# Patient Record
Sex: MALE | Race: WHITE | Employment: OTHER | URBAN - METROPOLITAN AREA
[De-identification: names, ages, dates, MRNs, and addresses within clinical notes are randomized per-mention and may not be internally consistent; named-entity substitution may affect disease eponyms.]

---

## 2017-01-30 PROBLEM — M54.50 CHRONIC MIDLINE LOW BACK PAIN WITHOUT SCIATICA: Status: ACTIVE | Noted: 2017-01-30

## 2017-01-30 PROBLEM — G89.29 CHRONIC MIDLINE LOW BACK PAIN WITHOUT SCIATICA: Status: ACTIVE | Noted: 2017-01-30

## 2017-01-30 PROBLEM — G62.9 SENSORIMOTOR NEUROPATHY: Status: ACTIVE | Noted: 2017-01-30

## 2017-01-30 PROBLEM — I67.89 CEREBRAL MICROVASCULOPATHY: Status: ACTIVE | Noted: 2017-01-30

## 2017-01-30 PROBLEM — Z87.898 HISTORY OF SNORING: Status: ACTIVE | Noted: 2017-01-30

## 2017-01-30 PROBLEM — R41.9 COGNITIVE COMPLAINTS: Status: ACTIVE | Noted: 2017-01-30

## 2017-02-02 PROBLEM — G62.89 MIXED AXONAL-DEMYELINATING POLYNEUROPATHY: Status: ACTIVE | Noted: 2017-02-02

## 2017-02-02 PROCEDURE — 82607 VITAMIN B-12: CPT | Performed by: OTHER

## 2017-02-02 PROCEDURE — 84165 PROTEIN E-PHORESIS SERUM: CPT | Performed by: OTHER

## 2017-02-02 PROCEDURE — 82746 ASSAY OF FOLIC ACID SERUM: CPT | Performed by: OTHER

## 2017-02-02 PROCEDURE — 83883 ASSAY NEPHELOMETRY NOT SPEC: CPT | Performed by: OTHER

## 2017-02-02 PROCEDURE — 86334 IMMUNOFIX E-PHORESIS SERUM: CPT | Performed by: OTHER

## 2017-02-02 PROCEDURE — 83921 ORGANIC ACID SINGLE QUANT: CPT | Performed by: OTHER

## 2017-02-02 PROCEDURE — 86618 LYME DISEASE ANTIBODY: CPT | Performed by: OTHER

## 2017-02-02 PROCEDURE — 86038 ANTINUCLEAR ANTIBODIES: CPT | Performed by: OTHER

## 2017-03-01 PROBLEM — I73.9 PVD (PERIPHERAL VASCULAR DISEASE) (HCC): Status: ACTIVE | Noted: 2017-03-01

## 2017-03-01 PROBLEM — E11.42 DIABETIC SENSORIMOTOR POLYNEUROPATHY (HCC): Status: ACTIVE | Noted: 2017-03-01

## 2017-03-01 PROBLEM — M48.061 LUMBAR FORAMINAL STENOSIS: Status: ACTIVE | Noted: 2017-03-01

## 2017-03-01 PROBLEM — I25.10 CORONARY ARTERY DISEASE INVOLVING NATIVE CORONARY ARTERY OF NATIVE HEART WITHOUT ANGINA PECTORIS: Status: ACTIVE | Noted: 2017-03-01

## 2017-03-01 PROBLEM — I34.0 NON-RHEUMATIC MITRAL REGURGITATION: Status: ACTIVE | Noted: 2017-03-01

## 2017-03-10 ENCOUNTER — HOSPITAL ENCOUNTER (OUTPATIENT)
Dept: GENERAL RADIOLOGY | Facility: HOSPITAL | Age: 62
Discharge: HOME | End: 2017-03-10
Attending: INTERNAL MEDICINE
Payer: COMMERCIAL

## 2017-03-10 ENCOUNTER — OFFICE VISIT (OUTPATIENT)
Dept: WOUND CARE | Facility: HOSPITAL | Age: 62
End: 2017-03-10
Attending: INTERNAL MEDICINE
Payer: COMMERCIAL

## 2017-03-10 DIAGNOSIS — E11.621 TYPE 2 DIABETES MELLITUS WITH FOOT ULCER (HCC): Primary | ICD-10-CM

## 2017-03-10 DIAGNOSIS — E11.621 DIABETES MELLITUS WITH ULCER OF FOOT (HCC): ICD-10-CM

## 2017-03-10 DIAGNOSIS — L97.509 FOOT ULCER DUE TO SECONDARY DM (HCC): ICD-10-CM

## 2017-03-10 DIAGNOSIS — E13.621 FOOT ULCER DUE TO SECONDARY DM (HCC): ICD-10-CM

## 2017-03-10 DIAGNOSIS — L97.509 DIABETES MELLITUS WITH ULCER OF FOOT (HCC): ICD-10-CM

## 2017-03-10 DIAGNOSIS — L97.509 TYPE 2 DIABETES MELLITUS WITH FOOT ULCER (HCC): Primary | ICD-10-CM

## 2017-03-10 PROCEDURE — 73630 X-RAY EXAM OF FOOT: CPT

## 2017-03-10 PROCEDURE — 11042 DBRDMT SUBQ TIS 1ST 20SQCM/<: CPT

## 2017-03-10 PROCEDURE — 87070 CULTURE OTHR SPECIMN AEROBIC: CPT

## 2017-03-10 PROCEDURE — 99215 OFFICE O/P EST HI 40 MIN: CPT

## 2017-03-10 PROCEDURE — 29445 APPL RIGID TOT CNTC LEG CAST: CPT

## 2017-03-10 PROCEDURE — 87147 CULTURE TYPE IMMUNOLOGIC: CPT

## 2017-03-10 PROCEDURE — 87205 SMEAR GRAM STAIN: CPT

## 2017-03-10 PROCEDURE — 87077 CULTURE AEROBIC IDENTIFY: CPT

## 2017-03-10 PROCEDURE — 87186 SC STD MICRODIL/AGAR DIL: CPT

## 2017-03-10 NOTE — PROGRESS NOTES
Chief Complaint  This information was obtained from the patient  Patient is a 60year old male and is here for an initial consult for his Right foot. Patient states that he has had this wound for about 2 years.  He only has pain when he steps onto his foot/ oral  pramipexole 1 mg tablet oral tablet oral  amlodipine 10 mg tablet oral tablet oral  ascorbic acid 7.5 mg-vit E 7.5 unit-biotin 1,250 mcg chewable tablet oral tablet,chewable oral  allopurinol 100 mg tablet oral tablet oral  insulin aspart 100 unit/mL The periwound skin did not exhibit: Edema, Erythema. The temperature of the periwound skin is Warm. Periwound skin does not exhibit signs or symptoms of infection.  Local Pulse is Normal.        Assessment    Active Problems    ICD-10  (Encounter Diagnosis) WEEK    Off-Loading  TCC  Right foot    Follow-Up Appointments  Return Appointment in 1 week. Misc/Additional Orders  Supplement with a daily multivitamin. Increase dietary protein  Exercise as tolerated.   S/S of Infection  Other: - intense blood sugar

## 2017-03-13 ENCOUNTER — OFFICE VISIT (OUTPATIENT)
Dept: WOUND CARE | Facility: HOSPITAL | Age: 62
End: 2017-03-13
Attending: INTERNAL MEDICINE
Payer: COMMERCIAL

## 2017-03-13 DIAGNOSIS — S91.301A UNSPECIFIED OPEN WOUND, RIGHT FOOT, INITIAL ENCOUNTER: ICD-10-CM

## 2017-03-13 DIAGNOSIS — E13.621 FOOT ULCER DUE TO SECONDARY DM (HCC): Primary | ICD-10-CM

## 2017-03-13 DIAGNOSIS — L97.509 FOOT ULCER DUE TO SECONDARY DM (HCC): Primary | ICD-10-CM

## 2017-03-13 PROCEDURE — 29445 APPL RIGID TOT CNTC LEG CAST: CPT

## 2017-03-17 ENCOUNTER — OFFICE VISIT (OUTPATIENT)
Dept: WOUND CARE | Facility: HOSPITAL | Age: 62
End: 2017-03-17
Attending: INTERNAL MEDICINE
Payer: COMMERCIAL

## 2017-03-17 DIAGNOSIS — L97.509 TYPE 2 DIABETES MELLITUS WITH FOOT ULCER (HCC): ICD-10-CM

## 2017-03-17 DIAGNOSIS — S91.301A UNSPECIFIED OPEN WOUND, RIGHT FOOT, INITIAL ENCOUNTER: Primary | ICD-10-CM

## 2017-03-17 DIAGNOSIS — E11.621 TYPE 2 DIABETES MELLITUS WITH FOOT ULCER (HCC): ICD-10-CM

## 2017-03-17 PROCEDURE — 29445 APPL RIGID TOT CNTC LEG CAST: CPT

## 2017-03-17 PROCEDURE — 11042 DBRDMT SUBQ TIS 1ST 20SQCM/<: CPT

## 2017-03-17 NOTE — PROGRESS NOTES
Chief Complaint  This information was obtained from the patient  Patient is a 60year old male and is here for a wound on his right foot. Has been tolerating cast well.      Allergies  Statins-Hmg-Coa Reductase Inhibitors (Reaction: Rash)    HPI  This inf (Hair, Skin)  Wound #1 Right, Plantar Foot is a chronic Marina Grade 1 Diabetic Ulcer and has received a status of Not Healed.  Subsequent wound encounter measurements are 0.5cm length x 0.4cm width x 0.4cm depth, with an area of 0.2 sq cm and a volume of 0 Lido. A moderate amount of bleeding was controlled with pressure. The procedure was tolerated well with a pain level of 0 throughout and a pain level of 0 following the procedure.  Post Debridement Measurements: 0.5cm length x 0.4cm width x 0.5cm depth; wit

## 2017-03-24 ENCOUNTER — OFFICE VISIT (OUTPATIENT)
Dept: WOUND CARE | Facility: HOSPITAL | Age: 62
End: 2017-03-24
Attending: INTERNAL MEDICINE
Payer: COMMERCIAL

## 2017-03-24 DIAGNOSIS — E13.621 FOOT ULCER DUE TO SECONDARY DM (HCC): ICD-10-CM

## 2017-03-24 DIAGNOSIS — S91.301A UNSPECIFIED OPEN WOUND, RIGHT FOOT, INITIAL ENCOUNTER: Primary | ICD-10-CM

## 2017-03-24 DIAGNOSIS — L97.509 TYPE 2 DIABETES MELLITUS WITH FOOT ULCER (HCC): ICD-10-CM

## 2017-03-24 DIAGNOSIS — L97.509 FOOT ULCER DUE TO SECONDARY DM (HCC): ICD-10-CM

## 2017-03-24 DIAGNOSIS — E11.621 TYPE 2 DIABETES MELLITUS WITH FOOT ULCER (HCC): ICD-10-CM

## 2017-03-24 PROCEDURE — 11042 DBRDMT SUBQ TIS 1ST 20SQCM/<: CPT

## 2017-03-24 PROCEDURE — 29445 APPL RIGID TOT CNTC LEG CAST: CPT

## 2017-03-24 PROCEDURE — 97597 DBRDMT OPN WND 1ST 20 CM/<: CPT

## 2017-03-31 ENCOUNTER — OFFICE VISIT (OUTPATIENT)
Dept: WOUND CARE | Facility: HOSPITAL | Age: 62
End: 2017-03-31
Attending: INTERNAL MEDICINE
Payer: COMMERCIAL

## 2017-03-31 DIAGNOSIS — L97.509 TYPE 2 DIABETES MELLITUS WITH FOOT ULCER (HCC): ICD-10-CM

## 2017-03-31 DIAGNOSIS — S91.301A UNSPECIFIED OPEN WOUND, RIGHT FOOT, INITIAL ENCOUNTER: Primary | ICD-10-CM

## 2017-03-31 DIAGNOSIS — E13.621 FOOT ULCER DUE TO SECONDARY DM (HCC): ICD-10-CM

## 2017-03-31 DIAGNOSIS — E11.621 TYPE 2 DIABETES MELLITUS WITH FOOT ULCER (HCC): ICD-10-CM

## 2017-03-31 DIAGNOSIS — L97.509 FOOT ULCER DUE TO SECONDARY DM (HCC): ICD-10-CM

## 2017-03-31 PROCEDURE — 29445 APPL RIGID TOT CNTC LEG CAST: CPT

## 2017-03-31 PROCEDURE — 11042 DBRDMT SUBQ TIS 1ST 20SQCM/<: CPT

## 2017-03-31 NOTE — PROGRESS NOTES
Chief Complaint  This information was obtained from the patient  Patient is a 60year old male and is here for a wound on his right foot. Has been tolerating cast well.      Allergies  Statins-Hmg-Coa Reductase Inhibitors (Reaction: Rash)    HPI  This infor one so he was told to stop taking the protein drink        Objective    Constitutional  Height/Length: 71 in (180.34 cm), Weight: 230 lbs (104.55 kgs), BMI: 32.1, Temperature: 96.7 °F (35.94 °C), Pulse: 67 bpm, Respiratory Rate: 16 breaths/min, Blood Press site        Procedures    Wound #1  Wound #1 (Diabetic Ulcer) is located on the right, plantar foot.  A skin/subcutaneous tissue level excisional/surgical debridement with a total area debrided of 0.09 sq cm was performed by Camacho Yoon MD. Manpreet Jacobo STUDY    General Notes:  3/10/17: bedside TBI- R 0.5    Follow-Up Appointments:  A follow-up appointment should be scheduled.

## 2017-04-07 ENCOUNTER — OFFICE VISIT (OUTPATIENT)
Dept: WOUND CARE | Facility: HOSPITAL | Age: 62
End: 2017-04-07
Attending: INTERNAL MEDICINE
Payer: COMMERCIAL

## 2017-04-07 ENCOUNTER — HOSPITAL ENCOUNTER (OUTPATIENT)
Dept: LAB | Facility: HOSPITAL | Age: 62
Discharge: HOME | End: 2017-04-07
Attending: INTERNAL MEDICINE
Payer: COMMERCIAL

## 2017-04-07 ENCOUNTER — HOSPITAL ENCOUNTER (OUTPATIENT)
Dept: CT IMAGING | Facility: HOSPITAL | Age: 62
Discharge: HOME | End: 2017-04-07
Attending: INTERNAL MEDICINE
Payer: COMMERCIAL

## 2017-04-07 DIAGNOSIS — E11.621 TYPE 2 DIABETES MELLITUS WITH FOOT ULCER (HCC): ICD-10-CM

## 2017-04-07 DIAGNOSIS — E87.5 HYPERKALEMIA: ICD-10-CM

## 2017-04-07 DIAGNOSIS — S91.301D UNSPECIFIED OPEN WOUND, RIGHT FOOT, SUBSEQUENT ENCOUNTER: Primary | ICD-10-CM

## 2017-04-07 DIAGNOSIS — N18.2 CKD (CHRONIC KIDNEY DISEASE), STAGE 2 (MILD): ICD-10-CM

## 2017-04-07 DIAGNOSIS — S91.301D UNSPECIFIED OPEN WOUND, RIGHT FOOT, SUBSEQUENT ENCOUNTER: ICD-10-CM

## 2017-04-07 DIAGNOSIS — L97.509 TYPE 2 DIABETES MELLITUS WITH FOOT ULCER (HCC): ICD-10-CM

## 2017-04-07 PROCEDURE — 99213 OFFICE O/P EST LOW 20 MIN: CPT

## 2017-04-07 PROCEDURE — 36415 COLL VENOUS BLD VENIPUNCTURE: CPT

## 2017-04-07 PROCEDURE — 73700 CT LOWER EXTREMITY W/O DYE: CPT

## 2017-04-07 PROCEDURE — 76377 3D RENDER W/INTRP POSTPROCES: CPT

## 2017-04-07 PROCEDURE — 80048 BASIC METABOLIC PNL TOTAL CA: CPT

## 2017-04-07 NOTE — PROGRESS NOTES
Chief Complaint  This information was obtained from the patient  Patient is a 60year old male and is here for a wound on his right foot. He states that he denies any pain to his wound. Patient came in with his cast bent in certain areas.  Once cast was mariela restriction? : Yes  Patient on prescription diet?: Diabetic Diet  Patient taking supplements? : No  Unintentional weight gain or loss > 5% / month?: No        Objective    Constitutional  Height/Length: 71 in (180.34 cm), Weight: 230 lbs (104.55 kgs), BMI: ulcer  A49.02:  Methicillin resistant Staphylococcus aureus infection, unspecified site        Plan    Wound Orders:  Wound #1 Right, Plantar Foot     Topicals:  Initial Anesthetic Order: Apply lidocaine to wound bed on all future wound center visits during

## 2017-04-14 ENCOUNTER — OFFICE VISIT (OUTPATIENT)
Dept: WOUND CARE | Facility: HOSPITAL | Age: 62
End: 2017-04-14
Attending: INTERNAL MEDICINE
Payer: COMMERCIAL

## 2017-04-14 DIAGNOSIS — E11.621 TYPE 2 DIABETES MELLITUS WITH FOOT ULCER (HCC): ICD-10-CM

## 2017-04-14 DIAGNOSIS — S91.301D UNSPECIFIED OPEN WOUND, RIGHT FOOT, SUBSEQUENT ENCOUNTER: Primary | ICD-10-CM

## 2017-04-14 DIAGNOSIS — L97.509 TYPE 2 DIABETES MELLITUS WITH FOOT ULCER (HCC): ICD-10-CM

## 2017-04-14 PROCEDURE — 99213 OFFICE O/P EST LOW 20 MIN: CPT

## 2017-04-14 NOTE — PROGRESS NOTES
Chief Complaint  This information was obtained from the patient  Pt here for a f/u to wound on right heel. Pt stated he thinks the wound has healed.      Allergies  Statins-Hmg-Coa Reductase Inhibitors (Reaction: Rash)    HPI  This information was obtaine supplements? : No  Unintentional weight gain or loss > 5% / month?: No        Objective    Constitutional  Height/Length: 71 in (180.34 cm), Weight: 230 lbs (104.55 kgs), BMI: 32.1, Temperature: 97.1 °F (36.17 °C), Pulse: 69 bpm, Respiratory Rate: 16 breat and/or cleanse wound with mild soap and water. Other: - band aid for protection    Off-Loading  Pressure relief shoe / inserts / foams  Other: - knee roller    Follow-Up Appointments  Return Appointment in 2 weeks.  - see dr. Jefferson Valente to address aurora paulino

## 2017-04-27 ENCOUNTER — OFFICE VISIT (OUTPATIENT)
Dept: WOUND CARE | Facility: HOSPITAL | Age: 62
End: 2017-04-27
Attending: NURSE PRACTITIONER
Payer: COMMERCIAL

## 2017-04-27 DIAGNOSIS — S91.301D UNSPECIFIED OPEN WOUND, RIGHT FOOT, SUBSEQUENT ENCOUNTER: Primary | ICD-10-CM

## 2017-04-27 DIAGNOSIS — L97.509 TYPE 2 DIABETES MELLITUS WITH FOOT ULCER (HCC): ICD-10-CM

## 2017-04-27 DIAGNOSIS — L97.511 NON-PRESSURE CHRONIC ULCER OF OTHER PART OF RIGHT FOOT LIMITED TO BREAKDOWN OF SKIN (HCC): ICD-10-CM

## 2017-04-27 DIAGNOSIS — E11.621 TYPE 2 DIABETES MELLITUS WITH FOOT ULCER (HCC): ICD-10-CM

## 2017-04-27 PROCEDURE — 99214 OFFICE O/P EST MOD 30 MIN: CPT

## 2017-04-27 NOTE — PROGRESS NOTES
Progress Note Details  Patient Name: Aaron Heck Date: 4/27/2017   Patient Number: [id number] Physician / Lisa Gomes: Diane Khan   Patient YOB: 1955 Facility: [facility]    Chief Complaint  This information was obtained f 4/27/16 Patient doing well. Ulcer is stable but remains open. No signs or symptoms of infection. Has history of reoccurrence. Charcot changes noted on CT and will be seen by Dr Hamilton Darnell next Wednesday at 115pm in [facility] office.  Has decreased me ascorbic acid 7.5 mg-vit E 7.5 unit-biotin 1,250 mcg chewable tablet oral tablet,chewable oral  allopurinol 100 mg tablet oral tablet oral  insulin aspart 100 unit/mL subcutaneous pen subcutaneous insulin pen subcutaneous  fenofibrate 120 mg tablet oral ta Wound #1 Right, Plantar Foot is a chronic Marina Grade 1 Diabetic Ulcer and has received a status of Not Healed. Subsequent wound encounter measurements are 0.1cm length x 0.1cm width with no measurable depth, with an area of 0.01 sq cm .  No tunneling has May shower and/or cleanse wound with mild soap and water.   Collagen - fibracol  Other: - band aid for protection  Change Dressing Daily and PRN    Off-Loading  Pressure relief shoe / inserts / foams  Other: - felted foam donut to be worn around open area u

## 2017-05-22 PROCEDURE — 87147 CULTURE TYPE IMMUNOLOGIC: CPT | Performed by: INTERNAL MEDICINE

## 2017-05-22 PROCEDURE — 87081 CULTURE SCREEN ONLY: CPT | Performed by: INTERNAL MEDICINE

## 2017-06-01 PROBLEM — Z86.73 H/O: CVA (CEREBROVASCULAR ACCIDENT): Status: ACTIVE | Noted: 2017-06-01

## 2017-06-08 ENCOUNTER — HOSPITAL (OUTPATIENT)
Dept: OTHER | Age: 62
End: 2017-06-08
Attending: INTERNAL MEDICINE

## 2017-06-08 LAB
GLUCOSE BLDC GLUCOMTR-MCNC: 129 MG/DL (ref 65–99)
GLUCOSE BLDC GLUCOMTR-MCNC: 142 MG/DL (ref 65–99)
GLUCOSE BLDC GLUCOMTR-MCNC: 99 MG/DL (ref 65–99)

## 2017-06-09 LAB
ANALYZER ANC (IANC): ABNORMAL
ANION GAP SERPL CALC-SCNC: 11 MMOL/L (ref 10–20)
BASOPHILS # BLD: 0 THOUSAND/MCL (ref 0–0.3)
BASOPHILS NFR BLD: 0 %
BUN SERPL-MCNC: 20 MG/DL (ref 6–20)
BUN/CREAT SERPL: 17 (ref 7–25)
CALCIUM SERPL-MCNC: 8.8 MG/DL (ref 8.4–10.2)
CHLORIDE: 102 MMOL/L (ref 98–107)
CO2 SERPL-SCNC: 28 MMOL/L (ref 21–32)
CREAT SERPL-MCNC: 1.17 MG/DL (ref 0.67–1.17)
DIFFERENTIAL METHOD BLD: ABNORMAL
EOSINOPHIL # BLD: 0.1 THOUSAND/MCL (ref 0.1–0.5)
EOSINOPHIL NFR BLD: 1 %
ERYTHROCYTE [DISTWIDTH] IN BLOOD: 13.1 % (ref 11–15)
GLUCOSE BLDC GLUCOMTR-MCNC: 138 MG/DL (ref 65–99)
GLUCOSE BLDC GLUCOMTR-MCNC: 158 MG/DL (ref 65–99)
GLUCOSE SERPL-MCNC: 159 MG/DL (ref 65–99)
HEMATOCRIT: 30.5 % (ref 39–51)
HGB BLD-MCNC: 10.1 GM/DL (ref 13–17)
LYMPHOCYTES # BLD: 0.9 THOUSAND/MCL (ref 1–4)
LYMPHOCYTES NFR BLD: 10 %
MCH RBC QN AUTO: 30.1 PG (ref 26–34)
MCHC RBC AUTO-ENTMCNC: 33.1 GM/DL (ref 32–36.5)
MCV RBC AUTO: 91 FL (ref 78–100)
MONOCYTES # BLD: 0.7 THOUSAND/MCL (ref 0.3–0.9)
MONOCYTES NFR BLD: 8 %
NEUTROPHILS # BLD: 7.2 THOUSAND/MCL (ref 1.8–7.7)
NEUTROPHILS NFR BLD: 81 %
NEUTS SEG NFR BLD: ABNORMAL %
PERCENT NRBC: ABNORMAL
PLATELET # BLD: 230 THOUSAND/MCL (ref 140–450)
POTASSIUM SERPL-SCNC: 4.5 MMOL/L (ref 3.4–5.1)
RBC # BLD: 3.35 MILLION/MCL (ref 4.5–5.9)
SODIUM SERPL-SCNC: 136 MMOL/L (ref 135–145)
WBC # BLD: 9 THOUSAND/MCL (ref 4.2–11)

## 2017-06-13 PROBLEM — Z47.89 AFTERCARE FOLLOWING SURGERY OF THE MUSCULOSKELETAL SYSTEM: Status: ACTIVE | Noted: 2017-06-13

## 2017-07-12 ENCOUNTER — ANESTHESIA EVENT (OUTPATIENT)
Dept: SURGERY | Facility: HOSPITAL | Age: 62
DRG: 856 | End: 2017-07-12
Payer: COMMERCIAL

## 2017-07-12 ENCOUNTER — HOSPITAL ENCOUNTER (INPATIENT)
Facility: HOSPITAL | Age: 62
LOS: 7 days | Discharge: NURSING, REHAB OR LONG-TERM CARE | DRG: 856 | End: 2017-07-19
Attending: HOSPITALIST | Admitting: HOSPITALIST
Payer: COMMERCIAL

## 2017-07-12 PROBLEM — L08.9 INFECTED TRAUMATIC LEG ULCER (HCC): Status: ACTIVE | Noted: 2017-07-12

## 2017-07-12 PROBLEM — L97.909 INFECTED TRAUMATIC LEG ULCER (HCC): Status: ACTIVE | Noted: 2017-07-12

## 2017-07-12 LAB
BASOPHILS # BLD AUTO: 0.02 X10(3) UL (ref 0–0.1)
BASOPHILS NFR BLD AUTO: 0.2 %
BUN BLD-MCNC: 38 MG/DL (ref 8–20)
CALCIUM BLD-MCNC: 9 MG/DL (ref 8.3–10.3)
CHLORIDE: 103 MMOL/L (ref 101–111)
CO2: 25 MMOL/L (ref 22–32)
CREAT BLD-MCNC: 1.65 MG/DL (ref 0.7–1.3)
EOSINOPHIL # BLD AUTO: 0 X10(3) UL (ref 0–0.3)
EOSINOPHIL NFR BLD AUTO: 0 %
ERYTHROCYTE [DISTWIDTH] IN BLOOD BY AUTOMATED COUNT: 14.3 % (ref 11.5–16)
EST. AVERAGE GLUCOSE BLD GHB EST-MCNC: 140 MG/DL (ref 68–126)
GLUCOSE BLD-MCNC: 209 MG/DL (ref 65–99)
GLUCOSE BLD-MCNC: 239 MG/DL (ref 70–99)
GLUCOSE BLD-MCNC: 252 MG/DL (ref 65–99)
HAV IGM SER QL: 2.2 MG/DL (ref 1.7–3)
HBA1C MFR BLD HPLC: 6.5 % (ref ?–5.7)
HCT VFR BLD AUTO: 25.2 % (ref 37–53)
HGB BLD-MCNC: 8.1 G/DL (ref 13–17)
IMMATURE GRANULOCYTE COUNT: 0.08 X10(3) UL (ref 0–1)
IMMATURE GRANULOCYTE RATIO %: 0.6 %
LYMPHOCYTES # BLD AUTO: 0.28 X10(3) UL (ref 0.9–4)
LYMPHOCYTES NFR BLD AUTO: 2.2 %
MCH RBC QN AUTO: 29.8 PG (ref 27–33.2)
MCHC RBC AUTO-ENTMCNC: 32.1 G/DL (ref 31–37)
MCV RBC AUTO: 92.6 FL (ref 80–99)
MONOCYTES # BLD AUTO: 0.57 X10(3) UL (ref 0.1–0.6)
MONOCYTES NFR BLD AUTO: 4.5 %
NEUTROPHIL ABS PRELIM: 11.6 X10 (3) UL (ref 1.3–6.7)
NEUTROPHILS # BLD AUTO: 11.6 X10(3) UL (ref 1.3–6.7)
NEUTROPHILS NFR BLD AUTO: 92.5 %
PLATELET # BLD AUTO: 244 10(3)UL (ref 150–450)
POTASSIUM SERPL-SCNC: 4.5 MMOL/L (ref 3.6–5.1)
RBC # BLD AUTO: 2.72 X10(6)UL (ref 4.3–5.7)
RED CELL DISTRIBUTION WIDTH-SD: 48 FL (ref 35.1–46.3)
SODIUM SERPL-SCNC: 135 MMOL/L (ref 136–144)
WBC # BLD AUTO: 12.6 X10(3) UL (ref 4–13)

## 2017-07-12 PROCEDURE — 87070 CULTURE OTHR SPECIMN AEROBIC: CPT | Performed by: INTERNAL MEDICINE

## 2017-07-12 PROCEDURE — 80048 BASIC METABOLIC PNL TOTAL CA: CPT | Performed by: HOSPITALIST

## 2017-07-12 PROCEDURE — 87186 SC STD MICRODIL/AGAR DIL: CPT | Performed by: INTERNAL MEDICINE

## 2017-07-12 PROCEDURE — 83036 HEMOGLOBIN GLYCOSYLATED A1C: CPT | Performed by: HOSPITALIST

## 2017-07-12 PROCEDURE — 87205 SMEAR GRAM STAIN: CPT | Performed by: INTERNAL MEDICINE

## 2017-07-12 PROCEDURE — 82962 GLUCOSE BLOOD TEST: CPT

## 2017-07-12 PROCEDURE — 87147 CULTURE TYPE IMMUNOLOGIC: CPT | Performed by: INTERNAL MEDICINE

## 2017-07-12 PROCEDURE — 87147 CULTURE TYPE IMMUNOLOGIC: CPT | Performed by: HOSPITALIST

## 2017-07-12 PROCEDURE — 87150 DNA/RNA AMPLIFIED PROBE: CPT | Performed by: HOSPITALIST

## 2017-07-12 PROCEDURE — 85025 COMPLETE CBC W/AUTO DIFF WBC: CPT | Performed by: HOSPITALIST

## 2017-07-12 PROCEDURE — 87040 BLOOD CULTURE FOR BACTERIA: CPT | Performed by: HOSPITALIST

## 2017-07-12 PROCEDURE — 83735 ASSAY OF MAGNESIUM: CPT | Performed by: HOSPITALIST

## 2017-07-12 PROCEDURE — 87186 SC STD MICRODIL/AGAR DIL: CPT | Performed by: HOSPITALIST

## 2017-07-12 PROCEDURE — 87077 CULTURE AEROBIC IDENTIFY: CPT | Performed by: INTERNAL MEDICINE

## 2017-07-12 RX ORDER — BISACODYL 10 MG
10 SUPPOSITORY, RECTAL RECTAL
Status: DISCONTINUED | OUTPATIENT
Start: 2017-07-12 | End: 2017-07-19

## 2017-07-12 RX ORDER — GABAPENTIN 600 MG/1
600 TABLET ORAL
COMMUNITY
End: 2017-08-03

## 2017-07-12 RX ORDER — ASPIRIN 325 MG
325 TABLET ORAL DAILY
Status: DISCONTINUED | OUTPATIENT
Start: 2017-07-13 | End: 2017-07-19

## 2017-07-12 RX ORDER — CARVEDILOL 6.25 MG/1
6.25 TABLET ORAL 2 TIMES DAILY WITH MEALS
Status: DISCONTINUED | OUTPATIENT
Start: 2017-07-12 | End: 2017-07-19

## 2017-07-12 RX ORDER — AMLODIPINE BESYLATE 5 MG/1
5 TABLET ORAL DAILY
Status: DISCONTINUED | OUTPATIENT
Start: 2017-07-13 | End: 2017-07-19

## 2017-07-12 RX ORDER — DULOXETIN HYDROCHLORIDE 20 MG/1
20 CAPSULE, DELAYED RELEASE ORAL DAILY
Status: DISCONTINUED | OUTPATIENT
Start: 2017-07-13 | End: 2017-07-19

## 2017-07-12 RX ORDER — HYDRALAZINE HYDROCHLORIDE 50 MG/1
100 TABLET, FILM COATED ORAL EVERY 8 HOURS SCHEDULED
Status: DISCONTINUED | OUTPATIENT
Start: 2017-07-12 | End: 2017-07-19

## 2017-07-12 RX ORDER — CARBAMAZEPINE 200 MG/1
400 TABLET ORAL 3 TIMES DAILY
Status: DISCONTINUED | OUTPATIENT
Start: 2017-07-12 | End: 2017-07-19

## 2017-07-12 RX ORDER — GABAPENTIN 600 MG/1
1200 TABLET ORAL 2 TIMES DAILY
Status: DISCONTINUED | OUTPATIENT
Start: 2017-07-12 | End: 2017-07-19

## 2017-07-12 RX ORDER — DEXTROSE MONOHYDRATE 25 G/50ML
50 INJECTION, SOLUTION INTRAVENOUS
Status: DISCONTINUED | OUTPATIENT
Start: 2017-07-12 | End: 2017-07-19

## 2017-07-12 RX ORDER — FENOFIBRATE 134 MG/1
134 CAPSULE ORAL
Status: DISCONTINUED | OUTPATIENT
Start: 2017-07-13 | End: 2017-07-19

## 2017-07-12 RX ORDER — GABAPENTIN 600 MG/1
1200 TABLET ORAL 2 TIMES DAILY
COMMUNITY
End: 2017-08-03

## 2017-07-12 RX ORDER — PRAMIPEXOLE DIHYDROCHLORIDE 1 MG/1
1 TABLET ORAL NIGHTLY
Status: DISCONTINUED | OUTPATIENT
Start: 2017-07-12 | End: 2017-07-19

## 2017-07-12 RX ORDER — ONDANSETRON 2 MG/ML
4 INJECTION INTRAMUSCULAR; INTRAVENOUS EVERY 6 HOURS PRN
Status: DISCONTINUED | OUTPATIENT
Start: 2017-07-12 | End: 2017-07-19

## 2017-07-12 RX ORDER — GABAPENTIN 600 MG/1
600 TABLET ORAL
Status: DISCONTINUED | OUTPATIENT
Start: 2017-07-13 | End: 2017-07-19

## 2017-07-12 RX ORDER — ACETAMINOPHEN 325 MG/1
650 TABLET ORAL EVERY 6 HOURS PRN
Status: DISCONTINUED | OUTPATIENT
Start: 2017-07-12 | End: 2017-07-19

## 2017-07-12 RX ORDER — POLYETHYLENE GLYCOL 3350 17 G/17G
17 POWDER, FOR SOLUTION ORAL DAILY PRN
Status: DISCONTINUED | OUTPATIENT
Start: 2017-07-12 | End: 2017-07-19

## 2017-07-12 RX ORDER — MULTIVIT WITH MINERALS/LUTEIN
1000 TABLET ORAL DAILY
Status: DISCONTINUED | OUTPATIENT
Start: 2017-07-12 | End: 2017-07-19

## 2017-07-12 RX ORDER — ALLOPURINOL 100 MG/1
100 TABLET ORAL DAILY
Status: DISCONTINUED | OUTPATIENT
Start: 2017-07-13 | End: 2017-07-19

## 2017-07-12 RX ORDER — FOLIC ACID 1 MG/1
1 TABLET ORAL DAILY
Status: DISCONTINUED | OUTPATIENT
Start: 2017-07-13 | End: 2017-07-19

## 2017-07-12 RX ORDER — HYDROMORPHONE HYDROCHLORIDE 1 MG/ML
0.5 INJECTION, SOLUTION INTRAMUSCULAR; INTRAVENOUS; SUBCUTANEOUS EVERY 2 HOUR PRN
Status: DISCONTINUED | OUTPATIENT
Start: 2017-07-12 | End: 2017-07-13

## 2017-07-12 RX ORDER — FOLIC ACID/VIT B COMPLEX AND C 400 MCG
1 TABLET ORAL DAILY
Status: DISCONTINUED | OUTPATIENT
Start: 2017-07-13 | End: 2017-07-19

## 2017-07-12 RX ORDER — ICOSAPENT ETHYL 1000 MG/1
2 CAPSULE ORAL 2 TIMES DAILY
Status: DISCONTINUED | OUTPATIENT
Start: 2017-07-12 | End: 2017-07-19

## 2017-07-12 RX ORDER — FOLIC ACID 1 MG/1
1 TABLET ORAL DAILY
COMMUNITY
End: 2017-08-03

## 2017-07-12 RX ORDER — HYDROMORPHONE HYDROCHLORIDE 1 MG/ML
1 INJECTION, SOLUTION INTRAMUSCULAR; INTRAVENOUS; SUBCUTANEOUS EVERY 2 HOUR PRN
Status: DISCONTINUED | OUTPATIENT
Start: 2017-07-12 | End: 2017-07-13

## 2017-07-12 NOTE — CONSULTS
INFECTIOUS DISEASE CONSULTATION    [facility] Patient Status:  Inpatient    1955 MRN [id number]   [facility] 5NW-A Attending Irlanda Ding, *   Hosp Day # 0 PCP Mariela Walsh [START ON 7/13/2017] DULoxetine HCl (CYMBALTA) DR particles cap 20 mg, 20 mg, Oral, Daily  •  [START ON 7/13/2017] fenofibrate micronized (LOFIBRA) cap 134 mg, 134 mg, Oral, Daily with breakfast  •  [START ON 7/08/8149] folic acid (FOLVITE) tab 1 mg, 1 mg, nondistended. Positive bowel sounds.   Musculoskeletal: right foot wound open, discharge, erythema, swelling        Laboratory Data:      Recent Labs   Lab  07/12/17   1318   RBC  2.72*   HGB  8.1*   HCT  25.2*   MCV  92.6   MCH  29.8   MCHC  32.1   RDW  1 vancomycin  I/D in am per podiatry    2. Renal insufficency, adjust abx dosinig    3.  CARROLL Curtis MD  [facility]  [phone]

## 2017-07-12 NOTE — ANESTHESIA PREPROCEDURE EVALUATION
PRE-OP EVALUATION    Patient Name: Alcon Whitaker    Pre-op Diagnosis: PT ON [address]    Procedure(s):  IRRIGATION AND DEBRIDEMENT OF RIGHT FOOT    Surgeon(s) and Role:     Mary Simon DPM - Primary    Pre-op vitals reviewed.   Temp: 100.2 °F ( (325 mg total) by mouth daily. Disp: 90 tablet Rfl: 3   VASCEPA 1 G Oral Cap 2 tablets 2 (two) times daily. Disp:  Rfl: 4   Ergocalciferol (VITAMIN D OR) Take 5,000 capsules by mouth daily.  Disp:  Rfl:    Ascorbic Acid (VITAMIN C) 1000 MG Oral Tab Take 1,0

## 2017-07-12 NOTE — PROGRESS NOTES
[facility] Pharmacy Note: Antimicrobial Weight Dose Adjustment for: Zosyn (piperacillin/tazobactam)    Christina Garcia is a 60year old male who has been prescribed Zosyn (piperacillin/tazobactam) 3.375 g IVPB every 8 hours.   The patient has a weight/BMI of 112.4 k

## 2017-07-12 NOTE — PROGRESS NOTES
Brief Internal Medicine Note    Full Note to Follow      Pt is a 61 yo with CAD, HTN/HL, DMII with nephropathy and neuropathy, CKD, cerebrovascular disease s/p hx CVA with cognitive changes per wife, who is directly admitted for severe infection of R foot

## 2017-07-12 NOTE — H&P
[facility] Hospitalist H&P       CC: R foot infection    PCP: Luiza Gates MD    History of Present Illness:     Pt is a 61 yo with CAD, HTN/HL, DMII with nephropathy and neuropathy, CKD, cerebrovascular disease s/p hx CVA with cognitive changes per wife, 1000 MG (OSM) Oral Tablet 24 Hr Take 2 tablets (2,000 mg total) by mouth daily.  Disp: 180 tablet Rfl: 1   HYDRALAZINE  MG Oral Tab TAKE 1 TABLET(100 MG) BY MOUTH THREE TIMES DAILY Disp: 270 tablet Rfl: 0   PRAMIPEXOLE DIHYDROCHLORIDE 1 MG Oral Tab T arms--somewhat tremulous, AxO x2. Answers questions, no accessory m use   Head:  Normocephalic, without obvious abnormality, atraumatic. Eyes:  Sclera anicteric, EOMs intact. Lids wnl.  PE   Ears, nose, throat:  external ears and nose within normal limit anticoagulation give procedure tomorrow     Outpatient records or previous hospital records reviewed. Further recommendations pending patient's clinical course.   [facility] hospitalist to continue to follow patient while in house    Patient and/or patient's f

## 2017-07-13 ENCOUNTER — SURGERY (OUTPATIENT)
Age: 62
End: 2017-07-13

## 2017-07-13 ENCOUNTER — APPOINTMENT (OUTPATIENT)
Dept: MRI IMAGING | Facility: HOSPITAL | Age: 62
DRG: 856 | End: 2017-07-13
Attending: Other
Payer: COMMERCIAL

## 2017-07-13 ENCOUNTER — ANESTHESIA (OUTPATIENT)
Dept: SURGERY | Facility: HOSPITAL | Age: 62
DRG: 856 | End: 2017-07-13
Payer: COMMERCIAL

## 2017-07-13 LAB
AMMONIA: <10 UMOL/L (ref 11–32)
ANTIBODY SCREEN: NEGATIVE
BASOPHILS # BLD AUTO: 0.03 X10(3) UL (ref 0–0.1)
BASOPHILS NFR BLD AUTO: 0.3 %
BUN BLD-MCNC: 50 MG/DL (ref 8–20)
CALCIUM BLD-MCNC: 9.4 MG/DL (ref 8.3–10.3)
CHLORIDE: 102 MMOL/L (ref 101–111)
CO2: 25 MMOL/L (ref 22–32)
CREAT BLD-MCNC: 2.06 MG/DL (ref 0.7–1.3)
EOSINOPHIL # BLD AUTO: 0 X10(3) UL (ref 0–0.3)
EOSINOPHIL NFR BLD AUTO: 0 %
ERYTHROCYTE [DISTWIDTH] IN BLOOD BY AUTOMATED COUNT: 14.4 % (ref 11.5–16)
GLUCOSE BLD-MCNC: 171 MG/DL (ref 70–99)
GLUCOSE BLD-MCNC: 192 MG/DL (ref 65–99)
GLUCOSE BLD-MCNC: 194 MG/DL (ref 65–99)
GLUCOSE BLD-MCNC: 195 MG/DL (ref 65–99)
GLUCOSE BLD-MCNC: 244 MG/DL (ref 65–99)
HAV IGM SER QL: 2.4 MG/DL (ref 1.7–3)
HCT VFR BLD AUTO: 25 % (ref 37–53)
HGB BLD-MCNC: 8 G/DL (ref 13–17)
IMMATURE GRANULOCYTE COUNT: 0.05 X10(3) UL (ref 0–1)
IMMATURE GRANULOCYTE RATIO %: 0.4 %
LYMPHOCYTES # BLD AUTO: 0.56 X10(3) UL (ref 0.9–4)
LYMPHOCYTES NFR BLD AUTO: 5 %
MCH RBC QN AUTO: 30 PG (ref 27–33.2)
MCHC RBC AUTO-ENTMCNC: 32 G/DL (ref 31–37)
MCV RBC AUTO: 93.6 FL (ref 80–99)
MONOCYTES # BLD AUTO: 0.64 X10(3) UL (ref 0.1–0.6)
MONOCYTES NFR BLD AUTO: 5.7 %
NEUTROPHIL ABS PRELIM: 9.93 X10 (3) UL (ref 1.3–6.7)
NEUTROPHILS # BLD AUTO: 9.93 X10(3) UL (ref 1.3–6.7)
NEUTROPHILS NFR BLD AUTO: 88.6 %
PLATELET # BLD AUTO: 225 10(3)UL (ref 150–450)
POTASSIUM SERPL-SCNC: 4.2 MMOL/L (ref 3.6–5.1)
RBC # BLD AUTO: 2.67 X10(6)UL (ref 4.3–5.7)
RED CELL DISTRIBUTION WIDTH-SD: 48.6 FL (ref 35.1–46.3)
RH BLOOD TYPE: POSITIVE
SODIUM SERPL-SCNC: 135 MMOL/L (ref 136–144)
WBC # BLD AUTO: 11.2 X10(3) UL (ref 4–13)

## 2017-07-13 PROCEDURE — 83735 ASSAY OF MAGNESIUM: CPT | Performed by: HOSPITALIST

## 2017-07-13 PROCEDURE — 86850 RBC ANTIBODY SCREEN: CPT | Performed by: HOSPITALIST

## 2017-07-13 PROCEDURE — 70551 MRI BRAIN STEM W/O DYE: CPT | Performed by: OTHER

## 2017-07-13 PROCEDURE — 87205 SMEAR GRAM STAIN: CPT | Performed by: PODIATRIST

## 2017-07-13 PROCEDURE — 95816 EEG AWAKE AND DROWSY: CPT

## 2017-07-13 PROCEDURE — 82962 GLUCOSE BLOOD TEST: CPT

## 2017-07-13 PROCEDURE — 86901 BLOOD TYPING SEROLOGIC RH(D): CPT | Performed by: HOSPITALIST

## 2017-07-13 PROCEDURE — 87075 CULTR BACTERIA EXCEPT BLOOD: CPT | Performed by: PODIATRIST

## 2017-07-13 PROCEDURE — 85025 COMPLETE CBC W/AUTO DIFF WBC: CPT | Performed by: HOSPITALIST

## 2017-07-13 PROCEDURE — 80048 BASIC METABOLIC PNL TOTAL CA: CPT | Performed by: HOSPITALIST

## 2017-07-13 PROCEDURE — 87147 CULTURE TYPE IMMUNOLOGIC: CPT | Performed by: PODIATRIST

## 2017-07-13 PROCEDURE — 82140 ASSAY OF AMMONIA: CPT | Performed by: OTHER

## 2017-07-13 PROCEDURE — 87176 TISSUE HOMOGENIZATION CULTR: CPT | Performed by: PODIATRIST

## 2017-07-13 PROCEDURE — 87040 BLOOD CULTURE FOR BACTERIA: CPT | Performed by: INTERNAL MEDICINE

## 2017-07-13 PROCEDURE — 87070 CULTURE OTHR SPECIMN AEROBIC: CPT | Performed by: PODIATRIST

## 2017-07-13 PROCEDURE — 87186 SC STD MICRODIL/AGAR DIL: CPT | Performed by: PODIATRIST

## 2017-07-13 PROCEDURE — 86900 BLOOD TYPING SEROLOGIC ABO: CPT | Performed by: HOSPITALIST

## 2017-07-13 PROCEDURE — 0QBN0ZZ EXCISION OF RIGHT METATARSAL, OPEN APPROACH: ICD-10-PCS | Performed by: PODIATRIST

## 2017-07-13 RX ORDER — MIDAZOLAM HYDROCHLORIDE 1 MG/ML
1 INJECTION INTRAMUSCULAR; INTRAVENOUS EVERY 5 MIN PRN
Status: DISCONTINUED | OUTPATIENT
Start: 2017-07-13 | End: 2017-07-13 | Stop reason: HOSPADM

## 2017-07-13 RX ORDER — HYDROCODONE BITARTRATE AND ACETAMINOPHEN 5; 325 MG/1; MG/1
2 TABLET ORAL AS NEEDED
Status: DISCONTINUED | OUTPATIENT
Start: 2017-07-13 | End: 2017-07-13 | Stop reason: HOSPADM

## 2017-07-13 RX ORDER — SODIUM CHLORIDE, SODIUM LACTATE, POTASSIUM CHLORIDE, CALCIUM CHLORIDE 600; 310; 30; 20 MG/100ML; MG/100ML; MG/100ML; MG/100ML
INJECTION, SOLUTION INTRAVENOUS CONTINUOUS
Status: DISCONTINUED | OUTPATIENT
Start: 2017-07-13 | End: 2017-07-16

## 2017-07-13 RX ORDER — INSULIN ASPART 100 [IU]/ML
INJECTION, SOLUTION INTRAVENOUS; SUBCUTANEOUS
Status: COMPLETED
Start: 2017-07-13 | End: 2017-07-13

## 2017-07-13 RX ORDER — BACITRACIN 50000 [USP'U]/1
INJECTION, POWDER, LYOPHILIZED, FOR SOLUTION INTRAMUSCULAR AS NEEDED
Status: DISCONTINUED | OUTPATIENT
Start: 2017-07-13 | End: 2017-07-13 | Stop reason: HOSPADM

## 2017-07-13 RX ORDER — ONDANSETRON 2 MG/ML
4 INJECTION INTRAMUSCULAR; INTRAVENOUS AS NEEDED
Status: DISCONTINUED | OUTPATIENT
Start: 2017-07-13 | End: 2017-07-13 | Stop reason: HOSPADM

## 2017-07-13 RX ORDER — MEPERIDINE HYDROCHLORIDE 25 MG/ML
12.5 INJECTION INTRAMUSCULAR; INTRAVENOUS; SUBCUTANEOUS AS NEEDED
Status: DISCONTINUED | OUTPATIENT
Start: 2017-07-13 | End: 2017-07-13 | Stop reason: HOSPADM

## 2017-07-13 RX ORDER — DEXTROSE MONOHYDRATE 25 G/50ML
50 INJECTION, SOLUTION INTRAVENOUS
Status: DISCONTINUED | OUTPATIENT
Start: 2017-07-13 | End: 2017-07-13 | Stop reason: HOSPADM

## 2017-07-13 RX ORDER — LABETALOL HYDROCHLORIDE 5 MG/ML
5 INJECTION, SOLUTION INTRAVENOUS EVERY 5 MIN PRN
Status: DISCONTINUED | OUTPATIENT
Start: 2017-07-13 | End: 2017-07-13 | Stop reason: HOSPADM

## 2017-07-13 RX ORDER — INSULIN ASPART 100 [IU]/ML
INJECTION, SOLUTION INTRAVENOUS; SUBCUTANEOUS ONCE
Status: COMPLETED | OUTPATIENT
Start: 2017-07-13 | End: 2017-07-13

## 2017-07-13 RX ORDER — HYDROMORPHONE HYDROCHLORIDE 1 MG/ML
0.4 INJECTION, SOLUTION INTRAMUSCULAR; INTRAVENOUS; SUBCUTANEOUS EVERY 2 HOUR PRN
Status: DISCONTINUED | OUTPATIENT
Start: 2017-07-13 | End: 2017-07-19

## 2017-07-13 RX ORDER — HYDROMORPHONE HYDROCHLORIDE 1 MG/ML
0.8 INJECTION, SOLUTION INTRAMUSCULAR; INTRAVENOUS; SUBCUTANEOUS EVERY 2 HOUR PRN
Status: DISCONTINUED | OUTPATIENT
Start: 2017-07-13 | End: 2017-07-19

## 2017-07-13 RX ORDER — HYDROMORPHONE HYDROCHLORIDE 1 MG/ML
0.4 INJECTION, SOLUTION INTRAMUSCULAR; INTRAVENOUS; SUBCUTANEOUS EVERY 5 MIN PRN
Status: DISCONTINUED | OUTPATIENT
Start: 2017-07-13 | End: 2017-07-13 | Stop reason: HOSPADM

## 2017-07-13 RX ORDER — HYDROMORPHONE HYDROCHLORIDE 1 MG/ML
0.2 INJECTION, SOLUTION INTRAMUSCULAR; INTRAVENOUS; SUBCUTANEOUS EVERY 2 HOUR PRN
Status: DISCONTINUED | OUTPATIENT
Start: 2017-07-13 | End: 2017-07-19

## 2017-07-13 RX ORDER — NALOXONE HYDROCHLORIDE 0.4 MG/ML
80 INJECTION, SOLUTION INTRAMUSCULAR; INTRAVENOUS; SUBCUTANEOUS AS NEEDED
Status: DISCONTINUED | OUTPATIENT
Start: 2017-07-13 | End: 2017-07-13 | Stop reason: HOSPADM

## 2017-07-13 RX ORDER — HYDROCODONE BITARTRATE AND ACETAMINOPHEN 5; 325 MG/1; MG/1
1 TABLET ORAL AS NEEDED
Status: DISCONTINUED | OUTPATIENT
Start: 2017-07-13 | End: 2017-07-13 | Stop reason: HOSPADM

## 2017-07-13 RX ORDER — HEPARIN SODIUM 5000 [USP'U]/ML
5000 INJECTION, SOLUTION INTRAVENOUS; SUBCUTANEOUS EVERY 12 HOURS SCHEDULED
Status: DISCONTINUED | OUTPATIENT
Start: 2017-07-14 | End: 2017-07-19

## 2017-07-13 RX ORDER — SODIUM CHLORIDE 9 MG/ML
INJECTION, SOLUTION INTRAVENOUS CONTINUOUS
Status: DISCONTINUED | OUTPATIENT
Start: 2017-07-13 | End: 2017-07-14

## 2017-07-13 RX ORDER — DIPHENHYDRAMINE HYDROCHLORIDE 50 MG/ML
12.5 INJECTION INTRAMUSCULAR; INTRAVENOUS AS NEEDED
Status: DISCONTINUED | OUTPATIENT
Start: 2017-07-13 | End: 2017-07-13 | Stop reason: HOSPADM

## 2017-07-13 RX ORDER — METOCLOPRAMIDE HYDROCHLORIDE 5 MG/ML
10 INJECTION INTRAMUSCULAR; INTRAVENOUS AS NEEDED
Status: DISCONTINUED | OUTPATIENT
Start: 2017-07-13 | End: 2017-07-13 | Stop reason: HOSPADM

## 2017-07-13 NOTE — PLAN OF CARE
Diabetes/Glucose Control    • Glucose maintained within prescribed range Progressing        Patient/Family Goals    • Patient/Family Long Term Goal Progressing    • Patient/Family Short Term Goal Progressing            Pt aox4, confusion at times.  Maintain

## 2017-07-13 NOTE — CM/SW NOTE
07/13/17 1600   CM/SW Referral Data   Referral Source Physician;Social Work (self-referral)   Reason for Referral Discharge planning   Informant Patient;Spouse   Pertinent Medical Hx   Primary Care Physician Name [facility]   Patient Info   Patient's Mental

## 2017-07-13 NOTE — CONSULTS
[facility]  Neurology  Consultation Report    Araceli Oleary Patient Status:  Inpatient    1955 MRN [id number]   [facility] 5NW-A Attending Luis Olea, *   Hosp Day # 1 PCP Patricia Bhatt MD   Date of Admission: History:  No date: APPENDECTOMY  No date: OTHER Left      Comment: AC joint x 2  06/2017: OTHER      Comment: 1st metatarsal osteotomy, 2nd, and 3rd                metatarsal head resections, right foot. Family History  History reviewed.  No pertinent f 1,500 mg in sodium chloride 0.9 % 500 mL IVPB 1,500 mg Intravenous Q24H   Piperacillin Sod-Tazobactam So (ZOSYN) 3.375 g in dextrose 5 % 100 mL IVPB 3.375 g Intravenous Q8H   glucose (DEX4) oral liquid 15 g 15 g Oral Q15 Min PRN   Or      Glucose-Vitamin C Oral Tab Take 1,000 mg by mouth daily. Insulin Aspart 100 UNIT/ML Subcutaneous Solution Pen-injector Inject 8 Units into the skin 2 (two) times daily.    Dakins, 1/4 strength, 0.125 % External Solution Apply bid to affected area   VIRT-TARUN 2.5-25-1 MG Or Pulse 75   Temp 98.5 °F (36.9 °C) (Oral)   Resp 20   Ht 5' 11\" (1.803 m)   Wt 247 lb 12.8 oz (112.4 kg)   SpO2 94%   BMI 34.56 kg/m²    General: The patient appears attentive, bright and well composed. Head: Normocephalic, atraumatic.   Eyes: anicteric  E presence of infection, the patient could be confused from this as well. I don't suspect the patient has meningoencephalitis. There does not appear to be indication at this time for lumbar puncture. It seems reasonable to repeat imaging of the brain.

## 2017-07-13 NOTE — PROCEDURES
ELECTROENCEPHALOGRAM REPORT      Patient Name: Pepe Loza  Chart ID: [id number]  Ordering Physician: Juany Christensen M.D. Date of Test: July 13, 2017  Patient Diagnosis: Confusion    A routine EEG was obtained. No sedation was given.     The record i

## 2017-07-13 NOTE — PLAN OF CARE
Diabetes/Glucose Control    • Glucose maintained within prescribed range Progressing        PAIN - ADULT    • Verbalizes/displays adequate comfort level or patient's stated pain goal Progressing        Patient/Family Goals    • Patient/Family [facility]

## 2017-07-13 NOTE — PROGRESS NOTES
[facility] Hospitalist Progress Note     PCP: Judie Lombard, MD    CC:  Follow up    SUBJECTIVE:  Pt laying in bed, on 2L NC. Spoke with RN--desatted with 1 mg IV dilaudid. Says he does not want his leg cut off.         OBJECTIVE:  Temp:  [98.5 °F (36.9 °C)-1 • folic acid  1 mg Oral Daily   • vitamin E  1,000 Units Oral Daily   • aspirin  325 mg Oral Daily   • JUAN-BEE/C  1 tablet Oral Daily   • gabapentin  1,200 mg Oral BID   • gabapentin  600 mg Oral After lunch   • HydrALAZINE HCl  100 mg Oral Q8H [address]   • protocol prn     **PPx-holding ppx anticoagulation given procedure today        Thank You,  Marla Parker MD    [facility] Hospitalist  Pager: [phone]

## 2017-07-13 NOTE — PAYOR COMM NOTE
--------------  ADMISSION REVIEW     Payor: [address]  Subscriber #:  [id number]  Authorization Number: N/A    Admit date: 7/12/2017 11:56 AM       Admitting Physician: Gaurav Lopez DPM  Attending Physician:  Luis Olea, *  Pr Location: Left arm)   Pulse 82   Temp 100.2 °F (37.9 °C) (Oral)   Resp 20   Ht 180.3 cm (5' 11\")   Wt 247 lb 12.8 oz (112.4 kg)   SpO2 100%   BMI 34.56 kg/m²    General:[LM.1]     Sitting up in bed, appears slightly restless, moving arms--somewhat tremulo appreciate[LM.3]    **anemia- ?acute. HDS. Suspect consumptive/reactive.  Repeat cbc in AM[LM.4]       MEDICATIONS ADMINISTERED IN LAST 1 DAY:  acetaminophen (TYLENOL) tab 650 mg     Date Action Dose Route User    7/12/2017 1645 Given 650 mg Oral Christopher Action Dose Route User    7/13/2017 0808 New Bag 3.375 g Intravenous Guillermina Kwan RN    7/13/2017 0150 New Bag 3.375 g Intravenous Vidal Bush RN      Piperacillin Sod-Tazobactam So (ZOSYN) 4.5 g in dextrose 5 % 100 mL IVPB     Date Action Dose

## 2017-07-13 NOTE — CONSULTS
[address] [place]    PATIENT'S NAME: GÉNESIS MYERS   ATTENDING PHYSICIAN: Larry Stout. Bailey Martinez MD   CONSULTING PHYSICIAN: Bethel Fowler D.P.M.    PATIENT ACCOUNT#:   [de-identified]    LOCATION:  [address] [place] [address]  MEDICAL RECORD #:   [id number]       DATE OF BIRTH: vancomycin. Wound cultures have been obtained, as well as blood cultures. The patient is complaining of pain to his right foot, mostly neuropathic in nature. PAST MEDICAL HISTORY:  Reviewed, and in the chart.       PAST SURGICAL HISTORY:  Reviewed, and await final wound cultures prior to adjusting antibiotics. All questions were entertained and answered. I will see the patient tomorrow.       Dictated By Ann-Marie Rojas D.P.M.  d: 07/12/2017 19:28:17  t: 07/12/2017 20:54:33  [facility] [id number]  GV/C02

## 2017-07-13 NOTE — PROGRESS NOTES
PT NOTED WITHOUT VOID SINCE 8AM. PT REPORTED NOT HAVING THE URGE TO VOID. ASSISTED PT TO USE URINAL, PT UNABLE TO URINATE AND REPORTED BLADDER FEELING FULL BUT DONT FEEL THE NEED TO VOID, BLADDER [id number].  PERFORMED STRAIGHT CATH FOR PT AS PER PROTOCOL A

## 2017-07-13 NOTE — PROGRESS NOTES
[facility]                INFECTIOUS DISEASE PROGRESS NOTE    Abimael Canada Patient Status:  Inpatient    1955 MRN [id number]   [facility] 5NW-A Attending Aneudy Redman, [facility] Day # 1 PCP Jessica Garzon MD     A 07/13/17 0536   Specimen: Blood from Blood,peripheral     Blood Culture Result Pending (A)    Blood Culture Smear Gram positive cocci in clusters     Aerobic Bacterial Culture Once [[id number]] (Abnormal) Collected: 07/12/17 1621   Order Status: Completed [facility]  [phone]

## 2017-07-13 NOTE — ANESTHESIA POSTPROCEDURE EVALUATION
[facility] Patient Status:  Inpatient   Age/Gender 60year old male MRN [id number]   [facility] SURGERY Attending Karin Kim [facility] Day # 1 PCP Lonny Spaulding MD       Anesthesia Post-op Note    Procedure

## 2017-07-13 NOTE — PROGRESS NOTES
Pt admitted to the floor from physician's office. Pt c/o pain rated 10/10 to right foot, prn dilaudid administered as ordered. Tylenol given for elevated temp. Dr. Ashley Villalpando notified of consult, IV antibiotics infusing as ordered.  Wound cultured and wet to

## 2017-07-13 NOTE — BRIEF OP NOTE
Pre-Operative Diagnosis: Post Operative Infection, Right Foot     Post-Operative Diagnosis: Post Operative Infection, Right Foot     Procedure Performed:   INCISION AND DRAINAGE OF COMPLEX POSTOPERATIVE INFECTION RIGHT FOOT,   EXCISIONAL DEBRIDEMENT OF

## 2017-07-14 ENCOUNTER — APPOINTMENT (OUTPATIENT)
Dept: GENERAL RADIOLOGY | Facility: HOSPITAL | Age: 62
DRG: 856 | End: 2017-07-14
Attending: HOSPITALIST
Payer: COMMERCIAL

## 2017-07-14 ENCOUNTER — APPOINTMENT (OUTPATIENT)
Dept: ULTRASOUND IMAGING | Facility: HOSPITAL | Age: 62
DRG: 856 | End: 2017-07-14
Attending: INTERNAL MEDICINE
Payer: COMMERCIAL

## 2017-07-14 LAB
ALLENS TEST: POSITIVE
ARTERIAL BLD GAS O2 SATURATION: 91 % (ref 92–100)
ARTERIAL BLOOD GAS BASE EXCESS: -0.7
ARTERIAL BLOOD GAS HCO3: 24.8 MEQ/L (ref 22–26)
ARTERIAL BLOOD GAS PCO2: 45 MM HG (ref 35–45)
ARTERIAL BLOOD GAS PH: 7.36 (ref 7.35–7.45)
ARTERIAL BLOOD GAS PO2: 67 MM HG (ref 80–105)
BASOPHILS # BLD AUTO: 0.02 X10(3) UL (ref 0–0.1)
BASOPHILS NFR BLD AUTO: 0.2 %
BUN BLD-MCNC: 52 MG/DL (ref 8–20)
CALCIUM BLD-MCNC: 8.3 MG/DL (ref 8.3–10.3)
CALCULATED O2 SATURATION: 92 % (ref 92–100)
CARBOXYHEMOGLOBIN: 1 % SAT (ref 0–3)
CHLORIDE: 99 MMOL/L (ref 101–111)
CO2: 25 MMOL/L (ref 22–32)
CREAT BLD-MCNC: 2 MG/DL (ref 0.7–1.3)
D-DIMER: 1.54 UG/ML FEU (ref 0–0.49)
EOSINOPHIL # BLD AUTO: 0 X10(3) UL (ref 0–0.3)
EOSINOPHIL NFR BLD AUTO: 0 %
ERYTHROCYTE [DISTWIDTH] IN BLOOD BY AUTOMATED COUNT: 14.2 % (ref 11.5–16)
GLUCOSE BLD-MCNC: 199 MG/DL (ref 65–99)
GLUCOSE BLD-MCNC: 213 MG/DL (ref 65–99)
GLUCOSE BLD-MCNC: 223 MG/DL (ref 70–99)
GLUCOSE BLD-MCNC: 244 MG/DL (ref 65–99)
HAV IGM SER QL: 2.4 MG/DL (ref 1.7–3)
HCT VFR BLD AUTO: 24 % (ref 37–53)
HGB BLD-MCNC: 7.7 G/DL (ref 13–17)
HGB BLD-MCNC: 7.8 G/DL (ref 13–17)
IMMATURE GRANULOCYTE COUNT: 0.04 X10(3) UL (ref 0–1)
IMMATURE GRANULOCYTE RATIO %: 0.4 %
L/M: 10 L/MIN
LYMPHOCYTES # BLD AUTO: 0.57 X10(3) UL (ref 0.9–4)
LYMPHOCYTES NFR BLD AUTO: 5.5 %
MCH RBC QN AUTO: 29.7 PG (ref 27–33.2)
MCHC RBC AUTO-ENTMCNC: 32.5 G/DL (ref 31–37)
MCV RBC AUTO: 91.3 FL (ref 80–99)
METHEMOGLOBIN: 0.5 % SAT (ref 0.4–1.5)
MONOCYTES # BLD AUTO: 0.84 X10(3) UL (ref 0.1–0.6)
MONOCYTES NFR BLD AUTO: 8.2 %
NEUTROPHIL ABS PRELIM: 8.81 X10 (3) UL (ref 1.3–6.7)
NEUTROPHILS # BLD AUTO: 8.81 X10(3) UL (ref 1.3–6.7)
NEUTROPHILS NFR BLD AUTO: 85.7 %
PATIENT TEMPERATURE: 98.6 F
PLATELET # BLD AUTO: 250 10(3)UL (ref 150–450)
POTASSIUM SERPL-SCNC: 4.1 MMOL/L (ref 3.6–5.1)
RBC # BLD AUTO: 2.63 X10(6)UL (ref 4.3–5.7)
RED CELL DISTRIBUTION WIDTH-SD: 47.3 FL (ref 35.1–46.3)
SODIUM SERPL-SCNC: 134 MMOL/L (ref 136–144)
TOTAL HEMOGLOBIN: 7.8 G/DL (ref 13.2–17.3)
WBC # BLD AUTO: 10.3 X10(3) UL (ref 4–13)

## 2017-07-14 PROCEDURE — 85025 COMPLETE CBC W/AUTO DIFF WBC: CPT | Performed by: HOSPITALIST

## 2017-07-14 PROCEDURE — 93970 EXTREMITY STUDY: CPT | Performed by: INTERNAL MEDICINE

## 2017-07-14 PROCEDURE — 97162 PT EVAL MOD COMPLEX 30 MIN: CPT

## 2017-07-14 PROCEDURE — 83050 HGB METHEMOGLOBIN QUAN: CPT | Performed by: HOSPITALIST

## 2017-07-14 PROCEDURE — 85018 HEMOGLOBIN: CPT | Performed by: HOSPITALIST

## 2017-07-14 PROCEDURE — 71010 XR CHEST AP PORTABLE  (CPT=71010): CPT | Performed by: HOSPITALIST

## 2017-07-14 PROCEDURE — 85378 FIBRIN DEGRADE SEMIQUANT: CPT | Performed by: HOSPITALIST

## 2017-07-14 PROCEDURE — 82375 ASSAY CARBOXYHB QUANT: CPT | Performed by: HOSPITALIST

## 2017-07-14 PROCEDURE — 87040 BLOOD CULTURE FOR BACTERIA: CPT | Performed by: INTERNAL MEDICINE

## 2017-07-14 PROCEDURE — 97166 OT EVAL MOD COMPLEX 45 MIN: CPT

## 2017-07-14 PROCEDURE — 82962 GLUCOSE BLOOD TEST: CPT

## 2017-07-14 PROCEDURE — 97530 THERAPEUTIC ACTIVITIES: CPT

## 2017-07-14 PROCEDURE — 83735 ASSAY OF MAGNESIUM: CPT | Performed by: HOSPITALIST

## 2017-07-14 PROCEDURE — 80048 BASIC METABOLIC PNL TOTAL CA: CPT | Performed by: HOSPITALIST

## 2017-07-14 PROCEDURE — 82803 BLOOD GASES ANY COMBINATION: CPT | Performed by: HOSPITALIST

## 2017-07-14 PROCEDURE — 36600 WITHDRAWAL OF ARTERIAL BLOOD: CPT | Performed by: HOSPITALIST

## 2017-07-14 RX ORDER — FUROSEMIDE 10 MG/ML
40 INJECTION INTRAMUSCULAR; INTRAVENOUS ONCE
Status: COMPLETED | OUTPATIENT
Start: 2017-07-14 | End: 2017-07-14

## 2017-07-14 NOTE — PROGRESS NOTES
Patient seen at bedside. Resting in no obvious distress. AVSS  Dressing intact to foot. Mild sanquinous drainage on bandage. Wounds appear clean, no odor. Slight serosanguinous weepage from I&D sites.    Post I&D right foot  Plan: abx per ID

## 2017-07-14 NOTE — CM/SW NOTE
Spoke with Óscar Roa from OT regarding recommendation for MELO at DC. Pt's wife does work during the day and pt requiring significant assistance in order to maintain NWB. She stated pt was not receptive to the need for MELO, but appears more confused today.

## 2017-07-14 NOTE — PROGRESS NOTES
MD notified that patient requiring up to 12L NC from 3L during the day. Orders received for chest xray, d dimer, hemoglobin, ABGs. Will continue to monitor. 0200- MD notified of lab results. Received orders to consult pulm.  Pulm consulted- notified of

## 2017-07-14 NOTE — PROGRESS NOTES
[facility] Hospitalist Progress Note     PCP: Donna Gunn MD    CC:  Follow up    SUBJECTIVE:  Pt's O2 requirements increased to 12L NC. Was given IV lasix and O2 now to 5L. IVF d/c'ed. Wife at bedside. Pt irritated.   Says he doesn't want to be here an 244*  194*  195*  244*  213*       Meds:     • Heparin Sodium (Porcine)  5,000 Units Subcutaneous 2 times per day   • allopurinol  100 mg Oral Daily   • AmLODIPine Besylate  5 mg Oral Daily   • carBAMazepine  400 mg Oral TID   • carvedilol  6.25 mg Oral BI consult, appreciate  -currently on IV zosyn, vancomycin. [facility] with MRSA x2. Aerobic cx with heavy staph aureus  -s/p I&D 7/13     Upper extremity tremors-  New  -suspect secondary to infectious process  -neuro consult, appreciate--, ammonia unremarkable.

## 2017-07-14 NOTE — PLAN OF CARE
Diabetes/Glucose Control    • Glucose maintained within prescribed range Progressing        PAIN - ADULT    • Verbalizes/displays adequate comfort level or patient's stated pain goal Progressing        Patient/Family Goals    • Patient/Family [facility]

## 2017-07-14 NOTE — OCCUPATIONAL THERAPY NOTE
Attempted to see pt for skilled OT services this date. RN requesting HOLD as pt is currently on 15L O2 at rest. Will re-attempt as appropriate.  Bailey Resendez, 07/14/17, 9:05 AM

## 2017-07-14 NOTE — OCCUPATIONAL THERAPY NOTE
OCCUPATIONAL THERAPY EVALUATION - INPATIENT     Room Number: 526/526-A  Evaluation Date: 7/14/2017  Type of Evaluation: Initial  Presenting Problem: AMS, R foot cellulitis s/p I&D on R foot on 7/13    Physician Order: IP Consult to Occupational Therapy  Re (Comment) (surgical shoe when up for transfers, R LE NWB)  Fall Risk: High fall risk    WEIGHT BEARING RESTRICTION  Weight Bearing Restriction: R lower extremity        R Lower Extremity: Non-Weight Bearing       PAIN ASSESSMENT  Ratin  Location: N/A Score:   Score: 15  Approx Degree of Impairment: 56.46%  Standardized Score (AM-PAC Scale): 34.69  CMS Modifier (G-Code): CK    FUNCTIONAL TRANSFER ASSESSMENT  Supine to Sit : Supervision  Sit to Stand: Maximum assistance    Skilled Therapy Provided: Pt was days )  OT Device Recommendations: TBD    PLAN  OT Treatment Plan: Balance activities; Energy conservation/work simplification techniques;ADL training;Functional transfer training; Endurance training;Cognitive reorientation;UE strengthening/ROM; Patient/Famil

## 2017-07-14 NOTE — CONSULTS
Pulmonary H&P/Consult       NAME: Costella Runner - ROOM: [id number] - MRN: [id number] - Age: 60year old - :  1955    Date of Admission: 2017 11:56 AM  Admission Diagnosis: celulitis right foot  PT ON WAY TO HOSPITAL  Infected traumatic leg ulcer 7/12/2017 at Unknown time   carBAMazepine 200 MG Oral Tab 2 tablets (400 MG) by mouth TID Disp: 180 tablet Rfl: 3 7/12/2017 at Unknown time   MetFORMIN HCl ER, OSM, 1000 MG (OSM) Oral Tablet 24 Hr Take 2 tablets (2,000 mg total) by mouth daily.  Disp: 180 t to affected area Disp: 1 Bottle Rfl: 1    VIRT-TARUN 2.5-25-1 MG Oral Tab Take 1 tablet by mouth daily.    Disp:  Rfl:  Taking   clotrimazole-betamethasone 1-0.05 % External Cream Apply twice daily to affected area Disp: 60 g Rfl: 3 Taking   BD PEN NEEDLE NA Tab Take 1 mg by mouth daily. Disp:  Rfl:    gabapentin 600 MG Oral Tab Take 1,200 mg by mouth 2 (two) times daily. Disp:  Rfl:    gabapentin 600 MG Oral Tab Take 600 mg by mouth After lunch.  Disp:  Rfl:    AmLODIPine Besylate 5 MG Oral Tab Take 5 mg by mo 400 mg Oral TID   • carvedilol  6.25 mg Oral BID with meals   • DULoxetine HCl  20 mg Oral Daily   • fenofibrate micronized  134 mg Oral Daily with breakfast   • folic acid  1 mg Oral Daily   • vitamin E  1,000 Units Oral Daily   • aspirin  325 mg Oral Jeff Nelson Readings from Last 3 Encounters:  07/12/17 : 247 lb 12.8 oz (112.4 kg)  07/13/17 : 247 lb 12.8 oz (112.4 kg)  06/02/17 : 251 lb 12.8 oz (114.2 kg)        Intake/Output Summary (Last 24 hours) at 07/14/17 1052  Last data filed at 07/14/17 0800   Gross per 2 8.3   MG 2.2 2.4 2.4       No results for input(s): ALT, AST, ALB, AMYLASE, LIPASE, LDH in the last 72 hours.     Invalid input(s): ALPHOS, TBIL, DBIL, TPROT    Invalid input(s): ARTERIALPH, ARTERIALPO2, ARTERIALPCO2, ARTERIALHCO3    No results for input(s)

## 2017-07-14 NOTE — PHYSICAL THERAPY NOTE
Attempted to see pt for skilled PT services this AM. RN requesting HOLD as pt is currently on 15L O2 at rest. Will re-attempt as able and appropriate.

## 2017-07-14 NOTE — PROGRESS NOTES
[facility]                INFECTIOUS DISEASE PROGRESS NOTE    Saulo Berry Patient Status:  Inpatient    1955 MRN [id number]   [facility] 5NW-A Attending Evette De Dios, [address] Day # 2 PCP Rodo Elizabeth MD     A Few Gram Negative Rods   Susceptibility      Staphylococcus aureus, MRSA     Not Specified (Preliminary)    Clindamycin <=.25 \"><=.25  Sensitive    Erythromycin >=8  Resistant    Gentamicin <=.5 \"><=.5  Sensitive    Levofloxacin . [address] History of coronary artery stent placement     Type 2 diabetes mellitus with other ophthalmic complication, with long-term current use of insulin (HCC)     Diabetic ulcer of left foot associated with diabetes mellitus due to underlying condition ([address]

## 2017-07-14 NOTE — PHYSICAL THERAPY NOTE
PHYSICAL THERAPY EVALUATION - INPATIENT     Room Number: 526/526-A  Evaluation Date: 7/14/2017  Type of Evaluation: Initial  Physician Order: PT Eval and Treat    Presenting Problem: infected traumatic leg ulcer  Reason for Therapy: Mobility Dysfunctio because of his neuropathic pain in BLE. SUBJECTIVE  \"I want to go home. I am not going to rehab\"    Patient self-stated goal is to go home. OBJECTIVE  Precautions:  Other (Comment) (Surgical shoe when up for transfers)  Fall Risk: High fall risk breath    AM-PAC '6-Clicks' INPATIENT SHORT FORM - BASIC MOBILITY  How much difficulty does the patient currently have. ..  -   Turning over in bed (including adjusting bedclothes, sheets and blankets)?: A Little   -   Sitting down on and standing up from a conservation  Functional activity tolerated  Posture  Transfer training    Patient End of Session: Up in chair;Needs met;Call light within reach;RN aware of session/findings; All patient questions and concerns addressed; Alarm set; Family present; Discussed re able to demonstrate transfers Sit to/from Stand at assistance level: supervision     Goal #3 Patient is able to stand pivot transfer from bed to chair with RW.  at assistance level: minimum assistance     Goal #4 -   Goal #5 -   Goal #6 -   Goal Comments:

## 2017-07-14 NOTE — PROGRESS NOTES
PT RECEIVED FROM PACU S/P INCISION AND DRAINAGE OF R FOOT WOUND. PT AWAKE AND ALERT, DENIES ANY SYMPTOMS,VS STABLE. REVIEWED POST OP ORDERS FROM DR. Parul Ramirez.

## 2017-07-14 NOTE — OPERATIVE REPORT
[address]    PATIENT'S NAME: GÉNESIS MYERS   ATTENDING PHYSICIAN: Luis A Brewer MD   OPERATING PHYSICIAN: Kyler Carver D.P.M.    PATIENT ACCOUNT#:   [de-identified]    LOCATION:  [address] [place] [address]  MEDICAL RECORD #:   [id number]       DATE OF BIRTH: thoroughly debrided. All necrotic nonviable soft tissue including bone that was prominent was resected. A bone biopsy was obtained from the right second metatarsal.  All of this was sent for aerobic, anaerobic cultures.   Pulse lavage irrigation at this p

## 2017-07-14 NOTE — PROGRESS NOTES
Patient was not in the room, when I came back at 6: 10, he still not in the room, went to radiology for the test.     Talked to his wife. He is more confused today than yesterday.  He had wound surgery and received Dilaudid for pain control, could be the re

## 2017-07-15 ENCOUNTER — APPOINTMENT (OUTPATIENT)
Dept: NUCLEAR MEDICINE | Facility: HOSPITAL | Age: 62
DRG: 856 | End: 2017-07-15
Attending: INTERNAL MEDICINE
Payer: COMMERCIAL

## 2017-07-15 ENCOUNTER — APPOINTMENT (OUTPATIENT)
Dept: ULTRASOUND IMAGING | Facility: HOSPITAL | Age: 62
DRG: 856 | End: 2017-07-15
Attending: PODIATRIST
Payer: COMMERCIAL

## 2017-07-15 ENCOUNTER — APPOINTMENT (OUTPATIENT)
Dept: CV DIAGNOSTICS | Facility: HOSPITAL | Age: 62
DRG: 856 | End: 2017-07-15
Attending: INTERNAL MEDICINE
Payer: COMMERCIAL

## 2017-07-15 ENCOUNTER — APPOINTMENT (OUTPATIENT)
Dept: GENERAL RADIOLOGY | Facility: HOSPITAL | Age: 62
DRG: 856 | End: 2017-07-15
Attending: INTERNAL MEDICINE
Payer: COMMERCIAL

## 2017-07-15 LAB
ALLENS TEST: POSITIVE
ARTERIAL BLD GAS O2 SATURATION: 97 % (ref 92–100)
ARTERIAL BLOOD GAS BASE EXCESS: 2
ARTERIAL BLOOD GAS HCO3: 27.8 MEQ/L (ref 22–26)
ARTERIAL BLOOD GAS PCO2: 50 MM HG (ref 35–45)
ARTERIAL BLOOD GAS PH: 7.36 (ref 7.35–7.45)
ARTERIAL BLOOD GAS PO2: 114 MM HG (ref 80–105)
BASOPHILS # BLD AUTO: 0.04 X10(3) UL (ref 0–0.1)
BASOPHILS NFR BLD AUTO: 0.4 %
BUN BLD-MCNC: 48 MG/DL (ref 8–20)
CALCIUM BLD-MCNC: 8.9 MG/DL (ref 8.3–10.3)
CALCULATED O2 SATURATION: 98 % (ref 92–100)
CARBOXYHEMOGLOBIN: 1 % SAT (ref 0–3)
CHLORIDE: 98 MMOL/L (ref 101–111)
CO2: 26 MMOL/L (ref 22–32)
CREAT BLD-MCNC: 1.42 MG/DL (ref 0.7–1.3)
EOSINOPHIL # BLD AUTO: 0.04 X10(3) UL (ref 0–0.3)
EOSINOPHIL NFR BLD AUTO: 0.4 %
ERYTHROCYTE [DISTWIDTH] IN BLOOD BY AUTOMATED COUNT: 14.1 % (ref 11.5–16)
GLUCOSE BLD-MCNC: 161 MG/DL (ref 65–99)
GLUCOSE BLD-MCNC: 180 MG/DL (ref 65–99)
GLUCOSE BLD-MCNC: 213 MG/DL (ref 70–99)
GLUCOSE BLD-MCNC: 217 MG/DL (ref 65–99)
GLUCOSE BLD-MCNC: 220 MG/DL (ref 65–99)
GLUCOSE BLD-MCNC: 237 MG/DL (ref 65–99)
GLUCOSE BLD-MCNC: 259 MG/DL (ref 65–99)
HCT VFR BLD AUTO: 24.7 % (ref 37–53)
HGB BLD-MCNC: 8.1 G/DL (ref 13–17)
IMMATURE GRANULOCYTE COUNT: 0.08 X10(3) UL (ref 0–1)
IMMATURE GRANULOCYTE RATIO %: 0.8 %
L/M: 15 L/MIN
LYMPHOCYTES # BLD AUTO: 0.68 X10(3) UL (ref 0.9–4)
LYMPHOCYTES NFR BLD AUTO: 6.4 %
MCH RBC QN AUTO: 29.3 PG (ref 27–33.2)
MCHC RBC AUTO-ENTMCNC: 32.8 G/DL (ref 31–37)
MCV RBC AUTO: 89.5 FL (ref 80–99)
METHEMOGLOBIN: 0.5 % SAT (ref 0.4–1.5)
MONOCYTES # BLD AUTO: 0.91 X10(3) UL (ref 0.1–0.6)
MONOCYTES NFR BLD AUTO: 8.5 %
NEUTROPHIL ABS PRELIM: 8.91 X10 (3) UL (ref 1.3–6.7)
NEUTROPHILS # BLD AUTO: 8.91 X10(3) UL (ref 1.3–6.7)
NEUTROPHILS NFR BLD AUTO: 83.5 %
PATIENT TEMPERATURE: 98.6 F
PLATELET # BLD AUTO: 282 10(3)UL (ref 150–450)
POTASSIUM SERPL-SCNC: 3.8 MMOL/L (ref 3.6–5.1)
RBC # BLD AUTO: 2.76 X10(6)UL (ref 4.3–5.7)
RED CELL DISTRIBUTION WIDTH-SD: 46.1 FL (ref 35.1–46.3)
SODIUM SERPL-SCNC: 134 MMOL/L (ref 136–144)
TOTAL HEMOGLOBIN: 9 G/DL (ref 13.2–17.3)
VANCOMYCIN TROUGH: 6.8 UG/ML (ref 10–20)
WBC # BLD AUTO: 10.7 X10(3) UL (ref 4–13)

## 2017-07-15 PROCEDURE — 94660 CPAP INITIATION&MGMT: CPT

## 2017-07-15 PROCEDURE — 36600 WITHDRAWAL OF ARTERIAL BLOOD: CPT | Performed by: INTERNAL MEDICINE

## 2017-07-15 PROCEDURE — 82375 ASSAY CARBOXYHB QUANT: CPT | Performed by: INTERNAL MEDICINE

## 2017-07-15 PROCEDURE — 93306 TTE W/DOPPLER COMPLETE: CPT | Performed by: INTERNAL MEDICINE

## 2017-07-15 PROCEDURE — 94664 DEMO&/EVAL PT USE INHALER: CPT

## 2017-07-15 PROCEDURE — 78582 LUNG VENTILAT&PERFUS IMAGING: CPT | Performed by: INTERNAL MEDICINE

## 2017-07-15 PROCEDURE — 94640 AIRWAY INHALATION TREATMENT: CPT

## 2017-07-15 PROCEDURE — 87040 BLOOD CULTURE FOR BACTERIA: CPT | Performed by: INTERNAL MEDICINE

## 2017-07-15 PROCEDURE — 85025 COMPLETE CBC W/AUTO DIFF WBC: CPT | Performed by: INTERNAL MEDICINE

## 2017-07-15 PROCEDURE — 80048 BASIC METABOLIC PNL TOTAL CA: CPT | Performed by: INTERNAL MEDICINE

## 2017-07-15 PROCEDURE — 5A09357 ASSISTANCE WITH RESPIRATORY VENTILATION, LESS THAN 24 CONSECUTIVE HOURS, CONTINUOUS POSITIVE AIRWAY PRESSURE: ICD-10-PCS | Performed by: HOSPITALIST

## 2017-07-15 PROCEDURE — 82962 GLUCOSE BLOOD TEST: CPT

## 2017-07-15 PROCEDURE — 83050 HGB METHEMOGLOBIN QUAN: CPT | Performed by: INTERNAL MEDICINE

## 2017-07-15 PROCEDURE — 82803 BLOOD GASES ANY COMBINATION: CPT | Performed by: INTERNAL MEDICINE

## 2017-07-15 PROCEDURE — 87147 CULTURE TYPE IMMUNOLOGIC: CPT | Performed by: HOSPITALIST

## 2017-07-15 PROCEDURE — 71010 XR CHEST AP PORTABLE  (CPT=71010): CPT | Performed by: INTERNAL MEDICINE

## 2017-07-15 PROCEDURE — 80202 ASSAY OF VANCOMYCIN: CPT | Performed by: INTERNAL MEDICINE

## 2017-07-15 PROCEDURE — 85018 HEMOGLOBIN: CPT | Performed by: INTERNAL MEDICINE

## 2017-07-15 PROCEDURE — 93925 LOWER EXTREMITY STUDY: CPT | Performed by: PODIATRIST

## 2017-07-15 PROCEDURE — 87081 CULTURE SCREEN ONLY: CPT | Performed by: HOSPITALIST

## 2017-07-15 RX ORDER — IPRATROPIUM BROMIDE AND ALBUTEROL SULFATE 2.5; .5 MG/3ML; MG/3ML
3 SOLUTION RESPIRATORY (INHALATION) EVERY 4 HOURS PRN
Status: DISCONTINUED | OUTPATIENT
Start: 2017-07-15 | End: 2017-07-19

## 2017-07-15 RX ORDER — POTASSIUM CHLORIDE 20 MEQ/1
40 TABLET, EXTENDED RELEASE ORAL ONCE
Status: COMPLETED | OUTPATIENT
Start: 2017-07-15 | End: 2017-07-15

## 2017-07-15 RX ORDER — FUROSEMIDE 10 MG/ML
40 INJECTION INTRAMUSCULAR; INTRAVENOUS ONCE
Status: COMPLETED | OUTPATIENT
Start: 2017-07-15 | End: 2017-07-15

## 2017-07-15 NOTE — PLAN OF CARE
PAIN - ADULT    • Verbalizes/displays adequate comfort level or patient's stated pain goal Not Progressing        RESPIRATORY - ADULT    • Achieves optimal ventilation and oxygenation Not Progressing        Pt transferred to the ICU from medical floor for

## 2017-07-15 NOTE — PROGRESS NOTES
[facility]                INFECTIOUS DISEASE PROGRESS NOTE    Severo Locks Patient Status:  Inpatient    1955 MRN [id number]   [facility] 5NW-A Attending Brad Chamberlain, [facility] Day # 3 PCP Jacqui Galloway MD     A <=10 \"><=10  Sensitive    Vancomycin <=.5 \"><=.5  Sensitive          7/13/2017 17:44  Foot, right; Tissue 7/13/2017 17:44  Foot, right; Tissue   TISSUE AEROBIC CULTURE    ANAEROBIC CULTURE  3+ growth Staphylococcus aureus, MRSA    pending 2+ growth [facility]  [phone]

## 2017-07-15 NOTE — PROGRESS NOTES
Patient seen at bedside. Confused. Refused dressing changes. AVSS  Dressing intact to foot. + erythema. Foot swollen. Serous/purulent drainage noted right foot  Wounds appear contaminated.      Cx's MRSA    Assessment:    1.  2 days s/p Incision an

## 2017-07-15 NOTE — PROGRESS NOTES
Pulmonary Progress Note     Assessment / Plan:  1. Acute Hypoxic resp failure - multifactorial due to pulm edema, GEOVANNY/OHS and atelectasis. Now with acute worsening. Does have elevated d-dimer.  LE duplex negative  -CXR  -ABG  -Duoneb x1  -start bipap and tr

## 2017-07-15 NOTE — PROGRESS NOTES
Pt yelled from room, when this writer walked in room pts sats were 79, he was on 5 L NC, he was put up to 15 L, resp called, Dr Parul Conner Dr was paged, pt also SOB while laying in bed.

## 2017-07-15 NOTE — PROGRESS NOTES
Spoke to pulm Dr Anne Meyers and he stated he will be right here to see pt, resp Kanika in room getting an ABG, he is now on a non-re breather at 98%

## 2017-07-15 NOTE — PROGRESS NOTES
Pt is going to the unit, he is desating, pulling mask off sats are in 70's and 80's, resp in room putting bi-pap on.  Waiting for bed

## 2017-07-15 NOTE — PROGRESS NOTES
Subjective     Patient transferred to ICU due to respiratory distress      ipratropium-albuterol (DUONEB) nebulizer solution 3 mL 3 mL Nebulization Q4H PRN   [COMPLETED] furosemide (LASIX) injection 40 mg 40 mg Intravenous Once   [COMPLETED] Potassium Chlo 100 mg 100 mg Oral Q8H CALI   Pramipexole Dihydrochloride (MIRAPEX) tab 1 mg 1 mg Oral Nightly   Icosapent Ethyl CAPS 2 tablet 2 tablet Oral BID   PEG 3350 (MIRALAX) powder packet 17 g 17 g Oral Daily PRN   magnesium hydroxide (MILK OF MAGNESIA) 400 MG/5ML EEG; imaging of the brain is unremarkable  - continue treatment of underlying medical insults  - will continue to follow    Anne Llamas MD

## 2017-07-15 NOTE — PROGRESS NOTES
[facility] Hospitalist Progress Note     PCP: Quinn Cowan MD    CC:  Follow up    SUBJECTIVE:  Pt being wheeled down to nuclear medicine for v/q scan. Overnight, pt developed respiratory distress and transferred to ICU with improvement on bipap.     OB carvedilol  6.25 mg Oral BID with meals   • DULoxetine HCl  20 mg Oral Daily   • fenofibrate micronized  134 mg Oral Daily with breakfast   • folic acid  1 mg Oral Daily   • vitamin E  1,000 Units Oral Daily   • aspirin  325 mg Oral Daily   • JUAN-BEE/C  1 and EEG unremarkable.     **anemia- ?acute. HDS. Suspect consumptive/reactive. repeat Hb stable     **CAD, HTN/HL-continue home meds, no acute issues     **DMII with nephropathy, neuropathy- HbA1c 7     **LELIA on CKD-stable Cr (fluctuates)-s/p trial of IVF.

## 2017-07-16 ENCOUNTER — APPOINTMENT (OUTPATIENT)
Dept: GENERAL RADIOLOGY | Facility: HOSPITAL | Age: 62
DRG: 856 | End: 2017-07-16
Attending: HOSPITALIST
Payer: COMMERCIAL

## 2017-07-16 LAB
BUN BLD-MCNC: 39 MG/DL (ref 8–20)
CALCIUM BLD-MCNC: 9.2 MG/DL (ref 8.3–10.3)
CHLORIDE: 99 MMOL/L (ref 101–111)
CO2: 28 MMOL/L (ref 22–32)
CREAT BLD-MCNC: 1.2 MG/DL (ref 0.7–1.3)
ERYTHROCYTE [DISTWIDTH] IN BLOOD BY AUTOMATED COUNT: 14.2 % (ref 11.5–16)
GLUCOSE BLD-MCNC: 181 MG/DL (ref 70–99)
GLUCOSE BLD-MCNC: 185 MG/DL (ref 65–99)
GLUCOSE BLD-MCNC: 207 MG/DL (ref 65–99)
GLUCOSE BLD-MCNC: 275 MG/DL (ref 65–99)
GLUCOSE BLD-MCNC: 286 MG/DL (ref 65–99)
HCT VFR BLD AUTO: 25.1 % (ref 37–53)
HGB BLD-MCNC: 8.1 G/DL (ref 13–17)
MCH RBC QN AUTO: 29.5 PG (ref 27–33.2)
MCHC RBC AUTO-ENTMCNC: 32.3 G/DL (ref 31–37)
MCV RBC AUTO: 91.3 FL (ref 80–99)
PLATELET # BLD AUTO: 296 10(3)UL (ref 150–450)
POTASSIUM SERPL-SCNC: 3.7 MMOL/L (ref 3.6–5.1)
POTASSIUM SERPL-SCNC: 3.7 MMOL/L (ref 3.6–5.1)
RBC # BLD AUTO: 2.75 X10(6)UL (ref 4.3–5.7)
RED CELL DISTRIBUTION WIDTH-SD: 47.4 FL (ref 35.1–46.3)
SODIUM SERPL-SCNC: 135 MMOL/L (ref 136–144)
WBC # BLD AUTO: 9 X10(3) UL (ref 4–13)

## 2017-07-16 PROCEDURE — 84132 ASSAY OF SERUM POTASSIUM: CPT | Performed by: INTERNAL MEDICINE

## 2017-07-16 PROCEDURE — 71010 XR CHEST AP PORTABLE  (CPT=71010): CPT | Performed by: NURSE PRACTITIONER

## 2017-07-16 PROCEDURE — 80048 BASIC METABOLIC PNL TOTAL CA: CPT | Performed by: NURSE PRACTITIONER

## 2017-07-16 PROCEDURE — 82962 GLUCOSE BLOOD TEST: CPT

## 2017-07-16 PROCEDURE — 94660 CPAP INITIATION&MGMT: CPT

## 2017-07-16 PROCEDURE — 85027 COMPLETE CBC AUTOMATED: CPT | Performed by: NURSE PRACTITIONER

## 2017-07-16 RX ORDER — FUROSEMIDE 10 MG/ML
40 INJECTION INTRAMUSCULAR; INTRAVENOUS DAILY
Status: DISCONTINUED | OUTPATIENT
Start: 2017-07-16 | End: 2017-07-18

## 2017-07-16 RX ORDER — SODIUM HYPOCHLORITE 1.25 MG/ML
SOLUTION TOPICAL 3 TIMES DAILY
Status: DISCONTINUED | OUTPATIENT
Start: 2017-07-16 | End: 2017-07-19

## 2017-07-16 RX ORDER — POTASSIUM CHLORIDE 20 MEQ/1
40 TABLET, EXTENDED RELEASE ORAL ONCE
Status: COMPLETED | OUTPATIENT
Start: 2017-07-16 | End: 2017-07-16

## 2017-07-16 NOTE — PROGRESS NOTES
[facility] Hospitalist Progress Note     PCP: Lonny Spaulding MD    CC:  Follow up    SUBJECTIVE:  Pt sitting up in bed, wife at bedside. On 5L NC. Pt without complaint.  Wife says pt's mental status much improved    OBJECTIVE:  Temp:  [97.8 °F (36.6 °C)-9 217*  180*  161*  185*  207*       Meds:     • furosemide  40 mg Intravenous Daily   • Dakins (1/4 strength)   Topical TID   • vancomycin  1,500 mg Intravenous Q12H   • Heparin Sodium (Porcine)  5,000 Units Subcutaneous 2 times per day   • allopurinol  100 on IV vancomycin--plan for 6 weeks, PICC pending. [facility] with MRSA x2.   Aerobic cx with heavy staph aureus  -s/p I&D 7/13   -LE WALDO without vascular disease  -echo EF 50-55%    Upper extremity tremors-  New--none currently  -suspect secondary to infectious pr

## 2017-07-16 NOTE — PLAN OF CARE
Diabetes/Glucose Control    • Glucose maintained within prescribed range Not Progressing          PAIN - ADULT    • Verbalizes/displays adequate comfort level or patient's stated pain goal Progressing        RESPIRATORY - ADULT    • Achieves optimal ventil

## 2017-07-16 NOTE — PROGRESS NOTES
Pulmonary Progress Note     Assessment / Plan:  1. Acute Hypoxic resp failure - multifactorial due to pulm edema, GEOVANNY/OHS and atelectasis.  D-dimer is elevated but V/Q is low prob and LE duplex is normal. Significantly improved this morning  -off bipap  -st

## 2017-07-16 NOTE — PROGRESS NOTES
Called Wife Manjula Garcia to ask if she can bring in [address]. Explained we do not carry it here. She said she would look up the medication and bring it in tomorrow.

## 2017-07-16 NOTE — PROGRESS NOTES
[facility]                INFECTIOUS DISEASE PROGRESS NOTE    Teri Zhou Patient Status:  Inpatient    1955 MRN [id number]   [facility] 5NW-A Attending Kelton Lynne, [address] [place] [address] Day # 4 PCP Russ Abraham MD     A Oxacillin >=4  Resistant    Penicillin >=.5  Resistant    Tetracycline >=16  Resistant    Trimethoprim/Sulfa <=10 \"><=10  Sensitive    Vancomycin <=.5 \"><=.5  Sensitive          7/13/2017 17:44  Foot, right; Tissue 7/13/2017 17:44  Foot, right; Tissue ALI  Improved /pulm managing/dieureses as needed    3.  Mental status changes improved      MD Fortino Elias [facility]  [phone]

## 2017-07-16 NOTE — PROGRESS NOTES
Subjective     No overnight events      [COMPLETED] Potassium Chloride ER (K-DUR M20) CR tab 40 mEq 40 mEq Oral Once   furosemide (LASIX) injection 40 mg 40 mg Intravenous Daily   Dakins (1/4 strength) (Sodium hypochlorite (1/4 strength)) 0.125 % external Oral Daily   aspirin tab 325 mg 325 mg Oral Daily   JUAN-BEE/C (ALBEE/C) tab TABS 1 tablet 1 tablet Oral Daily   gabapentin (NEURONTIN) tab 1,200 mg 1,200 mg Oral BID   gabapentin (NEURONTIN) tab 600 mg 600 mg Oral After lunch   hydrALAzine HCl (APRESOLINE further workup; if any questions, please call or page the neurology service    Rupal Carr MD

## 2017-07-17 LAB
GLUCOSE BLD-MCNC: 214 MG/DL (ref 65–99)
GLUCOSE BLD-MCNC: 267 MG/DL (ref 65–99)
POTASSIUM SERPL-SCNC: 3.8 MMOL/L (ref 3.6–5.1)

## 2017-07-17 PROCEDURE — 82962 GLUCOSE BLOOD TEST: CPT

## 2017-07-17 PROCEDURE — 97530 THERAPEUTIC ACTIVITIES: CPT

## 2017-07-17 PROCEDURE — 97535 SELF CARE MNGMENT TRAINING: CPT

## 2017-07-17 PROCEDURE — 02HV33Z INSERTION OF INFUSION DEVICE INTO SUPERIOR VENA CAVA, PERCUTANEOUS APPROACH: ICD-10-PCS | Performed by: HOSPITALIST

## 2017-07-17 PROCEDURE — 76937 US GUIDE VASCULAR ACCESS: CPT

## 2017-07-17 PROCEDURE — 84132 ASSAY OF SERUM POTASSIUM: CPT | Performed by: HOSPITALIST

## 2017-07-17 PROCEDURE — 97110 THERAPEUTIC EXERCISES: CPT

## 2017-07-17 PROCEDURE — 36569 INSJ PICC 5 YR+ W/O IMAGING: CPT

## 2017-07-17 PROCEDURE — B548ZZA ULTRASONOGRAPHY OF SUPERIOR VENA CAVA, GUIDANCE: ICD-10-PCS | Performed by: HOSPITALIST

## 2017-07-17 RX ORDER — SODIUM CHLORIDE 0.9 % (FLUSH) 0.9 %
10 SYRINGE (ML) INJECTION AS NEEDED
Status: DISCONTINUED | OUTPATIENT
Start: 2017-07-17 | End: 2017-07-19

## 2017-07-17 NOTE — PROGRESS NOTES
Pulmonary Progress Note        NAME: Paras Pantoja - ROOM: 499/799-B - MRN: [id number] - Age: 60year old - : 1955        SUBJECTIVE: No events overnight, was able to use the CPAP last night    OBJECTIVE:   17  2228 17  0550 17  07 regular rate and rhythm  Abdomen: soft, non-tender; bowel sounds normal; no masses,  no organomegaly  Extremities: edema trace james      Recent Labs   07/15/17  0752 07/16/17  0527   WBC 10.7 9.0   HGB 8.1* 8.1*   MCV 89.5 91.3   .0 296.0         Rec

## 2017-07-17 NOTE — PROGRESS NOTES
[facility] Hospitalist Progress Note     PCP: Heidi Patrick MD    CC:  Follow up    SUBJECTIVE:  Pt sitting up in bed  Feels well  No pain    OBJECTIVE:  Temp:  [97.5 °F (36.4 °C)-98.2 °F (36.8 °C)] 98 °F (36.7 °C)  Pulse:  [62-73] 62  Resp:  [18-20] 20 Subcutaneous 2 times per day   • allopurinol  100 mg Oral Daily   • AmLODIPine Besylate  5 mg Oral Daily   • carBAMazepine  400 mg Oral TID   • carvedilol  6.25 mg Oral BID with meals   • DULoxetine HCl  20 mg Oral Daily   • fenofibrate micronized  134 mg EF 50-55%    Upper extremity tremors-  New--none currently  -suspect secondary to infectious process  -neuro consult, appreciate--, ammonia unremarkable. MRI brain and EEG unremarkable.     **anemia- ?acute. HDS. Suspect consumptive/reactive. repeat Hb sta

## 2017-07-17 NOTE — CONSULTS
Discussed hyperbarics (HBO) with patient and his wife as ordered. Brochure given with HBOT information. Physician HBO consult is scheduled to be completed tomorrow. Questions asked and answered.  HBO tech consult will be completed tomorrow and have consent s

## 2017-07-17 NOTE — RESPIRATORY THERAPY NOTE
GEOVANNY Equipment Usage Summary :            Set Mode :AUTO CPAP W FLEX          Usage in Hours:6;25          90% Pressure (EPAP) : 10           90% Insp Pressure (IPAP);           AHI : 14.3          Supplemental Oxygen :  4    LPM

## 2017-07-17 NOTE — PROGRESS NOTES
[facility]                INFECTIOUS DISEASE PROGRESS NOTE    Marlyn Love Patient Status:  Inpatient    1955 MRN [id number]   [facility] 5NW-A Attending Niya Flower, [facility] Day # 5 PCP Lianne Olivares MD     A Sensitive    Oxacillin >=4  Resistant    Penicillin >=.5  Resistant    Tetracycline >=16  Resistant    Trimethoprim/Sulfa <=10 \"><=10  Sensitive    Vancomycin <=.5 \"><=.5  Sensitive          7/13/2017 17:44  Foot, right; Tissue 7/13/2017 17:44  Foot, rig

## 2017-07-17 NOTE — PHYSICAL THERAPY NOTE
PHYSICAL THERAPY TREATMENT NOTE - INPATIENT    Room Number: 671/553-W     Session: 1/5   Number of Visits to Meet Established Goals: 5    Presenting Problem: infected traumatic leg ulcer    Problem List  Active Problems:    Infected traumatic leg ulcer (H the bed?: A Little   How much help from another person does the patient currently need. ..   -   Moving to and from a bed to a chair (including a wheelchair)?: A Lot   -   Need to walk in hospital room?: A Lot   -   Climbing 3-5 steps with a railing?: Total Discharge Recommendations: Sub-acute rehabilitation; Other (Comment) (c ELOS 12-14 days)     PLAN  PT Treatment Plan: Bed mobility; Body mechanics; Endurance; Patient education; Family education;Gait training;Strengthening;Transfer training;Balance training  Re

## 2017-07-17 NOTE — PLAN OF CARE
Diabetes/Glucose Control    • Glucose maintained within prescribed range Progressing        Impaired Activities of Daily Living    • Achieve highest/safest level of independence in self care Progressing        Impaired Functional Mobility    • Achieve high Diabetic sensorimotor polyneuropathy ([facility] [address]     Lumbar foraminal stenosis     H/O: CVA (cerebrovascular accident)     1st MT osteotomy, 2nd and 3rd MT head resection right foot- Sx 6/8/17  NADEGE 9/6/17     Infected traumatic leg ulcer ([facility] [address]       Active issue

## 2017-07-17 NOTE — CM/SW NOTE
SW met with pt's spouse to discuss [facility] choices. Per pt/spouse referral sent via [facility] to [address] [place] [address]. / to remain available for support and/or discharge planning.

## 2017-07-18 LAB
BUN BLD-MCNC: 27 MG/DL (ref 8–20)
CALCIUM BLD-MCNC: 9.1 MG/DL (ref 8.3–10.3)
CHLORIDE: 99 MMOL/L (ref 101–111)
CO2: 30 MMOL/L (ref 22–32)
CREAT BLD-MCNC: 0.96 MG/DL (ref 0.7–1.3)
GLUCOSE BLD-MCNC: 215 MG/DL (ref 65–99)
GLUCOSE BLD-MCNC: 221 MG/DL (ref 70–99)
GLUCOSE BLD-MCNC: 256 MG/DL (ref 65–99)
GLUCOSE BLD-MCNC: 260 MG/DL (ref 65–99)
GLUCOSE BLD-MCNC: 278 MG/DL (ref 65–99)
GLUCOSE BLD-MCNC: 296 MG/DL (ref 65–99)
POTASSIUM SERPL-SCNC: 3.6 MMOL/L (ref 3.6–5.1)
SODIUM SERPL-SCNC: 135 MMOL/L (ref 136–144)
VANCOMYCIN TROUGH: 14.8 UG/ML (ref 10–20)

## 2017-07-18 PROCEDURE — 82962 GLUCOSE BLOOD TEST: CPT

## 2017-07-18 PROCEDURE — 80202 ASSAY OF VANCOMYCIN: CPT | Performed by: INTERNAL MEDICINE

## 2017-07-18 PROCEDURE — 80048 BASIC METABOLIC PNL TOTAL CA: CPT | Performed by: INTERNAL MEDICINE

## 2017-07-18 PROCEDURE — 97110 THERAPEUTIC EXERCISES: CPT

## 2017-07-18 PROCEDURE — 97530 THERAPEUTIC ACTIVITIES: CPT

## 2017-07-18 RX ORDER — HYDROCODONE BITARTRATE AND ACETAMINOPHEN 5; 325 MG/1; MG/1
1-2 TABLET ORAL EVERY 6 HOURS PRN
Qty: 20 TABLET | Refills: 0 | Status: ON HOLD | OUTPATIENT
Start: 2017-07-18 | End: 2017-10-07

## 2017-07-18 RX ORDER — HYDROCODONE BITARTRATE AND ACETAMINOPHEN 5; 325 MG/1; MG/1
1 TABLET ORAL EVERY 6 HOURS PRN
Status: DISCONTINUED | OUTPATIENT
Start: 2017-07-18 | End: 2017-07-19

## 2017-07-18 RX ORDER — POTASSIUM CHLORIDE 20 MEQ/1
40 TABLET, EXTENDED RELEASE ORAL EVERY 4 HOURS
Status: COMPLETED | OUTPATIENT
Start: 2017-07-18 | End: 2017-07-18

## 2017-07-18 RX ORDER — FUROSEMIDE 40 MG/1
40 TABLET ORAL DAILY
Status: DISCONTINUED | OUTPATIENT
Start: 2017-07-19 | End: 2017-07-18

## 2017-07-18 RX ORDER — FUROSEMIDE 20 MG/1
20 TABLET ORAL DAILY
Status: DISCONTINUED | OUTPATIENT
Start: 2017-07-19 | End: 2017-07-19

## 2017-07-18 NOTE — PROGRESS NOTES
Patient seen at bedside per the request of Dr García Diaz. Pt states he feels ok. No complaints. OBJECTIVE:    Dressing intact to foot. Mild erythema and edema to right foot. No active purulent drainage noted right foot.   No suggestion of deep absces

## 2017-07-18 NOTE — PLAN OF CARE
Diabetes/Glucose Control    • Glucose maintained within prescribed range Progressing        Impaired Activities of Daily Living    • Achieve highest/safest level of independence in self care Progressing        Impaired Functional Mobility    • Achieve high Diabetic sensorimotor polyneuropathy ([facility] [address]     Lumbar foraminal stenosis     H/O: CVA (cerebrovascular accident)     1st MT osteotomy, 2nd and 3rd MT head resection right foot- Sx 6/8/17  NADEGE 9/6/17     Infected traumatic leg ulcer ([facility] [address]       Active issue

## 2017-07-18 NOTE — PHYSICAL THERAPY NOTE
PHYSICAL THERAPY TREATMENT NOTE - INPATIENT    Room Number: 475/212-M     Session: 2   Number of Visits to Meet Established Goals: 5    Presenting Problem: infected traumatic leg ulcer    Problem List  Active Problems:    Infected traumatic leg ulcer ([address] help from another person does the patient currently need. ..   -   Moving to and from a bed to a chair (including a wheelchair)?: A Little   -   Need to walk in hospital room?: A Little   -   Climbing 3-5 steps with a railing?: Total       AM-PAC Score:  Ra hospital when she comes to visit [facility]. Pt will benefit from ongoing PT to continue to improve strength, endurance, standing balance, transfers, and gait.  Continue to recommend MELO at d/c to achieve mod I level in all aspects of household mobility as Pt

## 2017-07-18 NOTE — PROGRESS NOTES
Pulmonary Progress Note     Assessment / Plan:  1. Acute Hypoxic resp failure - multifactorial due to pulm edema, GEOVANNY/OHS and atelectasis.    -echo w/ no significant change from previous- moderate pulm htn, grade 2 diastolic dysfxn  -IS to help w/ atelectas

## 2017-07-18 NOTE — CONSULTS
Hyperbaric Therapy Evaluation      Araceli Oleary is a 60year old male. REFERRING PHYSICIAN: Dr. Nava ref. provider found    HPI:  Patient here for HBOT evaluation.   Reason for HBOT nonhealing diabetic wound    Does the patient have a prior history of HBO depression or anxiety  HEMATOLOGIC: denies hx of VTE  ENDOCRINE: denies thyroid history      EXAM:  /69 (BP Location: Left arm)   Pulse 66   Temp 98 °F (36.7 °C) (Oral)   Resp 22   Ht 5' 11\" (1.803 m)   Wt 233 lb 11 oz (106 kg)   SpO2 97%   BMI 32. 6

## 2017-07-18 NOTE — PAYOR COMM NOTE
--------------  CONTINUED STAY REVIEW    Payor: N[facility]  Subscriber #:  [id number]  Authorization Number: [id number]    Admit date: 7/12/2017 11:56 AM       Admitting Physician: Mayito Omalley MD  Attending Physician:  Avis Severance Britney Sherwood RN      HYDROmorphone HCl PF (DILAUDID) 1 MG/ML injection 0.4 mg     Date Action Dose Route User    7/17/2017 2248 Given 0.4 mg Intravenous Koko Haley RN      Icosapent Ethyl (Vascepa) 1 gram capsules- Patient Supplied     Date Action placed   -BC with MRSA x2. Aerobic cx with heavy staph aureus  -s/p I&D 7/13  -LE WALDO without vascular disease  -echo EF 50-55%  Spoke with Dr. Zaida Means, good candidate for hyperbaric oxygen therapy  -last required IV dilaudid overnight.  Transition to nor

## 2017-07-18 NOTE — OCCUPATIONAL THERAPY NOTE
OCCUPATIONAL THERAPY TREATMENT NOTE - INPATIENT     Room Number: 742/772-R  Session: 1   Number of Visits to Meet Established Goals: 5    Presenting Problem: AMS, R foot cellulitis s/p I&D on R foot on 7/13    History related to current admission: Pt was a bedpan or urinal? : A Lot  -   Putting on and taking off regular upper body clothing?: A Little  -   Taking care of personal grooming such as brushing teeth?: A Little  -   Eating meals?: None    AM-PAC Score:  Score: 16  Approx Degree of Impairment: 53.32 period of rehabilitation patient should achieve modified independence level in BADL. OT Discharge Recommendations: Sub-acute rehabilitation (ELOS 12-14 days )  OT Device Recommendations: TBD    PLAN  OT Treatment Plan: Balance activities; Energy conser

## 2017-07-18 NOTE — PROGRESS NOTES
[facility] Hospitalist Progress Note     PCP: Melvin Benítez MD    CC:  Follow up    SUBJECTIVE:  Pt sitting up in bed, conversational.  No complaints.     OBJECTIVE:  Temp:  [97.9 °F (36.6 °C)-98.4 °F (36.9 °C)] 98 °F (36.7 °C)  Pulse:  [62-73] 66  Resp Intravenous Q12H   • Heparin Sodium (Porcine)  5,000 Units Subcutaneous 2 times per day   • allopurinol  100 mg Oral Daily   • AmLODIPine Besylate  5 mg Oral Daily   • carBAMazepine  400 mg Oral TID   • carvedilol  6.25 mg Oral BID with meals   • DULoxetin heavy staph aureus  -s/p I&D 7/13  -LE WALDO without vascular disease  -echo EF 50-55%  Spoke with Dr. Bubba Donnelly, good candidate for hyperbaric oxygen therapy  -last required IV dilaudid overnight.  Transition to norco prn    Upper extremity tremors-  New--non

## 2017-07-18 NOTE — CM/SW NOTE
HIMANSHU spoke with spouse regarding [facility] transfer. Wound care consult recommends 30 days of of hyperbaric treatment, occurring 5 days per week.  Treatment would initiate upon discharge from the hospital.   HIMANSHU spoke with liaisons for both [employer] and [employer]

## 2017-07-18 NOTE — PAYOR COMM NOTE
--------------  CONTINUED STAY REVIEW    Payor: Naye [facility]  Subscriber #:  [id number]  Authorization Number: [id number]    Admit date: 7/12/2017 11:56 AM       Admitting Physician: Moni Ron MD  Attending Physician:  Ana Cristina Miller VENTILATION AND PERFUSION SCAN:   CONCLUSION:    #1. Low probability examination for pulmonary embolism. #2. Findings consistent with central/reactive airways disease.     Collected:  7/15/2017 07:23 Status:  Final result    Ref Range & Units 7/15/17 2246

## 2017-07-18 NOTE — PLAN OF CARE
Diabetes/Glucose Control    • Glucose maintained within prescribed range Progressing        PAIN - ADULT    • Verbalizes/displays adequate comfort level or patient's stated pain goal Progressing        Patient/Family Goals    • Patient/Family [facility]

## 2017-07-19 VITALS
OXYGEN SATURATION: 96 % | DIASTOLIC BLOOD PRESSURE: 69 MMHG | BODY MASS INDEX: 31.95 KG/M2 | TEMPERATURE: 98 F | WEIGHT: 228.19 LBS | RESPIRATION RATE: 20 BRPM | HEART RATE: 75 BPM | HEIGHT: 71 IN | SYSTOLIC BLOOD PRESSURE: 171 MMHG

## 2017-07-19 LAB
BUN BLD-MCNC: 22 MG/DL (ref 8–20)
CALCIUM BLD-MCNC: 9.5 MG/DL (ref 8.3–10.3)
CHLORIDE: 98 MMOL/L (ref 101–111)
CO2: 28 MMOL/L (ref 22–32)
CREAT BLD-MCNC: 1.1 MG/DL (ref 0.7–1.3)
GLUCOSE BLD-MCNC: 221 MG/DL (ref 70–99)
GLUCOSE BLD-MCNC: 228 MG/DL (ref 65–99)
GLUCOSE BLD-MCNC: 240 MG/DL (ref 65–99)
GLUCOSE BLD-MCNC: 271 MG/DL (ref 65–99)
GLUCOSE BLD-MCNC: 299 MG/DL (ref 65–99)
POTASSIUM SERPL-SCNC: 4.4 MMOL/L (ref 3.6–5.1)
SODIUM SERPL-SCNC: 134 MMOL/L (ref 136–144)

## 2017-07-19 PROCEDURE — 84132 ASSAY OF SERUM POTASSIUM: CPT | Performed by: HOSPITALIST

## 2017-07-19 PROCEDURE — 82962 GLUCOSE BLOOD TEST: CPT

## 2017-07-19 PROCEDURE — 80048 BASIC METABOLIC PNL TOTAL CA: CPT | Performed by: INTERNAL MEDICINE

## 2017-07-19 RX ORDER — FUROSEMIDE 20 MG/1
20 TABLET ORAL DAILY
Qty: 30 TABLET | Refills: 0 | Status: SHIPPED | OUTPATIENT
Start: 2017-07-19 | End: 2017-08-10 | Stop reason: CLARIF

## 2017-07-19 NOTE — PROGRESS NOTES
[facility] Hospitalist Progress Note     PCP: Luiza Gates MD    CC:  Follow up    SUBJECTIVE:  Pt sitting up in chair, comfortable. No complaints.     OBJECTIVE:  Temp:  [97.8 °F (36.6 °C)-98.6 °F (37 °C)] 98.4 °F (36.9 °C)  Pulse:  [63-75] 75  Res vancomycin  1,500 mg Intravenous Q12H   • Heparin Sodium (Porcine)  5,000 Units Subcutaneous 2 times per day   • allopurinol  100 mg Oral Daily   • AmLODIPine Besylate  5 mg Oral Daily   • carBAMazepine  400 mg Oral TID   • carvedilol  6.25 mg Oral BID wit placed   -BC with MRSA x2.   Aerobic cx with heavy staph aureus  -s/p I&D 7/13  -LE WALDO without vascular disease  -echo EF 50-55%  Spoke with Dr. Mara Mckeon 7/18, good candidate for hyperbaric oxygen therapy  - norco prn    Upper extremity tremors-  New--none

## 2017-07-19 NOTE — CM/SW NOTE
PTARICIA has received insurance authorization for M[facility]O transfer. Facility requests pt depart from the hospital at 6:30pm. RN spoke with pt and spouse to confirm transfer plans including transportation by spouse via private car.  RN provided with facility number a

## 2017-07-19 NOTE — PROGRESS NOTES
[facility]                INFECTIOUS DISEASE PROGRESS NOTE    Berlin Sosa Patient Status:  Inpatient    1955 MRN [id number]   [facility] 5NW-A Attending Adrian Milner, [facility] Day # 7 PCP Luiza Gates MD     A Tissue 7/13/2017 17:44  Foot, right; Tissue   TISSUE AEROBIC CULTURE    ANAEROBIC CULTURE  3+ growth Staphylococcus aureus, MRSA    No Anaerobes(final) 2+ growth Staphylococcus aureus, MRSA    No Anaerobes(final)     Problem list reviewed:  Patient Active

## 2017-07-19 NOTE — PLAN OF CARE
Diabetes/Glucose Control    • Glucose maintained within prescribed range Progressing        PAIN - ADULT    • Verbalizes/displays adequate comfort level or patient's stated pain goal Progressing        Patient/Family Goals    • Patient/Family [facility]

## 2017-07-19 NOTE — DISCHARGE SUMMARY
General Medicine Discharge Summary     Patient ID:  Suzette Shetty  60year old  [id number]  2/21/1955    Admit date: 7/12/2017    Discharge date and time: 7/19/17    Attending Physician: Jose Roberto Vázquez, *     Primary Care Physician: Elin Tyler infection of R foot ulceration.      **acute hypoxic respiratory failure- multifactorial-resolved. initially from dilaudid, then pulmonary edema, suspected GEOVANNY  -ddimer elevated, b/l US to r/o DVTs unremarkable.   V/q scan low probability for PE  -pulmonary fragment. The 2nd and 3rd metatarsal heads have been resected. Alignment of the forefoot is satisfactory.      Xr Foot, Complete (min 3 Views), Right (cpt=73630)    Result Date: 6/22/2017  RIGHT FOOT Three x-rays of the right foot dated June 21, 2017, ariela the cerebellar hemispheres. Small chronic infarcts are also seen within the occipital lobes.     Dictated by: Sylvie Kussmaul, MD on 7/13/2017 at 13:31     Approved by: Sylvie Kussmaul, MD            Us Venous Doppler Leg Bilat - Diag Img (cpt=93970)    R limited due to a bandage. LEFT EXTREMITY:  Triphasic degrading to biphasic within the anterior tibial and dorsalis pedis arteries. OTHER:  None.   SYSTOLIC VELOCITIES (CM/S): RIGHT COMMON FEMORAL:      132  RIGHT PROFUNDA FEMORAL:      73    RIGHT SUPERFICI additional history at this time. FINDINGS:  Stable cardiomegaly with pulmonary vascular congestion. Stable diffuse interstitial and airspace disease in both lungs, left greater right. Stable trace pleural effusions. No pneumothorax.       CONCLUSION:  No preliminary report without discrepancy.    Dictated by: Brandi Saravia MD on 7/14/2017 at 7:53     Approved by: Brandi Saravia MD            Nm Lung Vq Vent / Perfusion Scan  (LPB=[id number])    Result Date: 7/15/2017  PROCEDURE:  NUCLEAR MEDICINE LUNG VENTI Besylate 5 MG Oral Tab  Take 5 mg by mouth daily. carBAMazepine 200 MG Oral Tab  2 tablets (400 MG) by mouth TID    MetFORMIN HCl ER, OSM, 1000 MG (OSM) Oral Tablet 24 Hr  Take 2 tablets (2,000 mg total) by mouth daily.     HYDRALAZINE  MG Oral minutes then wash off, use prn    Multiple Vitamin (MULTI-VITAMIN) Oral Tab  Take 1 tablet by mouth daily. !! - Potential duplicate medications found. Please discuss with provider. Home Medication Changes:      Activity: activity as tolerated  D

## 2017-07-19 NOTE — PROGRESS NOTES
Multidisciplinary Discharge Rounds held 7/19/2017. Treatment team members present today include , , Charge Nurse,  Nurse, RT, PT and Pharmacy caring for [employer].      Other care providers present:    Patient Active Problem

## 2017-07-20 ENCOUNTER — LAB ENCOUNTER (OUTPATIENT)
Dept: LAB | Age: 62
End: 2017-07-20
Attending: INTERNAL MEDICINE
Payer: COMMERCIAL

## 2017-07-20 DIAGNOSIS — L97.509 FOOT ULCER (HCC): ICD-10-CM

## 2017-07-20 DIAGNOSIS — I10 PRIMARY HYPERTENSION: Primary | ICD-10-CM

## 2017-07-20 LAB
ALBUMIN SERPL-MCNC: 2.8 G/DL (ref 3.5–4.8)
ALP LIVER SERPL-CCNC: 101 U/L (ref 45–117)
ALT SERPL-CCNC: 31 U/L (ref 17–63)
AST SERPL-CCNC: 27 U/L (ref 15–41)
BASOPHIL % MANUAL: 0 %
BASOPHIL ABSOLUTE MANUAL: 0 X10(3) UL (ref 0–0.1)
BILIRUB SERPL-MCNC: 0.6 MG/DL (ref 0.1–2)
BUN BLD-MCNC: 20 MG/DL (ref 8–20)
CALCIUM BLD-MCNC: 9.6 MG/DL (ref 8.3–10.3)
CHLORIDE: 97 MMOL/L (ref 101–111)
CO2: 27 MMOL/L (ref 22–32)
CREAT BLD-MCNC: 1.08 MG/DL (ref 0.7–1.3)
EOSINOPHIL % MANUAL: 1 %
EOSINOPHIL ABSOLUTE MANUAL: 0.11 X10(3) UL (ref 0–0.3)
ERYTHROCYTE [DISTWIDTH] IN BLOOD BY AUTOMATED COUNT: 14 % (ref 11.5–16)
GLUCOSE BLD-MCNC: 211 MG/DL (ref 70–99)
HCT VFR BLD AUTO: 28.2 % (ref 37–53)
HGB BLD-MCNC: 9.3 G/DL (ref 13–17)
LYMPHOCYTE % MANUAL: 14 %
LYMPHOCYTE ABSOLUTE MANUAL: 1.53 X10(3) UL (ref 0.9–4)
M PROTEIN MFR SERPL ELPH: 7.7 G/DL (ref 6.1–8.3)
MCH RBC QN AUTO: 29.3 PG (ref 27–33.2)
MCHC RBC AUTO-ENTMCNC: 33 G/DL (ref 31–37)
MCV RBC AUTO: 89 FL (ref 80–99)
MONOCYTE % MANUAL: 8 %
MONOCYTE ABSOLUTE MANUAL: 0.87 X10(3) UL (ref 0.1–0.6)
NEUTROPHIL ABS PRELIM: 7.27 X10 (3) UL (ref 1.3–6.7)
NEUTROPHIL ABSOLUTE MANUAL: 8.39 X10(3) UL (ref 1.3–6.7)
NEUTROPHILS % MANUAL: 77 %
PLATELET # BLD AUTO: 460 10(3)UL (ref 150–450)
POTASSIUM SERPL-SCNC: 4.4 MMOL/L (ref 3.6–5.1)
RBC # BLD AUTO: 3.17 X10(6)UL (ref 4.3–5.7)
RED CELL DISTRIBUTION WIDTH-SD: 45.8 FL (ref 35.1–46.3)
SODIUM SERPL-SCNC: 132 MMOL/L (ref 136–144)
TOTAL CELLS COUNTED: 100
WBC # BLD AUTO: 10.9 X10(3) UL (ref 4–13)

## 2017-07-20 PROCEDURE — 85025 COMPLETE CBC W/AUTO DIFF WBC: CPT

## 2017-07-20 PROCEDURE — 36415 COLL VENOUS BLD VENIPUNCTURE: CPT

## 2017-07-20 PROCEDURE — 85007 BL SMEAR W/DIFF WBC COUNT: CPT

## 2017-07-20 PROCEDURE — 85027 COMPLETE CBC AUTOMATED: CPT

## 2017-07-20 PROCEDURE — 80053 COMPREHEN METABOLIC PANEL: CPT

## 2017-07-20 NOTE — CM/SW NOTE
Patient discharged on 7/19/17 as previously planned.        07/20/17 0800   Discharge disposition   Discharged to: Skilled Nurs   Name of [address]   Discharge transportation Private car

## 2017-07-21 ENCOUNTER — SNF VISIT (OUTPATIENT)
Dept: INTERNAL MEDICINE CLINIC | Age: 62
End: 2017-07-21

## 2017-07-21 VITALS
RESPIRATION RATE: 22 BRPM | OXYGEN SATURATION: 96 % | HEART RATE: 82 BPM | TEMPERATURE: 97 F | DIASTOLIC BLOOD PRESSURE: 69 MMHG | SYSTOLIC BLOOD PRESSURE: 130 MMHG

## 2017-07-21 DIAGNOSIS — E11.42 DIABETIC SENSORIMOTOR POLYNEUROPATHY (HCC): ICD-10-CM

## 2017-07-21 DIAGNOSIS — Z86.73 HISTORY OF CVA (CEREBROVASCULAR ACCIDENT): ICD-10-CM

## 2017-07-21 DIAGNOSIS — I25.10 CORONARY ARTERY DISEASE INVOLVING NATIVE CORONARY ARTERY OF NATIVE HEART WITHOUT ANGINA PECTORIS: ICD-10-CM

## 2017-07-21 DIAGNOSIS — E11.39 TYPE 2 DIABETES MELLITUS WITH OTHER OPHTHALMIC COMPLICATION, WITH LONG-TERM CURRENT USE OF INSULIN (HCC): ICD-10-CM

## 2017-07-21 DIAGNOSIS — Z79.4 TYPE 2 DIABETES MELLITUS WITH OTHER OPHTHALMIC COMPLICATION, WITH LONG-TERM CURRENT USE OF INSULIN (HCC): ICD-10-CM

## 2017-07-21 DIAGNOSIS — I73.9 PVD (PERIPHERAL VASCULAR DISEASE) (HCC): ICD-10-CM

## 2017-07-21 DIAGNOSIS — Z86.73 H/O: CVA (CEREBROVASCULAR ACCIDENT): ICD-10-CM

## 2017-07-21 PROCEDURE — 99309 SBSQ NF CARE MODERATE MDM 30: CPT | Performed by: NURSE PRACTITIONER

## 2017-07-21 RX ORDER — MELATONIN
325
COMMUNITY
End: 2017-08-10

## 2017-07-21 NOTE — PROGRESS NOTES
Lashell Rey  : 1955  Age 60year old  male patient is admitted to Facility: [facility] [address] for R toe infection     [address] [place] [address] Drive date:  17  Discharge date to [facility]:  17  ELOS:  6 weeks of antibiotics   Anticipated di Comment: AC joint x 2  06/2017: OTHER      Comment: 1st metatarsal osteotomy, 2nd, and 3rd                metatarsal head resections, right foot. No family history on file.   Smoking status: Never Smoker MetFORMIN HCl ER, OSM, 1000 MG (OSM) Oral Tablet 24 Hr Take 2 tablets (2,000 mg total) by mouth daily.  Disp: 180 tablet Rfl: 1   HYDRALAZINE  MG Oral Tab TAKE 1 TABLET(100 MG) BY MOUTH THREE TIMES DAILY Disp: 270 tablet Rfl: 0   PRAMIPEXOLE DIHYDROC BD PEN NEEDLE SHARONDA U/F 32G X 4 MM Does not apply Misc USE TWICE DAILY AS DIRECTED Disp: 100 Box Rfl: 1   ACCU-CHEK YOLANDA PLUS In Vitro Strip USE ONCE DAILY Disp: 50 strip Rfl: 0   Ascorbic Acid (VITAMIN C) 1000 MG Oral Tab Take 1,000 mg by mouth daily.  Dis RESPIRATORY:clear to percussion and auscultation/ diminished in bases   CARDIOVASCULAR: S1, S2 normal, RRR; no S3, no S4;   ABDOMEN:  normal active BS+, soft, nondistended; no organomegaly, no masses; no bruits; nontender, no guarding, no rebound tendernes   Note: Calculation is only valid when Albumin is less than 4.0g/dL.     Alkaline Phosphatase 45 - 117 U/L 101   AST 15 - 41 U/L 27   Alt 17 - 63 U/L 31   Bilirubin, Total 0.1 - 2.0 mg/dL 0.6   Total Protein 6.1 - 8.3 g/dL 7.7   Albumin 3.5 - 4.8 g/dL 2.8 3. F/U with Dr Katie Lopez in 1 week   4. F/U with PCP within 7 days of discharge from SNF   5. F/U with Endocrinology 8/30/17   6. F/U Dr Roberts Plate cardiology 9/1/17   7. F/U Dr Mulugeta Arias outpatient podiatry   8.      NAM Sweeney  07/21/17   9:56

## 2017-07-22 ENCOUNTER — LAB ENCOUNTER (OUTPATIENT)
Dept: LAB | Age: 62
End: 2017-07-22
Attending: INTERNAL MEDICINE
Payer: COMMERCIAL

## 2017-07-22 DIAGNOSIS — L97.519 RIGHT FOOT ULCER (HCC): Primary | ICD-10-CM

## 2017-07-22 LAB — VANCOMYCIN TROUGH: 22.1 UG/ML (ref 10–20)

## 2017-07-22 PROCEDURE — 36415 COLL VENOUS BLD VENIPUNCTURE: CPT

## 2017-07-22 PROCEDURE — 80202 ASSAY OF VANCOMYCIN: CPT

## 2017-07-24 ENCOUNTER — OFFICE VISIT (OUTPATIENT)
Dept: WOUND CARE | Facility: HOSPITAL | Age: 62
End: 2017-07-24
Attending: INTERNAL MEDICINE
Payer: COMMERCIAL

## 2017-07-24 ENCOUNTER — LAB ENCOUNTER (OUTPATIENT)
Dept: LAB | Age: 62
End: 2017-07-24
Attending: INTERNAL MEDICINE
Payer: COMMERCIAL

## 2017-07-24 DIAGNOSIS — S91.301D UNSPECIFIED OPEN WOUND, RIGHT FOOT, SUBSEQUENT ENCOUNTER: Primary | ICD-10-CM

## 2017-07-24 DIAGNOSIS — E11.621 TYPE 2 DIABETES MELLITUS WITH FOOT ULCER (HCC): ICD-10-CM

## 2017-07-24 DIAGNOSIS — L97.511 NON-PRESSURE CHRONIC ULCER OF OTHER PART OF RIGHT FOOT LIMITED TO BREAKDOWN OF SKIN (HCC): ICD-10-CM

## 2017-07-24 DIAGNOSIS — A49.02 MRSA INFECTION: Primary | ICD-10-CM

## 2017-07-24 DIAGNOSIS — L97.509 TYPE 2 DIABETES MELLITUS WITH FOOT ULCER (HCC): ICD-10-CM

## 2017-07-24 LAB
25-HYDROXYVITAMIN D (TOTAL): 40.8 NG/ML (ref 30–100)
BUN BLD-MCNC: 18 MG/DL (ref 8–20)
CREAT BLD-MCNC: 1.17 MG/DL (ref 0.7–1.3)
VANCOMYCIN TROUGH: 23.6 UG/ML (ref 10–20)

## 2017-07-24 PROCEDURE — 36415 COLL VENOUS BLD VENIPUNCTURE: CPT

## 2017-07-24 PROCEDURE — 84520 ASSAY OF UREA NITROGEN: CPT

## 2017-07-24 PROCEDURE — 80202 ASSAY OF VANCOMYCIN: CPT

## 2017-07-24 PROCEDURE — 82565 ASSAY OF CREATININE: CPT

## 2017-07-24 PROCEDURE — 82306 VITAMIN D 25 HYDROXY: CPT

## 2017-07-25 ENCOUNTER — OFFICE VISIT (OUTPATIENT)
Dept: WOUND CARE | Facility: HOSPITAL | Age: 62
End: 2017-07-25
Attending: INTERNAL MEDICINE
Payer: COMMERCIAL

## 2017-07-25 DIAGNOSIS — L97.509 TYPE 2 DIABETES MELLITUS WITH FOOT ULCER (HCC): ICD-10-CM

## 2017-07-25 DIAGNOSIS — S91.301D UNSPECIFIED OPEN WOUND, RIGHT FOOT, SUBSEQUENT ENCOUNTER: Primary | ICD-10-CM

## 2017-07-25 DIAGNOSIS — E11.621 TYPE 2 DIABETES MELLITUS WITH FOOT ULCER (HCC): ICD-10-CM

## 2017-07-25 DIAGNOSIS — L97.511 NON-PRESSURE CHRONIC ULCER OF OTHER PART OF RIGHT FOOT LIMITED TO BREAKDOWN OF SKIN (HCC): ICD-10-CM

## 2017-07-25 PROCEDURE — 97597 DBRDMT OPN WND 1ST 20 CM/<: CPT

## 2017-07-25 PROCEDURE — 11042 DBRDMT SUBQ TIS 1ST 20SQCM/<: CPT

## 2017-07-26 ENCOUNTER — OFFICE VISIT (OUTPATIENT)
Dept: WOUND CARE | Facility: HOSPITAL | Age: 62
End: 2017-07-26
Attending: INTERNAL MEDICINE
Payer: COMMERCIAL

## 2017-07-26 ENCOUNTER — SNF VISIT (OUTPATIENT)
Dept: INTERNAL MEDICINE CLINIC | Age: 62
End: 2017-07-26

## 2017-07-26 VITALS
SYSTOLIC BLOOD PRESSURE: 136 MMHG | HEART RATE: 70 BPM | BODY MASS INDEX: 31 KG/M2 | DIASTOLIC BLOOD PRESSURE: 66 MMHG | OXYGEN SATURATION: 96 % | WEIGHT: 222.63 LBS | TEMPERATURE: 98 F | RESPIRATION RATE: 20 BRPM

## 2017-07-26 DIAGNOSIS — S91.301D UNSPECIFIED OPEN WOUND, RIGHT FOOT, SUBSEQUENT ENCOUNTER: Primary | ICD-10-CM

## 2017-07-26 DIAGNOSIS — Z79.4 TYPE 2 DIABETES MELLITUS WITH OTHER OPHTHALMIC COMPLICATION, WITH LONG-TERM CURRENT USE OF INSULIN (HCC): ICD-10-CM

## 2017-07-26 DIAGNOSIS — E16.2 HYPOGLYCEMIA: ICD-10-CM

## 2017-07-26 DIAGNOSIS — R26.9 GAIT ABNORMALITY: ICD-10-CM

## 2017-07-26 DIAGNOSIS — E08.621 DIABETIC ULCER OF RIGHT FOOT ASSOCIATED WITH DIABETES MELLITUS DUE TO UNDERLYING CONDITION, UNSPECIFIED PART OF FOOT, UNSPECIFIED ULCER STAGE (HCC): Primary | ICD-10-CM

## 2017-07-26 DIAGNOSIS — E11.39 TYPE 2 DIABETES MELLITUS WITH OTHER OPHTHALMIC COMPLICATION, WITH LONG-TERM CURRENT USE OF INSULIN (HCC): ICD-10-CM

## 2017-07-26 DIAGNOSIS — R19.5 LOOSE STOOLS: ICD-10-CM

## 2017-07-26 DIAGNOSIS — E11.621 TYPE 2 DIABETES MELLITUS WITH FOOT ULCER (HCC): ICD-10-CM

## 2017-07-26 DIAGNOSIS — L97.509 TYPE 2 DIABETES MELLITUS WITH FOOT ULCER (HCC): ICD-10-CM

## 2017-07-26 DIAGNOSIS — L97.519 DIABETIC ULCER OF RIGHT FOOT ASSOCIATED WITH DIABETES MELLITUS DUE TO UNDERLYING CONDITION, UNSPECIFIED PART OF FOOT, UNSPECIFIED ULCER STAGE (HCC): Primary | ICD-10-CM

## 2017-07-26 PROCEDURE — 99309 SBSQ NF CARE MODERATE MDM 30: CPT | Performed by: NURSE PRACTITIONER

## 2017-07-26 RX ORDER — GARLIC EXTRACT 500 MG
1 CAPSULE ORAL 2 TIMES DAILY
COMMUNITY
Start: 2017-07-26 | End: 2017-08-10

## 2017-07-26 NOTE — PROGRESS NOTES
Progress Note Details  Patient Name: Aaron Heck Date: 7/25/2017   Patient Number: [id number] Physician / Rodney Second: Pradip Cruz   Patient YOB: 1955 Facility: [facility]    Chief Complaint  This information was obtained 3/24/17: WEEK 2: Dimensions improved significantly. some callus plus. Using TCC  3/17/17: WEEK 1 : Dimensions much smaller. Using TCC. Wound culture--> Bactrim and light growth H.parainfluenza. no complaints.      WOUND HISTORY ON ADMISSION:  59 yo CM ca Cerebral microvasculopathy  Peripheral vascular disease  Coronary artery disease  Lumbar foraminal stenosis  CVA  Left & right foot diabetic ulcer  MRSA Nares and right foot wound  Osteomyelitis (Right Foot)    Surgical History  This information was obtain Psychiatric: Anxiety, Depression, Mental Illness    Additional Information  Medication reconciliation completed at today's visit. : Yes  History of chemotherapy?: No  History of radiation?: No        Objective    Constitutional  BP WNL. Pulse RRR.  RR withi Wound #2 Right Foot is a chronic Marina Grade 3 Diabetic Ulcer and has received a status of Not Healed. Initial wound encounter measurements are 2.2cm length x 4cm width x 0.3cm depth, with an area of 8.8 sq cm and a volume of 2.64 cubic cm.  Muscle and santy Wound #4 Right Great Toe is a chronic Marina Grade 4 Diabetic Ulcer and has received a status of Not Healed. Initial wound encounter measurements are 2cm length x 2.5cm width x 0.1cm depth, with an area of 5 sq cm and a volume of 0.5 cubic cm.  No tunneling Lipodermatosclerosis: No      Additional Information  RIGHT- Heel to back of knee Measurement: 17 inches  LEFT- Heel to back of knee Measurement: 17 inches  Left Posterior Tibial Pulse: Triphasic  Right Posterior Tibial Pulse: Triphasic  Left Dorsalis Pedi Wound #2 (Diabetic Ulcer) is located on the right foot. A skin/subcutaneous tissue level excisional/surgical debridement with a total area debrided of 8.8 sq cm was performed by Army Darrin NP.  Subcutaneous was removed along with devitalized tissue Wound Orders:  Wound #2 Right Foot     Topicals:  Initial Anesthetic Order: Apply lidocaine to wound bed on all future wound center visits during preparation for physician exam if wound bed contains fibrin or eschar.   4% Topical Lidocaine  Dermaplast Spr A follow-up appointment should be scheduled.         Electronic Signature(s)   Signed By:  Date:    Clara BROWNING, NBA-BC 07/26/2017 14:12:26

## 2017-07-26 NOTE — PROGRESS NOTES
Juliane Darnell, 2/21/1955, 60year old, male    Chief Complaint:  Patient presents with: Follow - Up: right toe infection/OM w/ MRSA bacteremia  Hypoglycemia       Subjective:  PMH significant for CVA, tremor, CAD, Htn, HL, T2 DM w/ PN, and gout.   In MELO s RESPIRATORY:clear to auscultation anteriorly and posteriorly/ diminished in bases; no wheezing   CARDIOVASCULAR: S1, S2 normal, RRR; no S3, no S4; no click/murmur  ABDOMEN:  normal active BS+, soft, nondistended; no organomegaly, no masses; no bruits; nont 9. F/U Dr Markie Decker for hyperbaric treatment--Glucose need to be >120 and B/P needs to be WNL prior to tx  10. Weekly labs CBC/CMP/Mag/Sed/ESR      Acute respiratory failure in hospital   1. O2 as needed to keep sats above 92%  2.  F/U with Pulm outpatient s

## 2017-07-27 ENCOUNTER — LAB ENCOUNTER (OUTPATIENT)
Dept: LAB | Age: 62
End: 2017-07-27
Attending: INTERNAL MEDICINE
Payer: COMMERCIAL

## 2017-07-27 ENCOUNTER — OFFICE VISIT (OUTPATIENT)
Dept: WOUND CARE | Facility: HOSPITAL | Age: 62
End: 2017-07-27
Attending: INTERNAL MEDICINE
Payer: COMMERCIAL

## 2017-07-27 DIAGNOSIS — S91.301D UNSPECIFIED OPEN WOUND, RIGHT FOOT, SUBSEQUENT ENCOUNTER: Primary | ICD-10-CM

## 2017-07-27 DIAGNOSIS — E11.621 TYPE 2 DIABETES MELLITUS WITH FOOT ULCER (HCC): ICD-10-CM

## 2017-07-27 DIAGNOSIS — L97.519 RIGHT FOOT ULCER (HCC): Primary | ICD-10-CM

## 2017-07-27 DIAGNOSIS — L97.509 TYPE 2 DIABETES MELLITUS WITH FOOT ULCER (HCC): ICD-10-CM

## 2017-07-27 DIAGNOSIS — L97.511 NON-PRESSURE CHRONIC ULCER OF OTHER PART OF RIGHT FOOT LIMITED TO BREAKDOWN OF SKIN (HCC): ICD-10-CM

## 2017-07-27 LAB
25-HYDROXYVITAMIN D (TOTAL): 38 NG/ML (ref 30–100)
ALBUMIN SERPL-MCNC: 3 G/DL (ref 3.5–4.8)
ALP LIVER SERPL-CCNC: 82 U/L (ref 45–117)
ALT SERPL-CCNC: 24 U/L (ref 17–63)
AST SERPL-CCNC: 20 U/L (ref 15–41)
BASOPHILS # BLD AUTO: 0.1 X10(3) UL (ref 0–0.1)
BASOPHILS NFR BLD AUTO: 1.1 %
BILIRUB SERPL-MCNC: 0.5 MG/DL (ref 0.1–2)
BUN BLD-MCNC: 18 MG/DL (ref 8–20)
CALCIUM BLD-MCNC: 9.5 MG/DL (ref 8.3–10.3)
CHLORIDE: 99 MMOL/L (ref 101–111)
CO2: 30 MMOL/L (ref 22–32)
CREAT BLD-MCNC: 1.34 MG/DL (ref 0.7–1.3)
EOSINOPHIL # BLD AUTO: 0.19 X10(3) UL (ref 0–0.3)
EOSINOPHIL NFR BLD AUTO: 2.1 %
ERYTHROCYTE [DISTWIDTH] IN BLOOD BY AUTOMATED COUNT: 14 % (ref 11.5–16)
FOLATE (FOLIC ACID), SERUM: 48 NG/ML (ref 8.7–24)
GLUCOSE BLD-MCNC: 43 MG/DL (ref 70–99)
HAV IGM SER QL: 2 MG/DL (ref 1.7–3)
HCT VFR BLD AUTO: 29.2 % (ref 37–53)
HGB BLD-MCNC: 9.3 G/DL (ref 13–17)
IMMATURE GRANULOCYTE COUNT: 0.05 X10(3) UL (ref 0–1)
IMMATURE GRANULOCYTE RATIO %: 0.6 %
LYMPHOCYTES # BLD AUTO: 1.84 X10(3) UL (ref 0.9–4)
LYMPHOCYTES NFR BLD AUTO: 20.3 %
M PROTEIN MFR SERPL ELPH: 8.4 G/DL (ref 6.1–8.3)
MCH RBC QN AUTO: 29 PG (ref 27–33.2)
MCHC RBC AUTO-ENTMCNC: 31.8 G/DL (ref 31–37)
MCV RBC AUTO: 91 FL (ref 80–99)
MONOCYTES # BLD AUTO: 0.93 X10(3) UL (ref 0.1–0.6)
MONOCYTES NFR BLD AUTO: 10.3 %
NEUTROPHIL ABS PRELIM: 5.94 X10 (3) UL (ref 1.3–6.7)
NEUTROPHILS # BLD AUTO: 5.94 X10(3) UL (ref 1.3–6.7)
NEUTROPHILS NFR BLD AUTO: 65.6 %
PLATELET # BLD AUTO: 454 10(3)UL (ref 150–450)
POTASSIUM SERPL-SCNC: 4.3 MMOL/L (ref 3.6–5.1)
RBC # BLD AUTO: 3.21 X10(6)UL (ref 4.3–5.7)
RED CELL DISTRIBUTION WIDTH-SD: 47 FL (ref 35.1–46.3)
SED RATE-ML: 104 MM/HR (ref 0–12)
SODIUM SERPL-SCNC: 139 MMOL/L (ref 136–144)
VANCOMYCIN TROUGH: 19 UG/ML (ref 10–20)
WBC # BLD AUTO: 9.1 X10(3) UL (ref 4–13)

## 2017-07-27 PROCEDURE — 80202 ASSAY OF VANCOMYCIN: CPT

## 2017-07-27 PROCEDURE — 36415 COLL VENOUS BLD VENIPUNCTURE: CPT

## 2017-07-27 PROCEDURE — 82746 ASSAY OF FOLIC ACID SERUM: CPT

## 2017-07-27 PROCEDURE — 83735 ASSAY OF MAGNESIUM: CPT

## 2017-07-27 PROCEDURE — 85025 COMPLETE CBC W/AUTO DIFF WBC: CPT

## 2017-07-27 PROCEDURE — 85652 RBC SED RATE AUTOMATED: CPT

## 2017-07-27 PROCEDURE — 82306 VITAMIN D 25 HYDROXY: CPT

## 2017-07-27 PROCEDURE — 80053 COMPREHEN METABOLIC PANEL: CPT

## 2017-07-28 ENCOUNTER — OFFICE VISIT (OUTPATIENT)
Dept: WOUND CARE | Facility: HOSPITAL | Age: 62
End: 2017-07-28
Attending: INTERNAL MEDICINE
Payer: COMMERCIAL

## 2017-07-28 DIAGNOSIS — S91.301D UNSPECIFIED OPEN WOUND, RIGHT FOOT, SUBSEQUENT ENCOUNTER: Primary | ICD-10-CM

## 2017-07-28 DIAGNOSIS — L97.509 TYPE 2 DIABETES MELLITUS WITH FOOT ULCER (HCC): ICD-10-CM

## 2017-07-28 DIAGNOSIS — E11.621 TYPE 2 DIABETES MELLITUS WITH FOOT ULCER (HCC): ICD-10-CM

## 2017-07-28 PROCEDURE — 99212 OFFICE O/P EST SF 10 MIN: CPT

## 2017-07-31 ENCOUNTER — LAB ENCOUNTER (OUTPATIENT)
Dept: LAB | Age: 62
End: 2017-07-31
Attending: INTERNAL MEDICINE
Payer: COMMERCIAL

## 2017-07-31 ENCOUNTER — OFFICE VISIT (OUTPATIENT)
Dept: WOUND CARE | Facility: HOSPITAL | Age: 62
End: 2017-07-31
Attending: INTERNAL MEDICINE
Payer: COMMERCIAL

## 2017-07-31 DIAGNOSIS — L97.511 NON-PRESSURE CHRONIC ULCER OF OTHER PART OF RIGHT FOOT LIMITED TO BREAKDOWN OF SKIN (HCC): ICD-10-CM

## 2017-07-31 DIAGNOSIS — L97.509 TYPE 2 DIABETES MELLITUS WITH FOOT ULCER (HCC): ICD-10-CM

## 2017-07-31 DIAGNOSIS — S91.301D UNSPECIFIED OPEN WOUND, RIGHT FOOT, SUBSEQUENT ENCOUNTER: Primary | ICD-10-CM

## 2017-07-31 DIAGNOSIS — Z79.2 LONG TERM CURRENT USE OF ANTIBIOTICS: Primary | ICD-10-CM

## 2017-07-31 DIAGNOSIS — E11.621 TYPE 2 DIABETES MELLITUS WITH FOOT ULCER (HCC): ICD-10-CM

## 2017-07-31 LAB
BUN BLD-MCNC: 23 MG/DL (ref 8–20)
CREAT BLD-MCNC: 1.34 MG/DL (ref 0.7–1.3)
VANCOMYCIN TROUGH: 11.2 UG/ML (ref 10–20)

## 2017-07-31 PROCEDURE — 84520 ASSAY OF UREA NITROGEN: CPT

## 2017-07-31 PROCEDURE — 82565 ASSAY OF CREATININE: CPT

## 2017-07-31 PROCEDURE — 36415 COLL VENOUS BLD VENIPUNCTURE: CPT

## 2017-07-31 PROCEDURE — 80202 ASSAY OF VANCOMYCIN: CPT

## 2017-08-01 ENCOUNTER — OFFICE VISIT (OUTPATIENT)
Dept: WOUND CARE | Facility: HOSPITAL | Age: 62
End: 2017-08-01
Attending: INTERNAL MEDICINE
Payer: COMMERCIAL

## 2017-08-01 ENCOUNTER — TELEPHONE (OUTPATIENT)
Dept: HEMATOLOGY/ONCOLOGY | Facility: HOSPITAL | Age: 62
End: 2017-08-01

## 2017-08-01 PROCEDURE — 11042 DBRDMT SUBQ TIS 1ST 20SQCM/<: CPT

## 2017-08-01 NOTE — TELEPHONE ENCOUNTER
Received a faxed order from Gerry on 7/31, called Gerry the same day to ask for more information regarding this order and was given the charge RN phone # [phone] to call back. No call back.  Called and left a message for her again today asking to jaylin

## 2017-08-01 NOTE — PROGRESS NOTES
Progress Note Details  Patient Name: Ciara German Date: 8/1/2017   Patient Number: [id number] Physician / Paul Pateli: Katlyn Jones   Patient YOB: 1955 [facility]    Chief Complaint  This information was obtained 5-966-9: WEEK 5: wound closed to the surface. some callus present   CT results reviewed with patient. 4-7-17: WEEK 4: Dimensions all better except depth persisting. Ct scan today.    3/31/17: GBID3 : Dimensions appear smaller as wound is covered in callus Integumentary (Hair/Skin/Nails): Ulcers (right foot(dorsum and lateral)), Calluses/Corns (plantar feet)  Neurological: Loss of Protective Sensation (Pt stated has neuropathy)  Prior Wound History: Other (DFU in the past)    Patient denies complaints or sym BP Elevated, on antihypertensive meds, 138/73 rechecked. . Pulse RRR. RR within normal limits. Afebrile. Elevated BMI. Alert, calm, well developed, in no apparent distress.  Height/Length: 71 in (180.34 cm), Weight: 220 lbs (100 kgs), BMI: 30.7, Temperature: Wound #3 Right, Medial Foot is a chronic Marina Grade 1 Diabetic Ulcer and has received a status of Not Healed. Initial wound encounter measurements are 1.2cm length x 1.5cm width x 0.1cm depth, with an area of 1.8 sq cm and a volume of 0.18 cubic cm.  No t Compression Device In Use: No   Calf Measurement 34 cm from heel   Ankle Measurement 9 cm from heel  Vascular Assessment:  Left Extremity colors, hair growth, and conditions:   Extremity Color: Pigmented   Hair Growth on Extremity: Yes   Temperature of Ext Wound #2  Wound #2 (Diabetic Ulcer) is located on the right foot. A skin/subcutaneous tissue level excisional/surgical debridement with a total area debrided of 6.6 sq cm was performed by Kyle Roca NP.  Subcutaneous was removed along with devitali Wound #4 (Diabetic Ulcer) is located on the right great toe. A skin/subcutaneous tissue level excisional/surgical debridement with a total area debrided of 2.5 sq cm was performed by Roselyn Mccarty NP.  Subcutaneous was removed along with devitalized t Paper tape  Change Dressing Daily and PRN    Additional Orders:    Care summary  Reviewed and evaluated labs. - 7/20/17-CBC, CMP and HBA1c  7/15/wound culture MRSA  Discussed the Plan of Care @ bedside with patient.   Workflow: Diabetes  Perfusion assessed

## 2017-08-02 ENCOUNTER — OFFICE VISIT (OUTPATIENT)
Dept: WOUND CARE | Facility: HOSPITAL | Age: 62
End: 2017-08-02
Attending: INTERNAL MEDICINE
Payer: COMMERCIAL

## 2017-08-02 DIAGNOSIS — L97.509 TYPE 2 DIABETES MELLITUS WITH FOOT ULCER (HCC): Primary | ICD-10-CM

## 2017-08-02 DIAGNOSIS — E11.621 TYPE 2 DIABETES MELLITUS WITH FOOT ULCER (HCC): Primary | ICD-10-CM

## 2017-08-02 DIAGNOSIS — L97.511 NON-PRESSURE CHRONIC ULCER OF OTHER PART OF RIGHT FOOT LIMITED TO BREAKDOWN OF SKIN (HCC): ICD-10-CM

## 2017-08-03 ENCOUNTER — LAB ENCOUNTER (OUTPATIENT)
Dept: LAB | Age: 62
End: 2017-08-03
Attending: INTERNAL MEDICINE
Payer: COMMERCIAL

## 2017-08-03 ENCOUNTER — SNF DISCHARGE (OUTPATIENT)
Dept: INTERNAL MEDICINE CLINIC | Age: 62
End: 2017-08-03

## 2017-08-03 ENCOUNTER — OFFICE VISIT (OUTPATIENT)
Dept: WOUND CARE | Facility: HOSPITAL | Age: 62
End: 2017-08-03
Attending: INTERNAL MEDICINE
Payer: COMMERCIAL

## 2017-08-03 VITALS
WEIGHT: 224.63 LBS | BODY MASS INDEX: 31 KG/M2 | OXYGEN SATURATION: 98 % | HEART RATE: 65 BPM | RESPIRATION RATE: 18 BRPM | DIASTOLIC BLOOD PRESSURE: 66 MMHG | SYSTOLIC BLOOD PRESSURE: 138 MMHG

## 2017-08-03 DIAGNOSIS — I73.9 PVD (PERIPHERAL VASCULAR DISEASE) (HCC): ICD-10-CM

## 2017-08-03 DIAGNOSIS — R26.9 GAIT ABNORMALITY: ICD-10-CM

## 2017-08-03 DIAGNOSIS — E78.2 MIXED HYPERLIPIDEMIA: ICD-10-CM

## 2017-08-03 DIAGNOSIS — L97.512 NON-PRESSURE CHRONIC ULCER OF OTHER PART OF RIGHT FOOT WITH FAT LAYER EXPOSED (HCC): ICD-10-CM

## 2017-08-03 DIAGNOSIS — L97.519 DIABETIC ULCER OF RIGHT FOOT ASSOCIATED WITH DIABETES MELLITUS DUE TO UNDERLYING CONDITION, UNSPECIFIED PART OF FOOT, UNSPECIFIED ULCER STAGE (HCC): Primary | ICD-10-CM

## 2017-08-03 DIAGNOSIS — M86.9 DIABETIC FOOT ULCER WITH OSTEOMYELITIS (HCC): Primary | ICD-10-CM

## 2017-08-03 DIAGNOSIS — Z79.4 TYPE 2 DIABETES MELLITUS WITH OTHER OPHTHALMIC COMPLICATION, WITH LONG-TERM CURRENT USE OF INSULIN (HCC): ICD-10-CM

## 2017-08-03 DIAGNOSIS — E11.39 TYPE 2 DIABETES MELLITUS WITH OTHER OPHTHALMIC COMPLICATION, WITH LONG-TERM CURRENT USE OF INSULIN (HCC): ICD-10-CM

## 2017-08-03 DIAGNOSIS — L97.509 DIABETIC FOOT ULCER WITH OSTEOMYELITIS (HCC): Primary | ICD-10-CM

## 2017-08-03 DIAGNOSIS — E11.69 DIABETIC FOOT ULCER WITH OSTEOMYELITIS (HCC): Primary | ICD-10-CM

## 2017-08-03 DIAGNOSIS — I25.10 CORONARY ARTERY DISEASE INVOLVING NATIVE CORONARY ARTERY OF NATIVE HEART WITHOUT ANGINA PECTORIS: ICD-10-CM

## 2017-08-03 DIAGNOSIS — E08.621 DIABETIC ULCER OF RIGHT FOOT ASSOCIATED WITH DIABETES MELLITUS DUE TO UNDERLYING CONDITION, UNSPECIFIED PART OF FOOT, UNSPECIFIED ULCER STAGE (HCC): Primary | ICD-10-CM

## 2017-08-03 DIAGNOSIS — M10.9 GOUT, UNSPECIFIED: ICD-10-CM

## 2017-08-03 DIAGNOSIS — D64.9 ANEMIA, UNSPECIFIED TYPE: ICD-10-CM

## 2017-08-03 DIAGNOSIS — Z86.73 H/O: CVA (CEREBROVASCULAR ACCIDENT): ICD-10-CM

## 2017-08-03 DIAGNOSIS — Z79.2 LONG TERM (CURRENT) USE OF ANTIBIOTICS: Primary | ICD-10-CM

## 2017-08-03 DIAGNOSIS — E11.42 DIABETIC SENSORIMOTOR POLYNEUROPATHY (HCC): ICD-10-CM

## 2017-08-03 DIAGNOSIS — E11.621 DIABETIC FOOT ULCER WITH OSTEOMYELITIS (HCC): Primary | ICD-10-CM

## 2017-08-03 DIAGNOSIS — E16.2 HYPOGLYCEMIA: ICD-10-CM

## 2017-08-03 LAB
ALBUMIN SERPL-MCNC: 3.1 G/DL (ref 3.5–4.8)
ALP LIVER SERPL-CCNC: 70 U/L (ref 45–117)
ALT SERPL-CCNC: 23 U/L (ref 17–63)
AST SERPL-CCNC: 22 U/L (ref 15–41)
BASOPHILS # BLD AUTO: 0.07 X10(3) UL (ref 0–0.1)
BASOPHILS NFR BLD AUTO: 1 %
BILIRUB SERPL-MCNC: 0.3 MG/DL (ref 0.1–2)
BUN BLD-MCNC: 22 MG/DL (ref 8–20)
CALCIUM BLD-MCNC: 9.3 MG/DL (ref 8.3–10.3)
CHLORIDE: 97 MMOL/L (ref 101–111)
CO2: 29 MMOL/L (ref 22–32)
CREAT BLD-MCNC: 1.25 MG/DL (ref 0.7–1.3)
EOSINOPHIL # BLD AUTO: 0.19 X10(3) UL (ref 0–0.3)
EOSINOPHIL NFR BLD AUTO: 2.6 %
ERYTHROCYTE [DISTWIDTH] IN BLOOD BY AUTOMATED COUNT: 13.5 % (ref 11.5–16)
FOLATE (FOLIC ACID), SERUM: 49.2 NG/ML (ref 8.7–24)
GLUCOSE BLD-MCNC: 59 MG/DL (ref 70–99)
HAV IGM SER QL: 2 MG/DL (ref 1.7–3)
HCT VFR BLD AUTO: 28.1 % (ref 37–53)
HGB BLD-MCNC: 9.1 G/DL (ref 13–17)
IMMATURE GRANULOCYTE COUNT: 0.02 X10(3) UL (ref 0–1)
IMMATURE GRANULOCYTE RATIO %: 0.3 %
LYMPHOCYTES # BLD AUTO: 1.71 X10(3) UL (ref 0.9–4)
LYMPHOCYTES NFR BLD AUTO: 23.3 %
M PROTEIN MFR SERPL ELPH: 7.9 G/DL (ref 6.1–8.3)
MCH RBC QN AUTO: 28.8 PG (ref 27–33.2)
MCHC RBC AUTO-ENTMCNC: 32.4 G/DL (ref 31–37)
MCV RBC AUTO: 88.9 FL (ref 80–99)
MONOCYTES # BLD AUTO: 0.72 X10(3) UL (ref 0.1–0.6)
MONOCYTES NFR BLD AUTO: 9.8 %
NEUTROPHIL ABS PRELIM: 4.64 X10 (3) UL (ref 1.3–6.7)
NEUTROPHILS # BLD AUTO: 4.64 X10(3) UL (ref 1.3–6.7)
NEUTROPHILS NFR BLD AUTO: 63 %
PLATELET # BLD AUTO: 313 10(3)UL (ref 150–450)
POTASSIUM SERPL-SCNC: 4.4 MMOL/L (ref 3.6–5.1)
RBC # BLD AUTO: 3.16 X10(6)UL (ref 4.3–5.7)
RED CELL DISTRIBUTION WIDTH-SD: 43.8 FL (ref 35.1–46.3)
SED RATE-ML: 105 MM/HR (ref 0–12)
SODIUM SERPL-SCNC: 132 MMOL/L (ref 136–144)
WBC # BLD AUTO: 7.4 X10(3) UL (ref 4–13)

## 2017-08-03 PROCEDURE — 99316 NF DSCHRG MGMT 30 MIN+: CPT | Performed by: NURSE PRACTITIONER

## 2017-08-03 PROCEDURE — 80053 COMPREHEN METABOLIC PANEL: CPT

## 2017-08-03 PROCEDURE — 83735 ASSAY OF MAGNESIUM: CPT

## 2017-08-03 PROCEDURE — 85025 COMPLETE CBC W/AUTO DIFF WBC: CPT

## 2017-08-03 PROCEDURE — 85652 RBC SED RATE AUTOMATED: CPT

## 2017-08-03 PROCEDURE — 36415 COLL VENOUS BLD VENIPUNCTURE: CPT

## 2017-08-03 PROCEDURE — 82746 ASSAY OF FOLIC ACID SERUM: CPT

## 2017-08-04 ENCOUNTER — OFFICE VISIT (OUTPATIENT)
Dept: WOUND CARE | Facility: HOSPITAL | Age: 62
End: 2017-08-04
Attending: INTERNAL MEDICINE
Payer: COMMERCIAL

## 2017-08-04 DIAGNOSIS — M86.9 DIABETIC FOOT ULCER WITH OSTEOMYELITIS (HCC): Primary | ICD-10-CM

## 2017-08-04 DIAGNOSIS — E11.621 DIABETIC FOOT ULCER WITH OSTEOMYELITIS (HCC): Primary | ICD-10-CM

## 2017-08-04 DIAGNOSIS — L97.909 INFECTED TRAUMATIC LEG ULCER (HCC): ICD-10-CM

## 2017-08-04 DIAGNOSIS — L97.509 DIABETIC FOOT ULCER WITH OSTEOMYELITIS (HCC): Primary | ICD-10-CM

## 2017-08-04 DIAGNOSIS — L97.512 NON-PRESSURE CHRONIC ULCER OF OTHER PART OF RIGHT FOOT WITH FAT LAYER EXPOSED (HCC): ICD-10-CM

## 2017-08-04 DIAGNOSIS — L08.9 INFECTED TRAUMATIC LEG ULCER (HCC): ICD-10-CM

## 2017-08-04 DIAGNOSIS — E11.69 DIABETIC FOOT ULCER WITH OSTEOMYELITIS (HCC): Primary | ICD-10-CM

## 2017-08-04 NOTE — PROGRESS NOTES
Jerad Lemons, 2/21/1955, 60year old, male is being discharged from Facility: [address]    Date of Admission:  7.19.17    Date of Discharge:  Anticipated on 8.4.17                                 Admitting Diagnoses: rashes, no suspicious lesions  WOUND:   Right foot- ace wrap/ boot in place/ good cms. CDI drsg.   Wound RN reports wounds are healing, getting smaller, no drainage/foul odor  EYES: PERRLA, EOMI, sclera anicteric, conjunctiva normal; there is no nystagmus, 08/03/2017   MCV 88.9 08/03/2017   MCH 28.8 08/03/2017   MCHC 32.4 08/03/2017   RDW 13.5 08/03/2017   .0 08/03/2017       Lab Results  Component Value Date   MG 2.0 08/03/2017       Lab Results  Component Value Date   FOLIC 71.7 (H) 70/55/8460 Up Visits:  PCP <7 days after DC from [facility]  Dr Sandoval/pulm in one wk  Endocrinology 8.30.27  Dr Fermin/CV 9.1.17  Dr Lamonte Teixeira podiatry as scheduled      Greater than 30 minutes spent in coordination of care in preparation for discharge.     She

## 2017-08-05 ENCOUNTER — OFFICE VISIT (OUTPATIENT)
Dept: HEMATOLOGY/ONCOLOGY | Facility: HOSPITAL | Age: 62
End: 2017-08-05
Attending: INTERNAL MEDICINE
Payer: COMMERCIAL

## 2017-08-05 VITALS
DIASTOLIC BLOOD PRESSURE: 52 MMHG | HEART RATE: 69 BPM | SYSTOLIC BLOOD PRESSURE: 114 MMHG | TEMPERATURE: 97 F | RESPIRATION RATE: 18 BRPM

## 2017-08-05 DIAGNOSIS — L08.9 INFECTED TRAUMATIC LEG ULCER (HCC): Primary | ICD-10-CM

## 2017-08-05 DIAGNOSIS — L97.909 INFECTED TRAUMATIC LEG ULCER (HCC): Primary | ICD-10-CM

## 2017-08-05 DIAGNOSIS — T82.594A: ICD-10-CM

## 2017-08-05 PROCEDURE — 96365 THER/PROPH/DIAG IV INF INIT: CPT

## 2017-08-05 PROCEDURE — 36593 DECLOT VASCULAR DEVICE: CPT

## 2017-08-05 PROCEDURE — 96366 THER/PROPH/DIAG IV INF ADDON: CPT

## 2017-08-05 RX ORDER — SODIUM CHLORIDE 0.9 % (FLUSH) 0.9 %
10 SYRINGE (ML) INJECTION ONCE
Status: CANCELLED | OUTPATIENT
Start: 2017-08-05

## 2017-08-05 RX ORDER — SODIUM CHLORIDE 0.9 % (FLUSH) 0.9 %
10 SYRINGE (ML) INJECTION ONCE
Status: CANCELLED | OUTPATIENT
Start: 2017-08-06

## 2017-08-05 NOTE — PROGRESS NOTES
Education Record    Learner:  Patient    Disease / Diagnosis:Cellulitis    Barriers / Limitations:  None   Comments:    Method:  Brief focused   Comments:    General Topics:  Medication and Plan of care reviewed   Comments:  VANCOMYCIN 1.5 GRAM OVER 90 MIN

## 2017-08-06 ENCOUNTER — OFFICE VISIT (OUTPATIENT)
Dept: HEMATOLOGY/ONCOLOGY | Facility: HOSPITAL | Age: 62
End: 2017-08-06
Attending: INTERNAL MEDICINE
Payer: COMMERCIAL

## 2017-08-06 VITALS
TEMPERATURE: 97 F | DIASTOLIC BLOOD PRESSURE: 61 MMHG | SYSTOLIC BLOOD PRESSURE: 126 MMHG | HEART RATE: 70 BPM | RESPIRATION RATE: 18 BRPM

## 2017-08-06 DIAGNOSIS — L97.909 INFECTED TRAUMATIC LEG ULCER (HCC): Primary | ICD-10-CM

## 2017-08-06 DIAGNOSIS — L08.9 INFECTED TRAUMATIC LEG ULCER (HCC): Primary | ICD-10-CM

## 2017-08-06 PROCEDURE — 96365 THER/PROPH/DIAG IV INF INIT: CPT

## 2017-08-06 NOTE — PROGRESS NOTES
Education Record    Learner:  Patient and Spouse    Disease / Diagnosis:    Barriers / Limitations:  None   Comments:    Method:  Brief focused, Discussion and Reinforcement   Comments:    General Topics:  Medication, Side effects and symptom management, P

## 2017-08-07 ENCOUNTER — OFFICE VISIT (OUTPATIENT)
Dept: HEMATOLOGY/ONCOLOGY | Facility: HOSPITAL | Age: 62
End: 2017-08-07
Attending: INTERNAL MEDICINE
Payer: COMMERCIAL

## 2017-08-07 ENCOUNTER — OFFICE VISIT (OUTPATIENT)
Dept: WOUND CARE | Facility: HOSPITAL | Age: 62
End: 2017-08-07
Attending: INTERNAL MEDICINE
Payer: COMMERCIAL

## 2017-08-07 VITALS
RESPIRATION RATE: 18 BRPM | HEART RATE: 74 BPM | OXYGEN SATURATION: 99 % | SYSTOLIC BLOOD PRESSURE: 134 MMHG | DIASTOLIC BLOOD PRESSURE: 75 MMHG | TEMPERATURE: 98 F

## 2017-08-07 DIAGNOSIS — E11.69 DIABETIC FOOT ULCER WITH OSTEOMYELITIS (HCC): Primary | ICD-10-CM

## 2017-08-07 DIAGNOSIS — L08.9 INFECTED TRAUMATIC LEG ULCER (HCC): Primary | ICD-10-CM

## 2017-08-07 DIAGNOSIS — L97.909 INFECTED TRAUMATIC LEG ULCER (HCC): Primary | ICD-10-CM

## 2017-08-07 DIAGNOSIS — E11.621 DIABETIC FOOT ULCER WITH OSTEOMYELITIS (HCC): Primary | ICD-10-CM

## 2017-08-07 DIAGNOSIS — M86.9 DIABETIC FOOT ULCER WITH OSTEOMYELITIS (HCC): Primary | ICD-10-CM

## 2017-08-07 DIAGNOSIS — L97.509 DIABETIC FOOT ULCER WITH OSTEOMYELITIS (HCC): Primary | ICD-10-CM

## 2017-08-07 DIAGNOSIS — L97.512 NON-PRESSURE CHRONIC ULCER OF OTHER PART OF RIGHT FOOT WITH FAT LAYER EXPOSED (HCC): ICD-10-CM

## 2017-08-07 LAB — VANCOMYCIN TROUGH: 11.2 UG/ML (ref 10–20)

## 2017-08-07 PROCEDURE — 96366 THER/PROPH/DIAG IV INF ADDON: CPT

## 2017-08-07 PROCEDURE — 80202 ASSAY OF VANCOMYCIN: CPT | Performed by: INTERNAL MEDICINE

## 2017-08-07 PROCEDURE — 96365 THER/PROPH/DIAG IV INF INIT: CPT

## 2017-08-07 RX ORDER — SODIUM CHLORIDE 0.9 % (FLUSH) 0.9 %
10 SYRINGE (ML) INJECTION ONCE
Status: CANCELLED | OUTPATIENT
Start: 2017-08-07

## 2017-08-07 RX ORDER — SODIUM CHLORIDE 0.9 % (FLUSH) 0.9 %
10 SYRINGE (ML) INJECTION ONCE
Status: COMPLETED | OUTPATIENT
Start: 2017-08-07 | End: 2017-08-07

## 2017-08-07 RX ADMIN — SODIUM CHLORIDE 0.9 % (FLUSH) 10 ML: 0.9 % SYRINGE (ML) INJECTION at 15:09:00

## 2017-08-07 NOTE — PROGRESS NOTES
Patient here for IV vanco. Trough drawn this am.     Education Record    Learner:  Patient    Disease / Diagnosis: IV vancomycin     Barriers / Limitations:  None   Comments:    Method:  Discussion   Comments:    General Topics:  Medication, Side effects a

## 2017-08-08 ENCOUNTER — OFFICE VISIT (OUTPATIENT)
Dept: HEMATOLOGY/ONCOLOGY | Facility: HOSPITAL | Age: 62
End: 2017-08-08
Attending: INTERNAL MEDICINE
Payer: COMMERCIAL

## 2017-08-08 ENCOUNTER — OFFICE VISIT (OUTPATIENT)
Dept: WOUND CARE | Facility: HOSPITAL | Age: 62
End: 2017-08-08
Attending: INTERNAL MEDICINE
Payer: COMMERCIAL

## 2017-08-08 ENCOUNTER — TELEPHONE (OUTPATIENT)
Dept: HEMATOLOGY/ONCOLOGY | Facility: HOSPITAL | Age: 62
End: 2017-08-08

## 2017-08-08 VITALS
RESPIRATION RATE: 18 BRPM | DIASTOLIC BLOOD PRESSURE: 60 MMHG | TEMPERATURE: 98 F | HEART RATE: 76 BPM | SYSTOLIC BLOOD PRESSURE: 141 MMHG

## 2017-08-08 DIAGNOSIS — E11.69 DIABETIC FOOT ULCER WITH OSTEOMYELITIS (HCC): Primary | ICD-10-CM

## 2017-08-08 DIAGNOSIS — L97.512 NON-PRESSURE CHRONIC ULCER OF OTHER PART OF RIGHT FOOT WITH FAT LAYER EXPOSED (HCC): ICD-10-CM

## 2017-08-08 DIAGNOSIS — L97.909 INFECTED TRAUMATIC LEG ULCER (HCC): Primary | ICD-10-CM

## 2017-08-08 DIAGNOSIS — E11.621 TYPE 2 DIABETES MELLITUS WITH FOOT ULCER (HCC): ICD-10-CM

## 2017-08-08 DIAGNOSIS — E11.621 DIABETIC FOOT ULCER WITH OSTEOMYELITIS (HCC): Primary | ICD-10-CM

## 2017-08-08 DIAGNOSIS — M86.9 DIABETIC FOOT ULCER WITH OSTEOMYELITIS (HCC): Primary | ICD-10-CM

## 2017-08-08 DIAGNOSIS — L97.509 DIABETIC FOOT ULCER WITH OSTEOMYELITIS (HCC): Primary | ICD-10-CM

## 2017-08-08 DIAGNOSIS — L97.509 TYPE 2 DIABETES MELLITUS WITH FOOT ULCER (HCC): ICD-10-CM

## 2017-08-08 DIAGNOSIS — L08.9 INFECTED TRAUMATIC LEG ULCER (HCC): Primary | ICD-10-CM

## 2017-08-08 LAB
BASOPHILS # BLD AUTO: 0.07 X10(3) UL (ref 0–0.1)
BASOPHILS NFR BLD AUTO: 0.9 %
BUN BLD-MCNC: 25 MG/DL (ref 8–20)
C-REACTIVE PROTEIN: 1.53 MG/DL (ref ?–1)
CALCIUM BLD-MCNC: 9.2 MG/DL (ref 8.3–10.3)
CHLORIDE: 101 MMOL/L (ref 101–111)
CO2: 29 MMOL/L (ref 22–32)
CREAT BLD-MCNC: 1.19 MG/DL (ref 0.7–1.3)
EOSINOPHIL # BLD AUTO: 0.2 X10(3) UL (ref 0–0.3)
EOSINOPHIL NFR BLD AUTO: 2.5 %
ERYTHROCYTE [DISTWIDTH] IN BLOOD BY AUTOMATED COUNT: 13.8 % (ref 11.5–16)
GLUCOSE BLD-MCNC: 163 MG/DL (ref 70–99)
HCT VFR BLD AUTO: 29.3 % (ref 37–53)
HGB BLD-MCNC: 9.3 G/DL (ref 13–17)
IMMATURE GRANULOCYTE COUNT: 0.05 X10(3) UL (ref 0–1)
IMMATURE GRANULOCYTE RATIO %: 0.6 %
LYMPHOCYTES # BLD AUTO: 1.3 X10(3) UL (ref 0.9–4)
LYMPHOCYTES NFR BLD AUTO: 16.5 %
MCH RBC QN AUTO: 28.4 PG (ref 27–33.2)
MCHC RBC AUTO-ENTMCNC: 31.7 G/DL (ref 31–37)
MCV RBC AUTO: 89.6 FL (ref 80–99)
MONOCYTES # BLD AUTO: 0.78 X10(3) UL (ref 0.1–0.6)
MONOCYTES NFR BLD AUTO: 9.9 %
NEUTROPHIL ABS PRELIM: 5.5 X10 (3) UL (ref 1.3–6.7)
NEUTROPHILS # BLD AUTO: 5.5 X10(3) UL (ref 1.3–6.7)
NEUTROPHILS NFR BLD AUTO: 69.6 %
PLATELET # BLD AUTO: 268 10(3)UL (ref 150–450)
POTASSIUM SERPL-SCNC: 4.1 MMOL/L (ref 3.6–5.1)
RBC # BLD AUTO: 3.27 X10(6)UL (ref 4.3–5.7)
RED CELL DISTRIBUTION WIDTH-SD: 45 FL (ref 35.1–46.3)
SODIUM SERPL-SCNC: 136 MMOL/L (ref 136–144)
WBC # BLD AUTO: 7.9 X10(3) UL (ref 4–13)

## 2017-08-08 PROCEDURE — 97597 DBRDMT OPN WND 1ST 20 CM/<: CPT

## 2017-08-08 PROCEDURE — 80048 BASIC METABOLIC PNL TOTAL CA: CPT

## 2017-08-08 PROCEDURE — 96366 THER/PROPH/DIAG IV INF ADDON: CPT

## 2017-08-08 PROCEDURE — 86140 C-REACTIVE PROTEIN: CPT

## 2017-08-08 PROCEDURE — 96365 THER/PROPH/DIAG IV INF INIT: CPT

## 2017-08-08 PROCEDURE — 11042 DBRDMT SUBQ TIS 1ST 20SQCM/<: CPT

## 2017-08-08 PROCEDURE — 85025 COMPLETE CBC W/AUTO DIFF WBC: CPT

## 2017-08-08 RX ORDER — SODIUM CHLORIDE 0.9 % (FLUSH) 0.9 %
10 SYRINGE (ML) INJECTION ONCE
Status: CANCELLED | OUTPATIENT
Start: 2017-08-08

## 2017-08-08 RX ORDER — SODIUM CHLORIDE 0.9 % (FLUSH) 0.9 %
10 SYRINGE (ML) INJECTION ONCE
Status: COMPLETED | OUTPATIENT
Start: 2017-08-08 | End: 2017-08-08

## 2017-08-08 RX ORDER — LISINOPRIL 10 MG/1
10 TABLET ORAL DAILY
COMMUNITY
End: 2017-08-10 | Stop reason: CLARIF

## 2017-08-08 RX ORDER — MULTIVIT WITH MINERALS/LUTEIN
1000 TABLET ORAL DAILY
COMMUNITY

## 2017-08-08 RX ADMIN — SODIUM CHLORIDE 0.9 % (FLUSH) 10 ML: 0.9 % SYRINGE (ML) INJECTION at 17:30:00

## 2017-08-08 NOTE — PATIENT INSTRUCTIONS
Dr. Milan Bustillos increased your Vancomycin 1.75 Grams IV over 2 hours daily. He wants you to get Vanco level drawn on Monday 8/21 with CBC, BMP and CRP. He wants you to see him in his office on 8/30/17---call his office for appt. --[phone].

## 2017-08-08 NOTE — TELEPHONE ENCOUNTER
Called Dr. Tl Nieves to notify him that Vanco trough from 8/7 was 11.2 and pt is presently on Vanco 1.5 Gram daily.   Per telephone order from Dr. Tl Nieves,  increase Vancomycin to 1.75 Grams daily starting 8/8/17  Do Vanco trough and CBC, BMP and CRP weekly sta

## 2017-08-09 ENCOUNTER — OFFICE VISIT (OUTPATIENT)
Dept: HEMATOLOGY/ONCOLOGY | Facility: HOSPITAL | Age: 62
End: 2017-08-09
Attending: INTERNAL MEDICINE
Payer: COMMERCIAL

## 2017-08-09 ENCOUNTER — OFFICE VISIT (OUTPATIENT)
Dept: WOUND CARE | Facility: HOSPITAL | Age: 62
End: 2017-08-09
Attending: INTERNAL MEDICINE
Payer: COMMERCIAL

## 2017-08-09 VITALS
OXYGEN SATURATION: 98 % | SYSTOLIC BLOOD PRESSURE: 143 MMHG | RESPIRATION RATE: 18 BRPM | TEMPERATURE: 98 F | HEART RATE: 85 BPM | DIASTOLIC BLOOD PRESSURE: 74 MMHG

## 2017-08-09 DIAGNOSIS — L08.9 INFECTED TRAUMATIC LEG ULCER (HCC): Primary | ICD-10-CM

## 2017-08-09 DIAGNOSIS — E11.621 DIABETIC FOOT ULCER WITH OSTEOMYELITIS (HCC): Primary | ICD-10-CM

## 2017-08-09 DIAGNOSIS — E11.69 DIABETIC FOOT ULCER WITH OSTEOMYELITIS (HCC): Primary | ICD-10-CM

## 2017-08-09 DIAGNOSIS — L97.509 DIABETIC FOOT ULCER WITH OSTEOMYELITIS (HCC): Primary | ICD-10-CM

## 2017-08-09 DIAGNOSIS — M86.9 DIABETIC FOOT ULCER WITH OSTEOMYELITIS (HCC): Primary | ICD-10-CM

## 2017-08-09 DIAGNOSIS — L97.512 NON-PRESSURE CHRONIC ULCER OF OTHER PART OF RIGHT FOOT WITH FAT LAYER EXPOSED (HCC): ICD-10-CM

## 2017-08-09 DIAGNOSIS — L97.909 INFECTED TRAUMATIC LEG ULCER (HCC): Primary | ICD-10-CM

## 2017-08-09 PROCEDURE — 96365 THER/PROPH/DIAG IV INF INIT: CPT

## 2017-08-09 PROCEDURE — 96366 THER/PROPH/DIAG IV INF ADDON: CPT

## 2017-08-09 RX ORDER — SODIUM CHLORIDE 0.9 % (FLUSH) 0.9 %
10 SYRINGE (ML) INJECTION ONCE
Status: CANCELLED | OUTPATIENT
Start: 2017-08-09

## 2017-08-09 NOTE — PROGRESS NOTES
Progress Note Details  Patient Name: Tg Encarnacion Date: 8/8/2017   Patient Number: [id number] Physician / Madonna Del Angel: Marizol Caicedo   Patient YOB: 1955 Facility: [facility]    Chief Complaint  This information was obtained 8/8/17-Wound improved and no s/s of infection. Debridement, honey gel to right foot and right toe wound, cover with abd. Right medial foot wound endoform and hydrofera blue. Ace wrap. F/u next week with . HBOT in progress.   ---------------------- Cardiovascular (Central/Peripheral):  Lower extremity (leg) swelling (right foot)  Musculoskeletal: Assistive Devices (w/c)  Integumentary (Hair/Skin/Nails): Ulcers (right foot(dorsum and lateral)), Calluses/Corns (plantar feet)  Neurological: Loss of Prote Wound #2 Right Foot is a chronic Marina Grade 3 Diabetic Ulcer and has received a status of Not Healed. Subsequent wound encounter measurements are 1.8cm length x 2.7cm width x 0.3cm depth, with an area of 4.86 sq cm and a volume of 1.458 cubic cm.  Muscle The periwound skin exhibited: Edema.  The periwound skin did not exhibit: Brawny Induration, Excoriation, Induration, Callus, Crepitus, Fluctuance, Friable, Rash, Dry/Scaly, Moist, Maceration, Atrophie Eleanor, Cyanosis, Ecchymosis, Erythema, Hemosiderosis, Lipodermatosclerosis: No  Right Extremity colors, hair growth, and conditions:   Extremity Color: Pigmented   Hair Growth on Extremity: Yes   Temperature of Extremity: Warm   Capillary Refill: < 3 seconds   Erythema: No   Dependent Rubor: No   Hyperpigment Wound #3 (Diabetic Ulcer) is located on the right, medial foot. A selective debridement with a total area debrided of 0.21 sq cm was performed by Pricilla Morfin NP. to remove devitalized tissue: biofilm and scab.  The following instrument(s) were used: Last albumin date and value: - 7/20/17-2.8 and TP 7.7        Electronic Signature(s)   Signed By:  Date:    Doc Degree ARIANNE BROWNING 08/08/2017 22:59:34        Entered By: Doc Degree on 08/08/2017 22:51:05

## 2017-08-09 NOTE — PROGRESS NOTES
Pt arrived for IV ABX and denies any adverse S/S currently, pt alert and appears in nad, pt goes for hyperbaric treatment for ulcer on foot daily, pt alert pt only able to ambulate a few steps and uses wheel chair to aid in mobility    Education Record

## 2017-08-10 ENCOUNTER — OFFICE VISIT (OUTPATIENT)
Dept: HEMATOLOGY/ONCOLOGY | Facility: HOSPITAL | Age: 62
End: 2017-08-10
Attending: INTERNAL MEDICINE
Payer: COMMERCIAL

## 2017-08-10 ENCOUNTER — APPOINTMENT (OUTPATIENT)
Dept: WOUND CARE | Facility: HOSPITAL | Age: 62
End: 2017-08-10
Attending: INTERNAL MEDICINE
Payer: COMMERCIAL

## 2017-08-10 VITALS
HEART RATE: 67 BPM | TEMPERATURE: 97 F | SYSTOLIC BLOOD PRESSURE: 177 MMHG | RESPIRATION RATE: 18 BRPM | DIASTOLIC BLOOD PRESSURE: 77 MMHG

## 2017-08-10 DIAGNOSIS — L97.909 INFECTED TRAUMATIC LEG ULCER (HCC): Primary | ICD-10-CM

## 2017-08-10 DIAGNOSIS — L08.9 INFECTED TRAUMATIC LEG ULCER (HCC): Primary | ICD-10-CM

## 2017-08-10 PROBLEM — H69.83 EUSTACHIAN TUBE DYSFUNCTION, BILATERAL: Status: ACTIVE | Noted: 2017-08-10

## 2017-08-10 PROBLEM — H65.93 OTITIS MEDIA WITH EFFUSION, BILATERAL: Status: ACTIVE | Noted: 2017-08-10

## 2017-08-10 PROCEDURE — 96366 THER/PROPH/DIAG IV INF ADDON: CPT

## 2017-08-10 PROCEDURE — 96365 THER/PROPH/DIAG IV INF INIT: CPT

## 2017-08-10 RX ORDER — SODIUM CHLORIDE 0.9 % (FLUSH) 0.9 %
10 SYRINGE (ML) INJECTION ONCE
Status: COMPLETED | OUTPATIENT
Start: 2017-08-10 | End: 2017-08-10

## 2017-08-10 RX ORDER — SODIUM CHLORIDE 0.9 % (FLUSH) 0.9 %
10 SYRINGE (ML) INJECTION ONCE
Status: CANCELLED | OUTPATIENT
Start: 2017-08-10

## 2017-08-10 RX ADMIN — SODIUM CHLORIDE 0.9 % (FLUSH) 10 ML: 0.9 % SYRINGE (ML) INJECTION at 17:55:00

## 2017-08-11 ENCOUNTER — OFFICE VISIT (OUTPATIENT)
Dept: HEMATOLOGY/ONCOLOGY | Facility: HOSPITAL | Age: 62
End: 2017-08-11
Attending: INTERNAL MEDICINE
Payer: COMMERCIAL

## 2017-08-11 ENCOUNTER — OFFICE VISIT (OUTPATIENT)
Dept: WOUND CARE | Facility: HOSPITAL | Age: 62
End: 2017-08-11
Attending: INTERNAL MEDICINE
Payer: COMMERCIAL

## 2017-08-11 VITALS
RESPIRATION RATE: 16 BRPM | DIASTOLIC BLOOD PRESSURE: 58 MMHG | TEMPERATURE: 97 F | SYSTOLIC BLOOD PRESSURE: 135 MMHG | OXYGEN SATURATION: 96 %

## 2017-08-11 DIAGNOSIS — M86.9 DIABETIC FOOT ULCER WITH OSTEOMYELITIS (HCC): Primary | ICD-10-CM

## 2017-08-11 DIAGNOSIS — L97.512 NON-PRESSURE CHRONIC ULCER OF OTHER PART OF RIGHT FOOT WITH FAT LAYER EXPOSED (HCC): ICD-10-CM

## 2017-08-11 DIAGNOSIS — E11.621 TYPE 2 DIABETES MELLITUS WITH FOOT ULCER (HCC): ICD-10-CM

## 2017-08-11 DIAGNOSIS — E11.621 DIABETIC FOOT ULCER WITH OSTEOMYELITIS (HCC): Primary | ICD-10-CM

## 2017-08-11 DIAGNOSIS — L97.909 INFECTED TRAUMATIC LEG ULCER (HCC): Primary | ICD-10-CM

## 2017-08-11 DIAGNOSIS — L08.9 INFECTED TRAUMATIC LEG ULCER (HCC): Primary | ICD-10-CM

## 2017-08-11 DIAGNOSIS — L97.509 TYPE 2 DIABETES MELLITUS WITH FOOT ULCER (HCC): ICD-10-CM

## 2017-08-11 DIAGNOSIS — L97.509 DIABETIC FOOT ULCER WITH OSTEOMYELITIS (HCC): Primary | ICD-10-CM

## 2017-08-11 DIAGNOSIS — L97.511 NON-PRESSURE CHRONIC ULCER OF OTHER PART OF RIGHT FOOT LIMITED TO BREAKDOWN OF SKIN (HCC): ICD-10-CM

## 2017-08-11 DIAGNOSIS — E11.69 DIABETIC FOOT ULCER WITH OSTEOMYELITIS (HCC): Primary | ICD-10-CM

## 2017-08-11 PROCEDURE — 96366 THER/PROPH/DIAG IV INF ADDON: CPT

## 2017-08-11 PROCEDURE — 96365 THER/PROPH/DIAG IV INF INIT: CPT

## 2017-08-11 RX ORDER — SODIUM CHLORIDE 0.9 % (FLUSH) 0.9 %
10 SYRINGE (ML) INJECTION ONCE
Status: CANCELLED | OUTPATIENT
Start: 2017-08-11

## 2017-08-11 RX ORDER — SODIUM CHLORIDE 0.9 % (FLUSH) 0.9 %
10 SYRINGE (ML) INJECTION ONCE
Status: CANCELLED | OUTPATIENT
Start: 2017-08-12

## 2017-08-11 RX ORDER — SODIUM CHLORIDE 0.9 % (FLUSH) 0.9 %
10 SYRINGE (ML) INJECTION ONCE
Status: COMPLETED | OUTPATIENT
Start: 2017-08-13 | End: 2017-08-13

## 2017-08-11 RX ORDER — SODIUM CHLORIDE 0.9 % (FLUSH) 0.9 %
10 SYRINGE (ML) INJECTION ONCE
Status: CANCELLED | OUTPATIENT
Start: 2017-08-13

## 2017-08-11 RX ORDER — SODIUM CHLORIDE 0.9 % (FLUSH) 0.9 %
10 SYRINGE (ML) INJECTION ONCE
Status: COMPLETED | OUTPATIENT
Start: 2017-08-12 | End: 2017-08-12

## 2017-08-11 RX ORDER — SODIUM CHLORIDE 0.9 % (FLUSH) 0.9 %
10 SYRINGE (ML) INJECTION ONCE
Status: COMPLETED | OUTPATIENT
Start: 2017-08-11 | End: 2017-08-11

## 2017-08-11 RX ADMIN — SODIUM CHLORIDE 0.9 % (FLUSH) 10 ML: 0.9 % SYRINGE (ML) INJECTION at 17:10:00

## 2017-08-11 NOTE — PROGRESS NOTES
Education Record    Learner:  Patient    Disease / Diagnosis: Abscess/MRSA/Bacteremia    Barriers / Limitations:  None   Comments:    Method:  Brief focused and Reinforcement   Comments:    General Topics:  Plan of care reviewed   Comments:    Outcome:   Sh

## 2017-08-12 ENCOUNTER — OFFICE VISIT (OUTPATIENT)
Dept: HEMATOLOGY/ONCOLOGY | Facility: HOSPITAL | Age: 62
End: 2017-08-12
Attending: INTERNAL MEDICINE
Payer: COMMERCIAL

## 2017-08-12 VITALS
SYSTOLIC BLOOD PRESSURE: 154 MMHG | TEMPERATURE: 97 F | RESPIRATION RATE: 16 BRPM | DIASTOLIC BLOOD PRESSURE: 63 MMHG | HEART RATE: 73 BPM

## 2017-08-12 DIAGNOSIS — L08.9 INFECTED TRAUMATIC LEG ULCER (HCC): Primary | ICD-10-CM

## 2017-08-12 DIAGNOSIS — L97.909 INFECTED TRAUMATIC LEG ULCER (HCC): Primary | ICD-10-CM

## 2017-08-12 PROCEDURE — 96366 THER/PROPH/DIAG IV INF ADDON: CPT

## 2017-08-12 PROCEDURE — 96365 THER/PROPH/DIAG IV INF INIT: CPT

## 2017-08-12 RX ADMIN — SODIUM CHLORIDE 0.9 % (FLUSH) 10 ML: 0.9 % SYRINGE (ML) INJECTION at 11:13:00

## 2017-08-12 NOTE — PROGRESS NOTES
Education Record     Learner:  Patient     Disease / Diagnosis:Cellulitis     Barriers / Limitations:  None                          Comments:     Method:  Brief focused                          Comments:     General Topics:  Medication and Plan of care re

## 2017-08-13 ENCOUNTER — OFFICE VISIT (OUTPATIENT)
Dept: HEMATOLOGY/ONCOLOGY | Facility: HOSPITAL | Age: 62
End: 2017-08-13
Attending: INTERNAL MEDICINE
Payer: COMMERCIAL

## 2017-08-13 VITALS
SYSTOLIC BLOOD PRESSURE: 126 MMHG | DIASTOLIC BLOOD PRESSURE: 61 MMHG | HEART RATE: 66 BPM | RESPIRATION RATE: 16 BRPM | TEMPERATURE: 97 F | OXYGEN SATURATION: 99 %

## 2017-08-13 DIAGNOSIS — L97.909 INFECTED TRAUMATIC LEG ULCER (HCC): Primary | ICD-10-CM

## 2017-08-13 DIAGNOSIS — L08.9 INFECTED TRAUMATIC LEG ULCER (HCC): Primary | ICD-10-CM

## 2017-08-13 PROCEDURE — 96366 THER/PROPH/DIAG IV INF ADDON: CPT

## 2017-08-13 PROCEDURE — 96365 THER/PROPH/DIAG IV INF INIT: CPT

## 2017-08-13 RX ADMIN — SODIUM CHLORIDE 0.9 % (FLUSH) 10 ML: 0.9 % SYRINGE (ML) INJECTION at 10:45:00

## 2017-08-13 NOTE — PROGRESS NOTES
Education Record    Learner:  Patient    Disease / Diagnosis:infected leg ulcer    Barriers / Limitations:  None    Method:  Brief focused, printed material and  reinforcement    General Topics:  Plan of care reviewed    Outcome:  Shows understanding

## 2017-08-14 ENCOUNTER — OFFICE VISIT (OUTPATIENT)
Dept: WOUND CARE | Facility: HOSPITAL | Age: 62
End: 2017-08-14
Attending: INTERNAL MEDICINE
Payer: COMMERCIAL

## 2017-08-14 ENCOUNTER — OFFICE VISIT (OUTPATIENT)
Dept: HEMATOLOGY/ONCOLOGY | Facility: HOSPITAL | Age: 62
End: 2017-08-14
Attending: INTERNAL MEDICINE
Payer: COMMERCIAL

## 2017-08-14 VITALS
HEART RATE: 76 BPM | TEMPERATURE: 99 F | RESPIRATION RATE: 16 BRPM | SYSTOLIC BLOOD PRESSURE: 137 MMHG | DIASTOLIC BLOOD PRESSURE: 71 MMHG

## 2017-08-14 DIAGNOSIS — E11.621 DIABETIC FOOT ULCER WITH OSTEOMYELITIS (HCC): ICD-10-CM

## 2017-08-14 DIAGNOSIS — E11.69 DIABETIC FOOT ULCER WITH OSTEOMYELITIS (HCC): ICD-10-CM

## 2017-08-14 DIAGNOSIS — L97.509 DIABETIC FOOT ULCER WITH OSTEOMYELITIS (HCC): ICD-10-CM

## 2017-08-14 DIAGNOSIS — L97.909 INFECTED TRAUMATIC LEG ULCER (HCC): Primary | ICD-10-CM

## 2017-08-14 DIAGNOSIS — L97.512 NON-PRESSURE CHRONIC ULCER OF OTHER PART OF RIGHT FOOT WITH FAT LAYER EXPOSED (HCC): Primary | ICD-10-CM

## 2017-08-14 DIAGNOSIS — M86.9 DIABETIC FOOT ULCER WITH OSTEOMYELITIS (HCC): ICD-10-CM

## 2017-08-14 DIAGNOSIS — L08.9 INFECTED TRAUMATIC LEG ULCER (HCC): Primary | ICD-10-CM

## 2017-08-14 LAB
BASOPHILS # BLD AUTO: 0.06 X10(3) UL (ref 0–0.1)
BASOPHILS NFR BLD AUTO: 0.8 %
BUN BLD-MCNC: 28 MG/DL (ref 8–20)
C-REACTIVE PROTEIN: 2.34 MG/DL (ref ?–1)
CALCIUM BLD-MCNC: 9.1 MG/DL (ref 8.3–10.3)
CHLORIDE: 102 MMOL/L (ref 101–111)
CO2: 27 MMOL/L (ref 22–32)
CREAT BLD-MCNC: 1.31 MG/DL (ref 0.7–1.3)
EOSINOPHIL # BLD AUTO: 0.15 X10(3) UL (ref 0–0.3)
EOSINOPHIL NFR BLD AUTO: 2 %
ERYTHROCYTE [DISTWIDTH] IN BLOOD BY AUTOMATED COUNT: 13.9 % (ref 11.5–16)
GLUCOSE BLD-MCNC: 217 MG/DL (ref 70–99)
HCT VFR BLD AUTO: 27.7 % (ref 37–53)
HGB BLD-MCNC: 9.1 G/DL (ref 13–17)
IMMATURE GRANULOCYTE COUNT: 0.04 X10(3) UL (ref 0–1)
IMMATURE GRANULOCYTE RATIO %: 0.5 %
LYMPHOCYTES # BLD AUTO: 1.36 X10(3) UL (ref 0.9–4)
LYMPHOCYTES NFR BLD AUTO: 17.9 %
MCH RBC QN AUTO: 29.4 PG (ref 27–33.2)
MCHC RBC AUTO-ENTMCNC: 32.9 G/DL (ref 31–37)
MCV RBC AUTO: 89.6 FL (ref 80–99)
MONOCYTES # BLD AUTO: 0.83 X10(3) UL (ref 0.1–0.6)
MONOCYTES NFR BLD AUTO: 10.9 %
NEUTROPHIL ABS PRELIM: 5.17 X10 (3) UL (ref 1.3–6.7)
NEUTROPHILS # BLD AUTO: 5.17 X10(3) UL (ref 1.3–6.7)
NEUTROPHILS NFR BLD AUTO: 67.9 %
PLATELET # BLD AUTO: 257 10(3)UL (ref 150–450)
POTASSIUM SERPL-SCNC: 4.4 MMOL/L (ref 3.6–5.1)
RBC # BLD AUTO: 3.09 X10(6)UL (ref 4.3–5.7)
RED CELL DISTRIBUTION WIDTH-SD: 45 FL (ref 35.1–46.3)
SODIUM SERPL-SCNC: 135 MMOL/L (ref 136–144)
VANCOMYCIN TROUGH: 9.7 UG/ML (ref 10–20)
WBC # BLD AUTO: 7.6 X10(3) UL (ref 4–13)

## 2017-08-14 PROCEDURE — 96366 THER/PROPH/DIAG IV INF ADDON: CPT

## 2017-08-14 PROCEDURE — 96365 THER/PROPH/DIAG IV INF INIT: CPT

## 2017-08-14 PROCEDURE — 85025 COMPLETE CBC W/AUTO DIFF WBC: CPT | Performed by: INTERNAL MEDICINE

## 2017-08-14 PROCEDURE — 86140 C-REACTIVE PROTEIN: CPT

## 2017-08-14 PROCEDURE — 80202 ASSAY OF VANCOMYCIN: CPT | Performed by: INTERNAL MEDICINE

## 2017-08-14 PROCEDURE — 80048 BASIC METABOLIC PNL TOTAL CA: CPT | Performed by: INTERNAL MEDICINE

## 2017-08-14 RX ORDER — SODIUM CHLORIDE 0.9 % (FLUSH) 0.9 %
10 SYRINGE (ML) INJECTION ONCE
Status: CANCELLED | OUTPATIENT
Start: 2017-08-14

## 2017-08-14 RX ORDER — SODIUM CHLORIDE 0.9 % (FLUSH) 0.9 %
10 SYRINGE (ML) INJECTION ONCE
Status: COMPLETED | OUTPATIENT
Start: 2017-08-14 | End: 2017-08-14

## 2017-08-14 RX ADMIN — SODIUM CHLORIDE 0.9 % (FLUSH) 10 ML: 0.9 % SYRINGE (ML) INJECTION at 17:21:00

## 2017-08-15 ENCOUNTER — OFFICE VISIT (OUTPATIENT)
Dept: HEMATOLOGY/ONCOLOGY | Facility: HOSPITAL | Age: 62
End: 2017-08-15
Attending: INTERNAL MEDICINE
Payer: COMMERCIAL

## 2017-08-15 ENCOUNTER — OFFICE VISIT (OUTPATIENT)
Dept: WOUND CARE | Facility: HOSPITAL | Age: 62
End: 2017-08-15
Attending: INTERNAL MEDICINE
Payer: COMMERCIAL

## 2017-08-15 DIAGNOSIS — L08.9 INFECTED TRAUMATIC LEG ULCER, RIGHT, WITH UNSPECIFIED SEVERITY (HCC): Primary | ICD-10-CM

## 2017-08-15 DIAGNOSIS — L97.509 DIABETIC FOOT ULCER WITH OSTEOMYELITIS (HCC): ICD-10-CM

## 2017-08-15 DIAGNOSIS — L97.512 NON-PRESSURE CHRONIC ULCER OF OTHER PART OF RIGHT FOOT WITH FAT LAYER EXPOSED (HCC): Primary | ICD-10-CM

## 2017-08-15 DIAGNOSIS — E11.621 DIABETIC FOOT ULCER WITH OSTEOMYELITIS (HCC): ICD-10-CM

## 2017-08-15 DIAGNOSIS — E11.69 DIABETIC FOOT ULCER WITH OSTEOMYELITIS (HCC): ICD-10-CM

## 2017-08-15 DIAGNOSIS — M86.9 DIABETIC FOOT ULCER WITH OSTEOMYELITIS (HCC): ICD-10-CM

## 2017-08-15 DIAGNOSIS — L97.919 INFECTED TRAUMATIC LEG ULCER, RIGHT, WITH UNSPECIFIED SEVERITY (HCC): Primary | ICD-10-CM

## 2017-08-15 PROCEDURE — 96365 THER/PROPH/DIAG IV INF INIT: CPT

## 2017-08-15 PROCEDURE — 96374 THER/PROPH/DIAG INJ IV PUSH: CPT

## 2017-08-15 PROCEDURE — 96366 THER/PROPH/DIAG IV INF ADDON: CPT

## 2017-08-15 RX ORDER — SODIUM CHLORIDE 0.9 % (FLUSH) 0.9 %
10 SYRINGE (ML) INJECTION ONCE
Status: CANCELLED | OUTPATIENT
Start: 2017-08-15

## 2017-08-15 NOTE — PROGRESS NOTES
Education Record    Learner:  Patient    Disease / Diagnosis:    Barriers / Limitations:  None   Comments:    Method:  Brief focused and Discussion   Comments:    General Topics:  Precautions, Procedure, Side effects and symptom management, Plan of care re

## 2017-08-16 ENCOUNTER — OFFICE VISIT (OUTPATIENT)
Dept: WOUND CARE | Facility: HOSPITAL | Age: 62
End: 2017-08-16
Attending: INTERNAL MEDICINE
Payer: COMMERCIAL

## 2017-08-16 ENCOUNTER — OFFICE VISIT (OUTPATIENT)
Dept: HEMATOLOGY/ONCOLOGY | Facility: HOSPITAL | Age: 62
End: 2017-08-16
Attending: INTERNAL MEDICINE
Payer: COMMERCIAL

## 2017-08-16 VITALS
SYSTOLIC BLOOD PRESSURE: 147 MMHG | DIASTOLIC BLOOD PRESSURE: 71 MMHG | RESPIRATION RATE: 18 BRPM | TEMPERATURE: 97 F | OXYGEN SATURATION: 98 % | HEART RATE: 72 BPM

## 2017-08-16 DIAGNOSIS — E11.621 DIABETIC FOOT ULCER WITH OSTEOMYELITIS (HCC): ICD-10-CM

## 2017-08-16 DIAGNOSIS — L97.509 DIABETIC FOOT ULCER WITH OSTEOMYELITIS (HCC): ICD-10-CM

## 2017-08-16 DIAGNOSIS — L97.919 INFECTED TRAUMATIC LEG ULCER, RIGHT, WITH UNSPECIFIED SEVERITY (HCC): Primary | ICD-10-CM

## 2017-08-16 DIAGNOSIS — E11.621 TYPE 2 DIABETES MELLITUS WITH FOOT ULCER (HCC): ICD-10-CM

## 2017-08-16 DIAGNOSIS — L97.511 NON-PRESSURE CHRONIC ULCER OF OTHER PART OF RIGHT FOOT LIMITED TO BREAKDOWN OF SKIN (HCC): ICD-10-CM

## 2017-08-16 DIAGNOSIS — E11.69 DIABETIC FOOT ULCER WITH OSTEOMYELITIS (HCC): ICD-10-CM

## 2017-08-16 DIAGNOSIS — L97.509 TYPE 2 DIABETES MELLITUS WITH FOOT ULCER (HCC): ICD-10-CM

## 2017-08-16 DIAGNOSIS — L97.512 NON-PRESSURE CHRONIC ULCER OF OTHER PART OF RIGHT FOOT WITH FAT LAYER EXPOSED (HCC): Primary | ICD-10-CM

## 2017-08-16 DIAGNOSIS — L08.9 INFECTED TRAUMATIC LEG ULCER, RIGHT, WITH UNSPECIFIED SEVERITY (HCC): Primary | ICD-10-CM

## 2017-08-16 DIAGNOSIS — M86.9 DIABETIC FOOT ULCER WITH OSTEOMYELITIS (HCC): ICD-10-CM

## 2017-08-16 PROCEDURE — 96365 THER/PROPH/DIAG IV INF INIT: CPT

## 2017-08-16 PROCEDURE — 96366 THER/PROPH/DIAG IV INF ADDON: CPT

## 2017-08-16 RX ORDER — SODIUM CHLORIDE 0.9 % (FLUSH) 0.9 %
10 SYRINGE (ML) INJECTION ONCE
Status: COMPLETED | OUTPATIENT
Start: 2017-08-16 | End: 2017-08-16

## 2017-08-16 RX ORDER — SODIUM CHLORIDE 0.9 % (FLUSH) 0.9 %
10 SYRINGE (ML) INJECTION ONCE
Status: CANCELLED | OUTPATIENT
Start: 2017-08-16

## 2017-08-16 RX ADMIN — SODIUM CHLORIDE 0.9 % (FLUSH) 10 ML: 0.9 % SYRINGE (ML) INJECTION at 17:01:00

## 2017-08-17 ENCOUNTER — OFFICE VISIT (OUTPATIENT)
Dept: HEMATOLOGY/ONCOLOGY | Facility: HOSPITAL | Age: 62
End: 2017-08-17
Attending: INTERNAL MEDICINE
Payer: COMMERCIAL

## 2017-08-17 ENCOUNTER — OFFICE VISIT (OUTPATIENT)
Dept: WOUND CARE | Facility: HOSPITAL | Age: 62
End: 2017-08-17
Attending: INTERNAL MEDICINE
Payer: COMMERCIAL

## 2017-08-17 VITALS
RESPIRATION RATE: 18 BRPM | SYSTOLIC BLOOD PRESSURE: 161 MMHG | DIASTOLIC BLOOD PRESSURE: 73 MMHG | HEART RATE: 77 BPM | TEMPERATURE: 96 F

## 2017-08-17 DIAGNOSIS — E11.69 DIABETIC FOOT ULCER WITH OSTEOMYELITIS (HCC): ICD-10-CM

## 2017-08-17 DIAGNOSIS — L08.9 INFECTED TRAUMATIC LEG ULCER, RIGHT, WITH UNSPECIFIED SEVERITY (HCC): Primary | ICD-10-CM

## 2017-08-17 DIAGNOSIS — M86.9 DIABETIC FOOT ULCER WITH OSTEOMYELITIS (HCC): ICD-10-CM

## 2017-08-17 DIAGNOSIS — L97.919 INFECTED TRAUMATIC LEG ULCER, RIGHT, WITH UNSPECIFIED SEVERITY (HCC): Primary | ICD-10-CM

## 2017-08-17 DIAGNOSIS — L97.512 NON-PRESSURE CHRONIC ULCER OF OTHER PART OF RIGHT FOOT WITH FAT LAYER EXPOSED (HCC): Primary | ICD-10-CM

## 2017-08-17 DIAGNOSIS — E11.621 DIABETIC FOOT ULCER WITH OSTEOMYELITIS (HCC): ICD-10-CM

## 2017-08-17 DIAGNOSIS — L97.509 DIABETIC FOOT ULCER WITH OSTEOMYELITIS (HCC): ICD-10-CM

## 2017-08-17 DIAGNOSIS — E11.621 TYPE 2 DIABETES MELLITUS WITH FOOT ULCER (HCC): ICD-10-CM

## 2017-08-17 DIAGNOSIS — L97.509 TYPE 2 DIABETES MELLITUS WITH FOOT ULCER (HCC): ICD-10-CM

## 2017-08-17 PROCEDURE — 96365 THER/PROPH/DIAG IV INF INIT: CPT

## 2017-08-17 PROCEDURE — 99214 OFFICE O/P EST MOD 30 MIN: CPT

## 2017-08-17 PROCEDURE — 96366 THER/PROPH/DIAG IV INF ADDON: CPT

## 2017-08-17 RX ORDER — SODIUM CHLORIDE 0.9 % (FLUSH) 0.9 %
10 SYRINGE (ML) INJECTION ONCE
Status: CANCELLED | OUTPATIENT
Start: 2017-08-17

## 2017-08-17 RX ORDER — SODIUM CHLORIDE 0.9 % (FLUSH) 0.9 %
10 SYRINGE (ML) INJECTION ONCE
Status: COMPLETED | OUTPATIENT
Start: 2017-08-17 | End: 2017-08-17

## 2017-08-17 RX ADMIN — SODIUM CHLORIDE 0.9 % (FLUSH) 10 ML: 0.9 % SYRINGE (ML) INJECTION at 17:30:00

## 2017-08-18 ENCOUNTER — OFFICE VISIT (OUTPATIENT)
Dept: WOUND CARE | Facility: HOSPITAL | Age: 62
End: 2017-08-18
Attending: INTERNAL MEDICINE
Payer: COMMERCIAL

## 2017-08-18 ENCOUNTER — OFFICE VISIT (OUTPATIENT)
Dept: HEMATOLOGY/ONCOLOGY | Facility: HOSPITAL | Age: 62
End: 2017-08-18
Attending: INTERNAL MEDICINE
Payer: COMMERCIAL

## 2017-08-18 DIAGNOSIS — M86.9 DIABETIC FOOT ULCER WITH OSTEOMYELITIS (HCC): ICD-10-CM

## 2017-08-18 DIAGNOSIS — L08.9 INFECTED TRAUMATIC LEG ULCER, RIGHT, WITH UNSPECIFIED SEVERITY (HCC): Primary | ICD-10-CM

## 2017-08-18 DIAGNOSIS — L97.511 NON-PRESSURE CHRONIC ULCER OF OTHER PART OF RIGHT FOOT LIMITED TO BREAKDOWN OF SKIN (HCC): ICD-10-CM

## 2017-08-18 DIAGNOSIS — E11.621 TYPE 2 DIABETES MELLITUS WITH FOOT ULCER (HCC): ICD-10-CM

## 2017-08-18 DIAGNOSIS — E11.621 DIABETIC FOOT ULCER WITH OSTEOMYELITIS (HCC): ICD-10-CM

## 2017-08-18 DIAGNOSIS — L97.509 DIABETIC FOOT ULCER WITH OSTEOMYELITIS (HCC): ICD-10-CM

## 2017-08-18 DIAGNOSIS — L97.509 TYPE 2 DIABETES MELLITUS WITH FOOT ULCER (HCC): ICD-10-CM

## 2017-08-18 DIAGNOSIS — L97.512 NON-PRESSURE CHRONIC ULCER OF OTHER PART OF RIGHT FOOT WITH FAT LAYER EXPOSED (HCC): Primary | ICD-10-CM

## 2017-08-18 DIAGNOSIS — E11.69 DIABETIC FOOT ULCER WITH OSTEOMYELITIS (HCC): ICD-10-CM

## 2017-08-18 DIAGNOSIS — L97.919 INFECTED TRAUMATIC LEG ULCER, RIGHT, WITH UNSPECIFIED SEVERITY (HCC): Primary | ICD-10-CM

## 2017-08-18 LAB
ALBUMIN SERPL-MCNC: 3.2 G/DL (ref 3.5–4.8)
ALP LIVER SERPL-CCNC: 60 U/L (ref 45–117)
ALT SERPL-CCNC: 21 U/L (ref 17–63)
AST SERPL-CCNC: 18 U/L (ref 15–41)
BILIRUB SERPL-MCNC: 0.3 MG/DL (ref 0.1–2)
BUN BLD-MCNC: 23 MG/DL (ref 8–20)
CALCIUM BLD-MCNC: 9.6 MG/DL (ref 8.3–10.3)
CHLORIDE: 102 MMOL/L (ref 101–111)
CO2: 28 MMOL/L (ref 22–32)
CREAT BLD-MCNC: 1.25 MG/DL (ref 0.7–1.3)
GLUCOSE BLD-MCNC: 171 MG/DL (ref 70–99)
M PROTEIN MFR SERPL ELPH: 7.7 G/DL (ref 6.1–8.3)
POTASSIUM SERPL-SCNC: 4.5 MMOL/L (ref 3.6–5.1)
SODIUM SERPL-SCNC: 137 MMOL/L (ref 136–144)
VANCOMYCIN TROUGH: 14.1 UG/ML (ref 10–20)

## 2017-08-18 PROCEDURE — 96366 THER/PROPH/DIAG IV INF ADDON: CPT

## 2017-08-18 PROCEDURE — 80202 ASSAY OF VANCOMYCIN: CPT | Performed by: INTERNAL MEDICINE

## 2017-08-18 PROCEDURE — 96365 THER/PROPH/DIAG IV INF INIT: CPT

## 2017-08-18 PROCEDURE — 80053 COMPREHEN METABOLIC PANEL: CPT

## 2017-08-18 RX ORDER — SODIUM CHLORIDE 0.9 % (FLUSH) 0.9 %
10 SYRINGE (ML) INJECTION ONCE
Status: CANCELLED | OUTPATIENT
Start: 2017-08-19

## 2017-08-18 RX ORDER — SODIUM CHLORIDE 0.9 % (FLUSH) 0.9 %
10 SYRINGE (ML) INJECTION ONCE
Status: CANCELLED | OUTPATIENT
Start: 2017-08-18

## 2017-08-18 RX ORDER — SODIUM CHLORIDE 0.9 % (FLUSH) 0.9 %
10 SYRINGE (ML) INJECTION ONCE
Status: COMPLETED | OUTPATIENT
Start: 2017-08-20 | End: 2017-08-20

## 2017-08-18 RX ORDER — SODIUM CHLORIDE 0.9 % (FLUSH) 0.9 %
10 SYRINGE (ML) INJECTION ONCE
Status: COMPLETED | OUTPATIENT
Start: 2017-08-19 | End: 2017-08-19

## 2017-08-18 RX ORDER — SODIUM CHLORIDE 0.9 % (FLUSH) 0.9 %
10 SYRINGE (ML) INJECTION ONCE
Status: COMPLETED | OUTPATIENT
Start: 2017-08-18 | End: 2017-08-18

## 2017-08-18 RX ADMIN — SODIUM CHLORIDE 0.9 % (FLUSH) 10 ML: 0.9 % SYRINGE (ML) INJECTION at 17:05:00

## 2017-08-18 NOTE — PROGRESS NOTES
Vanc Trough drawn today and it came back WNL at 14. 1. [place], the pharmacist spoke with Dr. Iveth Ring and pt is to continue on 2gm of vanc. Pt needs another trough drawn on Friday, Aug. 25th.     Education Record    Learner:  Patient    Disease / Diagnosis:

## 2017-08-19 ENCOUNTER — OFFICE VISIT (OUTPATIENT)
Dept: HEMATOLOGY/ONCOLOGY | Facility: HOSPITAL | Age: 62
End: 2017-08-19
Attending: INTERNAL MEDICINE
Payer: COMMERCIAL

## 2017-08-19 VITALS
DIASTOLIC BLOOD PRESSURE: 61 MMHG | RESPIRATION RATE: 18 BRPM | SYSTOLIC BLOOD PRESSURE: 128 MMHG | TEMPERATURE: 98 F | HEART RATE: 68 BPM

## 2017-08-19 DIAGNOSIS — L08.9 INFECTED TRAUMATIC LEG ULCER, RIGHT, WITH UNSPECIFIED SEVERITY (HCC): Primary | ICD-10-CM

## 2017-08-19 DIAGNOSIS — L97.919 INFECTED TRAUMATIC LEG ULCER, RIGHT, WITH UNSPECIFIED SEVERITY (HCC): Primary | ICD-10-CM

## 2017-08-19 PROCEDURE — 96365 THER/PROPH/DIAG IV INF INIT: CPT

## 2017-08-19 PROCEDURE — 96366 THER/PROPH/DIAG IV INF ADDON: CPT

## 2017-08-19 PROCEDURE — 96374 THER/PROPH/DIAG INJ IV PUSH: CPT

## 2017-08-19 PROCEDURE — 96375 TX/PRO/DX INJ NEW DRUG ADDON: CPT

## 2017-08-19 RX ADMIN — SODIUM CHLORIDE 0.9 % (FLUSH) 10 ML: 0.9 % SYRINGE (ML) INJECTION at 11:00:00

## 2017-08-20 ENCOUNTER — OFFICE VISIT (OUTPATIENT)
Dept: HEMATOLOGY/ONCOLOGY | Facility: HOSPITAL | Age: 62
End: 2017-08-20
Attending: INTERNAL MEDICINE
Payer: COMMERCIAL

## 2017-08-20 VITALS
OXYGEN SATURATION: 98 % | TEMPERATURE: 97 F | DIASTOLIC BLOOD PRESSURE: 56 MMHG | HEART RATE: 69 BPM | RESPIRATION RATE: 18 BRPM | SYSTOLIC BLOOD PRESSURE: 102 MMHG

## 2017-08-20 DIAGNOSIS — L97.919 INFECTED TRAUMATIC LEG ULCER, RIGHT, WITH UNSPECIFIED SEVERITY (HCC): Primary | ICD-10-CM

## 2017-08-20 DIAGNOSIS — L08.9 INFECTED TRAUMATIC LEG ULCER, RIGHT, WITH UNSPECIFIED SEVERITY (HCC): Primary | ICD-10-CM

## 2017-08-20 PROCEDURE — 96366 THER/PROPH/DIAG IV INF ADDON: CPT

## 2017-08-20 PROCEDURE — 96365 THER/PROPH/DIAG IV INF INIT: CPT

## 2017-08-20 RX ADMIN — SODIUM CHLORIDE 0.9 % (FLUSH) 10 ML: 0.9 % SYRINGE (ML) INJECTION at 08:50:00

## 2017-08-20 NOTE — PROGRESS NOTES
Pt arrived for IV ABX and denies any adverse S/S from ABX infusion, pt alert and appears in nad, pt only able to ambulate short distances do to bandages to bilat feet, pt uses wheel chair to aid in mobility

## 2017-08-21 ENCOUNTER — OFFICE VISIT (OUTPATIENT)
Dept: WOUND CARE | Facility: HOSPITAL | Age: 62
End: 2017-08-21
Attending: INTERNAL MEDICINE
Payer: COMMERCIAL

## 2017-08-21 ENCOUNTER — OFFICE VISIT (OUTPATIENT)
Dept: HEMATOLOGY/ONCOLOGY | Facility: HOSPITAL | Age: 62
End: 2017-08-21
Attending: INTERNAL MEDICINE
Payer: COMMERCIAL

## 2017-08-21 VITALS
HEART RATE: 71 BPM | SYSTOLIC BLOOD PRESSURE: 139 MMHG | RESPIRATION RATE: 18 BRPM | OXYGEN SATURATION: 99 % | DIASTOLIC BLOOD PRESSURE: 61 MMHG | TEMPERATURE: 98 F

## 2017-08-21 DIAGNOSIS — L97.509 DIABETIC FOOT ULCER WITH OSTEOMYELITIS (HCC): ICD-10-CM

## 2017-08-21 DIAGNOSIS — L97.919 INFECTED TRAUMATIC LEG ULCER, RIGHT, WITH UNSPECIFIED SEVERITY (HCC): Primary | ICD-10-CM

## 2017-08-21 DIAGNOSIS — L08.9 INFECTED TRAUMATIC LEG ULCER, RIGHT, WITH UNSPECIFIED SEVERITY (HCC): Primary | ICD-10-CM

## 2017-08-21 DIAGNOSIS — E11.69 DIABETIC FOOT ULCER WITH OSTEOMYELITIS (HCC): ICD-10-CM

## 2017-08-21 DIAGNOSIS — L97.512 NON-PRESSURE CHRONIC ULCER OF OTHER PART OF RIGHT FOOT WITH FAT LAYER EXPOSED (HCC): Primary | ICD-10-CM

## 2017-08-21 DIAGNOSIS — M86.9 DIABETIC FOOT ULCER WITH OSTEOMYELITIS (HCC): ICD-10-CM

## 2017-08-21 DIAGNOSIS — E11.621 DIABETIC FOOT ULCER WITH OSTEOMYELITIS (HCC): ICD-10-CM

## 2017-08-21 LAB
BASOPHILS # BLD AUTO: 0.05 X10(3) UL (ref 0–0.1)
BASOPHILS NFR BLD AUTO: 0.6 %
BUN BLD-MCNC: 23 MG/DL (ref 8–20)
C-REACTIVE PROTEIN: 1.46 MG/DL (ref ?–1)
CALCIUM BLD-MCNC: 9.6 MG/DL (ref 8.3–10.3)
CHLORIDE: 101 MMOL/L (ref 101–111)
CO2: 29 MMOL/L (ref 22–32)
CREAT BLD-MCNC: 1.03 MG/DL (ref 0.7–1.3)
EOSINOPHIL # BLD AUTO: 0.19 X10(3) UL (ref 0–0.3)
EOSINOPHIL NFR BLD AUTO: 2.5 %
ERYTHROCYTE [DISTWIDTH] IN BLOOD BY AUTOMATED COUNT: 14.2 % (ref 11.5–16)
GLUCOSE BLD-MCNC: 100 MG/DL (ref 70–99)
HCT VFR BLD AUTO: 28.8 % (ref 37–53)
HGB BLD-MCNC: 9.5 G/DL (ref 13–17)
IMMATURE GRANULOCYTE COUNT: 0.03 X10(3) UL (ref 0–1)
IMMATURE GRANULOCYTE RATIO %: 0.4 %
LYMPHOCYTES # BLD AUTO: 1.5 X10(3) UL (ref 0.9–4)
LYMPHOCYTES NFR BLD AUTO: 19.4 %
MCH RBC QN AUTO: 29.6 PG (ref 27–33.2)
MCHC RBC AUTO-ENTMCNC: 33 G/DL (ref 31–37)
MCV RBC AUTO: 89.7 FL (ref 80–99)
MONOCYTES # BLD AUTO: 0.72 X10(3) UL (ref 0.1–0.6)
MONOCYTES NFR BLD AUTO: 9.3 %
NEUTROPHIL ABS PRELIM: 5.23 X10 (3) UL (ref 1.3–6.7)
NEUTROPHILS # BLD AUTO: 5.23 X10(3) UL (ref 1.3–6.7)
NEUTROPHILS NFR BLD AUTO: 67.8 %
PLATELET # BLD AUTO: 264 10(3)UL (ref 150–450)
POTASSIUM SERPL-SCNC: 4.4 MMOL/L (ref 3.6–5.1)
RBC # BLD AUTO: 3.21 X10(6)UL (ref 4.3–5.7)
RED CELL DISTRIBUTION WIDTH-SD: 46 FL (ref 35.1–46.3)
SODIUM SERPL-SCNC: 134 MMOL/L (ref 136–144)
WBC # BLD AUTO: 7.7 X10(3) UL (ref 4–13)

## 2017-08-21 PROCEDURE — 96366 THER/PROPH/DIAG IV INF ADDON: CPT

## 2017-08-21 PROCEDURE — 85025 COMPLETE CBC W/AUTO DIFF WBC: CPT | Performed by: INTERNAL MEDICINE

## 2017-08-21 PROCEDURE — 96365 THER/PROPH/DIAG IV INF INIT: CPT

## 2017-08-21 PROCEDURE — 86140 C-REACTIVE PROTEIN: CPT

## 2017-08-21 PROCEDURE — 80048 BASIC METABOLIC PNL TOTAL CA: CPT | Performed by: INTERNAL MEDICINE

## 2017-08-21 RX ORDER — SODIUM CHLORIDE 0.9 % (FLUSH) 0.9 %
10 SYRINGE (ML) INJECTION ONCE
Status: CANCELLED | OUTPATIENT
Start: 2017-08-21

## 2017-08-21 RX ORDER — SODIUM CHLORIDE 0.9 % (FLUSH) 0.9 %
10 SYRINGE (ML) INJECTION ONCE
Status: COMPLETED | OUTPATIENT
Start: 2017-08-21 | End: 2017-08-21

## 2017-08-21 RX ADMIN — SODIUM CHLORIDE 0.9 % (FLUSH) 10 ML: 0.9 % SYRINGE (ML) INJECTION at 17:15:00

## 2017-08-21 NOTE — PROGRESS NOTES
Education Record    Learner:  Patient    Disease / Diagnosis: Abscess/MRSA/Bacteremia    Barriers / Limitations:  None   Comments:    Method:  Brief focused and Reinforcement   Comments:    General Topics:  Side effects and symptom management, Plan of care

## 2017-08-22 ENCOUNTER — OFFICE VISIT (OUTPATIENT)
Dept: HEMATOLOGY/ONCOLOGY | Facility: HOSPITAL | Age: 62
End: 2017-08-22
Attending: INTERNAL MEDICINE
Payer: COMMERCIAL

## 2017-08-22 ENCOUNTER — OFFICE VISIT (OUTPATIENT)
Dept: WOUND CARE | Facility: HOSPITAL | Age: 62
End: 2017-08-22
Attending: INTERNAL MEDICINE
Payer: COMMERCIAL

## 2017-08-22 VITALS
OXYGEN SATURATION: 97 % | RESPIRATION RATE: 18 BRPM | DIASTOLIC BLOOD PRESSURE: 80 MMHG | TEMPERATURE: 97 F | HEART RATE: 81 BPM | SYSTOLIC BLOOD PRESSURE: 165 MMHG

## 2017-08-22 DIAGNOSIS — E11.621 DIABETIC FOOT ULCER WITH OSTEOMYELITIS (HCC): ICD-10-CM

## 2017-08-22 DIAGNOSIS — L97.509 TYPE 2 DIABETES MELLITUS WITH FOOT ULCER (HCC): ICD-10-CM

## 2017-08-22 DIAGNOSIS — M86.9 DIABETIC FOOT ULCER WITH OSTEOMYELITIS (HCC): ICD-10-CM

## 2017-08-22 DIAGNOSIS — E11.621 TYPE 2 DIABETES MELLITUS WITH FOOT ULCER (HCC): ICD-10-CM

## 2017-08-22 DIAGNOSIS — L97.512 NON-PRESSURE CHRONIC ULCER OF OTHER PART OF RIGHT FOOT WITH FAT LAYER EXPOSED (HCC): Primary | ICD-10-CM

## 2017-08-22 DIAGNOSIS — E11.69 DIABETIC FOOT ULCER WITH OSTEOMYELITIS (HCC): ICD-10-CM

## 2017-08-22 DIAGNOSIS — L08.9 INFECTED TRAUMATIC LEG ULCER, RIGHT, WITH UNSPECIFIED SEVERITY (HCC): Primary | ICD-10-CM

## 2017-08-22 DIAGNOSIS — L97.919 INFECTED TRAUMATIC LEG ULCER, RIGHT, WITH UNSPECIFIED SEVERITY (HCC): Primary | ICD-10-CM

## 2017-08-22 DIAGNOSIS — L97.509 DIABETIC FOOT ULCER WITH OSTEOMYELITIS (HCC): ICD-10-CM

## 2017-08-22 PROCEDURE — 96366 THER/PROPH/DIAG IV INF ADDON: CPT

## 2017-08-22 PROCEDURE — 96365 THER/PROPH/DIAG IV INF INIT: CPT

## 2017-08-22 RX ORDER — SODIUM CHLORIDE 0.9 % (FLUSH) 0.9 %
10 SYRINGE (ML) INJECTION ONCE
Status: COMPLETED | OUTPATIENT
Start: 2017-08-22 | End: 2017-08-22

## 2017-08-22 RX ORDER — SODIUM CHLORIDE 0.9 % (FLUSH) 0.9 %
10 SYRINGE (ML) INJECTION ONCE
Status: CANCELLED | OUTPATIENT
Start: 2017-08-22

## 2017-08-22 RX ADMIN — SODIUM CHLORIDE 0.9 % (FLUSH) 10 ML: 0.9 % SYRINGE (ML) INJECTION at 17:15:00

## 2017-08-23 ENCOUNTER — OFFICE VISIT (OUTPATIENT)
Dept: WOUND CARE | Facility: HOSPITAL | Age: 62
End: 2017-08-23
Attending: INTERNAL MEDICINE
Payer: COMMERCIAL

## 2017-08-23 ENCOUNTER — OFFICE VISIT (OUTPATIENT)
Dept: HEMATOLOGY/ONCOLOGY | Facility: HOSPITAL | Age: 62
End: 2017-08-23
Attending: INTERNAL MEDICINE
Payer: COMMERCIAL

## 2017-08-23 VITALS
DIASTOLIC BLOOD PRESSURE: 57 MMHG | TEMPERATURE: 97 F | HEART RATE: 72 BPM | RESPIRATION RATE: 18 BRPM | SYSTOLIC BLOOD PRESSURE: 130 MMHG

## 2017-08-23 DIAGNOSIS — L97.509 DIABETIC FOOT ULCER WITH OSTEOMYELITIS (HCC): ICD-10-CM

## 2017-08-23 DIAGNOSIS — L97.512 NON-PRESSURE CHRONIC ULCER OF OTHER PART OF RIGHT FOOT WITH FAT LAYER EXPOSED (HCC): Primary | ICD-10-CM

## 2017-08-23 DIAGNOSIS — L08.9 INFECTED TRAUMATIC LEG ULCER, RIGHT, WITH UNSPECIFIED SEVERITY (HCC): Primary | ICD-10-CM

## 2017-08-23 DIAGNOSIS — E11.69 DIABETIC FOOT ULCER WITH OSTEOMYELITIS (HCC): ICD-10-CM

## 2017-08-23 DIAGNOSIS — M86.9 DIABETIC FOOT ULCER WITH OSTEOMYELITIS (HCC): ICD-10-CM

## 2017-08-23 DIAGNOSIS — E11.621 DIABETIC FOOT ULCER WITH OSTEOMYELITIS (HCC): ICD-10-CM

## 2017-08-23 DIAGNOSIS — L97.919 INFECTED TRAUMATIC LEG ULCER, RIGHT, WITH UNSPECIFIED SEVERITY (HCC): Primary | ICD-10-CM

## 2017-08-23 PROCEDURE — 96366 THER/PROPH/DIAG IV INF ADDON: CPT

## 2017-08-23 PROCEDURE — 11042 DBRDMT SUBQ TIS 1ST 20SQCM/<: CPT

## 2017-08-23 PROCEDURE — 96365 THER/PROPH/DIAG IV INF INIT: CPT

## 2017-08-23 RX ORDER — SODIUM CHLORIDE 0.9 % (FLUSH) 0.9 %
10 SYRINGE (ML) INJECTION ONCE
Status: CANCELLED | OUTPATIENT
Start: 2017-08-23

## 2017-08-23 NOTE — PROGRESS NOTES
Education Record    Learner:  Patient    Disease / Diagnosis:  Infection    Barriers / Limitations:  None   Comments:    Method:  Brief focused   Comments:    General Topics:  Medication, Procedure and Side effects and symptom management   Comments:     Out

## 2017-08-24 ENCOUNTER — OFFICE VISIT (OUTPATIENT)
Dept: HEMATOLOGY/ONCOLOGY | Facility: HOSPITAL | Age: 62
End: 2017-08-24
Attending: INTERNAL MEDICINE
Payer: COMMERCIAL

## 2017-08-24 ENCOUNTER — OFFICE VISIT (OUTPATIENT)
Dept: WOUND CARE | Facility: HOSPITAL | Age: 62
End: 2017-08-24
Attending: INTERNAL MEDICINE
Payer: COMMERCIAL

## 2017-08-24 VITALS
SYSTOLIC BLOOD PRESSURE: 118 MMHG | DIASTOLIC BLOOD PRESSURE: 62 MMHG | OXYGEN SATURATION: 97 % | RESPIRATION RATE: 18 BRPM | TEMPERATURE: 97 F | HEART RATE: 66 BPM

## 2017-08-24 DIAGNOSIS — L97.512 NON-PRESSURE CHRONIC ULCER OF OTHER PART OF RIGHT FOOT WITH FAT LAYER EXPOSED (HCC): Primary | ICD-10-CM

## 2017-08-24 DIAGNOSIS — M86.9 DIABETIC FOOT ULCER WITH OSTEOMYELITIS (HCC): ICD-10-CM

## 2017-08-24 DIAGNOSIS — L97.919 INFECTED TRAUMATIC LEG ULCER, RIGHT, WITH UNSPECIFIED SEVERITY (HCC): Primary | ICD-10-CM

## 2017-08-24 DIAGNOSIS — L97.509 DIABETIC FOOT ULCER WITH OSTEOMYELITIS (HCC): ICD-10-CM

## 2017-08-24 DIAGNOSIS — E11.69 DIABETIC FOOT ULCER WITH OSTEOMYELITIS (HCC): ICD-10-CM

## 2017-08-24 DIAGNOSIS — L08.9 INFECTED TRAUMATIC LEG ULCER, RIGHT, WITH UNSPECIFIED SEVERITY (HCC): Primary | ICD-10-CM

## 2017-08-24 DIAGNOSIS — E11.621 DIABETIC FOOT ULCER WITH OSTEOMYELITIS (HCC): ICD-10-CM

## 2017-08-24 PROCEDURE — 96365 THER/PROPH/DIAG IV INF INIT: CPT

## 2017-08-24 PROCEDURE — 96366 THER/PROPH/DIAG IV INF ADDON: CPT

## 2017-08-24 RX ORDER — SODIUM CHLORIDE 0.9 % (FLUSH) 0.9 %
10 SYRINGE (ML) INJECTION ONCE
Status: CANCELLED | OUTPATIENT
Start: 2017-08-24

## 2017-08-24 RX ORDER — SODIUM CHLORIDE 0.9 % (FLUSH) 0.9 %
10 SYRINGE (ML) INJECTION ONCE
Status: COMPLETED | OUTPATIENT
Start: 2017-08-24 | End: 2017-08-24

## 2017-08-24 RX ADMIN — SODIUM CHLORIDE 0.9 % (FLUSH) 10 ML: 0.9 % SYRINGE (ML) INJECTION at 17:28:00

## 2017-08-24 NOTE — PROGRESS NOTES
Education Record    Learner:  Patient    Disease / Diagnosis:    Barriers / Limitations:  None   Comments:    Method:  Brief focused and Discussion   Comments:    General Topics:  Medication, Plan of care reviewed and Fall risk and prevention   Comments:

## 2017-08-25 ENCOUNTER — OFFICE VISIT (OUTPATIENT)
Dept: WOUND CARE | Facility: HOSPITAL | Age: 62
End: 2017-08-25
Attending: INTERNAL MEDICINE
Payer: COMMERCIAL

## 2017-08-25 ENCOUNTER — OFFICE VISIT (OUTPATIENT)
Dept: HEMATOLOGY/ONCOLOGY | Facility: HOSPITAL | Age: 62
End: 2017-08-25
Attending: INTERNAL MEDICINE
Payer: COMMERCIAL

## 2017-08-25 VITALS
DIASTOLIC BLOOD PRESSURE: 65 MMHG | SYSTOLIC BLOOD PRESSURE: 115 MMHG | OXYGEN SATURATION: 98 % | RESPIRATION RATE: 18 BRPM | TEMPERATURE: 98 F | HEART RATE: 65 BPM

## 2017-08-25 DIAGNOSIS — L08.9 INFECTED TRAUMATIC LEG ULCER, RIGHT, WITH UNSPECIFIED SEVERITY (HCC): Primary | ICD-10-CM

## 2017-08-25 DIAGNOSIS — L97.919 INFECTED TRAUMATIC LEG ULCER, RIGHT, WITH UNSPECIFIED SEVERITY (HCC): Primary | ICD-10-CM

## 2017-08-25 LAB — VANCOMYCIN TROUGH: 18 UG/ML (ref 10–20)

## 2017-08-25 PROCEDURE — 96366 THER/PROPH/DIAG IV INF ADDON: CPT

## 2017-08-25 PROCEDURE — 80202 ASSAY OF VANCOMYCIN: CPT

## 2017-08-25 PROCEDURE — 96365 THER/PROPH/DIAG IV INF INIT: CPT

## 2017-08-25 RX ORDER — SODIUM CHLORIDE 0.9 % (FLUSH) 0.9 %
10 SYRINGE (ML) INJECTION ONCE
Status: COMPLETED | OUTPATIENT
Start: 2017-08-26 | End: 2017-08-26

## 2017-08-25 RX ORDER — SODIUM CHLORIDE 0.9 % (FLUSH) 0.9 %
10 SYRINGE (ML) INJECTION ONCE
Status: CANCELLED | OUTPATIENT
Start: 2017-08-25

## 2017-08-25 RX ORDER — SODIUM CHLORIDE 0.9 % (FLUSH) 0.9 %
10 SYRINGE (ML) INJECTION ONCE
Status: CANCELLED | OUTPATIENT
Start: 2017-08-26

## 2017-08-25 RX ORDER — SODIUM CHLORIDE 0.9 % (FLUSH) 0.9 %
10 SYRINGE (ML) INJECTION ONCE
Status: COMPLETED | OUTPATIENT
Start: 2017-08-27 | End: 2017-08-27

## 2017-08-25 NOTE — PROGRESS NOTES
Progress Note Details  Patient Name:              Nelida Vazquez   Patient Number:                       [id number]  Patient YOB: 1955  Date:               8/23/2017  Physician / Michelle Ear:                Lexis Benoit 8/8/17-Wound improved and no s/s of infection. Debridement, honey gel to right foot and right toe wound, cover with abd. Right medial foot wound endoform and hydrofera blue. Ace wrap. F/u next week with . HBOT in progress.   8/23/17-Wound impro 4/27/16 Patient doing well. Ulcer is stable but remains open. No signs or symptoms of infection. Has history of reoccurrence. Charcot changes noted on CT and will be seen by Dr Hamilton Darnell next Wednesday at 115pm in [facility] office.  Has decreased me Intact judgement and insight. Approptiate affect.      Integumentary (Hair, Skin)  Wound #2 Right Foot is a chronic Marina Grade 3 Diabetic Ulcer and has received a status of Not Healed.  Subsequent wound encounter measurements are 1cm length x 1.8cm width Wound #4 Right Great Toe is a chronic Marina Grade 4 Diabetic Ulcer and has received a status of Not Healed. Subsequent wound encounter measurements are 1.6cm length x 2cm width x 0.1cm depth, with an area of 3.2 sq cm and a volume of 0.32 cubic cm.  No lisa (Encounter Diagnosis) M10.9 - Gout, unspecified  (Encounter Diagnosis) I73.9 - Peripheral vascular disease, unspecified  (Encounter Diagnosis) A49.9 - Bacterial infection, unspecified  (Encounter Diagnosis) M86.68 - Other chronic osteomyelitis, other site Ace wrap - As instructed by   Avoid prolonged standing in one place. Elevate leg(s)as much as possible.     Off-Loading  Knee roller - Right     Follow-Up Appointments  Return Appointment in 1 week.  - With Elizabet Gore NP  Other: - HBOT daily     M Entered By: Humberto Bynum on 08/25/2017 16:19:32

## 2017-08-25 NOTE — PROGRESS NOTES
Progress Note Details  Patient Name: Anusha Lowe   Patient Number: [id number]  Patient YOB: 1955  Date: 8/23/2017  Physician / Angelita Rodgers: Katie Badillo [address]: [address]    Chief Complaint  This information was obtained from 8/8/17-Wound improved and no s/s of infection. Debridement, honey gel to right foot and right toe wound, cover with abd. Right medial foot wound endoform and hydrofera blue. Ace wrap. F/u next week with . HBOT in progress.   8/23/17-Wound impro 4/27/16 Patient doing well. Ulcer is stable but remains open. No signs or symptoms of infection. Has history of reoccurrence. Charcot changes noted on CT and will be seen by Dr Jamie Qiu next Wednesday at 115pm in [facility] office.  Has decreased me Integumentary (Hair, Skin)  Wound #2 Right Foot is a chronic Marina Grade 3 Diabetic Ulcer and has received a status of Not Healed.  Subsequent wound encounter measurements are 1cm length x 1.8cm width x 0.3cm depth, with an area of 1.8 sq cm and a volume o Wound #4 Right Great Toe is a chronic Marina Grade 4 Diabetic Ulcer and has received a status of Not Healed. Subsequent wound encounter measurements are 1.6cm length x 2cm width x 0.1cm depth, with an area of 3.2 sq cm and a volume of 0.32 cubic cm.  No lisa (Encounter Diagnosis) M10.9 - Gout, unspecified  (Encounter Diagnosis) I73.9 - Peripheral vascular disease, unspecified  (Encounter Diagnosis) A49.9 - Bacterial infection, unspecified  (Encounter Diagnosis) M86.68 - Other chronic osteomyelitis, other site Ace wrap - As instructed by   Avoid prolonged standing in one place. Elevate leg(s)as much as possible. Off-Loading  Knee roller - Right    Follow-Up Appointments  Return Appointment in 1 week.  - With Ninfa Espana NP  Other: - HBOT daily    Misc

## 2017-08-26 ENCOUNTER — OFFICE VISIT (OUTPATIENT)
Dept: HEMATOLOGY/ONCOLOGY | Facility: HOSPITAL | Age: 62
End: 2017-08-26
Attending: INTERNAL MEDICINE
Payer: COMMERCIAL

## 2017-08-26 VITALS
RESPIRATION RATE: 18 BRPM | HEART RATE: 85 BPM | TEMPERATURE: 97 F | DIASTOLIC BLOOD PRESSURE: 70 MMHG | OXYGEN SATURATION: 100 % | SYSTOLIC BLOOD PRESSURE: 123 MMHG

## 2017-08-26 DIAGNOSIS — L08.9 INFECTED TRAUMATIC LEG ULCER, RIGHT, WITH UNSPECIFIED SEVERITY (HCC): Primary | ICD-10-CM

## 2017-08-26 DIAGNOSIS — L97.919 INFECTED TRAUMATIC LEG ULCER, RIGHT, WITH UNSPECIFIED SEVERITY (HCC): Primary | ICD-10-CM

## 2017-08-26 PROCEDURE — 96365 THER/PROPH/DIAG IV INF INIT: CPT

## 2017-08-26 PROCEDURE — 96366 THER/PROPH/DIAG IV INF ADDON: CPT

## 2017-08-26 RX ADMIN — SODIUM CHLORIDE 0.9 % (FLUSH) 10 ML: 0.9 % SYRINGE (ML) INJECTION at 10:55:00

## 2017-08-27 ENCOUNTER — OFFICE VISIT (OUTPATIENT)
Dept: HEMATOLOGY/ONCOLOGY | Facility: HOSPITAL | Age: 62
End: 2017-08-27
Attending: INTERNAL MEDICINE
Payer: COMMERCIAL

## 2017-08-27 VITALS
TEMPERATURE: 96 F | OXYGEN SATURATION: 96 % | DIASTOLIC BLOOD PRESSURE: 56 MMHG | RESPIRATION RATE: 16 BRPM | HEART RATE: 65 BPM | SYSTOLIC BLOOD PRESSURE: 113 MMHG

## 2017-08-27 DIAGNOSIS — L08.9 INFECTED TRAUMATIC LEG ULCER, RIGHT, WITH UNSPECIFIED SEVERITY (HCC): Primary | ICD-10-CM

## 2017-08-27 DIAGNOSIS — L97.919 INFECTED TRAUMATIC LEG ULCER, RIGHT, WITH UNSPECIFIED SEVERITY (HCC): Primary | ICD-10-CM

## 2017-08-27 PROCEDURE — 96366 THER/PROPH/DIAG IV INF ADDON: CPT

## 2017-08-27 PROCEDURE — 96365 THER/PROPH/DIAG IV INF INIT: CPT

## 2017-08-27 RX ADMIN — SODIUM CHLORIDE 0.9 % (FLUSH) 10 ML: 0.9 % SYRINGE (ML) INJECTION at 10:54:00

## 2017-08-27 NOTE — PROGRESS NOTES
Education Record  Learner:  Patient  Disease / Diagnosis:   R leg infected ulcer  Barriers / Limitations:  None  Method:  Printed material and Reinforcement  General Topics:  Plan of care reviewed  Outcome:  Shows understanding and Patient given copies of

## 2017-08-28 ENCOUNTER — APPOINTMENT (OUTPATIENT)
Dept: HEMATOLOGY/ONCOLOGY | Facility: HOSPITAL | Age: 62
End: 2017-08-28
Attending: INTERNAL MEDICINE
Payer: COMMERCIAL

## 2017-08-28 ENCOUNTER — OFFICE VISIT (OUTPATIENT)
Dept: WOUND CARE | Facility: HOSPITAL | Age: 62
End: 2017-08-28
Attending: INTERNAL MEDICINE
Payer: COMMERCIAL

## 2017-08-28 ENCOUNTER — HOSPITAL ENCOUNTER (EMERGENCY)
Facility: HOSPITAL | Age: 62
Discharge: HOME | End: 2017-08-28
Attending: EMERGENCY MEDICINE
Payer: COMMERCIAL

## 2017-08-28 ENCOUNTER — APPOINTMENT (OUTPATIENT)
Dept: GENERAL RADIOLOGY | Facility: HOSPITAL | Age: 62
End: 2017-08-28
Attending: EMERGENCY MEDICINE
Payer: COMMERCIAL

## 2017-08-28 VITALS
WEIGHT: 225 LBS | HEART RATE: 67 BPM | BODY MASS INDEX: 31.5 KG/M2 | DIASTOLIC BLOOD PRESSURE: 79 MMHG | HEIGHT: 71 IN | SYSTOLIC BLOOD PRESSURE: 158 MMHG | RESPIRATION RATE: 17 BRPM | TEMPERATURE: 98 F | OXYGEN SATURATION: 97 %

## 2017-08-28 DIAGNOSIS — L97.509 DIABETIC FOOT ULCER WITH OSTEOMYELITIS (HCC): ICD-10-CM

## 2017-08-28 DIAGNOSIS — J81.0 ACUTE PULMONARY EDEMA (HCC): ICD-10-CM

## 2017-08-28 DIAGNOSIS — E11.621 DIABETIC FOOT ULCER WITH OSTEOMYELITIS (HCC): ICD-10-CM

## 2017-08-28 DIAGNOSIS — R05.9 COUGH: Primary | ICD-10-CM

## 2017-08-28 DIAGNOSIS — M86.9 DIABETIC FOOT ULCER WITH OSTEOMYELITIS (HCC): ICD-10-CM

## 2017-08-28 DIAGNOSIS — E11.69 DIABETIC FOOT ULCER WITH OSTEOMYELITIS (HCC): ICD-10-CM

## 2017-08-28 DIAGNOSIS — L97.512 NON-PRESSURE CHRONIC ULCER OF OTHER PART OF RIGHT FOOT WITH FAT LAYER EXPOSED (HCC): Primary | ICD-10-CM

## 2017-08-28 LAB
ALBUMIN SERPL-MCNC: 3.3 G/DL (ref 3.5–4.8)
ALP LIVER SERPL-CCNC: 61 U/L (ref 45–117)
ALT SERPL-CCNC: 23 U/L (ref 17–63)
AST SERPL-CCNC: 15 U/L (ref 15–41)
ATRIAL RATE: 67 BPM
BASOPHILS # BLD AUTO: 0.05 X10(3) UL (ref 0–0.1)
BASOPHILS NFR BLD AUTO: 0.8 %
BILIRUB SERPL-MCNC: 0.3 MG/DL (ref 0.1–2)
BUN BLD-MCNC: 29 MG/DL (ref 8–20)
CALCIUM BLD-MCNC: 9.3 MG/DL (ref 8.3–10.3)
CHLORIDE: 102 MMOL/L (ref 101–111)
CO2: 26 MMOL/L (ref 22–32)
CREAT BLD-MCNC: 1.19 MG/DL (ref 0.7–1.3)
EOSINOPHIL # BLD AUTO: 0.17 X10(3) UL (ref 0–0.3)
EOSINOPHIL NFR BLD AUTO: 2.8 %
ERYTHROCYTE [DISTWIDTH] IN BLOOD BY AUTOMATED COUNT: 14.6 % (ref 11.5–16)
GLUCOSE BLD-MCNC: 150 MG/DL (ref 70–99)
HCT VFR BLD AUTO: 27.8 % (ref 37–53)
HGB BLD-MCNC: 9.1 G/DL (ref 13–17)
IMMATURE GRANULOCYTE COUNT: 0.07 X10(3) UL (ref 0–1)
IMMATURE GRANULOCYTE RATIO %: 1.1 %
INR BLD: 1.02 (ref 0.89–1.11)
LYMPHOCYTES # BLD AUTO: 1.16 X10(3) UL (ref 0.9–4)
LYMPHOCYTES NFR BLD AUTO: 18.8 %
M PROTEIN MFR SERPL ELPH: 7.6 G/DL (ref 6.1–8.3)
MCH RBC QN AUTO: 29.2 PG (ref 27–33.2)
MCHC RBC AUTO-ENTMCNC: 32.7 G/DL (ref 31–37)
MCV RBC AUTO: 89.1 FL (ref 80–99)
MONOCYTES # BLD AUTO: 0.49 X10(3) UL (ref 0.1–0.6)
MONOCYTES NFR BLD AUTO: 7.9 %
NEUTROPHIL ABS PRELIM: 4.23 X10 (3) UL (ref 1.3–6.7)
NEUTROPHILS # BLD AUTO: 4.23 X10(3) UL (ref 1.3–6.7)
NEUTROPHILS NFR BLD AUTO: 68.6 %
P AXIS: 42 DEGREES
P-R INTERVAL: 192 MS
PLATELET # BLD AUTO: 236 10(3)UL (ref 150–450)
POTASSIUM SERPL-SCNC: 4.5 MMOL/L (ref 3.6–5.1)
PRO-BETA NATRIURETIC PEPTIDE: 1294 PG/ML (ref ?–125)
PSA SERPL DL<=0.01 NG/ML-MCNC: 13.4 SECONDS (ref 12–14.3)
Q-T INTERVAL: 392 MS
QRS DURATION: 106 MS
QTC CALCULATION (BEZET): 414 MS
R AXIS: 35 DEGREES
RBC # BLD AUTO: 3.12 X10(6)UL (ref 4.3–5.7)
RED CELL DISTRIBUTION WIDTH-SD: 46.7 FL (ref 35.1–46.3)
SODIUM SERPL-SCNC: 134 MMOL/L (ref 136–144)
T AXIS: 52 DEGREES
TROPONIN: <0.046 NG/ML (ref ?–0.05)
TROPONIN: <0.046 NG/ML (ref ?–0.05)
VENTRICULAR RATE: 67 BPM
WBC # BLD AUTO: 6.2 X10(3) UL (ref 4–13)

## 2017-08-28 PROCEDURE — 99285 EMERGENCY DEPT VISIT HI MDM: CPT

## 2017-08-28 PROCEDURE — 93005 ELECTROCARDIOGRAM TRACING: CPT

## 2017-08-28 PROCEDURE — 85025 COMPLETE CBC W/AUTO DIFF WBC: CPT | Performed by: EMERGENCY MEDICINE

## 2017-08-28 PROCEDURE — 80053 COMPREHEN METABOLIC PANEL: CPT | Performed by: EMERGENCY MEDICINE

## 2017-08-28 PROCEDURE — 83880 ASSAY OF NATRIURETIC PEPTIDE: CPT | Performed by: EMERGENCY MEDICINE

## 2017-08-28 PROCEDURE — 71010 XR CHEST AP PORTABLE  (CPT=71010): CPT | Performed by: EMERGENCY MEDICINE

## 2017-08-28 PROCEDURE — 85610 PROTHROMBIN TIME: CPT | Performed by: EMERGENCY MEDICINE

## 2017-08-28 PROCEDURE — 93010 ELECTROCARDIOGRAM REPORT: CPT

## 2017-08-28 PROCEDURE — 96374 THER/PROPH/DIAG INJ IV PUSH: CPT

## 2017-08-28 PROCEDURE — 84484 ASSAY OF TROPONIN QUANT: CPT | Performed by: EMERGENCY MEDICINE

## 2017-08-28 RX ORDER — HYDROCODONE BITARTRATE AND ACETAMINOPHEN 5; 325 MG/1; MG/1
1 TABLET ORAL ONCE
Status: COMPLETED | OUTPATIENT
Start: 2017-08-28 | End: 2017-08-28

## 2017-08-28 RX ORDER — FUROSEMIDE 10 MG/ML
40 INJECTION INTRAMUSCULAR; INTRAVENOUS ONCE
Status: COMPLETED | OUTPATIENT
Start: 2017-08-28 | End: 2017-08-28

## 2017-08-28 NOTE — CONSULTS
[facility] Cardiology Consultation    [facility] Patient Status:  Emergency    1955 MRN [id number]   Location [facility] Attending Kesha Kumar MD   Hosp Day # 0 PCP Mariza Galaviz MD     Reason for Consultation:  CHF [13-17] 17  BP: (154-160)/(65-77) 154/65    Last 3 Weights  08/28/17 1506 : 225 lb (102.1 kg)  08/03/17 0810 : 224 lb 9.6 oz (101.9 kg)  07/26/17 1025 : 222 lb 9.6 oz (101 kg)          General: No apparent distress  HEENT: No focal deficits. Neck: supple.

## 2017-08-28 NOTE — ED NOTES
Pt requesting his dose of gabepentin for his neuropathy, Dr Viktor Smith made aware and stated he could take his usual dose when he goes home

## 2017-08-28 NOTE — ED PROVIDER NOTES
Patient Seen in: [facility] Emergency Department    History   Patient presents with:  Dyspnea MARTIN SOB (respiratory)    Stated Complaint: shortness of breath, oxygen sat of 88% after hyperbaric chamber r/o pulmonary e*    HPI    Patient is a 62-year- Oral Tab,     PRAMIPEXOLE DIHYDROCHLORIDE 1 MG Oral Tab,  TAKE 1 TABLET(1 MG) BY MOUTH EVERY NIGHT   AMLODIPINE BESYLATE 5 MG Oral Tab,  TAKE 1 TABLET(5 MG) BY MOUTH DAILY   HYDRALAZINE  MG Oral Tab,  TAKE 1 TABLET(100 MG) BY MOUTH THREE TIMES DAILY Ketoconazole 2 % External Shampoo,  Wash scalp, face, chest, ears and back daily.    selenium sulfide 2.5 % External Lotion,  Apply to affected area on face, chest, back, leave on for 15 minutes then wash off, use prn   Multiple Vitamin (MULTI-VITAMIN) Oral diaphoresis    ED Course     Labs Reviewed   COMP METABOLIC PANEL (14) - Abnormal; Notable for the following:        Result Value    Glucose 150 (*)     BUN 29 (*)     Albumin 3.3 (*)     Sodium 134 (*)     All other components within normal limits   PRO B spoke with Dr. Zari Parekh from cardiology. He saw the patient in the ER. Recommended repeat troponin at 3 hours a dose of Lasix and patient can go home. On reevaluation prior to the Lasix patient was feeling improved and requesting discharge home.   He wa

## 2017-08-29 ENCOUNTER — APPOINTMENT (OUTPATIENT)
Dept: WOUND CARE | Facility: HOSPITAL | Age: 62
End: 2017-08-29
Attending: INTERNAL MEDICINE
Payer: COMMERCIAL

## 2017-08-29 ENCOUNTER — OFFICE VISIT (OUTPATIENT)
Dept: HEMATOLOGY/ONCOLOGY | Facility: HOSPITAL | Age: 62
End: 2017-08-29
Attending: INTERNAL MEDICINE
Payer: COMMERCIAL

## 2017-08-29 VITALS
BODY MASS INDEX: 32 KG/M2 | OXYGEN SATURATION: 97 % | SYSTOLIC BLOOD PRESSURE: 129 MMHG | HEART RATE: 74 BPM | TEMPERATURE: 98 F | RESPIRATION RATE: 18 BRPM | WEIGHT: 231 LBS | DIASTOLIC BLOOD PRESSURE: 69 MMHG

## 2017-08-29 DIAGNOSIS — L97.919 INFECTED TRAUMATIC LEG ULCER, RIGHT, WITH UNSPECIFIED SEVERITY (HCC): Primary | ICD-10-CM

## 2017-08-29 DIAGNOSIS — L08.9 INFECTED TRAUMATIC LEG ULCER, RIGHT, WITH UNSPECIFIED SEVERITY (HCC): Primary | ICD-10-CM

## 2017-08-29 PROCEDURE — 96366 THER/PROPH/DIAG IV INF ADDON: CPT

## 2017-08-29 PROCEDURE — 96365 THER/PROPH/DIAG IV INF INIT: CPT

## 2017-08-29 RX ORDER — SODIUM CHLORIDE 0.9 % (FLUSH) 0.9 %
10 SYRINGE (ML) INJECTION ONCE
Status: COMPLETED | OUTPATIENT
Start: 2017-08-29 | End: 2017-08-29

## 2017-08-29 RX ORDER — SODIUM CHLORIDE 0.9 % (FLUSH) 0.9 %
10 SYRINGE (ML) INJECTION ONCE
Status: CANCELLED | OUTPATIENT
Start: 2017-08-29

## 2017-08-29 RX ADMIN — SODIUM CHLORIDE 0.9 % (FLUSH) 10 ML: 0.9 % SYRINGE (ML) INJECTION at 15:35:00

## 2017-08-30 ENCOUNTER — OFFICE VISIT (OUTPATIENT)
Dept: HEMATOLOGY/ONCOLOGY | Facility: HOSPITAL | Age: 62
End: 2017-08-30
Attending: INTERNAL MEDICINE
Payer: COMMERCIAL

## 2017-08-30 ENCOUNTER — APPOINTMENT (OUTPATIENT)
Dept: WOUND CARE | Facility: HOSPITAL | Age: 62
End: 2017-08-30
Attending: INTERNAL MEDICINE
Payer: COMMERCIAL

## 2017-08-30 VITALS
HEART RATE: 64 BPM | SYSTOLIC BLOOD PRESSURE: 114 MMHG | OXYGEN SATURATION: 97 % | TEMPERATURE: 97 F | RESPIRATION RATE: 18 BRPM | DIASTOLIC BLOOD PRESSURE: 66 MMHG

## 2017-08-30 DIAGNOSIS — L08.9 INFECTED TRAUMATIC LEG ULCER, RIGHT, WITH UNSPECIFIED SEVERITY (HCC): Primary | ICD-10-CM

## 2017-08-30 DIAGNOSIS — L97.919 INFECTED TRAUMATIC LEG ULCER, RIGHT, WITH UNSPECIFIED SEVERITY (HCC): Primary | ICD-10-CM

## 2017-08-30 PROCEDURE — 96366 THER/PROPH/DIAG IV INF ADDON: CPT

## 2017-08-30 PROCEDURE — 96365 THER/PROPH/DIAG IV INF INIT: CPT

## 2017-08-30 RX ORDER — SODIUM CHLORIDE 0.9 % (FLUSH) 0.9 %
10 SYRINGE (ML) INJECTION ONCE
Status: CANCELLED | OUTPATIENT
Start: 2017-08-30

## 2017-08-30 NOTE — PROGRESS NOTES
Pt arrivd for last dose of IV ABX, pt denies any adverse S/S from ABX infusion, pt states per Dr Sandra Fernandes this will be his last day of infusion and OK to pull PICC line, called and spoke with Dwayne Tellez at MD office and verbal OK to pull and she will fax ov

## 2017-08-30 NOTE — PROGRESS NOTES
INFECTIOUS DISEASE PROGRESS NOTE    S[place] Locks Patient Status:  Inpatient    1955 MRN [id number]   Location [facility]       PCP Jacqui Galloway MD     Antibiotics: vancomycin #47  POD#5  Subjective:  Re insulin ([address]     Diabetic ulcer of left foot associated with diabetes mellitus due to underlying condition ([address]     Nonrheumatic aortic valve stenosis     Cerebral microvasculopathy     Cognitive complaints     History of snoring     Chronic midline low

## 2017-08-31 ENCOUNTER — OFFICE VISIT (OUTPATIENT)
Dept: WOUND CARE | Facility: HOSPITAL | Age: 62
End: 2017-08-31
Attending: INTERNAL MEDICINE
Payer: COMMERCIAL

## 2017-08-31 DIAGNOSIS — E11.621 TYPE 2 DIABETES MELLITUS WITH FOOT ULCER (HCC): ICD-10-CM

## 2017-08-31 DIAGNOSIS — L97.509 DIABETIC FOOT ULCER WITH OSTEOMYELITIS (HCC): ICD-10-CM

## 2017-08-31 DIAGNOSIS — L97.512 NON-PRESSURE CHRONIC ULCER OF OTHER PART OF RIGHT FOOT WITH FAT LAYER EXPOSED (HCC): Primary | ICD-10-CM

## 2017-08-31 DIAGNOSIS — E11.621 DIABETIC FOOT ULCER WITH OSTEOMYELITIS (HCC): ICD-10-CM

## 2017-08-31 DIAGNOSIS — E11.69 DIABETIC FOOT ULCER WITH OSTEOMYELITIS (HCC): ICD-10-CM

## 2017-08-31 DIAGNOSIS — M86.9 DIABETIC FOOT ULCER WITH OSTEOMYELITIS (HCC): ICD-10-CM

## 2017-08-31 DIAGNOSIS — L97.509 TYPE 2 DIABETES MELLITUS WITH FOOT ULCER (HCC): ICD-10-CM

## 2017-09-01 ENCOUNTER — APPOINTMENT (OUTPATIENT)
Dept: NUCLEAR MEDICINE | Facility: HOSPITAL | Age: 62
DRG: 291 | End: 2017-09-01
Attending: EMERGENCY MEDICINE
Payer: COMMERCIAL

## 2017-09-01 ENCOUNTER — APPOINTMENT (OUTPATIENT)
Dept: GENERAL RADIOLOGY | Facility: HOSPITAL | Age: 62
DRG: 291 | End: 2017-09-01
Attending: EMERGENCY MEDICINE
Payer: COMMERCIAL

## 2017-09-01 ENCOUNTER — HOSPITAL ENCOUNTER (INPATIENT)
Facility: HOSPITAL | Age: 62
LOS: 1 days | Discharge: HOME | DRG: 291 | End: 2017-09-02
Attending: EMERGENCY MEDICINE | Admitting: INTERNAL MEDICINE
Payer: COMMERCIAL

## 2017-09-01 ENCOUNTER — OFFICE VISIT (OUTPATIENT)
Dept: WOUND CARE | Facility: HOSPITAL | Age: 62
End: 2017-09-01
Attending: INTERNAL MEDICINE
Payer: COMMERCIAL

## 2017-09-01 DIAGNOSIS — L97.512 NON-PRESSURE CHRONIC ULCER OF OTHER PART OF RIGHT FOOT WITH FAT LAYER EXPOSED (HCC): Primary | ICD-10-CM

## 2017-09-01 DIAGNOSIS — L97.509 DIABETIC FOOT ULCER WITH OSTEOMYELITIS (HCC): ICD-10-CM

## 2017-09-01 DIAGNOSIS — M86.9 DIABETIC FOOT ULCER WITH OSTEOMYELITIS (HCC): ICD-10-CM

## 2017-09-01 DIAGNOSIS — E11.621 DIABETIC FOOT ULCER WITH OSTEOMYELITIS (HCC): ICD-10-CM

## 2017-09-01 DIAGNOSIS — R06.00 ACUTE DYSPNEA: Primary | ICD-10-CM

## 2017-09-01 DIAGNOSIS — E11.69 DIABETIC FOOT ULCER WITH OSTEOMYELITIS (HCC): ICD-10-CM

## 2017-09-01 DIAGNOSIS — R09.02 HYPOXIA: ICD-10-CM

## 2017-09-01 DIAGNOSIS — I50.9 ACUTE ON CHRONIC CONGESTIVE HEART FAILURE, UNSPECIFIED CONGESTIVE HEART FAILURE TYPE: ICD-10-CM

## 2017-09-01 PROBLEM — D64.9 ANEMIA: Status: ACTIVE | Noted: 2017-09-01

## 2017-09-01 PROBLEM — N17.9 ACUTE KIDNEY INJURY (HCC): Status: ACTIVE | Noted: 2017-09-01

## 2017-09-01 PROBLEM — R79.89 AZOTEMIA: Status: ACTIVE | Noted: 2017-09-01

## 2017-09-01 PROBLEM — R73.9 HYPERGLYCEMIA: Status: ACTIVE | Noted: 2017-09-01

## 2017-09-01 LAB
ALBUMIN SERPL-MCNC: 3.4 G/DL (ref 3.5–4.8)
ALP LIVER SERPL-CCNC: 61 U/L (ref 45–117)
ALT SERPL-CCNC: 21 U/L (ref 17–63)
APTT PPP: 44.1 SECONDS (ref 25–34)
AST SERPL-CCNC: 14 U/L (ref 15–41)
ATRIAL RATE: 77 BPM
BASOPHILS # BLD AUTO: 0.08 X10(3) UL (ref 0–0.1)
BASOPHILS NFR BLD AUTO: 1.2 %
BILIRUB SERPL-MCNC: 0.2 MG/DL (ref 0.1–2)
BUN BLD-MCNC: 33 MG/DL (ref 8–20)
CALCIUM BLD-MCNC: 9.3 MG/DL (ref 8.3–10.3)
CHLORIDE: 104 MMOL/L (ref 101–111)
CO2: 27 MMOL/L (ref 22–32)
CREAT BLD-MCNC: 1.6 MG/DL (ref 0.7–1.3)
EOSINOPHIL # BLD AUTO: 0.17 X10(3) UL (ref 0–0.3)
EOSINOPHIL NFR BLD AUTO: 2.6 %
ERYTHROCYTE [DISTWIDTH] IN BLOOD BY AUTOMATED COUNT: 14.7 % (ref 11.5–16)
EST. AVERAGE GLUCOSE BLD GHB EST-MCNC: 143 MG/DL (ref 68–126)
GLUCOSE BLD-MCNC: 123 MG/DL (ref 65–99)
GLUCOSE BLD-MCNC: 151 MG/DL (ref 70–99)
HBA1C MFR BLD HPLC: 6.6 % (ref ?–5.7)
HCT VFR BLD AUTO: 28 % (ref 37–53)
HGB BLD-MCNC: 9.1 G/DL (ref 13–17)
IMMATURE GRANULOCYTE COUNT: 0.04 X10(3) UL (ref 0–1)
IMMATURE GRANULOCYTE RATIO %: 0.6 %
INR BLD: 1.07 (ref 0.89–1.11)
LYMPHOCYTES # BLD AUTO: 0.96 X10(3) UL (ref 0.9–4)
LYMPHOCYTES NFR BLD AUTO: 14.9 %
M PROTEIN MFR SERPL ELPH: 7.9 G/DL (ref 6.1–8.3)
MCH RBC QN AUTO: 30.1 PG (ref 27–33.2)
MCHC RBC AUTO-ENTMCNC: 32.5 G/DL (ref 31–37)
MCV RBC AUTO: 92.7 FL (ref 80–99)
MONOCYTES # BLD AUTO: 0.63 X10(3) UL (ref 0.1–0.6)
MONOCYTES NFR BLD AUTO: 9.8 %
NEUTROPHIL ABS PRELIM: 4.56 X10 (3) UL (ref 1.3–6.7)
NEUTROPHILS # BLD AUTO: 4.56 X10(3) UL (ref 1.3–6.7)
NEUTROPHILS NFR BLD AUTO: 70.9 %
P AXIS: 56 DEGREES
P-R INTERVAL: 180 MS
PLATELET # BLD AUTO: 233 10(3)UL (ref 150–450)
POTASSIUM SERPL-SCNC: 4.8 MMOL/L (ref 3.6–5.1)
PRO-BETA NATRIURETIC PEPTIDE: 863 PG/ML (ref ?–125)
PSA SERPL DL<=0.01 NG/ML-MCNC: 13.9 SECONDS (ref 12–14.3)
Q-T INTERVAL: 382 MS
QRS DURATION: 116 MS
QTC CALCULATION (BEZET): 432 MS
R AXIS: 54 DEGREES
RBC # BLD AUTO: 3.02 X10(6)UL (ref 4.3–5.7)
RED CELL DISTRIBUTION WIDTH-SD: 50.4 FL (ref 35.1–46.3)
SODIUM SERPL-SCNC: 140 MMOL/L (ref 136–144)
T AXIS: 52 DEGREES
TROPONIN: <0.046 NG/ML (ref ?–0.05)
VENTRICULAR RATE: 77 BPM
WBC # BLD AUTO: 6.4 X10(3) UL (ref 4–13)

## 2017-09-01 PROCEDURE — 85730 THROMBOPLASTIN TIME PARTIAL: CPT | Performed by: EMERGENCY MEDICINE

## 2017-09-01 PROCEDURE — 71010 XR CHEST AP PORTABLE  (CPT=71010): CPT | Performed by: EMERGENCY MEDICINE

## 2017-09-01 PROCEDURE — 93010 ELECTROCARDIOGRAM REPORT: CPT

## 2017-09-01 PROCEDURE — 99285 EMERGENCY DEPT VISIT HI MDM: CPT

## 2017-09-01 PROCEDURE — 84484 ASSAY OF TROPONIN QUANT: CPT | Performed by: EMERGENCY MEDICINE

## 2017-09-01 PROCEDURE — 85025 COMPLETE CBC W/AUTO DIFF WBC: CPT | Performed by: EMERGENCY MEDICINE

## 2017-09-01 PROCEDURE — 85610 PROTHROMBIN TIME: CPT | Performed by: EMERGENCY MEDICINE

## 2017-09-01 PROCEDURE — 78582 LUNG VENTILAT&PERFUS IMAGING: CPT | Performed by: EMERGENCY MEDICINE

## 2017-09-01 PROCEDURE — 93005 ELECTROCARDIOGRAM TRACING: CPT

## 2017-09-01 PROCEDURE — 82962 GLUCOSE BLOOD TEST: CPT

## 2017-09-01 PROCEDURE — 80053 COMPREHEN METABOLIC PANEL: CPT | Performed by: EMERGENCY MEDICINE

## 2017-09-01 PROCEDURE — 83880 ASSAY OF NATRIURETIC PEPTIDE: CPT | Performed by: EMERGENCY MEDICINE

## 2017-09-01 PROCEDURE — 83036 HEMOGLOBIN GLYCOSYLATED A1C: CPT | Performed by: HOSPITALIST

## 2017-09-01 PROCEDURE — 96374 THER/PROPH/DIAG INJ IV PUSH: CPT

## 2017-09-01 RX ORDER — GABAPENTIN 400 MG/1
1200 CAPSULE ORAL NIGHTLY
Status: DISCONTINUED | OUTPATIENT
Start: 2017-09-01 | End: 2017-09-02

## 2017-09-01 RX ORDER — SULFAMETHOXAZOLE AND TRIMETHOPRIM 800; 160 MG/1; MG/1
1 TABLET ORAL 2 TIMES DAILY
Status: DISCONTINUED | OUTPATIENT
Start: 2017-09-01 | End: 2017-09-02

## 2017-09-01 RX ORDER — ONDANSETRON 2 MG/ML
4 INJECTION INTRAMUSCULAR; INTRAVENOUS EVERY 6 HOURS PRN
Status: DISCONTINUED | OUTPATIENT
Start: 2017-09-01 | End: 2017-09-02

## 2017-09-01 RX ORDER — GABAPENTIN 300 MG/1
600 CAPSULE ORAL
Status: DISCONTINUED | OUTPATIENT
Start: 2017-09-02 | End: 2017-09-02

## 2017-09-01 RX ORDER — AMLODIPINE BESYLATE 5 MG/1
5 TABLET ORAL DAILY
Status: DISCONTINUED | OUTPATIENT
Start: 2017-09-01 | End: 2017-09-02

## 2017-09-01 RX ORDER — HYDRALAZINE HYDROCHLORIDE 50 MG/1
100 TABLET, FILM COATED ORAL EVERY 8 HOURS SCHEDULED
Status: DISCONTINUED | OUTPATIENT
Start: 2017-09-01 | End: 2017-09-02

## 2017-09-01 RX ORDER — PRAMIPEXOLE DIHYDROCHLORIDE 1 MG/1
1 TABLET ORAL EVERY EVENING
Status: DISCONTINUED | OUTPATIENT
Start: 2017-09-01 | End: 2017-09-02

## 2017-09-01 RX ORDER — ALLOPURINOL 100 MG/1
100 TABLET ORAL DAILY
Status: DISCONTINUED | OUTPATIENT
Start: 2017-09-01 | End: 2017-09-02

## 2017-09-01 RX ORDER — ACETAMINOPHEN 325 MG/1
650 TABLET ORAL EVERY 6 HOURS PRN
Status: DISCONTINUED | OUTPATIENT
Start: 2017-09-01 | End: 2017-09-02

## 2017-09-01 RX ORDER — DULOXETIN HYDROCHLORIDE 20 MG/1
20 CAPSULE, DELAYED RELEASE ORAL DAILY
Status: DISCONTINUED | OUTPATIENT
Start: 2017-09-01 | End: 2017-09-02

## 2017-09-01 RX ORDER — SULFAMETHOXAZOLE AND TRIMETHOPRIM 800; 160 MG/1; MG/1
1 TABLET ORAL 2 TIMES DAILY
COMMUNITY
End: 2017-10-07

## 2017-09-01 RX ORDER — HEPARIN SODIUM 5000 [USP'U]/ML
5000 INJECTION, SOLUTION INTRAVENOUS; SUBCUTANEOUS EVERY 8 HOURS SCHEDULED
Status: DISCONTINUED | OUTPATIENT
Start: 2017-09-01 | End: 2017-09-02

## 2017-09-01 RX ORDER — HYDROCODONE BITARTRATE AND ACETAMINOPHEN 5; 325 MG/1; MG/1
1-2 TABLET ORAL EVERY 6 HOURS PRN
Status: DISCONTINUED | OUTPATIENT
Start: 2017-09-01 | End: 2017-09-02

## 2017-09-01 RX ORDER — DEXTROSE MONOHYDRATE 25 G/50ML
50 INJECTION, SOLUTION INTRAVENOUS
Status: DISCONTINUED | OUTPATIENT
Start: 2017-09-01 | End: 2017-09-02

## 2017-09-01 RX ORDER — ASPIRIN 81 MG/1
324 TABLET, CHEWABLE ORAL ONCE
Status: DISCONTINUED | OUTPATIENT
Start: 2017-09-01 | End: 2017-09-02

## 2017-09-01 RX ORDER — OFLOXACIN 3 MG/ML
4 SOLUTION/ DROPS OPHTHALMIC DAILY
Status: DISCONTINUED | OUTPATIENT
Start: 2017-09-01 | End: 2017-09-02

## 2017-09-01 RX ORDER — FENOFIBRATE 134 MG/1
134 CAPSULE ORAL
Status: DISCONTINUED | OUTPATIENT
Start: 2017-09-02 | End: 2017-09-02

## 2017-09-01 RX ORDER — FUROSEMIDE 10 MG/ML
40 INJECTION INTRAMUSCULAR; INTRAVENOUS ONCE
Status: COMPLETED | OUTPATIENT
Start: 2017-09-01 | End: 2017-09-01

## 2017-09-01 RX ORDER — ONDANSETRON 2 MG/ML
4 INJECTION INTRAMUSCULAR; INTRAVENOUS EVERY 4 HOURS PRN
Status: DISCONTINUED | OUTPATIENT
Start: 2017-09-01 | End: 2017-09-01

## 2017-09-01 RX ORDER — ASPIRIN 325 MG
325 TABLET ORAL DAILY
Status: DISCONTINUED | OUTPATIENT
Start: 2017-09-01 | End: 2017-09-02

## 2017-09-01 RX ORDER — CARVEDILOL 6.25 MG/1
6.25 TABLET ORAL 2 TIMES DAILY WITH MEALS
Status: DISCONTINUED | OUTPATIENT
Start: 2017-09-01 | End: 2017-09-02

## 2017-09-01 RX ORDER — FUROSEMIDE 10 MG/ML
40 INJECTION INTRAMUSCULAR; INTRAVENOUS
Status: DISCONTINUED | OUTPATIENT
Start: 2017-09-02 | End: 2017-09-02

## 2017-09-01 RX ORDER — GABAPENTIN 600 MG/1
1200 TABLET ORAL EVERY MORNING
Status: DISCONTINUED | OUTPATIENT
Start: 2017-09-02 | End: 2017-09-02

## 2017-09-01 NOTE — ED PROVIDER NOTES
Patient Seen in: [facility] Emergency Department    History   Patient presents with:  Chest Pain Angina (cardiovascular)    Stated Complaint: chest discomfort, 90% on hyperbaric    HPI    Patient is a 16-year-old male with medical history as noted b tablet by mouth 2 (two) times daily.    ACCU-CHEK YOLANDA PLUS In Vitro Strip,  USE ONCE DAILY   VASCEPA 1 g Oral Cap,  TAKE 2 CAPSULES BY MOUTH TWICE DAILY   GABAPENTIN 600 MG Oral Tab,  TAKE 2 TABLETS BY MOUTH EVERY MORNING, 1 TABLET BY MOUTH EVERY AFTERNOO Place 1 patch onto the skin daily as needed. Apply one patch daily    omega-3 fatty acids 1000 MG Oral Cap,  Take 2,000 mg by mouth daily. Ergocalciferol (VITAMIN D OR),  Take 5,000 Units by mouth daily.      Ketoconazole 2 % External Shampoo,  Wash scalp light. Oropharynx is clear. Mucous membranes are moist.  NECK: There is no focal tenderness to palpation appreciated. There is no JVD. No meningeal signs or nuchal rigidity appreciated. No stridor.   LUNGS: Breath sounds are somewhat diminished at both base (PT) - Normal   CBC WITH DIFFERENTIAL WITH PLATELET    Narrative: The following orders were created for panel order CBC WITH DIFFERENTIAL WITH PLATELET.   Procedure                               Abnormality         Status                     --------- and saw the patient in the emergency room and agreed the patient required admission to the hospital for further observation and treatment at this time. Dr. Jordyn Landers was notified from the emergency room as well.   The patient will be admitted to the telemetry

## 2017-09-01 NOTE — H&P
.  CC: Patient presents with:  Chest Pain Angina (cardiovascular)       PCP: Luiza Gates MD    History of Present Illness: Patient is a 60year old male with PMH sig for DM, HTN, CAD who is currently receiving hyperbaric O2 sessions for diabetic foot TAKE 2 CAPSULES BY MOUTH TWICE DAILY Disp: 360 capsule Rfl: 1   GABAPENTIN 600 MG Oral Tab TAKE 2 TABLETS BY MOUTH EVERY MORNING, 1 TABLET BY MOUTH EVERY AFTERNOON AND 2 TABLETS EVERY EVENING( DO NOT WITH GRALISE) Disp: 150 tablet Rfl: 0   folic acid 1 MG Disp: 100 Box Rfl: 1   FENOFIBRATE 145 MG Oral Tab TAKE 1 TABLET(145 MG) BY MOUTH DAILY Disp: 90 tablet Rfl: 3   aspirin 325 MG Oral Tab Take 1 tablet (325 mg total) by mouth daily.  Disp: 90 tablet Rfl: 3   lidocaine 5 % External Patch Apply one patch nurys 6. 4   HGB  9.1*   MCV  92.7   PLT  233.0   INR  1.07       Recent Labs   Lab  09/01/17   1443   NA  140   K  4.8   CL  104   CO2  27.0   BUN  33*   CREATSERUM  1.60*   GLU  151*   CA  9.3       Recent Labs   Lab  09/01/17   1443   ALT  21   AST  14*   ALB heart size. Mild pulmonary venous congestion and increased interstitial markings perihilar regions concerning for possible pulmonary edema. No focal consolidation. No effusion. PICC line tip SVC.     Dictated by: Christin Duran MD on 8/28/2017 at 16:34

## 2017-09-01 NOTE — CONSULTS
[facility]  Consultation Note      Althia Fresh Patient Status:  Emergency    1955 MRN [id number]   Location [address] Attending Rani Keita MD   [facility] Day # 0 PCP Lonny Spaulding MD     [address] [place] [address] sats dropping to the low 90s. Denies chest pain/palpitations. Upon opening of the chamber, symptoms resolve; denies any anxiety/claustrophobia.   He was evaluated in the ER 8/28, where CXR showed mild pulmonary congestion, BNP 1200; he was treated with las bleeding  Musculoskeletal:negative for myalgias  Neurological: negative for dizziness and headaches  Endocrine: negative for temperature intolerance      Physical Exam:  Blood pressure 128/51, pulse 69, temperature 98.3 °F (36.8 °C), temperature source Tem Mildly calcified annulus. There was mild regurgitation. 4. Left atrium: The left atrium was mildly dilated. The left atrial volume     was mildly increased. 5. Right ventricle: RV systolic pressure (S, est): 46mm Hg.   6. Pulmonary arteries: Systolic pres

## 2017-09-02 VITALS
RESPIRATION RATE: 22 BRPM | TEMPERATURE: 98 F | WEIGHT: 227.5 LBS | SYSTOLIC BLOOD PRESSURE: 115 MMHG | BODY MASS INDEX: 31.85 KG/M2 | DIASTOLIC BLOOD PRESSURE: 68 MMHG | OXYGEN SATURATION: 99 % | HEART RATE: 64 BPM | HEIGHT: 71 IN

## 2017-09-02 LAB
BUN BLD-MCNC: 32 MG/DL (ref 8–20)
CALCIUM BLD-MCNC: 9.7 MG/DL (ref 8.3–10.3)
CHLORIDE: 102 MMOL/L (ref 101–111)
CO2: 29 MMOL/L (ref 22–32)
CREAT BLD-MCNC: 1.49 MG/DL (ref 0.7–1.3)
GLUCOSE BLD-MCNC: 112 MG/DL (ref 65–99)
GLUCOSE BLD-MCNC: 120 MG/DL (ref 65–99)
GLUCOSE BLD-MCNC: 123 MG/DL (ref 65–99)
GLUCOSE BLD-MCNC: 96 MG/DL (ref 70–99)
HAV IGM SER QL: 2.5 MG/DL (ref 1.7–3)
POTASSIUM SERPL-SCNC: 4.5 MMOL/L (ref 3.6–5.1)
SODIUM SERPL-SCNC: 138 MMOL/L (ref 136–144)

## 2017-09-02 PROCEDURE — 83735 ASSAY OF MAGNESIUM: CPT | Performed by: HOSPITALIST

## 2017-09-02 PROCEDURE — 80048 BASIC METABOLIC PNL TOTAL CA: CPT | Performed by: HOSPITALIST

## 2017-09-02 PROCEDURE — 82962 GLUCOSE BLOOD TEST: CPT

## 2017-09-02 RX ORDER — FUROSEMIDE 20 MG/1
20 TABLET ORAL DAILY
Qty: 30 TABLET | Refills: 0 | Status: SHIPPED | OUTPATIENT
Start: 2017-09-02 | End: 2017-09-29

## 2017-09-02 RX ORDER — OFLOXACIN 3 MG/ML
SOLUTION/ DROPS OPHTHALMIC 4 TIMES DAILY
COMMUNITY
End: 2017-09-06 | Stop reason: ALTCHOICE

## 2017-09-02 RX ORDER — FUROSEMIDE 20 MG/1
20 TABLET ORAL DAILY
Status: DISCONTINUED | OUTPATIENT
Start: 2017-09-02 | End: 2017-09-02

## 2017-09-02 NOTE — PROGRESS NOTES
[facility] Cardiology/[facility]  Progress Note    Lashell Rey Patient Status:  Inpatient    1955 MRN [id number]   [facility] 2NE-A Attending Franky Osorio MD   Hosp Day # 1 PCP Jessica Ratliff MD     Assessment and Plan:       Gal Extremities: Without clubbing, cyanosis or edema. Peripheral pulses present  Neurologic: Alert and oriented, normal affect. Skin: Warm and dry.    Psych: Appropriate      Laboratory/Data:  Diagnostics:   EKG: Normal     Echo: 7/15/17    Conclusions: Oral Daily   aspirin tab 325 mg 325 mg Oral Daily   carvedilol (COREG) tab 6.25 mg 6.25 mg Oral BID with meals   DULoxetine HCl (CYMBALTA) DR particles cap 20 mg 20 mg Oral Daily   fenofibrate micronized (LOFIBRA) cap 134 mg 134 mg Oral Daily with breakfas

## 2017-09-02 NOTE — DISCHARGE SUMMARY
[facility]  Discharge Summary    Abimael Canada Patient Status:  Inpatient    1955 MRN [id number]   [facility] 2NE-A Attending Barbara Dennison MD   Hosp Day # 1 PCP Jessica Garzon MD     Date of Admission: 2017    Date of Dis ml/min    DM2: SSI in the hospital. Resume outpatient regimen on discharge, monitor glucose. Also check BMP at the follow up with Dr. Coty Logan MD      Elevated creatinine; patient able to urinate.  Instructed patient to check BMP at the follow up wi hyperlipidemia; Nonrheumatic aortic valve stenosis    HYDRALAZINE  MG Oral Tab  TAKE 1 TABLET(100 MG) BY MOUTH THREE TIMES DAILY  Qty: 270 tablet Refills: 0    vitamin E 1000 UNITS Oral Cap  Take 1,000 Units by mouth daily.     CARVEDILOL 6.25 MG Ora Tab  Take 1 tablet (325 mg total) by mouth daily. Qty: 90 tablet Refills: 3  Associated Diagnoses:Diabetic ulcer of left foot associated with diabetes mellitus due to underlying condition ([address] History of CVA (cerebrovascular accident);  Essential hyperte [address] [facility] [address], [address] [address]  [address] [place] [address], Chirag Bower 118, 0[address] [place] [address]. Devon Buckner [phone] [id number] [phone]              Other Discharge Instructions:

## 2017-09-02 NOTE — PLAN OF CARE
Diabetes/Glucose Control    • Glucose maintained within prescribed range Progressing        Patient/Family Goals    • Patient/Family Long Term Goal Progressing    • Patient/Family Short Term Goal Progressing          HX: DM. CHF, neuropathy, HTN, diabetic

## 2017-09-02 NOTE — PROGRESS NOTES
Tele & iv site removed (catheter intact); tele box returned by [facility], PCT. Discharge paperwork, follow-up appointment needed, discharge med rec & all medication side effects all reviewed in-depth with pt & wife. All questions answered.   Transported via [employer]

## 2017-09-02 NOTE — PLAN OF CARE
Problem: Diabetes/Glucose Control  Goal: Glucose maintained within prescribed range  INTERVENTIONS:  - Monitor Blood Glucose as ordered  - Assess for signs and symptoms of hyperglycemia and hypoglycemia  - Administer ordered medications to maintain glucose RESPIRATORY - ADULT  Goal: Achieves optimal ventilation and oxygenation  INTERVENTIONS:  - Assess for changes in respiratory status  - Assess for changes in mentation and behavior  - Position to facilitate oxygenation and minimize respiratory effort  - Oxy

## 2017-09-02 NOTE — CONSULTS
Pulmonary / Critical Care H&P/Consult       NAME: Suzette Shetty - ROOM: [address] [place] [address] - MRN: [id number] - Age: 60year old - :  1955    Date of Admission: 2017  2:17 PM  Admission Diagnosis: Hypoxia [R09.02]  Acute dyspnea [R06.00]  Acute on chron history on file. Home Medications:    Outpatient Prescriptions Marked as Taking for the 9/1/17 encounter [facility] HOSPITAL Encounter):  Sulfamethoxazole-TMP -160 MG Oral Tab per tablet Take 1 tablet by mouth 2 (two) times daily.  Disp:  Rfl:    SCOTTIE (two) times daily with meals.  ) Disp: 180 tablet Rfl: 1   DULoxetine HCl 20 MG Oral Cap DR Particles Take 1 capsule (20 mg total) by mouth daily.  Disp: 30 capsule Rfl: 2   BD PEN NEEDLE SHARONDA U/F 32G X 4 MM Does not apply Misc USE TWICE DAILY AS DIRECTED D BID   • gabapentin  600 mg Oral Daily with lunch    And   • gabapentin  1,200 mg Oral Nightly   • Insulin Aspart Pen  1-5 Units Subcutaneous TID CC and HS   • AmLODIPine Besylate  5 mg Oral Daily   • ofloxacin  4 drop Both Ears Daily     Continuous Infusin Abdomen:     Soft, non-tender, bowel sounds active all four quadrants,     no masses, no organomegaly   Extremities:   Extremities normal, atraumatic, no cyanosis or edema. R foot in dressing.     Pulses:   2+ and symmetric all extremities   Skin:   Skin

## 2017-09-05 ENCOUNTER — APPOINTMENT (OUTPATIENT)
Dept: WOUND CARE | Facility: HOSPITAL | Age: 62
End: 2017-09-05
Attending: INTERNAL MEDICINE
Payer: COMMERCIAL

## 2017-09-05 PROBLEM — Z48.817 AFTERCARE FOLLOWING SURGERY OF THE SKIN OR SUBCUTANEOUS TISSUE: Status: ACTIVE | Noted: 2017-09-05

## 2017-09-06 ENCOUNTER — APPOINTMENT (OUTPATIENT)
Dept: WOUND CARE | Facility: HOSPITAL | Age: 62
End: 2017-09-06
Attending: INTERNAL MEDICINE
Payer: COMMERCIAL

## 2017-09-06 NOTE — PAYOR COMM NOTE
--------------  ADMISSION REVIEW     Payor: [address]  Subscriber #:  [id number]  Authorization Number: [id number]    Admit date: 9/1/17  Admit time: 1422       Admitting Physician: Geovany Blake MD  Attending Physician:  No att.  prov other components within normal limits    Narrative: The aPTT Heparin Therapeutic Range is approximately 65- 104 seconds. The therapeutic range has been validated against 0.3-0.7 heparin anti-Xa units/mL.      PRO BETA NATRIURETIC PEPTIDE - Abnormal; Not hyperbaric O2 recently. - receiving IV diuresis, appreciate cards consult  - may need more outpt pulm eval since he is already feeling better. Diabetic foot wound- likely will not resume hyperbaric O2 but has had good healing per pt.   - ID recently sto

## 2017-09-07 ENCOUNTER — APPOINTMENT (OUTPATIENT)
Dept: WOUND CARE | Facility: HOSPITAL | Age: 62
End: 2017-09-07
Attending: INTERNAL MEDICINE
Payer: COMMERCIAL

## 2017-09-07 NOTE — PAYOR COMM NOTE
--------------  DISCHARGE REVIEW    Payor: [address]  Subscriber #:  [id number]  Authorization Number: [id number]    Admit date: 9/1/17  Admit time:  0996  Discharge Date: 9/2/2017 12:45 PM     Admitting Physician: MD CHARU Sotomayor

## 2017-09-08 ENCOUNTER — APPOINTMENT (OUTPATIENT)
Dept: WOUND CARE | Facility: HOSPITAL | Age: 62
End: 2017-09-08
Attending: INTERNAL MEDICINE
Payer: COMMERCIAL

## 2017-09-11 ENCOUNTER — APPOINTMENT (OUTPATIENT)
Dept: WOUND CARE | Facility: HOSPITAL | Age: 62
End: 2017-09-11
Attending: INTERNAL MEDICINE
Payer: COMMERCIAL

## 2017-09-12 ENCOUNTER — APPOINTMENT (OUTPATIENT)
Dept: WOUND CARE | Facility: HOSPITAL | Age: 62
End: 2017-09-12
Attending: INTERNAL MEDICINE
Payer: COMMERCIAL

## 2017-09-13 ENCOUNTER — APPOINTMENT (OUTPATIENT)
Dept: WOUND CARE | Facility: HOSPITAL | Age: 62
End: 2017-09-13
Attending: INTERNAL MEDICINE
Payer: COMMERCIAL

## 2017-09-14 ENCOUNTER — APPOINTMENT (OUTPATIENT)
Dept: WOUND CARE | Facility: HOSPITAL | Age: 62
End: 2017-09-14
Attending: INTERNAL MEDICINE
Payer: COMMERCIAL

## 2017-09-15 ENCOUNTER — APPOINTMENT (OUTPATIENT)
Dept: WOUND CARE | Facility: HOSPITAL | Age: 62
End: 2017-09-15
Attending: INTERNAL MEDICINE
Payer: COMMERCIAL

## 2017-09-18 ENCOUNTER — APPOINTMENT (OUTPATIENT)
Dept: WOUND CARE | Facility: HOSPITAL | Age: 62
End: 2017-09-18
Attending: INTERNAL MEDICINE
Payer: COMMERCIAL

## 2017-09-18 PROBLEM — H61.22 IMPACTED CERUMEN, LEFT EAR: Status: ACTIVE | Noted: 2017-09-18

## 2017-09-18 PROBLEM — I35.9 AORTIC VALVE DISORDER: Status: ACTIVE | Noted: 2017-09-18

## 2017-09-18 PROBLEM — N18.30 CKD (CHRONIC KIDNEY DISEASE) STAGE 3, GFR 30-59 ML/MIN (HCC): Status: ACTIVE | Noted: 2017-09-18

## 2017-09-19 ENCOUNTER — APPOINTMENT (OUTPATIENT)
Dept: WOUND CARE | Facility: HOSPITAL | Age: 62
End: 2017-09-19
Attending: INTERNAL MEDICINE
Payer: COMMERCIAL

## 2017-09-20 ENCOUNTER — APPOINTMENT (OUTPATIENT)
Dept: WOUND CARE | Facility: HOSPITAL | Age: 62
End: 2017-09-20
Attending: INTERNAL MEDICINE
Payer: COMMERCIAL

## 2017-09-21 ENCOUNTER — APPOINTMENT (OUTPATIENT)
Dept: WOUND CARE | Facility: HOSPITAL | Age: 62
End: 2017-09-21
Attending: INTERNAL MEDICINE
Payer: COMMERCIAL

## 2017-09-22 ENCOUNTER — APPOINTMENT (OUTPATIENT)
Dept: WOUND CARE | Facility: HOSPITAL | Age: 62
End: 2017-09-22
Attending: INTERNAL MEDICINE
Payer: COMMERCIAL

## 2017-09-25 ENCOUNTER — APPOINTMENT (OUTPATIENT)
Dept: WOUND CARE | Facility: HOSPITAL | Age: 62
End: 2017-09-25
Attending: INTERNAL MEDICINE
Payer: COMMERCIAL

## 2017-09-26 ENCOUNTER — APPOINTMENT (OUTPATIENT)
Dept: WOUND CARE | Facility: HOSPITAL | Age: 62
End: 2017-09-26
Attending: INTERNAL MEDICINE
Payer: COMMERCIAL

## 2017-09-27 ENCOUNTER — APPOINTMENT (OUTPATIENT)
Dept: WOUND CARE | Facility: HOSPITAL | Age: 62
End: 2017-09-27
Attending: INTERNAL MEDICINE
Payer: COMMERCIAL

## 2017-09-28 ENCOUNTER — APPOINTMENT (OUTPATIENT)
Dept: WOUND CARE | Facility: HOSPITAL | Age: 62
End: 2017-09-28
Attending: INTERNAL MEDICINE
Payer: COMMERCIAL

## 2017-09-29 ENCOUNTER — APPOINTMENT (OUTPATIENT)
Dept: WOUND CARE | Facility: HOSPITAL | Age: 62
End: 2017-09-29
Attending: INTERNAL MEDICINE
Payer: COMMERCIAL

## 2017-10-02 ENCOUNTER — APPOINTMENT (OUTPATIENT)
Dept: GENERAL RADIOLOGY | Facility: HOSPITAL | Age: 62
DRG: 580 | End: 2017-10-02
Attending: EMERGENCY MEDICINE
Payer: COMMERCIAL

## 2017-10-02 ENCOUNTER — APPOINTMENT (OUTPATIENT)
Dept: CT IMAGING | Facility: HOSPITAL | Age: 62
DRG: 580 | End: 2017-10-02
Attending: EMERGENCY MEDICINE
Payer: COMMERCIAL

## 2017-10-02 ENCOUNTER — HOSPITAL ENCOUNTER (INPATIENT)
Facility: HOSPITAL | Age: 62
LOS: 4 days | Discharge: HOME HEALTH | DRG: 580 | End: 2017-10-07
Attending: EMERGENCY MEDICINE | Admitting: HOSPITALIST
Payer: COMMERCIAL

## 2017-10-02 DIAGNOSIS — S90.819A: ICD-10-CM

## 2017-10-02 DIAGNOSIS — M86.8X9 OTHER OSTEOMYELITIS, UNSPECIFIED SITE (HCC): Primary | ICD-10-CM

## 2017-10-02 DIAGNOSIS — L08.9: ICD-10-CM

## 2017-10-02 DIAGNOSIS — R41.0 DISORIENTATION: ICD-10-CM

## 2017-10-02 PROCEDURE — 70450 CT HEAD/BRAIN W/O DYE: CPT | Performed by: EMERGENCY MEDICINE

## 2017-10-02 PROCEDURE — 71010 XR CHEST AP PORTABLE  (CPT=71010): CPT | Performed by: EMERGENCY MEDICINE

## 2017-10-02 PROCEDURE — 73630 X-RAY EXAM OF FOOT: CPT | Performed by: EMERGENCY MEDICINE

## 2017-10-02 RX ORDER — ACETAMINOPHEN 500 MG
1000 TABLET ORAL ONCE
Status: COMPLETED | OUTPATIENT
Start: 2017-10-02 | End: 2017-10-02

## 2017-10-02 RX ORDER — ACETAMINOPHEN 500 MG
TABLET ORAL
Status: COMPLETED
Start: 2017-10-02 | End: 2017-10-02

## 2017-10-03 ENCOUNTER — APPOINTMENT (OUTPATIENT)
Dept: MRI IMAGING | Facility: HOSPITAL | Age: 62
DRG: 580 | End: 2017-10-03
Attending: INTERNAL MEDICINE
Payer: COMMERCIAL

## 2017-10-03 PROBLEM — R41.0 DISORIENTATION: Status: ACTIVE | Noted: 2017-10-03

## 2017-10-03 PROBLEM — M86.8X9: Status: ACTIVE | Noted: 2017-10-03

## 2017-10-03 PROCEDURE — 73718 MRI LOWER EXTREMITY W/O DYE: CPT | Performed by: INTERNAL MEDICINE

## 2017-10-03 RX ORDER — DEXTROSE MONOHYDRATE 25 G/50ML
50 INJECTION, SOLUTION INTRAVENOUS
Status: DISCONTINUED | OUTPATIENT
Start: 2017-10-03 | End: 2017-10-07

## 2017-10-03 RX ORDER — GLYBURIDE 5 MG/1
5 TABLET ORAL 2 TIMES DAILY WITH MEALS
COMMUNITY
End: 2018-07-03

## 2017-10-03 RX ORDER — BISACODYL 10 MG
10 SUPPOSITORY, RECTAL RECTAL
Status: DISCONTINUED | OUTPATIENT
Start: 2017-10-03 | End: 2017-10-07

## 2017-10-03 RX ORDER — GABAPENTIN 400 MG/1
1200 CAPSULE ORAL 2 TIMES DAILY
Status: DISCONTINUED | OUTPATIENT
Start: 2017-10-03 | End: 2017-10-03

## 2017-10-03 RX ORDER — GABAPENTIN 300 MG/1
600 CAPSULE ORAL
Status: DISCONTINUED | OUTPATIENT
Start: 2017-10-03 | End: 2017-10-07

## 2017-10-03 RX ORDER — GABAPENTIN 600 MG/1
600 TABLET ORAL DAILY
COMMUNITY
End: 2018-07-03

## 2017-10-03 RX ORDER — GABAPENTIN 300 MG/1
600 CAPSULE ORAL DAILY
Status: DISCONTINUED | OUTPATIENT
Start: 2017-10-03 | End: 2017-10-03

## 2017-10-03 RX ORDER — HYDROCODONE BITARTRATE AND ACETAMINOPHEN 5; 325 MG/1; MG/1
2 TABLET ORAL EVERY 4 HOURS PRN
Status: DISCONTINUED | OUTPATIENT
Start: 2017-10-03 | End: 2017-10-07

## 2017-10-03 RX ORDER — ICOSAPENT ETHYL 1000 MG/1
2 CAPSULE ORAL 2 TIMES DAILY
Status: DISCONTINUED | OUTPATIENT
Start: 2017-10-03 | End: 2017-10-07

## 2017-10-03 RX ORDER — GABAPENTIN 600 MG/1
1200 TABLET ORAL 2 TIMES DAILY
COMMUNITY
End: 2018-02-26

## 2017-10-03 RX ORDER — PRAMIPEXOLE DIHYDROCHLORIDE 1 MG/1
1 TABLET ORAL NIGHTLY
Status: DISCONTINUED | OUTPATIENT
Start: 2017-10-03 | End: 2017-10-07

## 2017-10-03 RX ORDER — ICOSAPENT ETHYL 1000 MG/1
2 CAPSULE ORAL 2 TIMES DAILY
COMMUNITY
End: 2018-07-03

## 2017-10-03 RX ORDER — HEPARIN SODIUM 5000 [USP'U]/ML
5000 INJECTION, SOLUTION INTRAVENOUS; SUBCUTANEOUS EVERY 8 HOURS SCHEDULED
Status: DISCONTINUED | OUTPATIENT
Start: 2017-10-03 | End: 2017-10-07

## 2017-10-03 RX ORDER — SODIUM PHOSPHATE, DIBASIC AND SODIUM PHOSPHATE, MONOBASIC 7; 19 G/133ML; G/133ML
1 ENEMA RECTAL ONCE AS NEEDED
Status: DISCONTINUED | OUTPATIENT
Start: 2017-10-03 | End: 2017-10-07

## 2017-10-03 RX ORDER — PRAMIPEXOLE DIHYDROCHLORIDE 1 MG/1
1 TABLET ORAL NIGHTLY
COMMUNITY
End: 2017-11-14

## 2017-10-03 RX ORDER — ALLOPURINOL 100 MG/1
100 TABLET ORAL DAILY
COMMUNITY
End: 2017-11-09

## 2017-10-03 RX ORDER — GABAPENTIN 600 MG/1
1200 TABLET ORAL 2 TIMES DAILY
Status: DISCONTINUED | OUTPATIENT
Start: 2017-10-03 | End: 2017-10-07

## 2017-10-03 RX ORDER — POLYETHYLENE GLYCOL 3350 17 G/17G
17 POWDER, FOR SOLUTION ORAL DAILY PRN
Status: DISCONTINUED | OUTPATIENT
Start: 2017-10-03 | End: 2017-10-07

## 2017-10-03 RX ORDER — FENOFIBRATE 145 MG/1
145 TABLET, COATED ORAL DAILY
COMMUNITY
End: 2017-12-18

## 2017-10-03 RX ORDER — DULOXETIN HYDROCHLORIDE 20 MG/1
20 CAPSULE, DELAYED RELEASE ORAL NIGHTLY
Status: DISCONTINUED | OUTPATIENT
Start: 2017-10-03 | End: 2017-10-06

## 2017-10-03 RX ORDER — CARBAMAZEPINE 200 MG/1
400 TABLET ORAL 3 TIMES DAILY
COMMUNITY
End: 2017-12-08

## 2017-10-03 RX ORDER — HYDROCODONE BITARTRATE AND ACETAMINOPHEN 5; 325 MG/1; MG/1
1 TABLET ORAL EVERY 4 HOURS PRN
Status: DISCONTINUED | OUTPATIENT
Start: 2017-10-03 | End: 2017-10-07

## 2017-10-03 RX ORDER — AMLODIPINE BESYLATE 5 MG/1
5 TABLET ORAL DAILY
Status: DISCONTINUED | OUTPATIENT
Start: 2017-10-03 | End: 2017-10-07

## 2017-10-03 RX ORDER — FOLIC ACID 1 MG/1
1 TABLET ORAL DAILY
Status: DISCONTINUED | OUTPATIENT
Start: 2017-10-03 | End: 2017-10-07

## 2017-10-03 RX ORDER — ASPIRIN 325 MG
325 TABLET ORAL DAILY
Status: DISCONTINUED | OUTPATIENT
Start: 2017-10-03 | End: 2017-10-07

## 2017-10-03 RX ORDER — METFORMIN HYDROCHLORIDE EXTENDED-RELEASE TABLETS 1000 MG/1
1000 TABLET, FILM COATED, EXTENDED RELEASE ORAL 2 TIMES DAILY WITH MEALS
COMMUNITY
End: 2018-01-15

## 2017-10-03 RX ORDER — ACETAMINOPHEN 325 MG/1
650 TABLET ORAL EVERY 4 HOURS PRN
Status: DISCONTINUED | OUTPATIENT
Start: 2017-10-03 | End: 2017-10-07

## 2017-10-03 RX ORDER — CARBAMAZEPINE 200 MG/1
400 TABLET ORAL 3 TIMES DAILY
Status: DISCONTINUED | OUTPATIENT
Start: 2017-10-03 | End: 2017-10-03

## 2017-10-03 RX ORDER — FUROSEMIDE 20 MG/1
20 TABLET ORAL DAILY
Status: DISCONTINUED | OUTPATIENT
Start: 2017-10-03 | End: 2017-10-07

## 2017-10-03 RX ORDER — DULOXETIN HYDROCHLORIDE 20 MG/1
20 CAPSULE, DELAYED RELEASE ORAL DAILY
Status: DISCONTINUED | OUTPATIENT
Start: 2017-10-03 | End: 2017-10-03

## 2017-10-03 RX ORDER — ONDANSETRON 2 MG/ML
4 INJECTION INTRAMUSCULAR; INTRAVENOUS EVERY 6 HOURS PRN
Status: DISCONTINUED | OUTPATIENT
Start: 2017-10-03 | End: 2017-10-07

## 2017-10-03 RX ORDER — ALLOPURINOL 100 MG/1
100 TABLET ORAL DAILY
Status: DISCONTINUED | OUTPATIENT
Start: 2017-10-03 | End: 2017-10-03

## 2017-10-03 RX ORDER — CARBAMAZEPINE 200 MG/1
400 TABLET ORAL 3 TIMES DAILY
Status: DISCONTINUED | OUTPATIENT
Start: 2017-10-03 | End: 2017-10-07

## 2017-10-03 RX ORDER — DOCUSATE SODIUM 100 MG/1
100 CAPSULE, LIQUID FILLED ORAL 2 TIMES DAILY
Status: DISCONTINUED | OUTPATIENT
Start: 2017-10-03 | End: 2017-10-07

## 2017-10-03 RX ORDER — HYDROCODONE BITARTRATE AND ACETAMINOPHEN 5; 325 MG/1; MG/1
1-2 TABLET ORAL EVERY 6 HOURS PRN
Status: DISCONTINUED | OUTPATIENT
Start: 2017-10-03 | End: 2017-10-03

## 2017-10-03 RX ORDER — ALLOPURINOL 100 MG/1
100 TABLET ORAL DAILY
Status: DISCONTINUED | OUTPATIENT
Start: 2017-10-04 | End: 2017-10-07

## 2017-10-03 RX ORDER — CARVEDILOL 6.25 MG/1
6.25 TABLET ORAL 2 TIMES DAILY WITH MEALS
Status: DISCONTINUED | OUTPATIENT
Start: 2017-10-03 | End: 2017-10-07

## 2017-10-03 NOTE — CONSULTS
INFECTIOUS DISEASE CONSULTATION    Dieter Delcid Patient Status:  Inpatient    1955 MRN [id number]   [facility] 2NE-A Attending Gilford Guy   Hosp Day # 0 PCP Kacey File injection 5,000 Units, 5,000 Units, Subcutaneous, Q8H [address]  •  acetaminophen (TYLENOL) tab 650 mg, 650 mg, Oral, Q4H PRN **OR** HYDROcodone-acetaminophen (NORCO) 5-325 MG per tab 1 tablet, 1 tablet, Oral, Q4H PRN **OR** HYDROcodone-acetaminophen (NORCO) 5-3 Exam:    General: No acute distress. Alert and oriented x 3. Vital signs: Temp:  [97.9 °F (36.6 °C)-101.3 °F (38.5 °C)] 98.5 °F (36.9 °C)  Pulse:  [68-88] 73  Resp:  [15-20] 18  BP: (100-155)/() 146/61  HEENT: Moist mucous membranes.  Extraocular mus Mixed axonal-demyelinating polyneuropathy     PVD (peripheral vascular disease) (HCC)     Coronary artery disease involving native coronary artery of native heart without angina pectoris     Non-rheumatic mitral regurgitation     Diabetic sensorimotor po

## 2017-10-03 NOTE — SIGNIFICANT EVENT
Received patient from ED, accompanied by his spouse. He is alert and oriented and was complaining of pain in his right foot. Needs attended.

## 2017-10-03 NOTE — ED PROVIDER NOTES
Patient Seen in: [facility] 2ne-a    History   Patient presents with:  Altered Mental Status (neurologic)    Stated Complaint: MENTAL STATUS CHANGE    HPI    23-year-old male, history as noted below, here for evaluation of fever and confusion.   Note Exam      Constitutional: Pt is oriented to person, place, and time. Appears well-developed and well-nourished. Head: Normocephalic and atraumatic. Nose: Nose normal.   Eyes: EOM are normal. Pupils are equal, round, and reactive to light.    Neck: Velma Nickels ------                     CBC W/ DIFFERENTIAL[[id number]]          Abnormal            Final result                 Please view results for these tests on the individual orders.    URINALYSIS WITH CULTURE REFLEX   C-REACTIVE PROTEIN   COMP METABOLIC PANEL (

## 2017-10-03 NOTE — ED INITIAL ASSESSMENT (HPI)
56YM c/c of AMS pt wife state when she came home found pt laying acoss the table with several broken glasses .  Pt wife reports is having shaking and trouble with his balance

## 2017-10-03 NOTE — PLAN OF CARE
Problem: NEUROLOGICAL - ADULT  Goal: Achieves stable or improved neurological status  INTERVENTIONS  - Assess for and report changes in neurological status  - Initiate measures to prevent increased intracranial pressure  - Maintain blood pressure and fluid Progressing      Problem: SKIN/TISSUE INTEGRITY - ADULT  Goal: Incision(s), wounds(s) or drain site(s) healing without S/S of infection  INTERVENTIONS:  - Assess and document risk factors for pressure ulcer development  - Assess and document skin integrity

## 2017-10-03 NOTE — PROGRESS NOTES
[facility] dosing service    Initial Pharmacokinetic Consult for Vancomycin Dosing     Desiree Ray is a 60year old male admitted on 10/3/17 who is being treated for osteomyelitis. Pharmacy has been asked to dose Vancomycin by Dr. Tuan Head.     He is [employer] changes, toxicity and efficacy. Pharmacy will continue to follow him. We appreciate the opportunity to assist in his care.     Benito Solis, PharmD  10/3/2017  12:53 AM  [address] [place] [address] Extension: [phone]

## 2017-10-03 NOTE — PROGRESS NOTES
[facility] Pharmacy Note: Antimicrobial Weight Dose Adjustment for: Zosyn (piperacillin/tazobactam)    Juliane Darnell is a 60year old male who has been prescribed Zosyn (piperacillin/tazobactam) 3.375 gm IVPB once.   CrCl is estimated creatinine clearance is 43.3

## 2017-10-03 NOTE — CONSULTS
[facility]  Report of Inpatient Wound Care Consultation     Cynthia Lebron Patient Status:  Inpatient    1955 MRN [id number]   [facility] 2NE-A Attending Roderick Man [facility]   Hosp Day # 0 PCP Abelardo Dawn MD     REASON --    CRP   --    --   21.70*   --    --    PGLU  210*   --    --   135*  162*       Imaging:   NA  Microbiology:   NA    ASSESSMENT/ RECOMMENDATIONS:  Received verbal order from  To Nicola Estrada, PT, MPT for \"Physical Therapy wound care evaluate an

## 2017-10-03 NOTE — PROGRESS NOTES
Pharmacy signing off vancomycin dosing for Alisa Lee to ID service-please re-consult pharmacy if further dosing recommendations are needed.  Thanks, Lana Nicole Pharm D [id number]

## 2017-10-03 NOTE — H&P
[facility] Hospitalist History and Physical      CC: Fever, chills, confusion    PCP: Trang Salcedo MD    History of Present Illness: Patient is a 60year old male with PMH sig for DM, peripheral neuropathy, CKD, HTN, HL who has been following with podiatry an 5-325 MG Oral Tab Take 1-2 tablets by mouth every 6 (six) hours as needed. Disp: 20 tablet Rfl: 0   MetFORMIN HCl ER, OSM, 1000 MG (OSM) Oral Tablet 24 Hr Take 2 tablets (2,000 mg total) by mouth daily.  (Patient taking differently: Take 1,000 mg by mouth 2 daily (Patient taking differently: Place 1 patch onto the skin daily as needed. Apply one patch daily ) Disp: 30 patch Rfl: 11   Ergocalciferol (VITAMIN D OR) Take 5,000 Units by mouth daily.    Disp:  Rfl:    Multiple Vitamin (MULTI-VITAMIN) Oral Tab Take edema  Skin:Right foot has ulceration though with no draining or significant warmth on exam  Psych: AAO x 3, with appropriate affect        Data Review:    Labs:     Lab Results  Component Value Date   WBC 9.0 10/03/2017   HGB 8.2 10/03/2017   HCT 25.1 10/ gabapentin  - ASA    DM  - HA1C is 6.6  - Stop orals, SSI while in house    Anemia  - Chronic suspect 2/2 chronic infection and CKD  - Monitor    HTN  - BP stable, home meds     FEN:  - DM  - Lytes prn    Proph:  - DVT proph with heparin    Dispo:  - [facility] h

## 2017-10-03 NOTE — CONSULTS
Patient is a 76-year-old gentleman presented to the emergency room with a one-day history of fever, sweats. History noted for diabetes along with foot wounds complications. He has been seeing Elbert Monreal for these wounds.   Most recently seen a week and a MPJ smaller area at the IPJ. No significant depth. No undermining no sinus and really no weepage. It is dry appear stable. There is a wound on the lateral aspect from the fourth toe.   There is nylon sutures placed from again his recent visit with Germaine Bloch

## 2017-10-04 ENCOUNTER — SURGERY (OUTPATIENT)
Age: 62
End: 2017-10-04

## 2017-10-04 ENCOUNTER — ANESTHESIA EVENT (OUTPATIENT)
Dept: SURGERY | Facility: HOSPITAL | Age: 62
DRG: 580 | End: 2017-10-04
Payer: COMMERCIAL

## 2017-10-04 ENCOUNTER — ANESTHESIA (OUTPATIENT)
Dept: SURGERY | Facility: HOSPITAL | Age: 62
DRG: 580 | End: 2017-10-04
Payer: COMMERCIAL

## 2017-10-04 PROCEDURE — 0J9Q0ZZ DRAINAGE OF RIGHT FOOT SUBCUTANEOUS TISSUE AND FASCIA, OPEN APPROACH: ICD-10-PCS | Performed by: PODIATRIST

## 2017-10-04 RX ORDER — LIDOCAINE HYDROCHLORIDE 10 MG/ML
INJECTION, SOLUTION INFILTRATION; PERINEURAL AS NEEDED
Status: DISCONTINUED | OUTPATIENT
Start: 2017-10-04 | End: 2017-10-04 | Stop reason: HOSPADM

## 2017-10-04 RX ORDER — DIPHENHYDRAMINE HYDROCHLORIDE 50 MG/ML
12.5 INJECTION INTRAMUSCULAR; INTRAVENOUS AS NEEDED
Status: DISCONTINUED | OUTPATIENT
Start: 2017-10-04 | End: 2017-10-04 | Stop reason: HOSPADM

## 2017-10-04 RX ORDER — SODIUM CHLORIDE, SODIUM LACTATE, POTASSIUM CHLORIDE, CALCIUM CHLORIDE 600; 310; 30; 20 MG/100ML; MG/100ML; MG/100ML; MG/100ML
INJECTION, SOLUTION INTRAVENOUS CONTINUOUS
Status: DISCONTINUED | OUTPATIENT
Start: 2017-10-04 | End: 2017-10-07

## 2017-10-04 RX ORDER — HYDROMORPHONE HYDROCHLORIDE 1 MG/ML
0.4 INJECTION, SOLUTION INTRAMUSCULAR; INTRAVENOUS; SUBCUTANEOUS EVERY 5 MIN PRN
Status: DISCONTINUED | OUTPATIENT
Start: 2017-10-04 | End: 2017-10-04 | Stop reason: HOSPADM

## 2017-10-04 RX ORDER — MIDAZOLAM HYDROCHLORIDE 1 MG/ML
1 INJECTION INTRAMUSCULAR; INTRAVENOUS EVERY 5 MIN PRN
Status: DISCONTINUED | OUTPATIENT
Start: 2017-10-04 | End: 2017-10-04 | Stop reason: HOSPADM

## 2017-10-04 RX ORDER — HYDROCODONE BITARTRATE AND ACETAMINOPHEN 5; 325 MG/1; MG/1
2 TABLET ORAL AS NEEDED
Status: DISCONTINUED | OUTPATIENT
Start: 2017-10-04 | End: 2017-10-04 | Stop reason: HOSPADM

## 2017-10-04 RX ORDER — HYDROCODONE BITARTRATE AND ACETAMINOPHEN 5; 325 MG/1; MG/1
1 TABLET ORAL AS NEEDED
Status: DISCONTINUED | OUTPATIENT
Start: 2017-10-04 | End: 2017-10-04 | Stop reason: HOSPADM

## 2017-10-04 RX ORDER — INSULIN ASPART 100 [IU]/ML
INJECTION, SOLUTION INTRAVENOUS; SUBCUTANEOUS ONCE
Status: DISCONTINUED | OUTPATIENT
Start: 2017-10-04 | End: 2017-10-04 | Stop reason: HOSPADM

## 2017-10-04 RX ORDER — DEXTROSE MONOHYDRATE 25 G/50ML
50 INJECTION, SOLUTION INTRAVENOUS
Status: DISCONTINUED | OUTPATIENT
Start: 2017-10-04 | End: 2017-10-04 | Stop reason: HOSPADM

## 2017-10-04 RX ORDER — BUPIVACAINE HYDROCHLORIDE 5 MG/ML
INJECTION, SOLUTION EPIDURAL; INTRACAUDAL AS NEEDED
Status: DISCONTINUED | OUTPATIENT
Start: 2017-10-04 | End: 2017-10-04 | Stop reason: HOSPADM

## 2017-10-04 RX ORDER — ONDANSETRON 2 MG/ML
4 INJECTION INTRAMUSCULAR; INTRAVENOUS AS NEEDED
Status: DISCONTINUED | OUTPATIENT
Start: 2017-10-04 | End: 2017-10-04 | Stop reason: HOSPADM

## 2017-10-04 RX ORDER — MEPERIDINE HYDROCHLORIDE 25 MG/ML
12.5 INJECTION INTRAMUSCULAR; INTRAVENOUS; SUBCUTANEOUS AS NEEDED
Status: DISCONTINUED | OUTPATIENT
Start: 2017-10-04 | End: 2017-10-04 | Stop reason: HOSPADM

## 2017-10-04 RX ORDER — NALOXONE HYDROCHLORIDE 0.4 MG/ML
80 INJECTION, SOLUTION INTRAMUSCULAR; INTRAVENOUS; SUBCUTANEOUS AS NEEDED
Status: DISCONTINUED | OUTPATIENT
Start: 2017-10-04 | End: 2017-10-04 | Stop reason: HOSPADM

## 2017-10-04 NOTE — PROGRESS NOTES
[facility] Hospitalist Progress Note                                                                   [address]  2/21/1955    CC: FU foot infection    Interval History:  - MRI with multiple ab has ulceration though with no draining or significant warmth on exam  Psych: AAO x 3, with appropriate affect        Pertinent Labs:   Recent Labs   Lab  10/02/17   1956  10/03/17   0542  10/04/17   0556   RBC  3.11*  2.77*  2.88*   HGB  9.3*  8.2*  8.4* MD [facility] Hospitalist  Pager: [phone]

## 2017-10-04 NOTE — PROGRESS NOTES
[facility]                INFECTIOUS DISEASE PROGRESS NOTE    Marlyn Love Patient Status:  Inpatient    1955 MRN [id number]   [facility] 2NE-A Attending Blanca Bruner,*   Hosp Day # 1 PCP Lianne Olivares MD     A Comment: Smear and PCR Identification performed by Rosita Lesches        Methicillin Resistant Staph aureus (MRSA) by PCR       Blood Culture FREQ X 2 [[id number]] Collected: 10/02/17 1956   Order Status: Completed Lab Status: Preliminary result Updated: 10/03/17 CKD (chronic kidney disease) stage 3, GFR 30-59 ml/min     Aortic valve disorder     Impacted cerumen, left ear     Other osteomyelitis (HCC)     Other osteomyelitis, unspecified site [facility])     Disorientation      ASSESSMENT/PLAN:  1.  Persistent ABSCESS

## 2017-10-04 NOTE — BH LEVEL OF CARE ASSESSMENT
Level of Care Assessment Note                                     Level of Care Assessment Note    General Questions  Why are you here?: Patient states he had a fever. Precipitating Events: Patient states he ended up having an infection.     History of Pre Hours: 7 Hours  Use of Sleep Aids: denies  Appetite Symptoms: Normal for patient  Unplanned Weight Loss: No  Unplanned Weight Gain: No  History of Eating Disorder: No  Active Eating Disorder: No            Geriatric Functioning  Dementia Symptoms Observed/ situation; Cooperative;Participated  Gait/Movement:  Jaky Pimentel  Speech Characteristics: Normal rate;Normal rhythm;Normal volume  Concentration: Unimpaired  Memory: Recent memory intact; Remote memory impaired  Orientation Level: Oriented X4    Assessment Summary

## 2017-10-04 NOTE — ANESTHESIA PREPROCEDURE EVALUATION
PRE-OP EVALUATION    Patient Name: Severo Locks    Pre-op Diagnosis: Infected abrasion of foot [S90.819A, L08.9]    Procedure(s):  INCISION AND DRAINAGE SOFT TISSUE BONE RIGHT FOOT    Surgeon(s) and Role:     * Will Wilson DPM - Primary    Pre-op v Units Subcutaneous TID CC and HS   gabapentin (NEURONTIN) tab 1,200 mg 1,200 mg Oral BID   Icosapent Ethyl CAPS 2 g 2 g Oral BID   Pramipexole Dihydrochloride (MIRAPEX) tab 1 mg 1 mg Oral Nightly   gabapentin (NEURONTIN) cap 600 mg 600 mg Oral Noon   allop mouth daily. Disp:  Rfl:    carvedilol 6.25 MG Oral Tab Take 6.25 mg by mouth 2 (two) times daily with meals. Disp:  Rfl:    Sulfamethoxazole-TMP -160 MG Oral Tab per tablet Take 1 tablet by mouth 2 (two) times daily.  Disp:  Rfl:    HYDROcodone-aceta Value Date    (L) 10/04/2017    09/30/2017   K 4.4 10/04/2017   K 5.0 09/30/2017   K 5.50 (H) 09/06/2017   CL 99 (L) 10/04/2017    09/30/2017   CO2 26.0 10/04/2017   CO2 28.0 09/30/2017   BUN 27 (H) 10/04/2017   BUN 28 (H) 09/30/2017   CR

## 2017-10-05 PROBLEM — L97.519 ULCER OF RIGHT FOOT (HCC): Status: ACTIVE | Noted: 2017-10-05

## 2017-10-05 RX ORDER — HYDROMORPHONE HYDROCHLORIDE 1 MG/ML
0.5 INJECTION, SOLUTION INTRAMUSCULAR; INTRAVENOUS; SUBCUTANEOUS EVERY 2 HOUR PRN
Status: DISCONTINUED | OUTPATIENT
Start: 2017-10-05 | End: 2017-10-07

## 2017-10-05 RX ORDER — HYDROMORPHONE HYDROCHLORIDE 1 MG/ML
1 INJECTION, SOLUTION INTRAMUSCULAR; INTRAVENOUS; SUBCUTANEOUS EVERY 4 HOURS PRN
Status: DISCONTINUED | OUTPATIENT
Start: 2017-10-05 | End: 2017-10-07

## 2017-10-05 NOTE — PROGRESS NOTES
1:1 sitting care transferred to [address] [facility], PCT (CRT); this writer provided 1:1 sitting from 1630 to 1730.

## 2017-10-05 NOTE — PROGRESS NOTES
[facility] Hospitalist Progress Note                                                                   [address] [place]  2/21/1955    CC: FU foot infection    Interval History:  - S/P I&D last evening in room        Pertinent Labs:   Recent Labs   Lab  10/02/17   1956  10/03/17   0542  10/04/17   0556   RBC  3.11*  2.77*  2.88*   HGB  9.3*  8.2*  8.4*   HCT  27.9*  25.1*  25.8*   MCV  89.7  90.6  89.6   MCH  29.9  29.6  29.2   MCHC  33.3  32.7  32.6   R FEN:  - DM  - Lytes prn     Proph:  - DVT proph with heparin     Dispo:  - [facility] hospitalist service  - Consults include: ID, podiatry     Outpatient records or previous hospital records reviewed.    [facility] hospitalist to continue to follow patient while in [facility]

## 2017-10-05 NOTE — PROGRESS NOTES
Suicide Precautions Initiated w/1:1 sitter; Suicide Prevention Toolkit Informational Handout given to patient by bedside RN: Kevin Varela

## 2017-10-05 NOTE — PLAN OF CARE
CARDIOVASCULAR - ADULT    • Maintains optimal cardiac output and hemodynamic stability Progressing    • Absence of cardiac arrhythmias or at baseline Progressing        Diabetes/Glucose Control    • Glucose maintained within prescribed range Progressing foot.  Will continue to monitor.

## 2017-10-05 NOTE — PROGRESS NOTES
[facility]                INFECTIOUS DISEASE PROGRESS NOTE    Jerad Lemons Patient Status:  Inpatient    1955 MRN [id number]   [facility] 2NE-A Attending Kristi Castillo,*   Hosp Day # 2 PCP Judie Lombard, MD     A Result Staphylococcus aureus, MRSA     Blood Culture Smear Gram positive cocci in clusters     Blood Culture FREQ X 2 [[id number]] Collected: 10/02/17 1956   Order Status: Completed Lab Status: Preliminary result Updated: 10/04/17 2100   Specimen: Blood fro Hyperglycemia     Hypoxia     Acute on chronic congestive heart failure, unspecified congestive heart failure type [facility])     I&D, right foot- Sx 7/13/17  NADEGE 10/11/17     CKD (chronic kidney disease) stage 3, GFR 30-59 ml/min     Aortic valve disorder

## 2017-10-05 NOTE — PROGRESS NOTES
Assumed care at 1730. Pt on suicide precautions, 1:1 sitter at bedside. Pt alert and oriented, answering all questions appropriately. Pt calm, cooperative. Lungs diminished/clear, on RA saturating well.  Denies any CP, SOB, palpitations, or dizziness at [place]

## 2017-10-05 NOTE — BRIEF OP NOTE
Pre-Operative Diagnosis: Infected abrasion of foot/Abscess foot [S90.819A, L08.9]     Post-Operative Diagnosis: Infected abrasion of foot [S90.819A, L08. 9]Abscess foot right     Procedure Performed:   Procedure(s):  INCISION AND DRAINAGE SOFT TISSUE BON

## 2017-10-05 NOTE — PROGRESS NOTES
Patient comfortable. Had some pain last night. Nursing stating he has been up and about on foot in room. AVSS    Dressing intact with mild sanguinous weepage on bandage. Wound appear clean. No cellulitis on dorsum. No odor.  However, when compressing

## 2017-10-05 NOTE — PAYOR COMM NOTE
--------------  ADMISSION REVIEW     Payor: [address]  Subscriber #:  [id number]  Authorization Number: [id number]    Admit date: 10/3/17  Admit time: 0036       Admitting Physician: Em Newell MD  Attending Physician:  Yury Whalen above.    PSFH elements reviewed from today and agreed except as otherwise stated in HPI. Physical Exam[BK. 1]   ED Triage Vitals [10/02/17 1952]  BP: (!) 155/139  Pulse: 87  Resp: 20  Temp: 101.3 °F (38.5 °C)  Temp src: Temporal  SpO2: 95 %  O2 Device: Absolute 8.70 (*)     Lymphocyte Absolute 0.59 (*)     Monocyte Absolute 0.79 (*)     All other components within normal limits   LACTIC ACID, PLASMA - Normal   CBC WITH DIFFERENTIAL WITH PLATELET   URINALYSIS WITH CULTURE REFLEX   C-REACTIVE PROTEIN   COM metatarsal osteotomy, and 2nd and 3rd metatarsal head resection for chronic diabetic ulcer 6/2017 Josephine Luna) weeks ago c/b post-op infection (MRSA abscess/bactermia) that was treated with hyperbaric therapy and Abx- IV Vanc completed on 8/30/17 followed by 800-160 MG Oral Tab per tablet TAKE 1 TABLET BY MOUTH TWICE DAILY Disp: 28 tablet Rfl: 0   GABAPENTIN 600 MG Oral Tab TAKE 2 TABLETS BY MOUTH EVERY MORNING, 1 TABLET EVERY AFTERNOON, AND 2 TABLETS EVERY EVENING( DO NOT TAKE WITH GRALISE) Disp: 150 tablet R furosemide  20 mg Oral Daily   • Heparin Sodium (Porcine)  5,000 Units Subcutaneous [facility]   • docusate sodium  100 mg Oral BID   • vancomycin  15 mg/kg Intravenous Q12H   • gabapentin  1,200 mg Oral BID   • gabapentin  600 mg Oral Daily   • Insulin Aspar date:     CT head: no bleed or evidence of acute stroke    Xray of the right foot:  CONCLUSION:    1. Findings as detailed above predominantly involving the first and second metatarsal bones highly concerning for osteomyelitis.  Soft tissue swelling is also LAST 1 DAY:  allopurinol (ZYLOPRIM) tab 100 mg     Date Action Dose Route User    10/5/2017 0954 Given 100 mg Oral Tres Em, RN      AmLODIPine Besylate (NORVASC) tab 5 mg     Date Action Dose Route User    10/5/2017 0955 Given 5 mg Oral Josué Francis Pen (NOVOLOG) 100 UNIT/ML flexpen 2-10 Units     Date Action Dose Route User    10/5/2017 1325 Given 6 Units Subcutaneous (Right Upper Arm) Fareed Beltran RN    10/5/2017 1701 Given 2 Units Subcutaneous (Right Upper Arm) Fareed Beltran RN      lactated r

## 2017-10-05 NOTE — OPERATIVE REPORT
[address]    PATIENT'S NAME: GÉNESIS MYERS   ATTENDING PHYSICIAN: Rianna Rey MD   OPERATING PHYSICIAN: Hortencia Sams D.P.M.    PATIENT ACCOUNT#:   [de-identified]    LOCATION:  [address] [place] [address]  MEDICAL RECORD #:   [id number]       DATE OF Upon blunt dissection, there were large amounts of purulent-type serous fluid that was expressed. It had a combination of purulence as well as yellowish serous type of fluid.   Approximately 1-2 mL of this material was passed off and collected as specimen debridement.     Dictated By Paola Kwok D.P.M.  d: 10/04/2017 20:32:36  t: 10/04/2017 21:25:44  Job [id number]  DB/

## 2017-10-05 NOTE — ANESTHESIA POSTPROCEDURE EVALUATION
[facility] Patient Status:  Inpatient   Age/Gender 60year old male MRN [id number]   [facility] SURGERY Attending Anh Rey Good Day # 1 PCP Jacqui Galloway MD       Anesthesia Post-op Note    Procedure

## 2017-10-06 PROCEDURE — 90792 PSYCH DIAG EVAL W/MED SRVCS: CPT | Performed by: OTHER

## 2017-10-06 PROCEDURE — 02HV33Z INSERTION OF INFUSION DEVICE INTO SUPERIOR VENA CAVA, PERCUTANEOUS APPROACH: ICD-10-PCS | Performed by: HOSPITALIST

## 2017-10-06 RX ORDER — SODIUM CHLORIDE 0.9 % (FLUSH) 0.9 %
10 SYRINGE (ML) INJECTION AS NEEDED
Status: DISCONTINUED | OUTPATIENT
Start: 2017-10-06 | End: 2017-10-07

## 2017-10-06 RX ORDER — DULOXETIN HYDROCHLORIDE 30 MG/1
60 CAPSULE, DELAYED RELEASE ORAL NIGHTLY
Status: DISCONTINUED | OUTPATIENT
Start: 2017-10-06 | End: 2017-10-07

## 2017-10-06 NOTE — CM/SW NOTE
HIMANSHU spoke with RN, pt will most likely need IVABX and potential d/c today. Preliminary clinical and ECIN referral sent to [facility]/Dr. Moreno's office. PICC line and IVABX orders pending.     ADDENDUM:  HIMANSHU updated Zara at [facility] and [address] [place] [address] liaison, pt will not

## 2017-10-06 NOTE — HOME CARE LIAISON
DIAGNOSES AND PERTINENT MEDICAL HISTORY: OSTEOMYELITIS OF THE RIGHT FOOT    FACILITY NAME AND DC DATE: [facility]END[facility]    BEDSIDE VISIT WITH:AMBROCIO MET WITH PATIENT AT BEDSIDE     SERVICES ORDERED: RN    VERIFIED PATIENT ADDRESS, PHONE NUMBER AND CAREGIVER

## 2017-10-06 NOTE — CONSULTS
[facility]  Report of Psychiatric Consultation    [facility] Patient Status:  Inpatient    1955 MRN [id number]   [facility] 2NE-A Attending Angel Luis Arguelles, DO   Hosp Day # 3 PCP Coty Logan MD     Date of Admission: 10/2/17 himself, saying it was a figure of speech. He DENIES feeling hopeless or having any suicidal ideation or plan. Psychiatry Review Systems: No voices or visions.  No elevated mood, racing thoughts, decreased need for sleep, grandiose thoughts    Past Psych tab 1 mg, 1 mg, Oral, Daily  •  furosemide (LASIX) tab 20 mg, 20 mg, Oral, Daily  •  Heparin Sodium (Porcine) 5000 UNIT/ML injection 5,000 Units, 5,000 Units, Subcutaneous, Q8H [address]  •  acetaminophen (TYLENOL) tab 650 mg, 650 mg, Oral, Q4H PRN **OR** HYDROc ideas. The patient is nervous/anxious.       Mental Status Exam:     Risk Assessment  Suicidal ideation: no suicidal ideation  Homicidal ideation: None noted    Objective       10/06/17  0350   BP: (!) 161/72   Pulse: 66   Resp: 18   Temp: 98.3 °F (36.8 °C)

## 2017-10-06 NOTE — PROGRESS NOTES
[facility]                INFECTIOUS DISEASE PROGRESS NOTE    Dariela Sidhu Patient Status:  Inpatient    1955 MRN [id number]   [facility] 2NE-A Attending Ann Romeo,*   Hosp Day # 3 PCP Clerance Kawasaki, MD     A Clindamycin <=0.25  Sensitive    Erythromycin >=8  Resistant    Gentamicin <=0.5  Sensitive    Oxacillin >=4  Resistant    Tetracycline >=16  Resistant    Trimethoprim/Sulfa <=10  Sensitive    Vancomycin 1  Sensitive            Blood Culture FREQ X 2 [7373 Non-rheumatic mitral regurgitation     Diabetic sensorimotor polyneuropathy (HCC)     Lumbar foraminal stenosis     H/O: CVA (cerebrovascular accident)     1st MT osteotomy, 2nd and 3rd MT head resection right foot- Sx 6/8/17  NADEGE 9/6/17     Infected tr

## 2017-10-06 NOTE — PROGRESS NOTES
[facility] Hospitalist Progress Note     PCP: Mitchel Painter MD    SUBJECTIVE:  No CP, SOB, or N/V. Pt feeling well.     OBJECTIVE:  Temp:  [98.3 °F (36.8 °C)-98.9 °F (37.2 °C)] 98.6 °F (37 °C)  Pulse:  [66-73] 73  Resp:  [18-20] 18  BP: (118-184)/(48-72) 184 Sodium (Porcine)  5,000 Units Subcutaneous [facility]   • docusate sodium  100 mg Oral BID   • DULoxetine HCl  20 mg Oral Nightly   • Insulin Aspart Pen  2-10 Units Subcutaneous TID CC and HS   • gabapentin  1,200 mg Oral BID   • Icosapent Ethyl  2 g Oral BID to increase cymbalta dose. No need for sitter.   Suicide precautions removed  - Reports he has cognitive deficits following stroke in the past and has trouble processing things at times; she will be here again today to support him     FEN:  - CARROLL singleton

## 2017-10-06 NOTE — PROGRESS NOTES
Patient seen at bedside today. He appears comfortable. Relating no significant pain. Afebrile vital signs stable. Dressing is clean, dry, and intact. Less edema and erythema noted on the dorsum of the foot.   No fluctuance with compression of the ski

## 2017-10-06 NOTE — PLAN OF CARE
Assumed care of patient around 1930, alert and oriented X4, calm and cooperative, no c/o CP/SOB, SpO2 95 % on RA   Rhythm/HR: NSR 60s  Suicide precautions in place- sitter, room scanned for any potential harmful items    Right foot dressing changed- podiat

## 2017-10-06 NOTE — CM/SW NOTE
SW met with pt and confirmed d/c plan home with [facility] and [facility] to provide IVABX and supplies.

## 2017-10-07 VITALS
DIASTOLIC BLOOD PRESSURE: 67 MMHG | TEMPERATURE: 99 F | WEIGHT: 224.81 LBS | SYSTOLIC BLOOD PRESSURE: 153 MMHG | HEART RATE: 66 BPM | BODY MASS INDEX: 31 KG/M2 | OXYGEN SATURATION: 98 % | RESPIRATION RATE: 17 BRPM

## 2017-10-07 RX ORDER — AMLODIPINE BESYLATE 5 MG/1
10 TABLET ORAL DAILY
Status: DISCONTINUED | OUTPATIENT
Start: 2017-10-07 | End: 2017-10-07 | Stop reason: SDUPTHER

## 2017-10-07 RX ORDER — AMLODIPINE BESYLATE 5 MG/1
5 TABLET ORAL ONCE
Status: COMPLETED | OUTPATIENT
Start: 2017-10-07 | End: 2017-10-07

## 2017-10-07 RX ORDER — DULOXETIN HYDROCHLORIDE 60 MG/1
60 CAPSULE, DELAYED RELEASE ORAL NIGHTLY
Qty: 30 CAPSULE | Refills: 0 | Status: SHIPPED
Start: 2017-10-07 | End: 2017-12-18 | Stop reason: DRUGHIGH

## 2017-10-07 RX ORDER — HYDRALAZINE HYDROCHLORIDE 20 MG/ML
10 INJECTION INTRAMUSCULAR; INTRAVENOUS ONCE
Status: COMPLETED | OUTPATIENT
Start: 2017-10-07 | End: 2017-10-07

## 2017-10-07 RX ORDER — PSEUDOEPHEDRINE HCL 30 MG
100 TABLET ORAL 2 TIMES DAILY
Qty: 60 CAPSULE | Refills: 0 | Status: SHIPPED | OUTPATIENT
Start: 2017-10-07

## 2017-10-07 RX ORDER — AMLODIPINE BESYLATE 10 MG/1
10 TABLET ORAL DAILY
Qty: 30 TABLET | Refills: 0 | Status: SHIPPED
Start: 2017-10-08 | End: 2017-12-18

## 2017-10-07 RX ORDER — HYDROCODONE BITARTRATE AND ACETAMINOPHEN 5; 325 MG/1; MG/1
1-2 TABLET ORAL EVERY 6 HOURS PRN
Qty: 40 TABLET | Refills: 0 | Status: SHIPPED | OUTPATIENT
Start: 2017-10-07 | End: 2017-12-18 | Stop reason: ALTCHOICE

## 2017-10-07 RX ORDER — AMLODIPINE BESYLATE 5 MG/1
10 TABLET ORAL DAILY
Status: DISCONTINUED | OUTPATIENT
Start: 2017-10-08 | End: 2017-10-07

## 2017-10-07 NOTE — PROGRESS NOTES
[facility] Hospitalist Progress Note     PCP: Judie Lombard, MD    SUBJECTIVE:  No CP, SOB, or N/V. Doing well.     OBJECTIVE:  Temp:  [98.2 °F (36.8 °C)-99 °F (37.2 °C)] 98.7 °F (37.1 °C)  Pulse:  [60-70] 64  Resp:  [18] 18  BP: (138-189)/(62-76) 173/69    I Subcutaneous Q8H [address]   • docusate sodium  100 mg Oral BID   • Insulin Aspart Pen  2-10 Units Subcutaneous TID CC and HS   • gabapentin  1,200 mg Oral BID   • Icosapent Ethyl  2 g Oral BID   • Pramipexole Dihydrochloride  1 mg Oral Nightly   • gabapentin  6 seems very down about recurrent infection  - Dr. Manda Tyson following from psych - plan to increase cymbalta dose. No need for sitter.   Suicide precautions removed  - Reports he has cognitive deficits following stroke in the past and has trouble processing thin

## 2017-10-07 NOTE — PHYSICAL THERAPY NOTE
PHYSICAL THERAPY EVALUATION - INPATIENT     Room Number: 0701/8202-D  Evaluation Date: 10/7/2017  Type of Evaluation: Initial  Physician Order: PT Eval and Treat    Presenting Problem: right foot osteomyelitis with abscess s/p incision and drainage (10 modified independent level. Pt denies falls. SUBJECTIVE  \"I have a good set-up at home. I've been through this before. \"    Patient self-stated goal is to return home. OBJECTIVE  Precautions:  Other (Comment) (surgical shoe to right foot during tra walker     Stoop/Curb Assistance: Not tested  Comment : Scores based on FIM definitions per department protocol. Skilled Therapy Provided: Session approved by RN. Pt received supine in bed, agreeable to therapy.  Bed mobility completed with modified ind benefit from skilled inpatient PT to address the above deficits to assist patient in returning to prior to level of function. DISCHARGE RECOMMENDATIONS  PT Discharge Recommendations: Home    PLAN  PT Treatment Plan: Endurance; Energy conservation;Patient e

## 2017-10-07 NOTE — DISCHARGE SUMMARY
General Medicine Discharge Summary     Patient ID:  Christina Garcia  60year old  2/21/1955    Admit date: 10/2/2017    Discharge date and time: 10/7/17    Attending Physician: Tena Litten, DO Primary today     Depression  - Has been very tearful this admission and seems very down about recurrent infection  - Dr. Nikki Guzman following from psych - plan to increase cymbalta dose.  No need for sitter.  Suicide precautions removed  - Reports he has cognitive defi with osteomyelitis is of high clinical concern. A subcutaneous abscess cannot be excluded. CONCLUSION:  1. Findings as detailed above predominantly involving the first and second metatarsal bones highly concerning for osteomyelitis.  Soft tissue swelli osseous abnormality. OTHER:             Atherosclerosis. CONCLUSION:  1. No acute intracranial hemorrhage. 2. Findings most consistent with chronic small vessel ischemic change within the deep white matter.  If an acute infarct is of high clinical co talonavicular joint and in the navicular cuneiform joint. There is generalized soft tissue edema.        CONCLUSION:  Multiple soft tissue abscesses of the right foot as detailed above, the largest abscess is dorsal to the second and third metatarsals measu by mouth 2 (two) times daily with meals. allopurinol 100 MG Oral Tab  Take 100 mg by mouth daily. carBAMazepine 200 MG Oral Tab  Take 400 mg by mouth 3 (three) times daily. Fenofibrate 145 MG Oral Tab  Take 145 mg by mouth daily.     glyBURIDE 5 MG Pulse: 66   Resp: 17   Temp: 98.5 °F (36.9 °C)       Alert oriented.        Total time coordinating care for discharge: Greater than 30 minutes    Terri Ty DO  [facility]ist

## 2017-10-07 NOTE — PROGRESS NOTES
[facility]                INFECTIOUS DISEASE PROGRESS NOTE    Paras Scarlett Patient Status:  Inpatient    1955 MRN [id number]   [facility] 2NE-A Attending [facility]   Hosp Day # 4 PCP Leticia Briones MD     A Gentamicin <=0.5  Sensitive    Oxacillin >=4  Resistant    Tetracycline >=16  Resistant    Trimethoprim/Sulfa <=10  Sensitive    Vancomycin 1  Sensitive            Blood Culture FREQ X 2 [[id number]] Collected: 10/02/17 1956   Order Status: Completed Lab [address] Nonrheumatic aortic valve stenosis     Cerebral microvasculopathy     Cognitive complaints     History of snoring     Chronic midline low back pain without sciatica     Mixed axonal-demyelinating polyneuropathy     PVD (peripheral vascular disease) ([address]

## 2017-10-07 NOTE — CM/SW NOTE
sw notified pt will dc home today. [facility] liaison aware and they will start care Sunday morning. [facility] to deliver drug and supplies today.

## 2017-10-07 NOTE — CM/SW NOTE
10/07/17 1400   Discharge disposition   Discharged to: Home-Health   Name of Angi [facility] 1 services after discharge Skilled home care   HME provider [facility] [place])

## 2017-10-11 PROBLEM — Z47.89 AFTERCARE FOLLOWING SURGERY OF THE MUSCULOSKELETAL SYSTEM: Status: ACTIVE | Noted: 2017-10-11

## 2017-10-11 PROBLEM — Z47.89 AFTERCARE FOLLOWING SURGERY OF THE MUSCULOSKELETAL SYSTEM: Status: RESOLVED | Noted: 2017-06-13 | Resolved: 2017-10-11

## 2017-10-11 NOTE — CM/SW NOTE
10/11/17 1546   Discharge disposition   Discharged to: Home-Health   Name of Facillity/Home Care/Hospice Residential  [facility] [place]   Home services after discharge Skilled home care   Discharge transportation Private car   discharged 10/7

## 2017-10-19 PROBLEM — L02.619 ABSCESS OF FOOT: Status: ACTIVE | Noted: 2017-10-19

## 2017-10-25 NOTE — PROGRESS NOTES
INFECTIOUS DISEASE PROGRESS NOTE    Marlyn Love     1955 MRN [id number]   Location: [address]       PCP Lianne Olivares MD     Antibiotics: Vancomycin#23  POD#21    Subjective:  Was released from [facility], media with effusion, bilateral     Acute dyspnea     Anemia     Acute kidney injury ([facility] [address]     Azotemia     Hyperglycemia     Hypoxia     Acute on chronic congestive heart failure, unspecified congestive heart failure type [facility])     I&D, right foot- Sx 7/13

## 2017-10-30 ENCOUNTER — LAB REQUISITION (OUTPATIENT)
Dept: LAB | Age: 62
End: 2017-10-30
Attending: INTERNAL MEDICINE
Payer: COMMERCIAL

## 2017-10-30 DIAGNOSIS — M86.171 OTHER ACUTE OSTEOMYELITIS, RIGHT ANKLE AND FOOT (HCC): ICD-10-CM

## 2017-10-30 PROCEDURE — 86140 C-REACTIVE PROTEIN: CPT | Performed by: INTERNAL MEDICINE

## 2017-10-30 PROCEDURE — 85025 COMPLETE CBC W/AUTO DIFF WBC: CPT | Performed by: INTERNAL MEDICINE

## 2017-10-30 PROCEDURE — 80202 ASSAY OF VANCOMYCIN: CPT | Performed by: INTERNAL MEDICINE

## 2017-10-30 PROCEDURE — 80048 BASIC METABOLIC PNL TOTAL CA: CPT | Performed by: INTERNAL MEDICINE

## 2017-12-18 PROBLEM — I50.32 CHRONIC DIASTOLIC CHF (CONGESTIVE HEART FAILURE) (HCC): Status: ACTIVE | Noted: 2017-12-18

## 2017-12-20 PROBLEM — Z48.817 AFTERCARE FOLLOWING SURGERY OF THE SKIN OR SUBCUTANEOUS TISSUE: Status: RESOLVED | Noted: 2017-09-05 | Resolved: 2017-12-20

## 2017-12-20 PROBLEM — M86.679 CHRONIC OSTEOMYELITIS OF FOOT (HCC): Status: ACTIVE | Noted: 2017-12-20

## 2017-12-20 PROBLEM — M86.8X9: Status: RESOLVED | Noted: 2017-10-03 | Resolved: 2017-12-20

## 2017-12-20 PROBLEM — Z47.89 AFTERCARE FOLLOWING SURGERY OF THE MUSCULOSKELETAL SYSTEM: Status: RESOLVED | Noted: 2017-10-11 | Resolved: 2017-12-20

## 2017-12-20 PROBLEM — L97.519 ULCER OF RIGHT FOOT (HCC): Status: RESOLVED | Noted: 2017-10-05 | Resolved: 2017-12-20

## 2017-12-20 PROBLEM — L02.619 ABSCESS OF FOOT: Status: RESOLVED | Noted: 2017-10-19 | Resolved: 2017-12-20

## 2018-01-06 ENCOUNTER — APPOINTMENT (OUTPATIENT)
Dept: GENERAL RADIOLOGY | Facility: HOSPITAL | Age: 63
DRG: 617 | End: 2018-01-06
Attending: EMERGENCY MEDICINE
Payer: COMMERCIAL

## 2018-01-06 ENCOUNTER — HOSPITAL ENCOUNTER (INPATIENT)
Facility: HOSPITAL | Age: 63
LOS: 9 days | Discharge: NURSING, REHAB OR LONG-TERM CARE | DRG: 617 | End: 2018-01-15
Attending: EMERGENCY MEDICINE | Admitting: HOSPITALIST
Payer: COMMERCIAL

## 2018-01-06 DIAGNOSIS — L03.115 CELLULITIS OF RIGHT LOWER EXTREMITY: Primary | ICD-10-CM

## 2018-01-06 DIAGNOSIS — M86.679 CHRONIC OSTEOMYELITIS OF FOOT (HCC): ICD-10-CM

## 2018-01-06 LAB
ALBUMIN SERPL-MCNC: 3.1 G/DL (ref 3.5–4.8)
ALP LIVER SERPL-CCNC: 77 U/L (ref 45–117)
ALT SERPL-CCNC: 27 U/L (ref 17–63)
AST SERPL-CCNC: 23 U/L (ref 15–41)
BASOPHILS # BLD AUTO: 0.04 X10(3) UL (ref 0–0.1)
BASOPHILS NFR BLD AUTO: 0.4 %
BILIRUB SERPL-MCNC: 0.3 MG/DL (ref 0.1–2)
BUN BLD-MCNC: 31 MG/DL (ref 8–20)
CALCIUM BLD-MCNC: 9.5 MG/DL (ref 8.3–10.3)
CHLORIDE: 99 MMOL/L (ref 101–111)
CO2: 28 MMOL/L (ref 22–32)
CREAT BLD-MCNC: 1.85 MG/DL (ref 0.7–1.3)
EOSINOPHIL # BLD AUTO: 0.09 X10(3) UL (ref 0–0.3)
EOSINOPHIL NFR BLD AUTO: 0.9 %
ERYTHROCYTE [DISTWIDTH] IN BLOOD BY AUTOMATED COUNT: 14 % (ref 11.5–16)
GLUCOSE BLD-MCNC: 159 MG/DL (ref 65–99)
GLUCOSE BLD-MCNC: 208 MG/DL (ref 70–99)
HCT VFR BLD AUTO: 31.2 % (ref 37–53)
HGB BLD-MCNC: 10.2 G/DL (ref 13–17)
IMMATURE GRANULOCYTE COUNT: 0.06 X10(3) UL (ref 0–1)
IMMATURE GRANULOCYTE RATIO %: 0.6 %
LACTIC ACID: 1.7 MMOL/L (ref 0.5–2)
LYMPHOCYTES # BLD AUTO: 1.04 X10(3) UL (ref 0.9–4)
LYMPHOCYTES NFR BLD AUTO: 10.6 %
M PROTEIN MFR SERPL ELPH: 8.6 G/DL (ref 6.1–8.3)
MCH RBC QN AUTO: 29.9 PG (ref 27–33.2)
MCHC RBC AUTO-ENTMCNC: 32.7 G/DL (ref 31–37)
MCV RBC AUTO: 91.5 FL (ref 80–99)
MONOCYTES # BLD AUTO: 0.99 X10(3) UL (ref 0.1–0.6)
MONOCYTES NFR BLD AUTO: 10.1 %
NEUTROPHIL ABS PRELIM: 7.57 X10 (3) UL (ref 1.3–6.7)
NEUTROPHILS # BLD AUTO: 7.57 X10(3) UL (ref 1.3–6.7)
NEUTROPHILS NFR BLD AUTO: 77.4 %
PLATELET # BLD AUTO: 398 10(3)UL (ref 150–450)
POTASSIUM SERPL-SCNC: 5.1 MMOL/L (ref 3.6–5.1)
RBC # BLD AUTO: 3.41 X10(6)UL (ref 4.3–5.7)
RED CELL DISTRIBUTION WIDTH-SD: 47.5 FL (ref 35.1–46.3)
SED RATE-ML: 84 MM/HR (ref 0–12)
SODIUM SERPL-SCNC: 133 MMOL/L (ref 136–144)
WBC # BLD AUTO: 9.8 X10(3) UL (ref 4–13)

## 2018-01-06 PROCEDURE — 85025 COMPLETE CBC W/AUTO DIFF WBC: CPT | Performed by: EMERGENCY MEDICINE

## 2018-01-06 PROCEDURE — 87502 INFLUENZA DNA AMP PROBE: CPT | Performed by: EMERGENCY MEDICINE

## 2018-01-06 PROCEDURE — 73630 X-RAY EXAM OF FOOT: CPT | Performed by: EMERGENCY MEDICINE

## 2018-01-06 PROCEDURE — 87205 SMEAR GRAM STAIN: CPT | Performed by: EMERGENCY MEDICINE

## 2018-01-06 PROCEDURE — 87070 CULTURE OTHR SPECIMN AEROBIC: CPT | Performed by: EMERGENCY MEDICINE

## 2018-01-06 PROCEDURE — 99285 EMERGENCY DEPT VISIT HI MDM: CPT

## 2018-01-06 PROCEDURE — 87147 CULTURE TYPE IMMUNOLOGIC: CPT | Performed by: EMERGENCY MEDICINE

## 2018-01-06 PROCEDURE — 83605 ASSAY OF LACTIC ACID: CPT | Performed by: EMERGENCY MEDICINE

## 2018-01-06 PROCEDURE — 87186 SC STD MICRODIL/AGAR DIL: CPT | Performed by: EMERGENCY MEDICINE

## 2018-01-06 PROCEDURE — 80053 COMPREHEN METABOLIC PANEL: CPT | Performed by: EMERGENCY MEDICINE

## 2018-01-06 PROCEDURE — 87040 BLOOD CULTURE FOR BACTERIA: CPT | Performed by: EMERGENCY MEDICINE

## 2018-01-06 PROCEDURE — 82962 GLUCOSE BLOOD TEST: CPT

## 2018-01-06 PROCEDURE — 85652 RBC SED RATE AUTOMATED: CPT | Performed by: EMERGENCY MEDICINE

## 2018-01-06 PROCEDURE — 36415 COLL VENOUS BLD VENIPUNCTURE: CPT

## 2018-01-06 PROCEDURE — 96365 THER/PROPH/DIAG IV INF INIT: CPT

## 2018-01-06 PROCEDURE — 93010 ELECTROCARDIOGRAM REPORT: CPT

## 2018-01-06 PROCEDURE — 87798 DETECT AGENT NOS DNA AMP: CPT | Performed by: EMERGENCY MEDICINE

## 2018-01-06 PROCEDURE — 83036 HEMOGLOBIN GLYCOSYLATED A1C: CPT | Performed by: INTERNAL MEDICINE

## 2018-01-06 PROCEDURE — 96361 HYDRATE IV INFUSION ADD-ON: CPT

## 2018-01-06 PROCEDURE — 71046 X-RAY EXAM CHEST 2 VIEWS: CPT | Performed by: EMERGENCY MEDICINE

## 2018-01-06 PROCEDURE — 93005 ELECTROCARDIOGRAM TRACING: CPT

## 2018-01-06 PROCEDURE — 87999 UNLISTED MICROBIOLOGY PX: CPT

## 2018-01-06 RX ORDER — ACETAMINOPHEN 325 MG/1
650 TABLET ORAL EVERY 6 HOURS PRN
Status: DISCONTINUED | OUTPATIENT
Start: 2018-01-06 | End: 2018-01-15

## 2018-01-06 RX ORDER — SODIUM CHLORIDE 9 MG/ML
INJECTION, SOLUTION INTRAVENOUS CONTINUOUS
Status: DISCONTINUED | OUTPATIENT
Start: 2018-01-06 | End: 2018-01-07

## 2018-01-06 RX ORDER — DEXTROSE MONOHYDRATE 25 G/50ML
50 INJECTION, SOLUTION INTRAVENOUS
Status: DISCONTINUED | OUTPATIENT
Start: 2018-01-06 | End: 2018-01-15

## 2018-01-06 RX ORDER — HEPARIN SODIUM 5000 [USP'U]/ML
5000 INJECTION, SOLUTION INTRAVENOUS; SUBCUTANEOUS EVERY 8 HOURS SCHEDULED
Status: DISCONTINUED | OUTPATIENT
Start: 2018-01-07 | End: 2018-01-12

## 2018-01-06 RX ORDER — ONDANSETRON 2 MG/ML
4 INJECTION INTRAMUSCULAR; INTRAVENOUS EVERY 4 HOURS PRN
Status: DISCONTINUED | OUTPATIENT
Start: 2018-01-06 | End: 2018-01-15

## 2018-01-07 LAB
BASOPHILS # BLD AUTO: 0.03 X10(3) UL (ref 0–0.1)
BASOPHILS NFR BLD AUTO: 0.3 %
BUN BLD-MCNC: 25 MG/DL (ref 8–20)
C-REACTIVE PROTEIN: 22.8 MG/DL (ref ?–1)
CALCIUM BLD-MCNC: 8.9 MG/DL (ref 8.3–10.3)
CHLORIDE: 102 MMOL/L (ref 101–111)
CO2: 27 MMOL/L (ref 22–32)
CREAT BLD-MCNC: 1.27 MG/DL (ref 0.7–1.3)
CREAT UR-SCNC: 92.3 MG/DL
EOSINOPHIL # BLD AUTO: 0.13 X10(3) UL (ref 0–0.3)
EOSINOPHIL NFR BLD AUTO: 1.5 %
ERYTHROCYTE [DISTWIDTH] IN BLOOD BY AUTOMATED COUNT: 14.2 % (ref 11.5–16)
EST. AVERAGE GLUCOSE BLD GHB EST-MCNC: 177 MG/DL (ref 68–126)
GLUCOSE BLD-MCNC: 118 MG/DL (ref 65–99)
GLUCOSE BLD-MCNC: 120 MG/DL (ref 70–99)
GLUCOSE BLD-MCNC: 131 MG/DL (ref 65–99)
GLUCOSE BLD-MCNC: 132 MG/DL (ref 65–99)
GLUCOSE BLD-MCNC: 212 MG/DL (ref 65–99)
HAV IGM SER QL: 2.1 MG/DL (ref 1.7–3)
HBA1C MFR BLD HPLC: 7.8 % (ref ?–5.7)
HCT VFR BLD AUTO: 28.5 % (ref 37–53)
HGB BLD-MCNC: 9.2 G/DL (ref 13–17)
IMMATURE GRANULOCYTE COUNT: 0.04 X10(3) UL (ref 0–1)
IMMATURE GRANULOCYTE RATIO %: 0.5 %
LACTIC ACID: 0.6 MMOL/L (ref 0.5–2)
LACTIC ACID: 0.9 MMOL/L (ref 0.5–2)
LYMPHOCYTES # BLD AUTO: 1.46 X10(3) UL (ref 0.9–4)
LYMPHOCYTES NFR BLD AUTO: 16.8 %
MCH RBC QN AUTO: 29.5 PG (ref 27–33.2)
MCHC RBC AUTO-ENTMCNC: 32.3 G/DL (ref 31–37)
MCV RBC AUTO: 91.3 FL (ref 80–99)
MONOCYTES # BLD AUTO: 1.02 X10(3) UL (ref 0.1–0.6)
MONOCYTES NFR BLD AUTO: 11.8 %
NEUTROPHIL ABS PRELIM: 6 X10 (3) UL (ref 1.3–6.7)
NEUTROPHILS # BLD AUTO: 6 X10(3) UL (ref 1.3–6.7)
NEUTROPHILS NFR BLD AUTO: 69.1 %
PLATELET # BLD AUTO: 331 10(3)UL (ref 150–450)
POTASSIUM SERPL-SCNC: 4.5 MMOL/L (ref 3.6–5.1)
RBC # BLD AUTO: 3.12 X10(6)UL (ref 4.3–5.7)
RED CELL DISTRIBUTION WIDTH-SD: 47.7 FL (ref 35.1–46.3)
SODIUM SERPL-SCNC: 136 MMOL/L (ref 136–144)
SODIUM SERPL-SCNC: 77 MMOL/L
UREA UR RANDOM: 584 MG/DL
WBC # BLD AUTO: 8.7 X10(3) UL (ref 4–13)

## 2018-01-07 PROCEDURE — 83735 ASSAY OF MAGNESIUM: CPT | Performed by: INTERNAL MEDICINE

## 2018-01-07 PROCEDURE — 85025 COMPLETE CBC W/AUTO DIFF WBC: CPT | Performed by: INTERNAL MEDICINE

## 2018-01-07 PROCEDURE — 83605 ASSAY OF LACTIC ACID: CPT | Performed by: EMERGENCY MEDICINE

## 2018-01-07 PROCEDURE — 82570 ASSAY OF URINE CREATININE: CPT | Performed by: INTERNAL MEDICINE

## 2018-01-07 PROCEDURE — 86140 C-REACTIVE PROTEIN: CPT | Performed by: INTERNAL MEDICINE

## 2018-01-07 PROCEDURE — 84540 ASSAY OF URINE/UREA-N: CPT | Performed by: INTERNAL MEDICINE

## 2018-01-07 PROCEDURE — 80048 BASIC METABOLIC PNL TOTAL CA: CPT | Performed by: INTERNAL MEDICINE

## 2018-01-07 PROCEDURE — 82962 GLUCOSE BLOOD TEST: CPT

## 2018-01-07 PROCEDURE — 84300 ASSAY OF URINE SODIUM: CPT | Performed by: INTERNAL MEDICINE

## 2018-01-07 RX ORDER — ALLOPURINOL 100 MG/1
100 TABLET ORAL DAILY
Status: DISCONTINUED | OUTPATIENT
Start: 2018-01-07 | End: 2018-01-15

## 2018-01-07 RX ORDER — CARVEDILOL 6.25 MG/1
6.25 TABLET ORAL 2 TIMES DAILY WITH MEALS
Status: DISCONTINUED | OUTPATIENT
Start: 2018-01-07 | End: 2018-01-15

## 2018-01-07 RX ORDER — PRAMIPEXOLE DIHYDROCHLORIDE 1 MG/1
1 TABLET ORAL NIGHTLY
Status: DISCONTINUED | OUTPATIENT
Start: 2018-01-07 | End: 2018-01-15

## 2018-01-07 RX ORDER — DULOXETIN HYDROCHLORIDE 20 MG/1
20 CAPSULE, DELAYED RELEASE ORAL DAILY
Status: DISCONTINUED | OUTPATIENT
Start: 2018-01-07 | End: 2018-01-15

## 2018-01-07 RX ORDER — GABAPENTIN 300 MG/1
600 CAPSULE ORAL
Status: DISCONTINUED | OUTPATIENT
Start: 2018-01-07 | End: 2018-01-15

## 2018-01-07 RX ORDER — AMLODIPINE BESYLATE 5 MG/1
10 TABLET ORAL DAILY
Status: DISCONTINUED | OUTPATIENT
Start: 2018-01-07 | End: 2018-01-15

## 2018-01-07 RX ORDER — HYDROCODONE BITARTRATE AND ACETAMINOPHEN 5; 325 MG/1; MG/1
2 TABLET ORAL EVERY 4 HOURS PRN
Status: DISCONTINUED | OUTPATIENT
Start: 2018-01-07 | End: 2018-01-15

## 2018-01-07 RX ORDER — FUROSEMIDE 20 MG/1
20 TABLET ORAL DAILY
Status: DISCONTINUED | OUTPATIENT
Start: 2018-01-07 | End: 2018-01-15

## 2018-01-07 RX ORDER — ASPIRIN 325 MG
325 TABLET ORAL DAILY
Status: DISCONTINUED | OUTPATIENT
Start: 2018-01-07 | End: 2018-01-15

## 2018-01-07 RX ORDER — HYDROCODONE BITARTRATE AND ACETAMINOPHEN 5; 325 MG/1; MG/1
1 TABLET ORAL EVERY 4 HOURS PRN
Status: DISCONTINUED | OUTPATIENT
Start: 2018-01-07 | End: 2018-01-15

## 2018-01-07 RX ORDER — FOLIC ACID 1 MG/1
1 TABLET ORAL DAILY
Status: DISCONTINUED | OUTPATIENT
Start: 2018-01-07 | End: 2018-01-15

## 2018-01-07 RX ORDER — LIDOCAINE 50 MG/G
1 PATCH TOPICAL EVERY 24 HOURS
Status: DISCONTINUED | OUTPATIENT
Start: 2018-01-07 | End: 2018-01-09

## 2018-01-07 RX ORDER — GABAPENTIN 600 MG/1
1200 TABLET ORAL 2 TIMES DAILY
Status: DISCONTINUED | OUTPATIENT
Start: 2018-01-07 | End: 2018-01-15

## 2018-01-07 RX ORDER — CARBAMAZEPINE 200 MG/1
400 TABLET ORAL 3 TIMES DAILY
Status: DISCONTINUED | OUTPATIENT
Start: 2018-01-07 | End: 2018-01-15

## 2018-01-07 RX ORDER — FENOFIBRATE 134 MG/1
134 CAPSULE ORAL
Status: DISCONTINUED | OUTPATIENT
Start: 2018-01-07 | End: 2018-01-15

## 2018-01-07 RX ORDER — EZETIMIBE 10 MG/1
10 TABLET ORAL DAILY
Status: DISCONTINUED | OUTPATIENT
Start: 2018-01-07 | End: 2018-01-15

## 2018-01-07 RX ORDER — DOCUSATE SODIUM 100 MG/1
100 CAPSULE, LIQUID FILLED ORAL 2 TIMES DAILY
Status: DISCONTINUED | OUTPATIENT
Start: 2018-01-07 | End: 2018-01-15

## 2018-01-07 NOTE — PROGRESS NOTES
[facility] dosing service    Initial Pharmacokinetic Consult for Vancomycin Dosing     Dariela Sidhu is a 60year old male admitted on 1/6/18 who is being treated for cellulitis and osteomyelitis.   Pharmacy has been asked to dose Vancomycin by Dr. Rad Matthews Goal trough level 15-20 ug/mL. 3.  Pharmacy will need BUN/Scr daily while on Vancomycin to assess renal function. 4.  Pharmacy will follow and monitor renal function changes, toxicity and efficacy. Pharmacy will continue to follow him.   We appreci

## 2018-01-07 NOTE — PLAN OF CARE
Impaired Cognition    • Patient will exhibit improved attention, thought processing and/or memory Progressing        Impaired Functional Mobility    • Achieve highest/safest level of mobility/gait Progressing        SKIN/TISSUE INTEGRITY - ADULT    • Skin

## 2018-01-07 NOTE — CONSULTS
Podiatry    Full note to follow    Patient seen at bedside. Admitted late last night for flulike symptoms as well as a recent onset of redness and weepage to his right foot. Patient has a history of chronic osteomyelitis.   He has been seen by my partne

## 2018-01-07 NOTE — PROGRESS NOTES
Patient presented to the emergency room primarily for upper respiratory infection for the last 5 days. At the same time he was concerned with some redness and swelling to his right foot.   He has been treated by Lizzy Garcia over the last several months for Fenofibrate 145 MG Oral Tab Take 1 tablet (145 mg total) by mouth daily. Disp: 90 tablet Rfl: 3   carvedilol 6.25 MG Oral Tab Take 1 tablet (6.25 mg total) by mouth 2 (two) times daily with meals.  Disp: 180 tablet Rfl: 3   ezetimibe (ZETIA) 10 MG Oral Ta Needle (BD PEN NEEDLE SHARONDA U/F) 32G X 4 MM Does not apply Misc USE TWICE DAILY AS DIRECTED; DX:  E11.39 on insulin Disp: 100 Box Rfl: prn   ACCU-CHEK YOLANDA PLUS In Vitro Strip USE TO TEST ONCE DAILY Disp: 100 strip Rfl: 11   Fluticasone Propionate 50 MCG/A Discussed options with the patient. He is not looking for any type of amputation at least at this point. Will obtain infectious disease consult for antibiotics. There is no urgent need for incision and drainage. Will monitor over the next few days.   He

## 2018-01-07 NOTE — ED PROVIDER NOTES
Patient Seen in: [facility] Emergency Department    History   Patient presents with:  Cough/URI  Cellulitis (integumentary, infectious)    Stated Complaint: cough, cellulitis foot    HPI    This is a 48-year-old male who arrives here with complains reviewed. All other systems reviewed and negative except as noted above.     Physical Exam   ED Triage Vitals  BP: (!) 80/37 [01/06/18 2002]  Pulse: 61 [01/06/18 2002]  Resp: 18 [01/06/18 2002]  Temp: (!) 97.4 °F (36.3 °C) [01/06/18 2002]  Temp src: Te - Abnormal; Notable for the following:     Sed Rate 84 (*)     All other components within normal limits   CBC W/ DIFFERENTIAL - Abnormal; Notable for the following:     RBC 3.41 (*)     HGB 10.2 (*)     HCT 31.2 (*)     RDW-SD 47.5 (*)     Neutrophil Abso insufficiency BUN of 31 creatinine 1.85 sodium was 133. 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 The patient did get a chest x-ray, foot x-rays which.     Xr Ch There is diffuse soft tissue swelling involving the foot especially medially. Dense sclerotic changes involving the 3rd 4th and 5th metatarsals is stable. CONCLUSION:  1.  Findings are consistent with cellulitis involving the right foot with superimpo

## 2018-01-07 NOTE — PLAN OF CARE
Dr. Jeffrey Nicole from Infectious Disease notified of consult. Pharmacy to continue dosing vancomycin. Pharmacist notified.

## 2018-01-07 NOTE — H&P
[facility] Hospitalist H&P       CC: Patient presents with:  Cough/URI  Cellulitis (integumentary, infectious)       PCP: No primary care provider on file.     History of Present Illness:  Mr Marlyn Cummings is a 59 yo male with PMH of CAD, HTN, PVD, chronic osteomyelitis, Take 1 tablet (6.25 mg total) by mouth 2 (two) times daily with meals. Disp: 180 tablet Rfl: 3   ezetimibe (ZETIA) 10 MG Oral Tab Take 1 tablet (10 mg total) by mouth daily.  Disp: 90 tablet Rfl: 3   DULOXETINE HCL 20 MG Oral Cap DR Particles TAKE 1 CAPSULE pertinent family history.     Review of Systems  12 point ROS negative except as stated in HPI    OBJECTIVE:  /61 (BP Location: Left arm)   Pulse 68   Temp 98.1 °F (36.7 °C) (Oral)   Resp 17   Ht 5' 11\" (1.803 m)   Wt 228 lb (103.4 kg)   SpO2 93%   B CHEST AP PORTABLE  (CPT=71010), 10/02/2017, 20:33.  TECHNIQUE:  PA and lateral chest radiographs were obtained. PATIENT STATED HISTORY: (As transcribed by Technologist)  Patient offered no additional history at this time.     FINDINGS:  LUNGS:  No infiltra heads, the overall appearance is worse when compared to the previous study especially the 1st and 2nd metatarsals. .     Dictated by: Bernard Costello MD on 1/06/2018 at 23:07     Approved by: Bernard Costello MD                   ASSESSMENT / PLAN:     M

## 2018-01-07 NOTE — ED INITIAL ASSESSMENT (HPI)
Pt with URI symptoms for past 5-6 days. Pt denies any fevers. Pt also having some balance issue. Wife reports patient has been acting a little confused and \"shaky\".       Pt with Right foot redness, and drainage from surgical wound from about 6 months

## 2018-01-08 ENCOUNTER — APPOINTMENT (OUTPATIENT)
Dept: MRI IMAGING | Facility: HOSPITAL | Age: 63
DRG: 617 | End: 2018-01-08
Attending: INTERNAL MEDICINE
Payer: COMMERCIAL

## 2018-01-08 LAB
ATRIAL RATE: 63 BPM
BASOPHILS # BLD AUTO: 0.05 X10(3) UL (ref 0–0.1)
BASOPHILS NFR BLD AUTO: 0.9 %
BUN BLD-MCNC: 23 MG/DL (ref 8–20)
CALCIUM BLD-MCNC: 9.6 MG/DL (ref 8.3–10.3)
CHLORIDE: 101 MMOL/L (ref 101–111)
CO2: 28 MMOL/L (ref 22–32)
CREAT BLD-MCNC: 1.15 MG/DL (ref 0.7–1.3)
EOSINOPHIL # BLD AUTO: 0.13 X10(3) UL (ref 0–0.3)
EOSINOPHIL NFR BLD AUTO: 2.4 %
ERYTHROCYTE [DISTWIDTH] IN BLOOD BY AUTOMATED COUNT: 13.5 % (ref 11.5–16)
GLUCOSE BLD-MCNC: 148 MG/DL (ref 70–99)
GLUCOSE BLD-MCNC: 187 MG/DL (ref 65–99)
GLUCOSE BLD-MCNC: 212 MG/DL (ref 65–99)
GLUCOSE BLD-MCNC: 225 MG/DL (ref 65–99)
GLUCOSE BLD-MCNC: 248 MG/DL (ref 65–99)
HCT VFR BLD AUTO: 28.6 % (ref 37–53)
HGB BLD-MCNC: 9.3 G/DL (ref 13–17)
IMMATURE GRANULOCYTE COUNT: 0.05 X10(3) UL (ref 0–1)
IMMATURE GRANULOCYTE RATIO %: 0.9 %
LYMPHOCYTES # BLD AUTO: 1.53 X10(3) UL (ref 0.9–4)
LYMPHOCYTES NFR BLD AUTO: 27.9 %
MCH RBC QN AUTO: 29.2 PG (ref 27–33.2)
MCHC RBC AUTO-ENTMCNC: 32.5 G/DL (ref 31–37)
MCV RBC AUTO: 89.9 FL (ref 80–99)
MONOCYTES # BLD AUTO: 0.65 X10(3) UL (ref 0.1–0.6)
MONOCYTES NFR BLD AUTO: 11.9 %
NEUTROPHIL ABS PRELIM: 3.07 X10 (3) UL (ref 1.3–6.7)
NEUTROPHILS # BLD AUTO: 3.07 X10(3) UL (ref 1.3–6.7)
NEUTROPHILS NFR BLD AUTO: 56 %
P AXIS: 20 DEGREES
P-R INTERVAL: 206 MS
PLATELET # BLD AUTO: 383 10(3)UL (ref 150–450)
POTASSIUM SERPL-SCNC: 4.3 MMOL/L (ref 3.6–5.1)
Q-T INTERVAL: 398 MS
QRS DURATION: 96 MS
QTC CALCULATION (BEZET): 407 MS
R AXIS: 61 DEGREES
RBC # BLD AUTO: 3.18 X10(6)UL (ref 4.3–5.7)
RED CELL DISTRIBUTION WIDTH-SD: 44.9 FL (ref 35.1–46.3)
SODIUM SERPL-SCNC: 136 MMOL/L (ref 136–144)
T AXIS: 67 DEGREES
VANCOMYCIN TROUGH: 17.4 UG/ML (ref 10–20)
VENTRICULAR RATE: 63 BPM
WBC # BLD AUTO: 5.5 X10(3) UL (ref 4–13)

## 2018-01-08 PROCEDURE — 80048 BASIC METABOLIC PNL TOTAL CA: CPT | Performed by: INTERNAL MEDICINE

## 2018-01-08 PROCEDURE — 80202 ASSAY OF VANCOMYCIN: CPT | Performed by: INTERNAL MEDICINE

## 2018-01-08 PROCEDURE — 82962 GLUCOSE BLOOD TEST: CPT

## 2018-01-08 PROCEDURE — 73720 MRI LWR EXTREMITY W/O&W/DYE: CPT | Performed by: INTERNAL MEDICINE

## 2018-01-08 PROCEDURE — 85025 COMPLETE CBC W/AUTO DIFF WBC: CPT | Performed by: INTERNAL MEDICINE

## 2018-01-08 NOTE — PAYOR COMM NOTE
--------------  ADMISSION REVIEW     Payor: [address] [address]  Subscriber #:  [id number]  Authorization Number: N/A    Admit date: 1/6/18  Admit time: 26       Admitting Physician: Mary Waddell MD  Attending Physician:  Mary Waddell MD  [facility] Triage Vitals  BP: (!) 80/37 [01/06/18 2002]  Pulse: 61 [01/06/18 2002]  Resp: 18 [01/06/18 2002]  Temp: (!) 97.4 °F (36.3 °C) [01/06/18 2002]  Temp src: Temporal [01/06/18 2002]  SpO2: 100 % [01/06/18 2142]  O2 Device: None (Room air) [01/06/18 2142][SM.2 components within normal limits   CBC W/ DIFFERENTIAL - Abnormal; Notable for the following:     RBC 3.41 (*)     HGB 10.2 (*)     HCT 31.2 (*)     RDW-SD 47.5 (*)     Neutrophil Absolute Prelim 7.57 (*)     Neutrophil Absolute 7.57 (*)     Monocyte Absolu deformities and fractures are re-identified involving the metatarsals of the right foot with absence of the 2nd and 3rd metatarsal heads, the overall appearance is worse when compared to the previous study especially the 1st and 2nd metatarsals. Aster Ngo Attribution Almazan     SB. 1 - Alicia Gonzalez MD on 1/7/2018  9:55 AM   SB.2 - Alicia Gonzalez MD on 1/7/2018 10:17 AM                  MEDICATIONS ADMINISTERED IN LAST 1 DAY:  allopurinol (ZYLOPRIM) tab 100 mg     Date Action Dose Route User    1/8/2018 08 tab 2 tablet     Date Action Dose Route User    1/7/2018 1652 Given 2 tablet Oral Bhavin Head, RN      Insulin Aspart Pen (NOVOLOG) 100 UNIT/ML flexpen 1-5 Units     Date Action Dose Route User    1/8/2018 1305 Given 3 Units Subcutaneous (Left Upper Arm

## 2018-01-08 NOTE — PROGRESS NOTES
[facility] Hospitalist Progress Note                                                                   [facility]  2/21/1955    CC: recurrent right foot osteomyelitis/abscess    SUBJECTIVE:    N 134 mg Oral Daily with breakfast   • folic acid  1 mg Oral Daily   • gabapentin  600 mg Oral Noon   • lidocaine  1 patch Transdermal Q24H   • Insulin Aspart Pen  1-5 Units Subcutaneous TID CC and HS   • Heparin Sodium (Porcine)  5,000 Units Subcutaneous Q8

## 2018-01-08 NOTE — CONSULTS
INFECTIOUS DISEASE CONSULTATION    [facility] Patient Status:  Inpatient    1955 MRN [id number]   [facility] 3SW-A Attending Nissa Arroyo MD   Hosp Day # 2 PCP No primary care Facility-Administered Medications:   •  allopurinol (ZYLOPRIM) tab 100 mg, 100 mg, Oral, Daily  •  AmLODIPine Besylate (NORVASC) tab 10 mg, 10 mg, Oral, Daily  •  aspirin tab 325 mg, 325 mg, Oral, Daily  •  carBAMazepine (TEGRETOL) tab 400 mg, 400 mg, Oral Intravenous, Q12H    Review of Systems:    A comprehensive 10 point review of systems was completed. Pertinent positives and negatives noted in the the HPI. Physical Exam:    General: No acute distress. Alert and oriented x 3.   Vital signs: Temp:  [9 Once [[id number]] Collected: 01/06/18 2021   Order Status: Completed Lab Status: Preliminary result Updated: 01/07/18 8339   Specimen: Other from Foot, right     Aerobic Smear 1+ WBCs seen     1+ Gram positive cocci in clusters         Problem list reviewed I/D right foot 8/30/17, repeat I/D for deep seated abscess 10/5  -having receieved multiple courses of ivabx, oral abx supression  Had declined amputation in the past  Resume vancomycin  MRI  Await surgical decisions        Marlyn Gamble MD  [facility] INF

## 2018-01-08 NOTE — PLAN OF CARE
Impaired Cognition    • Patient will exhibit improved attention, thought processing and/or memory Progressing        Impaired Functional Mobility    • Achieve highest/safest level of mobility/gait Progressing        PAIN - ADULT    • Verbalizes/displays ad

## 2018-01-08 NOTE — PROGRESS NOTES
Patient seen at bedside. No complaints of pain. Afebrile  Dressing intact. Slight serous stain on bandage. Local cellulitis same. No appreciable fluctuance in skin. Dry scabbed area dorsum foot.    Impression: chronic osteo right foot/cellulits  Plan: rhoda

## 2018-01-08 NOTE — PLAN OF CARE
PAIN - ADULT    • Verbalizes/displays adequate comfort level or patient's stated pain goal Progressing        RISK FOR INFECTION - ADULT    • Absence of fever/infection during anticipated neutropenic period Progressing        Pt up ad kate in room.   Dressin

## 2018-01-08 NOTE — PROGRESS NOTES
[facility] dosing service    Follow-up Pharmacokinetic Consult for Vancomycin Dosing     George Wesley is a 60year old male who is being treated for osteomyelitis. Patient is on day 2 of Vancomycin 1.25 gm IV Q 12 hours. Goal trough is 15-20 ug/mL. of 17.4 ug/mL, pharmacokinetics, adjusted weight and renal function)    2. Pharmacy will re-check Vancomycin trough levels in 3-4 days if renal function stable. Goal trough level 15-20 ug/mL.     3.  Pharmacy will need BUN/Scr daily while on Vancomycin to

## 2018-01-09 LAB
CARBAMAZEPINE (TEGRETOL): 12.8 UG/ML (ref 4–10)
GLUCOSE BLD-MCNC: 185 MG/DL (ref 65–99)
GLUCOSE BLD-MCNC: 195 MG/DL (ref 65–99)
GLUCOSE BLD-MCNC: 234 MG/DL (ref 65–99)
GLUCOSE BLD-MCNC: 262 MG/DL (ref 65–99)

## 2018-01-09 PROCEDURE — 80156 ASSAY CARBAMAZEPINE TOTAL: CPT | Performed by: INTERNAL MEDICINE

## 2018-01-09 PROCEDURE — 82962 GLUCOSE BLOOD TEST: CPT

## 2018-01-09 RX ORDER — LIDOCAINE 50 MG/G
1 PATCH TOPICAL EVERY 24 HOURS
Status: DISCONTINUED | OUTPATIENT
Start: 2018-01-10 | End: 2018-01-15

## 2018-01-09 RX ORDER — HYDRALAZINE HYDROCHLORIDE 20 MG/ML
10 INJECTION INTRAMUSCULAR; INTRAVENOUS EVERY 6 HOURS PRN
Status: DISCONTINUED | OUTPATIENT
Start: 2018-01-09 | End: 2018-01-15

## 2018-01-09 NOTE — PLAN OF CARE
Blood pressure still elevated 171/85 ,notified DR. Manuel ,got order for hydralazine 10 mg prn for sbp >160 ,will continue to monitor .

## 2018-01-09 NOTE — PAYOR COMM NOTE
--------------  CONTINUED STAY REVIEW    Payor: [address] [facility]  Subscriber #:  [id number]  Authorization Number: [id number]    Admit date: 1/6/18  Admit time: 26    Admitting Physician: Glen Putnam MD  Attending Physician:  Glen Putnam MD gabapentin (NEURONTIN) cap 600 mg     Date Action Dose Route User    1/8/2018 1115 Given 600 mg Oral Tigre Staples RN      gabapentin (NEURONTIN) tab 1,200 mg     Date Action Dose Route User    1/8/2018 2150 Given 1200 mg Oral Charles Duvall RN

## 2018-01-09 NOTE — CONSULTS
Pharmacy consulted by Dr. Núñez  to dose carbamazepine (CBZ) for high level=12.8.     CBZ can cause bone marrow suppression & anemia, CNS (CNS depression, latent psychosis, confusion, or agitation), hyponatremia, among other adverse effects cited in the m

## 2018-01-09 NOTE — PLAN OF CARE
Impaired Functional Mobility    • Achieve highest/safest level of mobility/gait Progressing        PAIN - ADULT    • Verbalizes/displays adequate comfort level or patient's stated pain goal Progressing        RISK FOR INFECTION - ADULT    • Absence of feve

## 2018-01-09 NOTE — PROGRESS NOTES
[facility] Hospitalist Progress Note                                                                   [address] [facility]  2/21/1955    CC: recurrent right foot osteomyelitis/abscess    SUBJECTIVE:    N Daily with breakfast   • folic acid  1 mg Oral Daily   • gabapentin  600 mg Oral Noon   • lidocaine  1 patch Transdermal Q24H   • Insulin Aspart Pen  1-5 Units Subcutaneous TID CC and HS   • Heparin Sodium (Porcine)  5,000 Units Subcutaneous Q8H [address]   • va

## 2018-01-09 NOTE — PROGRESS NOTES
[facility]                INFECTIOUS DISEASE PROGRESS NOTE    Costella Runner Patient Status:  Inpatient    1955 MRN [id number]   [facility] 3SW-A Attending Lina Meraz MD   Hosp Day # 3 PCP No primary care provider on file 01/06/18 2021   Order Status: Completed Lab Status: Preliminary result Updated: 01/08/18 2100   Specimen: Blood from Blood,peripheral     Blood Culture Result No Growth 2 Days   Aerobic Bacterial Culture Once [[id number]] (Abnormal) Collected: 01/06/18 2021 ear     Other osteomyelitis     Disorientation     Adjustment disorder with mixed anxiety and depressed mood     Chronic diastolic CHF (congestive heart failure) (HCC)     Chronic osteomyelitis of foot (HCC)     Cellulitis of right lower extremity      ASS

## 2018-01-09 NOTE — PLAN OF CARE
Discussed POC with Dr. Brady Prince. He saw pt earlier today and pt was tentatively agreeable to partial amputation of his foot. Full note from Dr. Brady Prince will follow. Dr. Cornell Krabbe will see pt tomorrow to set up and consent for surgery.  No need to keep NPO

## 2018-01-10 ENCOUNTER — ANESTHESIA EVENT (OUTPATIENT)
Dept: SURGERY | Facility: HOSPITAL | Age: 63
End: 2018-01-10

## 2018-01-10 LAB
CREAT BLD-MCNC: 1.09 MG/DL (ref 0.7–1.3)
GLUCOSE BLD-MCNC: 151 MG/DL (ref 65–99)
GLUCOSE BLD-MCNC: 219 MG/DL (ref 65–99)
GLUCOSE BLD-MCNC: 240 MG/DL (ref 65–99)
GLUCOSE BLD-MCNC: 271 MG/DL (ref 65–99)

## 2018-01-10 PROCEDURE — 82565 ASSAY OF CREATININE: CPT | Performed by: HOSPITALIST

## 2018-01-10 PROCEDURE — 82962 GLUCOSE BLOOD TEST: CPT

## 2018-01-10 NOTE — PROGRESS NOTES
[facility] Hospitalist Progress Note     PCP: No primary care provider on file.     SUBJECTIVE:  No CP, SOB, or N/V.    OBJECTIVE:  Temp:  [97.4 °F (36.3 °C)-97.9 °F (36.6 °C)] 97.4 °F (36.3 °C)  Pulse:  [64-68] 64  Resp:  [16-18] 18  BP: (124-194)/(66-79) 148/7 Oral BID with meals   • docusate sodium  100 mg Oral BID   • furosemide  20 mg Oral Daily   • gabapentin  1,200 mg Oral BID   • Pramipexole Dihydrochloride  1 mg Oral Nightly   • DULoxetine HCl  20 mg Oral Daily   • ezetimibe  10 mg Oral Daily   • fenofibr Cymbalta     Prophy:  DVT: heparin subQ       Questions/concerns were discussed with patient and/or family by bedside.     Total Time spent with patient and coordinating care:  35 minutes    Thank You,  Gregory Brand DO  [facility] Hospitalist  Pager: [phone]

## 2018-01-10 NOTE — CM/SW NOTE
Message from [facility], Yadira Real, [phone] re: referral from  to check benefits for home IVAB. She notes that pt's insurance requests name of medication and d/c date before they will give authorization. Clinical sent via [employer].   Informed by RN t

## 2018-01-10 NOTE — PROGRESS NOTES
Patient seen at bedside today. Patient was up sitting in chair. Patient had many questions regarding variety of treatment options for his foot.   With long-standing history of ulcerations and chronic infection he would like to consider pursuing surgical i

## 2018-01-10 NOTE — ANESTHESIA PREPROCEDURE EVALUATION
PRE-OP EVALUATION    Patient Name: Kaylen Chau    Pre-op Diagnosis: OSTEOMYELITIS, DIABETIC NEUROPATHY, CELLLULITIS    Procedure(s):  LISFRANC AMPUTATION RIGHT FOOT, PERONEUS BREVIS TENDON TRANSFER, ABDUCTOR HALLUCIS ROTATIONAL MUSCLE FLAP RIGHT FOOT, AB HYDROcodone-acetaminophen (NORCO) 5-325 MG per tab 1 tablet 1 tablet Oral Q4H PRN   Or      HYDROcodone-acetaminophen (NORCO) 5-325 MG per tab 2 tablet 2 tablet Oral Q4H PRN   [COMPLETED] sodium chloride 0.9% IV bolus 1,000 mL 1,000 mL Intravenous Once ALLOPURINOL 100 MG Oral Tab TAKE 1 TABLET(100 MG) BY MOUTH DAILY Disp: 90 tablet Rfl: 0   LIDOCAINE 5 % External Patch APPLY 1 PATCH EXTERNALLY TO THE SKIN DAILY Disp: 30 patch Rfl: 0   docusate sodium 100 MG Oral Cap Take 100 mg by mouth 2 (two) times d concomitant abnormal relaxation and     increased filling pressure - grade 2 diastolic dysfunction. 2. Aortic valve: Mildly calcified annulus. Trileaflet; mildly thickened     leaflets. Thickening, consistent with sclerosis. Trivial regurgitation.   3. Anthony 01/08/2018    01/08/2018   CO2 28.0 01/08/2018   BUN 23 (H) 01/08/2018   CREATSERUM 1.09 01/10/2018    (H) 01/08/2018   CA 9.6 01/08/2018            Airway      Mallampati: II  Mouth opening: >3 FB  TM distance: 4 - 6 cm  Neck ROM: full Cardio

## 2018-01-10 NOTE — PROGRESS NOTES
[facility] Podiatry, Foot and Ankle Surgery    Patient seen at bedside today. Patient was up sitting in chair. Patient had many questions regarding variety of treatment options for his foot.   With long-standing history of ulcerations and chronic infection he wo

## 2018-01-10 NOTE — PROGRESS NOTES
[facility]                INFECTIOUS DISEASE PROGRESS NOTE    Defunmilayo Alba Patient Status:  Inpatient    1955 MRN [id number]   [facility] 3SW-A Attending Jenny Lang MD   Hosp Day # 4 PCP No primary care provider on file Aerobic Culture Result 3+ growth Staphylococcus aureus, MRSA (A)    Comment: Isolate was tested for inducible resistance to Clindamycin and found to be negative.        Aerobic Smear 1+ WBCs seen     1+ Gram positive cocci in clusters   Susceptibility CKD (chronic kidney disease) stage 3, GFR 30-59 ml/min     Aortic valve disorder     Impacted cerumen, left ear     Other osteomyelitis     Disorientation     Adjustment disorder with mixed anxiety and depressed mood     Chronic diastolic CHF Evelyne German

## 2018-01-10 NOTE — PLAN OF CARE
SKIN/TISSUE INTEGRITY - ADULT    • Skin integrity remains intact Not Progressing    • Incision(s), wounds(s) or drain site(s) healing without S/S of infection Not Progressing          Impaired Cognition    • Patient will exhibit improved attention, thought

## 2018-01-10 NOTE — PLAN OF CARE
Informed pt of call from Podiatrist and that doctor will be here tomorrow, plan is for surgery most likely Thursday. Offered to change dressing to right foot and apply lidoderm patch, pt requested it be done later, endorsed to ISAURA THOMAS [facility] RN.

## 2018-01-11 ENCOUNTER — ANESTHESIA (OUTPATIENT)
Dept: SURGERY | Facility: HOSPITAL | Age: 63
End: 2018-01-11

## 2018-01-11 ENCOUNTER — SURGERY (OUTPATIENT)
Age: 63
End: 2018-01-11

## 2018-01-11 LAB
CREAT BLD-MCNC: 1.15 MG/DL (ref 0.7–1.3)
GLUCOSE BLD-MCNC: 122 MG/DL (ref 65–99)
GLUCOSE BLD-MCNC: 149 MG/DL (ref 65–99)
GLUCOSE BLD-MCNC: 174 MG/DL (ref 65–99)
GLUCOSE BLD-MCNC: 177 MG/DL (ref 65–99)

## 2018-01-11 PROCEDURE — 0Y6M0ZF DETACHMENT AT RIGHT FOOT, PARTIAL 5TH RAY, OPEN APPROACH: ICD-10-PCS | Performed by: PODIATRIST

## 2018-01-11 PROCEDURE — 88311 DECALCIFY TISSUE: CPT | Performed by: PODIATRIST

## 2018-01-11 PROCEDURE — 82565 ASSAY OF CREATININE: CPT | Performed by: HOSPITALIST

## 2018-01-11 PROCEDURE — 36569 INSJ PICC 5 YR+ W/O IMAGING: CPT

## 2018-01-11 PROCEDURE — 0Y6M0Z9 DETACHMENT AT RIGHT FOOT, PARTIAL 1ST RAY, OPEN APPROACH: ICD-10-PCS | Performed by: PODIATRIST

## 2018-01-11 PROCEDURE — 82962 GLUCOSE BLOOD TEST: CPT

## 2018-01-11 PROCEDURE — 0KXV0Z0 TRANSFER RIGHT FOOT MUSCLE WITH SKIN, OPEN APPROACH: ICD-10-PCS | Performed by: PODIATRIST

## 2018-01-11 PROCEDURE — 0LXV0ZZ TRANSFER RIGHT FOOT TENDON, OPEN APPROACH: ICD-10-PCS | Performed by: PODIATRIST

## 2018-01-11 PROCEDURE — 0Y6M0ZC DETACHMENT AT RIGHT FOOT, PARTIAL 3RD RAY, OPEN APPROACH: ICD-10-PCS | Performed by: PODIATRIST

## 2018-01-11 PROCEDURE — 76937 US GUIDE VASCULAR ACCESS: CPT

## 2018-01-11 PROCEDURE — 0Y6M0ZD DETACHMENT AT RIGHT FOOT, PARTIAL 4TH RAY, OPEN APPROACH: ICD-10-PCS | Performed by: PODIATRIST

## 2018-01-11 PROCEDURE — 0L8V0ZZ DIVISION OF RIGHT FOOT TENDON, OPEN APPROACH: ICD-10-PCS | Performed by: PODIATRIST

## 2018-01-11 PROCEDURE — 88307 TISSUE EXAM BY PATHOLOGIST: CPT | Performed by: PODIATRIST

## 2018-01-11 PROCEDURE — 0Y6M0ZB DETACHMENT AT RIGHT FOOT, PARTIAL 2ND RAY, OPEN APPROACH: ICD-10-PCS | Performed by: PODIATRIST

## 2018-01-11 PROCEDURE — B548ZZA ULTRASONOGRAPHY OF SUPERIOR VENA CAVA, GUIDANCE: ICD-10-PCS | Performed by: HOSPITALIST

## 2018-01-11 PROCEDURE — 02HV33Z INSERTION OF INFUSION DEVICE INTO SUPERIOR VENA CAVA, PERCUTANEOUS APPROACH: ICD-10-PCS | Performed by: HOSPITALIST

## 2018-01-11 DEVICE — IMPLANTABLE DEVICE: Type: IMPLANTABLE DEVICE | Status: FUNCTIONAL

## 2018-01-11 RX ORDER — SODIUM CHLORIDE, SODIUM LACTATE, POTASSIUM CHLORIDE, CALCIUM CHLORIDE 600; 310; 30; 20 MG/100ML; MG/100ML; MG/100ML; MG/100ML
INJECTION, SOLUTION INTRAVENOUS CONTINUOUS
Status: DISCONTINUED | OUTPATIENT
Start: 2018-01-11 | End: 2018-01-11 | Stop reason: HOSPADM

## 2018-01-11 RX ORDER — HYDROMORPHONE HYDROCHLORIDE 1 MG/ML
0.4 INJECTION, SOLUTION INTRAMUSCULAR; INTRAVENOUS; SUBCUTANEOUS EVERY 5 MIN PRN
Status: DISCONTINUED | OUTPATIENT
Start: 2018-01-11 | End: 2018-01-11 | Stop reason: HOSPADM

## 2018-01-11 RX ORDER — METOCLOPRAMIDE HYDROCHLORIDE 5 MG/ML
10 INJECTION INTRAMUSCULAR; INTRAVENOUS AS NEEDED
Status: DISCONTINUED | OUTPATIENT
Start: 2018-01-11 | End: 2018-01-11 | Stop reason: HOSPADM

## 2018-01-11 RX ORDER — SODIUM CHLORIDE 0.9 % (FLUSH) 0.9 %
10 SYRINGE (ML) INJECTION AS NEEDED
Status: DISCONTINUED | OUTPATIENT
Start: 2018-01-11 | End: 2018-01-15

## 2018-01-11 RX ORDER — ONDANSETRON 2 MG/ML
4 INJECTION INTRAMUSCULAR; INTRAVENOUS AS NEEDED
Status: DISCONTINUED | OUTPATIENT
Start: 2018-01-11 | End: 2018-01-11 | Stop reason: HOSPADM

## 2018-01-11 RX ORDER — BACITRACIN 50000 [USP'U]/1
INJECTION, POWDER, LYOPHILIZED, FOR SOLUTION INTRAMUSCULAR AS NEEDED
Status: DISCONTINUED | OUTPATIENT
Start: 2018-01-11 | End: 2018-01-11 | Stop reason: HOSPADM

## 2018-01-11 RX ORDER — INSULIN ASPART 100 [IU]/ML
INJECTION, SOLUTION INTRAVENOUS; SUBCUTANEOUS ONCE
Status: DISCONTINUED | OUTPATIENT
Start: 2018-01-11 | End: 2018-01-11 | Stop reason: HOSPADM

## 2018-01-11 RX ORDER — BUPIVACAINE HYDROCHLORIDE 5 MG/ML
INJECTION, SOLUTION EPIDURAL; INTRACAUDAL AS NEEDED
Status: DISCONTINUED | OUTPATIENT
Start: 2018-01-11 | End: 2018-01-11 | Stop reason: HOSPADM

## 2018-01-11 RX ORDER — MORPHINE SULFATE 2 MG/ML
3 INJECTION, SOLUTION INTRAMUSCULAR; INTRAVENOUS EVERY 2 HOUR PRN
Status: DISCONTINUED | OUTPATIENT
Start: 2018-01-11 | End: 2018-01-15

## 2018-01-11 RX ORDER — NALOXONE HYDROCHLORIDE 0.4 MG/ML
80 INJECTION, SOLUTION INTRAMUSCULAR; INTRAVENOUS; SUBCUTANEOUS AS NEEDED
Status: DISCONTINUED | OUTPATIENT
Start: 2018-01-11 | End: 2018-01-11 | Stop reason: HOSPADM

## 2018-01-11 RX ORDER — DEXTROSE MONOHYDRATE 25 G/50ML
50 INJECTION, SOLUTION INTRAVENOUS
Status: DISCONTINUED | OUTPATIENT
Start: 2018-01-11 | End: 2018-01-11 | Stop reason: HOSPADM

## 2018-01-11 RX ORDER — HEPARIN SODIUM 5000 [USP'U]/ML
5000 INJECTION, SOLUTION INTRAVENOUS; SUBCUTANEOUS EVERY 8 HOURS SCHEDULED
Status: DISCONTINUED | OUTPATIENT
Start: 2018-01-11 | End: 2018-01-15

## 2018-01-11 NOTE — PROGRESS NOTES
[facility]                INFECTIOUS DISEASE PROGRESS NOTE    Tarri Primer Patient Status:  Inpatient    1955 MRN [id number]   [facility] 3SW-A Attending Mary Gomez MD   Hosp Day # 5 PCP No primary care provider on file 1+ Gram positive cocci in clusters   Susceptibility      Staphylococcus aureus, MRSA     Not Specified (Preliminary)    Cefazolin  Resistant    Clindamycin <=0.25  Sensitive    Erythromycin >=8  Resistant    Gentamicin <=0.5  Sensitive    Levofloxacin 0. Chronic osteomyelitis of foot (HCC)     Cellulitis of right lower extremity      ASSESSMENT/PLAN:  1.  Chronic osteomyelitis right foot/MRSA  SP forefoot amp  -continue vancomycin > 2 weeks posotop, or up to 6 weeks depending on surgical margins  Repeat

## 2018-01-11 NOTE — PROGRESS NOTES
Patient back from surgery post right mid foot amputation. Right lower leg/foot with post mold and ace wrap dressing in place. Right leg elevated with pillow. Discussed post op orders with patient and wife at bedside.   Patient instructed that right leg/f

## 2018-01-11 NOTE — CM/SW NOTE
01/11/18 1500   CM/SW Referral Data   Referral Source Physician   Reason for Referral Discharge planning   Informant Patient;Spouse;Edward Staff   Patient Info   Patient's Mental Status Alert;Oriented   Patient's [address]   Number of Level previously and may consider CNR. SW will f/u after PT/OT recommendations to assist with putting plan in place.

## 2018-01-11 NOTE — PLAN OF CARE
Patient maintained on NPO, scheduled for OR today for right foot amputation by Dr. Jamie Qiu. Right foot dressing intact. Patient to OR by bed, accompanied by his wife.

## 2018-01-11 NOTE — BRIEF OP NOTE
Pre-Operative Diagnosis: OSTEOMYELITIS, DIABETIC NEUROPATHY, CELLLULITIS     Post-Operative Diagnosis: OSTEOMYELITIS, DIABETIC NEUROPATHY, CELLLULITIS     Procedure Performed:   MIDTARSAL AMPUTATION RIGHT FOOT,   PERONEUS BREVIS TENDON TRANSFER RIGHT FO

## 2018-01-11 NOTE — PROGRESS NOTES
[facility] Hospitalist Progress Note     PCP: No primary care provider on file. SUBJECTIVE:  No CP, SOB, or N/V. Pt with flat affect today; minimal speaking because upset about surgery.     OBJECTIVE:  Temp:  [97.6 °F (36.4 °C)-98.7 °F (37.1 °C)] 97.6 °F (36 325 mg Oral Daily   • [MAR Hold] carBAMazepine  400 mg Oral TID   • [MAR Hold] carvedilol  6.25 mg Oral BID with meals   • [MAR Hold] docusate sodium  100 mg Oral BID   • furosemide  20 mg Oral Daily   • [MAR Hold] gabapentin  1,200 mg Oral BID   • [MAR H carbamazepine- pt with elevated level, discussed case with pharmacy.  Would recommend outpatient physician who is prescribing carbamazepine to consider possible dose adjustment and monitor neuropathy on this lower dose and monitoring levels.      # HTN  - A

## 2018-01-11 NOTE — ANESTHESIA POSTPROCEDURE EVALUATION
[facility] Patient Status:  Inpatient   Age/Gender 60year old male MRN [id number]   Location [address] [facility] Attending Margaret Regan, [address] [facility] Day # 5 PCP No primary care provider on file.        Anesthesia P

## 2018-01-12 LAB
CREAT BLD-MCNC: 1.37 MG/DL (ref 0.7–1.3)
CREAT BLD-MCNC: 1.52 MG/DL (ref 0.7–1.3)
GLUCOSE BLD-MCNC: 141 MG/DL (ref 65–99)
GLUCOSE BLD-MCNC: 203 MG/DL (ref 65–99)
GLUCOSE BLD-MCNC: 233 MG/DL (ref 65–99)
PLATELET # BLD AUTO: 346 10(3)UL (ref 150–450)
VANCOMYCIN TROUGH: 23 UG/ML (ref 10–20)

## 2018-01-12 PROCEDURE — 82962 GLUCOSE BLOOD TEST: CPT

## 2018-01-12 PROCEDURE — 82565 ASSAY OF CREATININE: CPT | Performed by: INTERNAL MEDICINE

## 2018-01-12 PROCEDURE — 80202 ASSAY OF VANCOMYCIN: CPT | Performed by: INTERNAL MEDICINE

## 2018-01-12 PROCEDURE — 85049 AUTOMATED PLATELET COUNT: CPT | Performed by: HOSPITALIST

## 2018-01-12 PROCEDURE — 97162 PT EVAL MOD COMPLEX 30 MIN: CPT

## 2018-01-12 PROCEDURE — 97116 GAIT TRAINING THERAPY: CPT

## 2018-01-12 PROCEDURE — 97165 OT EVAL LOW COMPLEX 30 MIN: CPT

## 2018-01-12 PROCEDURE — 97535 SELF CARE MNGMENT TRAINING: CPT

## 2018-01-12 PROCEDURE — 82565 ASSAY OF CREATININE: CPT | Performed by: HOSPITALIST

## 2018-01-12 RX ORDER — HYDROCODONE BITARTRATE AND ACETAMINOPHEN 5; 325 MG/1; MG/1
2 TABLET ORAL EVERY 4 HOURS PRN
Qty: 40 TABLET | Refills: 0 | Status: SHIPPED | OUTPATIENT
Start: 2018-01-12 | End: 2018-02-15

## 2018-01-12 RX ORDER — HYDROCODONE BITARTRATE AND ACETAMINOPHEN 5; 325 MG/1; MG/1
1 TABLET ORAL EVERY 4 HOURS PRN
Qty: 30 TABLET | Refills: 0 | Status: SHIPPED | OUTPATIENT
Start: 2018-01-12 | End: 2018-01-12

## 2018-01-12 NOTE — PROGRESS NOTES
[facility]                INFECTIOUS DISEASE PROGRESS NOTE    Severo Locks Patient Status:  Inpatient    1955 MRN [id number]   [facility] 3SW-A Attending Arthur Koyanagi, MD   Hosp Day # 6 PCP No primary care provider on file Staphylococcus aureus, MRSA     Not Specified (Preliminary)    Cefazolin  Resistant    Clindamycin <=0.25  Sensitive    Erythromycin >=8  Resistant    Gentamicin <=0.5  Sensitive    Levofloxacin 0.25  Sensitive    Oxacillin >=4  Resistant    Tetracycline Cellulitis of right lower extremity      ASSESSMENT/PLAN:  1.  Chronic osteomyelitis right foot/MRSA  SP forefoot amp  Vancomycin level elevated, may be related to slightly worse renal function today  -hold vanc today restart 1.5g daily in am  Duration d

## 2018-01-12 NOTE — CM/SW NOTE
Informed by PT that MELO is recommended. Also spoke with  re: need for IVAB. Pt has a PICC line. SW spoke with pt and he referred SW to his wife. SW spoke pt's wife, Gerry.   She had toured [facility] last time and would like referral to that facili

## 2018-01-12 NOTE — OCCUPATIONAL THERAPY NOTE
OCCUPATIONAL THERAPY EVALUATION - INPATIENT     Room Number: 879/878-F  Evaluation Date: 1/12/2018  Type of Evaluation: Initial  Presenting Problem: s/p R mid-tarsal amputation with AT transfer 1/11/18    Physician Order: IP Consult to Occupational Therapy floor and sleeps in recliner. SUBJECTIVE   \"I'm \"    Patient self-stated goal is to feel better.      OBJECTIVE  Precautions:  (NWB right)  Fall Risk: High fall risk    WEIGHT BEARING RESTRICTION  Weight Bearing Restriction: R lower extremity        R into ADLs and functional transfers > sitting EOB to via min assist with safety cues > sitting EOB several minutes with fair minus balance > education on LB dressing techniques > donning pants to knees via SBA without AE with increased time > standing via R discharge. The patient is below their baseline and would benefit from continued skilled OT to address the activity limitations identified by the AM-PAC \"6 clicks\". Subacute rehab is recommended for 15-19 days.  After this period of rehabilitation patient

## 2018-01-12 NOTE — PROGRESS NOTES
Suzette Shetty  2/21/1955  [id number]      PODIATRY PROGRESS NOTE    HPI: Suzette Shetty is a 60year old  with PMH significant for DM with neuropathy and severe hx of foot ulceration. The patient was admitted for cellulitis.  Pt is POD #1 s/p mid tarsal amput chronic congestive heart failure, unspecified congestive heart failure type (HCC)     CKD (chronic kidney disease) stage 3, GFR 30-59 ml/min     Aortic valve disorder     Impacted cerumen, left ear     Other osteomyelitis     Disorientation     Adjustment Rash      Social History  Social History   Marital status:   Spouse name: N/A    Years of education: N/A  Number of children: N/A     Occupational History  None on file     Social History Main Topics   Smoking status: Never Smoker    Smokeless tobac Date   A1C 7.8 (H) 01/06/2018   A1C 6.6 (H) 10/03/2017   A1C 6.6 (H) 09/01/2017    (H) 01/06/2018           XRAYS :  CONCLUSION:    1. Findings are consistent with cellulitis involving the right foot with superimposed osteomyelitis.   Underlying nancy

## 2018-01-12 NOTE — PHYSICAL THERAPY NOTE
PHYSICAL THERAPY EVALUATION - INPATIENT     Room Number: 638/392-T  Evaluation Date: 1/12/2018  Type of Evaluation: Initial  Physician Order: PT Eval and Treat    Presenting Problem: s/p right mid tarsal amputation with tendon transfers  Reason for The No  Patient Owned Equipment: Rolling walker;Cane (knee walker)  Patient Regularly Uses: None    Prior Level of [place]: pt reports recently he has been ambulating at home with a cane, but has had much experience with ambulating NWB right due to chroni bedclothes, sheets and blankets)?: A Little   -   Sitting down on and standing up from a chair with arms (e.g., wheelchair, bedside commode, etc.): A Lot   -   Moving from lying on back to sitting on the side of the bed?: A Little   How much help from anot amputation with Rotational muscle flaps, AT transfer, PB transfer, Achilles lengthening all right foot. Pertinent comorbidities and personal factors impacting therapy include h/o DM< PVD, CHF, osteomyelitis, CAD, CVA.   In this PT evaluation, the patient p

## 2018-01-12 NOTE — PROGRESS NOTES
[facility] Hospitalist Progress Note     PCP: No primary care provider on file. SUBJECTIVE:  No CP, SOB, or N/V. Pain is 8/10 this am.  Hypotensive with bp noted 80/47.     OBJECTIVE:  Temp:  [97.7 °F (36.5 °C)-99.1 °F (37.3 °C)] 98.6 °F (37 °C)  Pulse:  [58 • fenofibrate micronized  134 mg Oral Daily with breakfast   • folic acid  1 mg Oral Daily   • gabapentin  600 mg Oral Noon   • Heparin Sodium (Porcine)  5,000 Units Subcutaneous Q8H [facility]   • vancomycin  1,250 mg Intravenous Q12H       morphINE sulfate, N psychiatric evaluation though clearly, he is more depressed today     Prophy:  DVT: heparin subQ    Dispo - planning MELO likely on dc when ok per ID       Questions/concerns were discussed with patient and/or family by bedside.     Total Time spent with pat

## 2018-01-12 NOTE — OPERATIVE REPORT
[address]    PATIENT'S NAME: GÉNESIS MYERS   ATTENDING PHYSICIAN: Henry Chamberlain D.O.   OPERATING PHYSICIAN: Elier Lomax D.P.M.    PATIENT ACCOUNT#:   [de-identified]    LOCATION:  [address] [place] [address]  MEDICAL RECORD #:   [id number]       DATE OF BIRTH: fishmouth incision was completed through the subcutaneous tissue. The 2 longitudinal incisions overlying the fifth metatarsal and the first metatarsal were placed. The skin flaps were reflected.   Blunt dissection was performed down to the level of the ad the dorsal flap secondary to the cuneiform and the cuboid. I made the decision, at this point, to detach the tibialis anterior tendon and clamp it with an Allis clamp.   I then resected and removed the medial, intermediate, and lateral cuneiforms at the na sutures were placed within the tibialis anterior tendon to reattach it back down onto the navicular. Excellent repair was noted.   At this point, the adductor digiti minimi muscle was transposed laterally with care not to put any tension to the proximal va

## 2018-01-13 LAB
CREAT BLD-MCNC: 1.13 MG/DL (ref 0.7–1.3)
GLUCOSE BLD-MCNC: 137 MG/DL (ref 65–99)
GLUCOSE BLD-MCNC: 159 MG/DL (ref 65–99)
GLUCOSE BLD-MCNC: 204 MG/DL (ref 65–99)
GLUCOSE BLD-MCNC: 214 MG/DL (ref 65–99)

## 2018-01-13 PROCEDURE — 97535 SELF CARE MNGMENT TRAINING: CPT

## 2018-01-13 PROCEDURE — 97116 GAIT TRAINING THERAPY: CPT

## 2018-01-13 PROCEDURE — 82565 ASSAY OF CREATININE: CPT | Performed by: HOSPITALIST

## 2018-01-13 PROCEDURE — 82962 GLUCOSE BLOOD TEST: CPT

## 2018-01-13 NOTE — PHYSICAL THERAPY NOTE
PHYSICAL THERAPY TREATMENT NOTE - INPATIENT    Room Number: 455/908-Z     Session: 2   Number of Visits to Meet Established Goals: 6    Presenting Problem: s/p right mid tarsal amputation with tendon transfers    Problem List  Principal Problem:    Cellul of the bed?: A Little   How much help from another person does the patient currently need. ..   -   Moving to and from a bed to a chair (including a wheelchair)?: A Lot   -   Need to walk in hospital room?: A Lot   -   Climbing 3-5 steps with a railing?: To transfers Sit to/from Stand at assistance level: minimum assistance   Goal #3 Patient is able to ambulate 50 feet with assist device: walker - standard at assistance level: minimum assistance   Goal #4     Goal #5     Goal #6     Goal Comments: Goals estab

## 2018-01-13 NOTE — PROGRESS NOTES
[facility]                INFECTIOUS DISEASE PROGRESS NOTE    [facility] Patient Status:  Inpatient    1955 MRN [id number]   [facility] 3SW-A Attending Rodger Guzman MD   Hosp Day # 7 PCP No primary care provider on file Susceptibility      Staphylococcus aureus, MRSA     Not Specified (Preliminary)    Cefazolin  Resistant    Clindamycin <=0.25  Sensitive    Erythromycin >=8  Resistant    Gentamicin <=0.5  Sensitive    Levofloxacin 0.25  Sensitive    Oxacillin >=4  Resista Cellulitis of right lower extremity      ASSESSMENT/PLAN:  1.  Chronic osteomyelitis right foot/MRSA  SP forefoot amp  Vancomycin level elevated, may be related to slightly worse renal function today  -hold vanc today restart 1.5g daily in am  Duration d

## 2018-01-13 NOTE — OCCUPATIONAL THERAPY NOTE
OCCUPATIONAL THERAPY TREATMENT NOTE - INPATIENT     Room Number: 824/445-O  Session: 1   Number of Visits to Meet Established Goals: 5    Presenting Problem: s/p R mid-tarsal amputation with AT transfer 1/11/18    History related to current admission: Sraah currently need…  -   Putting on and taking off regular lower body clothing?: A Little  -   Bathing (including washing, rinsing, drying)?: A Little  -   Toileting, which includes using toilet, bedpan or urinal? : A Little  -   Putting on and taking off regu session, pt remains considerably below baseline functionally and is a high fall risk. Continue to recommend MELO at discharge to assist patient in returning to Mod I for BADLs and functional transfers.        OT Discharge Recommendations: Sub-acute rehabilit

## 2018-01-14 LAB
CREAT BLD-MCNC: 1.09 MG/DL (ref 0.7–1.3)
GLUCOSE BLD-MCNC: 125 MG/DL (ref 65–99)
GLUCOSE BLD-MCNC: 128 MG/DL (ref 65–99)
GLUCOSE BLD-MCNC: 136 MG/DL (ref 65–99)
GLUCOSE BLD-MCNC: 185 MG/DL (ref 65–99)
GLUCOSE BLD-MCNC: 250 MG/DL (ref 65–99)

## 2018-01-14 PROCEDURE — 82962 GLUCOSE BLOOD TEST: CPT

## 2018-01-14 PROCEDURE — 82565 ASSAY OF CREATININE: CPT | Performed by: HOSPITALIST

## 2018-01-14 NOTE — PROGRESS NOTES
[facility] Hospitalist Progress Note     PCP: No primary care provider on file.      CC: f/u s/p mid foot amputation    SUBJECTIVE:  No CP/SOB    OBJECTIVE:  Temp:  [97.5 °F (36.4 °C)-98.7 °F (37.1 °C)] 98.1 °F (36.7 °C)  Pulse:  [66-72] 66  Resp:  [17-20] 17  B mg Oral Nightly   • DULoxetine HCl  20 mg Oral Daily   • ezetimibe  10 mg Oral Daily   • fenofibrate micronized  134 mg Oral Daily with breakfast   • folic acid  1 mg Oral Daily   • gabapentin  600 mg Oral Noon       morphINE sulfate, Normal Saline Flush, heparin subQ    Dispo - planning MELO likely on dc when ok per ID       Questions/concerns were discussed with patient and/or family by bedside.       Thank Raquel Davis MD  [facility] Hospitalist  Answering Service: [phone]

## 2018-01-14 NOTE — PROGRESS NOTES
[facility] Hospitalist Progress Note     PCP: No primary care provider on file. CC: f/u s/p mid foot amputation    SUBJECTIVE:  No CP/SOB.      OBJECTIVE:  Temp:  [98.1 °F (36.7 °C)-99 °F (37.2 °C)] 98.7 °F (37.1 °C)  Pulse:  [66-88] 66  Resp:  [18-20] 18  B mg Oral Nightly   • DULoxetine HCl  20 mg Oral Daily   • ezetimibe  10 mg Oral Daily   • fenofibrate micronized  134 mg Oral Daily with breakfast   • folic acid  1 mg Oral Daily   • gabapentin  600 mg Oral Noon       morphINE sulfate, Normal Saline Flush, heparin subQ    Dispo - planning MELO likely on dc when ok per ID       Questions/concerns were discussed with patient and/or family by bedside.       Thank Jefry Villavicencio MD  [facility] Hospitalist  Answering Service: [phone]

## 2018-01-15 VITALS
RESPIRATION RATE: 18 BRPM | OXYGEN SATURATION: 94 % | HEIGHT: 71 IN | HEART RATE: 64 BPM | TEMPERATURE: 98 F | BODY MASS INDEX: 31.92 KG/M2 | DIASTOLIC BLOOD PRESSURE: 60 MMHG | WEIGHT: 228 LBS | SYSTOLIC BLOOD PRESSURE: 117 MMHG

## 2018-01-15 LAB
CREAT BLD-MCNC: 1.19 MG/DL (ref 0.7–1.3)
GLUCOSE BLD-MCNC: 124 MG/DL (ref 65–99)
GLUCOSE BLD-MCNC: 133 MG/DL (ref 65–99)

## 2018-01-15 PROCEDURE — 82565 ASSAY OF CREATININE: CPT | Performed by: HOSPITALIST

## 2018-01-15 PROCEDURE — 97530 THERAPEUTIC ACTIVITIES: CPT

## 2018-01-15 PROCEDURE — 82962 GLUCOSE BLOOD TEST: CPT

## 2018-01-15 PROCEDURE — 97116 GAIT TRAINING THERAPY: CPT

## 2018-01-15 NOTE — DISCHARGE SUMMARY
General Medicine Discharge Summary     Patient ID:  Araceli Oleary  60year old  2/21/1955    Admit date: 1/6/2018    Discharge date and time: 1/15/18    Attending Physician: Merlyn Dorsey MD    Primary Ca and erythema. He did note some fevers/chills today. He denies any foot pain. He has an elevated ESR and CRP on admission. He was started on IV vancomycin. \"      Hospital course:  # Cellulitis / Hx of Chronic Osteomyelitis of right foot  - Prior MRSA ab 5-325 MG Oral Tab  Take 2 tablets by mouth every 4 (four) hours as needed.       CONTINUE these medications which have NOT CHANGED    METFORMIN HCL ER, OSM, 1000 MG (OSM) Oral Tablet 24 Hr  TAKE 2 TABLETS(2000 MG) BY MOUTH DAILY    carBAMazepine 200 MG Oral Does not apply Misc  USE TWICE DAILY AS DIRECTED; DX:  E11.39 on insulin    ACCU-CHEK YOLANDA PLUS In Vitro Strip  USE TO TEST ONCE DAILY    Fluticasone Propionate 50 MCG/ACT Nasal Suspension  2 sprays by Each Nare route daily.     !! - Potential duplicate me

## 2018-01-15 NOTE — PLAN OF CARE
Iaired Activities of Daily Living     Achieve highest/safest level of independence in self care Progressing        Impaired Cognition    • Patient will exhibit improved attention, thought processing and/or memory Progressing        Impaired Functional Mobi

## 2018-01-15 NOTE — CM/SW NOTE
01/15/18 1546   Discharge disposition   Discharged to: Skilled Nurs   Name of [address]   Discharge transportation Private car

## 2018-01-15 NOTE — PHYSICAL THERAPY NOTE
PHYSICAL THERAPY TREATMENT NOTE - INPATIENT    Room Number: 317/147-I     Session: 3  Number of Visits to Meet Established Goals: 6    Presenting Problem: s/p right mid tarsal amputation with tendon transfers    Problem List  Principal Problem:    Nuzhat Fernandez on the side of the bed?: A Little   How much help from another person does the patient currently need. ..   -   Moving to and from a bed to a chair (including a wheelchair)?: A Lot   -   Need to walk in hospital room?: A Lot   -   Climbing 3-5 steps with a time to perform stair training. Pt's wife asked if pt would be able to transfer into the car to be transported to the hospital and therfore discussed car transfer with pt.  Also discussed the need for a  walker at home and provided legs for the pt's

## 2018-01-15 NOTE — PLAN OF CARE
Impaired Activities of Daily Living    • Achieve highest/safest level of independence in self care Adequate for Discharge        Impaired Cognition    • Patient will exhibit improved attention, thought processing and/or memory Adequate for Discharge

## 2018-01-15 NOTE — PROGRESS NOTES
[facility]                INFECTIOUS DISEASE PROGRESS NOTE    Anamaria Marion Patient Status:  Inpatient    1955 MRN [id number]   [facility] 3SW-A Attending Ayesha Villarreal MD   Hosp Day # 9 PCP No primary care provider on file Aerobic Smear 1+ WBCs seen     1+ Gram positive cocci in clusters   Susceptibility      Staphylococcus aureus, MRSA     Not Specified (Preliminary)    Cefazolin  Resistant    Clindamycin <=0.25  Sensitive    Erythromycin >=8  Resistant    Gentamicin <=0.5 Type 2 diabetes mellitus with other ophthalmic complication, with long-term current use of insulin (HCC)     Nonrheumatic aortic valve stenosis     Cerebral microvasculopathy     Cognitive complaints     History of snoring     Chronic midline low back p

## 2018-01-15 NOTE — CM/SW NOTE
Update sent via [employer] to [phone]. Message left for liaison, Sandra Newby. Awaiting return call. Spoke with pt's wife this AM. She tried to call [facility] but office was closed.

## 2018-01-15 NOTE — CM/SW NOTE
HIMANSHU spoke with Lenoard Leyden with [facility] [phone]. She notes that facility has pending auth from [facility]. Facility contacted [facility] again today and were informed that cm not available due to holiday.   Facility willing to accept pt with pending auth since he does have ski

## 2018-01-16 ENCOUNTER — SNF VISIT (OUTPATIENT)
Dept: INTERNAL MEDICINE CLINIC | Age: 63
End: 2018-01-16

## 2018-01-16 VITALS
DIASTOLIC BLOOD PRESSURE: 61 MMHG | WEIGHT: 217 LBS | BODY MASS INDEX: 30 KG/M2 | RESPIRATION RATE: 18 BRPM | HEART RATE: 62 BPM | SYSTOLIC BLOOD PRESSURE: 138 MMHG | TEMPERATURE: 98 F | OXYGEN SATURATION: 98 %

## 2018-01-16 DIAGNOSIS — L97.519 DIABETIC ULCER OF RIGHT FOOT ASSOCIATED WITH DIABETES MELLITUS DUE TO UNDERLYING CONDITION, UNSPECIFIED PART OF FOOT, UNSPECIFIED ULCER STAGE (HCC): ICD-10-CM

## 2018-01-16 DIAGNOSIS — L03.115 CELLULITIS OF RIGHT FOOT WITHOUT TOES: ICD-10-CM

## 2018-01-16 DIAGNOSIS — E08.621 DIABETIC ULCER OF RIGHT FOOT ASSOCIATED WITH DIABETES MELLITUS DUE TO UNDERLYING CONDITION, UNSPECIFIED PART OF FOOT, UNSPECIFIED ULCER STAGE (HCC): ICD-10-CM

## 2018-01-16 DIAGNOSIS — I10 ESSENTIAL HYPERTENSION, BENIGN: ICD-10-CM

## 2018-01-16 DIAGNOSIS — E11.39 TYPE 2 DIABETES MELLITUS WITH OTHER OPHTHALMIC COMPLICATION, WITH LONG-TERM CURRENT USE OF INSULIN (HCC): Primary | ICD-10-CM

## 2018-01-16 DIAGNOSIS — I25.10 CORONARY ARTERY DISEASE INVOLVING NATIVE CORONARY ARTERY OF NATIVE HEART WITHOUT ANGINA PECTORIS: ICD-10-CM

## 2018-01-16 DIAGNOSIS — Z79.4 TYPE 2 DIABETES MELLITUS WITH OTHER OPHTHALMIC COMPLICATION, WITH LONG-TERM CURRENT USE OF INSULIN (HCC): Primary | ICD-10-CM

## 2018-01-16 PROBLEM — Z48.817 AFTERCARE FOLLOWING SURGERY OF THE SKIN OR SUBCUTANEOUS TISSUE: Status: ACTIVE | Noted: 2018-01-16

## 2018-01-16 PROCEDURE — 99309 SBSQ NF CARE MODERATE MDM 30: CPT | Performed by: NURSE PRACTITIONER

## 2018-01-17 NOTE — PAYOR COMM NOTE
--------------  CONTINUED STAY REVIEW    Payor: [employer] [facility]  Subscriber #:  [id number]  Authorization Number: [id number]    Admit date: 1/6/18  Admit time: 26    Admitting Physician: Ayesha Villarreal MD  Attending Physician:  No att. providers tendon transfer to the navicular, right foot. 6.       Achilles tendon lengthening, right ankle.     ASSISTANT:  Dr. Steve Espinosa. ANESTHESIA:  General.   BLOOD LOSS:  100 mL.    SPECIMEN:  Right foot.   COMPLICATIONS:  None.   IMPLANTS:  Arthrex 3.0 mm furosemide  20 mg Oral Daily   • gabapentin  1,200 mg Oral BID   • Pramipexole Dihydrochloride  1 mg Oral Nightly   • ezetimibe  10 mg Oral Daily   • fenofibrate micronized  134 mg Oral Daily with breakfast   • folic acid  1 mg Oral Daily   • gabapentin  6

## 2018-01-17 NOTE — PAYOR COMM NOTE
--------------  DISCHARGE REVIEW    Payor: [facility]  Subscriber #:  [id number]  Authorization Number: [id number]    Admit date: 1/6/18  Admit time:  4293  Discharge Date: 1/15/2018  3:00 PM     Admitting Physician: Javier Toussaint MD  Attendi available    Brenna Fam, ROSENDO  [id number] [address]     Schedule an appointment as soon as possible for a visit  CALL PRIMARY MD FOR F/U    Hospital Course:      History of Present Illness (per Dr. Shelli Roca HTN  - Amlodipine  - Coreg     Consults: IP CONSULT TO HOSPITALIST  IP CONSULT TO PODIATRY  IP CONSULT TO PHARMACY  IP CONSULT TO INFECTIOUS DISEASE  IP CONSULT TO PHARMACY  IP CONSULT TO VASCULAR ACCESS TEAM  IP CONSULT TO PHYSICAL THERAPY    Operative Pr E 1000 UNITS Oral Cap  Take 1,000 Units by mouth daily. aspirin 325 MG Oral Tab  Take 1 tablet (325 mg total) by mouth daily. Ergocalciferol (VITAMIN D OR)  Take 5,000 Units by mouth daily.       Multiple Vitamin (MULTI-VITAMIN) Oral Tab  Take 1 table

## 2018-01-17 NOTE — PROGRESS NOTES
Juliane Carie  : 1955  Age 60year old  male patient is admitted to Facility[facility] for impaired functional mobility r/t cellulitis of the right foot d/t s/p right toes amputation.     [address] [place] [address] Drive date:  18  Disch Smokeless tobacco: Never Used                      Alcohol use:  No                ALLERGIES:    Statins                 Rash    CODE STATUS:  Full Code    ADVANCED CARE PLANNING TEAM: None      CURRENT MEDICATIONS     Lucius Bullock PATCH EXTERNALLY TO THE SKIN DAILY Disp: 30 patch Rfl: 0   docusate sodium 100 MG Oral Cap Take 100 mg by mouth 2 (two) times daily. Disp: 60 capsule Rfl: 0   glyBURIDE 5 MG Oral Tab Take 5 mg by mouth 2 (two) times daily with meals.  Disp:  Rfl:    [place]cosape neuropathy  NEURO:no sensory or motor complaint, denies seizures, denies vertigo, denies tinnitus and denies tremors  PSYCHE: no symptoms of depression or anxiety  HEMATOLOGY:denies hx anemia, denies bruising, denies excessive bleeding  ENDOCRINE: denies e Carbon Dioxide, Total Latest Ref Range: 22.0 - 32.0 mmol/L 28.0   BUN Latest Ref Range: 8 - 20 mg/dL 23 (H)   CREATININE Latest Ref Range: 0.70 - 1.30 mg/dL 1.15   CALCIUM Latest Ref Range: 8.3 - 10.3 mg/dL 9.6   GFR Latest Ref Range: >=60  67   WBC Late bid  8. Lidoderm patich 12 hours on and 12 hours off  9. Vancomycin 1,500 IV qd  10. CBC, CMP ordered on 1/16/18 not draw, reordered for 1/17 and q weekly, per PICC line  11. Change PICC line dressing q weekly  12. Vanco trough due 1/19  13.  ELOS:  15-19 d

## 2018-01-18 ENCOUNTER — SNF VISIT (OUTPATIENT)
Dept: INTERNAL MEDICINE CLINIC | Age: 63
End: 2018-01-18

## 2018-01-18 DIAGNOSIS — D64.9 ANEMIA, UNSPECIFIED TYPE: ICD-10-CM

## 2018-01-18 DIAGNOSIS — G62.9 NEUROPATHY: ICD-10-CM

## 2018-01-18 DIAGNOSIS — E11.39 TYPE 2 DIABETES MELLITUS WITH OTHER OPHTHALMIC COMPLICATION, WITH LONG-TERM CURRENT USE OF INSULIN (HCC): ICD-10-CM

## 2018-01-18 DIAGNOSIS — Z79.4 TYPE 2 DIABETES MELLITUS WITH OTHER OPHTHALMIC COMPLICATION, WITH LONG-TERM CURRENT USE OF INSULIN (HCC): ICD-10-CM

## 2018-01-18 DIAGNOSIS — L03.115 CELLULITIS OF RIGHT FOOT WITHOUT TOES: Primary | ICD-10-CM

## 2018-01-18 DIAGNOSIS — E08.621 DIABETIC ULCER OF RIGHT FOOT ASSOCIATED WITH DIABETES MELLITUS DUE TO UNDERLYING CONDITION, UNSPECIFIED PART OF FOOT, UNSPECIFIED ULCER STAGE (HCC): ICD-10-CM

## 2018-01-18 DIAGNOSIS — R26.9 GAIT ABNORMALITY: ICD-10-CM

## 2018-01-18 DIAGNOSIS — Z89.431 PARTIAL NONTRAUMATIC AMPUTATION OF RIGHT FOOT (HCC): ICD-10-CM

## 2018-01-18 DIAGNOSIS — L97.519 DIABETIC ULCER OF RIGHT FOOT ASSOCIATED WITH DIABETES MELLITUS DUE TO UNDERLYING CONDITION, UNSPECIFIED PART OF FOOT, UNSPECIFIED ULCER STAGE (HCC): ICD-10-CM

## 2018-01-18 PROCEDURE — 99308 SBSQ NF CARE LOW MDM 20: CPT | Performed by: NURSE PRACTITIONER

## 2018-01-20 VITALS
SYSTOLIC BLOOD PRESSURE: 153 MMHG | DIASTOLIC BLOOD PRESSURE: 76 MMHG | RESPIRATION RATE: 20 BRPM | TEMPERATURE: 98 F | HEART RATE: 65 BPM | OXYGEN SATURATION: 95 %

## 2018-01-20 NOTE — PROGRESS NOTES
Teri Zhou, 2/21/1955, 60year old, male    Chief Complaint:  Patient presents with:   Follow - Up: s/p right foot amputation and cellulitis on antibiotics     Subjective:   60year old male with h/o CAD, HTN, PVD, chronic osteomyelitis, prior h/o MRSAR assess  EYES: PERRLA, EOMI, sclera anicteric, conjunctiva normal; there is no nystagmus, no drainage from eyes  HENT: normocephalic; normal nose, no nasal drainage, mucous membranes pink, moist, pharynx no exudate, no visible cerumen.   NECK: supple; FROM; due 1/19  14. ELOS:  15-19 days  15. Anticipated discharge 2/3/18  16. F/U with Dr. Daljit Mcpherson 1/31  17. F/U with Dr. Brooklynn Salvador on 1/31  18. F/U with Dr. Caro Wang on 6/13/18  19. F/U with Dr. Major Atkins MD on 4/25/18  20.  F/U with Dr. Blaine Arizmendi on 2/21

## 2018-01-22 ENCOUNTER — SNF VISIT (OUTPATIENT)
Dept: INTERNAL MEDICINE CLINIC | Age: 63
End: 2018-01-22

## 2018-01-22 VITALS
OXYGEN SATURATION: 94 % | TEMPERATURE: 98 F | HEART RATE: 88 BPM | DIASTOLIC BLOOD PRESSURE: 80 MMHG | SYSTOLIC BLOOD PRESSURE: 146 MMHG | RESPIRATION RATE: 18 BRPM

## 2018-01-22 DIAGNOSIS — R26.9 GAIT ABNORMALITY: ICD-10-CM

## 2018-01-22 DIAGNOSIS — G62.9 NEUROPATHY: ICD-10-CM

## 2018-01-22 DIAGNOSIS — Z02.9 DISCHARGE PLANNING ISSUES: ICD-10-CM

## 2018-01-22 DIAGNOSIS — Z89.431 PARTIAL NONTRAUMATIC AMPUTATION OF RIGHT FOOT (HCC): ICD-10-CM

## 2018-01-22 DIAGNOSIS — E11.8 TYPE 2 DIABETES MELLITUS WITH COMPLICATION, WITHOUT LONG-TERM CURRENT USE OF INSULIN (HCC): Primary | ICD-10-CM

## 2018-01-22 PROBLEM — M86.171 ACUTE OSTEOMYELITIS OF TOE OF RIGHT FOOT (HCC): Status: ACTIVE | Noted: 2018-01-22

## 2018-01-22 PROCEDURE — 99307 SBSQ NF CARE SF MDM 10: CPT | Performed by: NURSE PRACTITIONER

## 2018-01-23 NOTE — PROGRESS NOTES
Suzette Shetty, 2/21/1955, 60year old, male    Chief Complaint:  Patient presents with:   Follow - Up: s/p right foot partial amputee, chronic osteomyelitis, discharge planning     Subjective:   60year old male with h/o CAD, HTN, PVD, chronic osteomyelitis normal; there is no nystagmus, no drainage from eyes  HENT: normocephalic; normal nose, no nasal drainage, mucous membranes pink, moist, pharynx no exudate, no visible cerumen.   NECK: supple; FROM; no JVD, no TMG, no carotid bruits  BREAST: normal skin  RE >400  2. Glyburide 5 mg po bid  3. Metformin HCI ER 1000 mg po qd  4. Novolog SS  5. Concentrated Sweets diet     CAD/HTN  1. Vitals q shift  2. Amlodipine 10 mg po qd, hold for sbp,100 or hr<60  3.  mg po qd  4.  Coreg 6.25 mg po qd, hold for sbp<10

## 2018-01-24 ENCOUNTER — SNF VISIT (OUTPATIENT)
Dept: INTERNAL MEDICINE CLINIC | Age: 63
End: 2018-01-24

## 2018-01-24 DIAGNOSIS — E11.8 TYPE 2 DIABETES MELLITUS WITH COMPLICATION, WITHOUT LONG-TERM CURRENT USE OF INSULIN (HCC): Primary | ICD-10-CM

## 2018-01-24 DIAGNOSIS — L03.115 CELLULITIS OF RIGHT FOOT WITHOUT TOES: ICD-10-CM

## 2018-01-24 DIAGNOSIS — Z02.9 DISCHARGE PLANNING ISSUES: ICD-10-CM

## 2018-01-24 DIAGNOSIS — R26.9 GAIT ABNORMALITY: ICD-10-CM

## 2018-01-24 PROCEDURE — 99307 SBSQ NF CARE SF MDM 10: CPT | Performed by: NURSE PRACTITIONER

## 2018-01-25 ENCOUNTER — SNF DISCHARGE (OUTPATIENT)
Dept: INTERNAL MEDICINE CLINIC | Age: 63
End: 2018-01-25

## 2018-01-25 VITALS
RESPIRATION RATE: 16 BRPM | TEMPERATURE: 97 F | DIASTOLIC BLOOD PRESSURE: 60 MMHG | HEART RATE: 79 BPM | SYSTOLIC BLOOD PRESSURE: 120 MMHG | OXYGEN SATURATION: 97 %

## 2018-01-25 DIAGNOSIS — Z02.9 DISCHARGE PLANNING ISSUES: ICD-10-CM

## 2018-01-25 DIAGNOSIS — R26.9 GAIT ABNORMALITY: ICD-10-CM

## 2018-01-25 DIAGNOSIS — Z89.431 PARTIAL NONTRAUMATIC AMPUTATION OF RIGHT FOOT (HCC): ICD-10-CM

## 2018-01-25 DIAGNOSIS — E11.8 TYPE 2 DIABETES MELLITUS WITH COMPLICATION, WITHOUT LONG-TERM CURRENT USE OF INSULIN (HCC): Primary | ICD-10-CM

## 2018-01-25 PROCEDURE — 99315 NF DSCHRG MGMT 30 MIN/LESS: CPT | Performed by: NURSE PRACTITIONER

## 2018-01-25 NOTE — PROGRESS NOTES
Pepe Loza, 2/21/1955, 60year old, male    Chief Complaint:  Patient presents with:   Follow - Up: s/p right foot partial amputation d/t cellulitis, discharge planning     Subjective:   60year old male with h/o CAD, HTN, PVD, chronic osteomyelitis, christos auscultation  CARDIOVASCULAR: S1, S2 normal, RRR; no S3, no S4; , no click, no murmur  ABDOMEN:  normal active BS+, soft, nondistended; no organomegaly, no masses; no bruits; nontender, no guarding, no rebound tenderness.   :Deferred  LYMPHATIC:no lymphed bid  3. Metformin HCI ER 1000 mg po qd  4. Novolog SS  5. Concentrated Sweets diet     CAD/HTN  1. Vitals q shift  2. Amlodipine 10 mg po qd, hold for sbp,100 or hr<60  3.  mg po qd  4. Coreg 6.25 mg po qd, hold for sbp<100 or hr<60  5.  Furosemide 2

## 2018-01-26 VITALS
DIASTOLIC BLOOD PRESSURE: 60 MMHG | HEART RATE: 79 BPM | TEMPERATURE: 97 F | RESPIRATION RATE: 21 BRPM | SYSTOLIC BLOOD PRESSURE: 120 MMHG | OXYGEN SATURATION: 98 %

## 2018-01-26 NOTE — PROGRESS NOTES
Saulo Berry, 2/21/1955, 60year old, male is being discharged from [address] [place] [address] and [email or web address]    Date of Admission:1/15/18    Date of Anticipated Discharge:  1/26/18                               Admitting Diagnoses:Righ nose, no nasal drainage, mucous membranes pink, moist, pharynx no exudate, no visible cerumen.   NECK: supple; FROM; no JVD, no TMG, no carotid bruits  BREAST: normal skin  RESPIRATORY:clear to percussion and auscultation  CARDIOVASCULAR: S1, S2 normal, RRR hours off  8. Vancomycin 1,500 IV qd  9. CBC, CMP q weekly, per PICC line  10. Ferrous Sulfate 325 mg po bid  11. Change PICC line dressing q weekly  12. Vanco trough, per pharmacy next due on 1/28/18     DMT2-A1c 7.8  1. ACHS, call for a bs<70 or >400  2.

## 2018-01-29 ENCOUNTER — LAB REQUISITION (OUTPATIENT)
Dept: LAB | Facility: HOSPITAL | Age: 63
End: 2018-01-29
Attending: INTERNAL MEDICINE
Payer: COMMERCIAL

## 2018-01-29 DIAGNOSIS — R69 ILLNESS: ICD-10-CM

## 2018-01-29 LAB
ALBUMIN SERPL-MCNC: 3.7 G/DL (ref 3.5–4.8)
ALP LIVER SERPL-CCNC: 58 U/L (ref 45–117)
ALT SERPL-CCNC: 24 U/L (ref 17–63)
AST SERPL-CCNC: 26 U/L (ref 15–41)
BASOPHILS # BLD AUTO: 0.06 X10(3) UL (ref 0–0.1)
BASOPHILS NFR BLD AUTO: 1 %
BILIRUB SERPL-MCNC: 0.2 MG/DL (ref 0.1–2)
BUN BLD-MCNC: 22 MG/DL (ref 8–20)
CALCIUM BLD-MCNC: 9.3 MG/DL (ref 8.3–10.3)
CHLORIDE: 103 MMOL/L (ref 101–111)
CO2: 28 MMOL/L (ref 22–32)
CREAT BLD-MCNC: 1.35 MG/DL (ref 0.7–1.3)
EOSINOPHIL # BLD AUTO: 0.44 X10(3) UL (ref 0–0.3)
EOSINOPHIL NFR BLD AUTO: 7.5 %
ERYTHROCYTE [DISTWIDTH] IN BLOOD BY AUTOMATED COUNT: 14.6 % (ref 11.5–16)
GLUCOSE BLD-MCNC: 159 MG/DL (ref 70–99)
HCT VFR BLD AUTO: 31.4 % (ref 37–53)
HGB BLD-MCNC: 10.1 G/DL (ref 13–17)
IMMATURE GRANULOCYTE COUNT: 0.03 X10(3) UL (ref 0–1)
IMMATURE GRANULOCYTE RATIO %: 0.5 %
LYMPHOCYTES # BLD AUTO: 1.33 X10(3) UL (ref 0.9–4)
LYMPHOCYTES NFR BLD AUTO: 22.7 %
M PROTEIN MFR SERPL ELPH: 7.6 G/DL (ref 6.1–8.3)
MCH RBC QN AUTO: 29.9 PG (ref 27–33.2)
MCHC RBC AUTO-ENTMCNC: 32.2 G/DL (ref 31–37)
MCV RBC AUTO: 92.9 FL (ref 80–99)
MONOCYTES # BLD AUTO: 0.44 X10(3) UL (ref 0.1–0.6)
MONOCYTES NFR BLD AUTO: 7.5 %
NEUTROPHIL ABS PRELIM: 3.56 X10 (3) UL (ref 1.3–6.7)
NEUTROPHILS # BLD AUTO: 3.56 X10(3) UL (ref 1.3–6.7)
NEUTROPHILS NFR BLD AUTO: 60.8 %
PLATELET # BLD AUTO: 308 10(3)UL (ref 150–450)
POTASSIUM SERPL-SCNC: 5 MMOL/L (ref 3.6–5.1)
RBC # BLD AUTO: 3.38 X10(6)UL (ref 4.3–5.7)
RED CELL DISTRIBUTION WIDTH-SD: 49.7 FL (ref 35.1–46.3)
SODIUM SERPL-SCNC: 139 MMOL/L (ref 136–144)
WBC # BLD AUTO: 5.9 X10(3) UL (ref 4–13)

## 2018-01-29 PROCEDURE — 80053 COMPREHEN METABOLIC PANEL: CPT | Performed by: INTERNAL MEDICINE

## 2018-01-29 PROCEDURE — 80202 ASSAY OF VANCOMYCIN: CPT | Performed by: INTERNAL MEDICINE

## 2018-01-29 PROCEDURE — 85025 COMPLETE CBC W/AUTO DIFF WBC: CPT | Performed by: INTERNAL MEDICINE

## 2018-01-30 LAB — VANCOMYCIN TROUGH: 14.7 UG/ML (ref 10–20)

## 2018-02-24 PROCEDURE — 82043 UR ALBUMIN QUANTITATIVE: CPT | Performed by: FAMILY MEDICINE

## 2018-02-24 PROCEDURE — 81003 URINALYSIS AUTO W/O SCOPE: CPT | Performed by: INTERNAL MEDICINE

## 2018-02-24 PROCEDURE — 82570 ASSAY OF URINE CREATININE: CPT | Performed by: FAMILY MEDICINE

## 2018-03-21 PROCEDURE — 83930 ASSAY OF BLOOD OSMOLALITY: CPT | Performed by: INTERNAL MEDICINE

## 2018-03-21 PROCEDURE — 83935 ASSAY OF URINE OSMOLALITY: CPT | Performed by: INTERNAL MEDICINE

## 2018-03-21 PROCEDURE — 84300 ASSAY OF URINE SODIUM: CPT | Performed by: INTERNAL MEDICINE

## 2018-03-21 PROCEDURE — 82533 TOTAL CORTISOL: CPT | Performed by: INTERNAL MEDICINE

## 2018-04-02 PROBLEM — I50.9 ACUTE ON CHRONIC CONGESTIVE HEART FAILURE, UNSPECIFIED CONGESTIVE HEART FAILURE TYPE: Status: RESOLVED | Noted: 2017-09-01 | Resolved: 2018-04-02

## 2018-04-02 PROBLEM — R06.00 ACUTE DYSPNEA: Status: RESOLVED | Noted: 2017-09-01 | Resolved: 2018-04-02

## 2018-04-02 PROBLEM — L03.115 CELLULITIS OF RIGHT LOWER EXTREMITY: Status: RESOLVED | Noted: 2018-01-06 | Resolved: 2018-04-02

## 2018-04-02 PROBLEM — R73.9 HYPERGLYCEMIA: Status: RESOLVED | Noted: 2017-09-01 | Resolved: 2018-04-02

## 2018-04-02 PROBLEM — I35.9 AORTIC VALVE DISORDER: Status: RESOLVED | Noted: 2017-09-18 | Resolved: 2018-04-02

## 2018-04-02 PROBLEM — R41.0 DISORIENTATION: Status: RESOLVED | Noted: 2017-10-03 | Resolved: 2018-04-02

## 2018-04-02 PROBLEM — H65.93 OTITIS MEDIA WITH EFFUSION, BILATERAL: Status: RESOLVED | Noted: 2017-08-10 | Resolved: 2018-04-02

## 2018-04-02 PROBLEM — H61.22 IMPACTED CERUMEN, LEFT EAR: Status: RESOLVED | Noted: 2017-09-18 | Resolved: 2018-04-02

## 2018-04-02 PROBLEM — R09.02 HYPOXIA: Status: RESOLVED | Noted: 2017-09-01 | Resolved: 2018-04-02

## 2018-04-02 PROBLEM — R79.89 AZOTEMIA: Status: RESOLVED | Noted: 2017-09-01 | Resolved: 2018-04-02

## 2018-04-02 PROBLEM — M86.171 ACUTE OSTEOMYELITIS OF TOE OF RIGHT FOOT (HCC): Status: RESOLVED | Noted: 2018-01-22 | Resolved: 2018-04-02

## 2018-04-02 PROBLEM — M86.8X9: Status: RESOLVED | Noted: 2017-10-02 | Resolved: 2018-04-02

## 2018-04-02 PROBLEM — H69.83 EUSTACHIAN TUBE DYSFUNCTION, BILATERAL: Status: RESOLVED | Noted: 2017-08-10 | Resolved: 2018-04-02

## 2018-04-07 PROCEDURE — 86334 IMMUNOFIX E-PHORESIS SERUM: CPT | Performed by: INTERNAL MEDICINE

## 2018-04-07 PROCEDURE — 84165 PROTEIN E-PHORESIS SERUM: CPT | Performed by: INTERNAL MEDICINE

## 2018-04-07 PROCEDURE — 83883 ASSAY NEPHELOMETRY NOT SPEC: CPT | Performed by: INTERNAL MEDICINE

## 2018-04-07 PROCEDURE — 82784 ASSAY IGA/IGD/IGG/IGM EACH: CPT | Performed by: INTERNAL MEDICINE

## 2018-10-25 PROBLEM — I10 ESSENTIAL HYPERTENSION: Status: ACTIVE | Noted: 2018-10-25

## 2018-10-25 PROBLEM — E78.2 MIXED HYPERLIPIDEMIA: Status: ACTIVE | Noted: 2018-10-25

## 2019-01-02 PROBLEM — Z47.89 AFTERCARE FOLLOWING SURGERY OF THE MUSCULOSKELETAL SYSTEM, NEC: Status: ACTIVE | Noted: 2019-01-02

## 2019-02-15 NOTE — PROGRESS NOTES
Chief Complaint  This information was obtained from the patient  Patient is a 60year old male and is here for a wound on his right foot. Has been tolerating cast well.  However pt states that the end of the cast where his calf ended had some sharp pieces No        Objective    Constitutional  Height/Length: 71 in (180.34 cm), Weight: 230 lbs (104.55 kgs), BMI: 32.1, Temperature: 97.4 °F (36.33 °C), Pulse: 67 bpm, Respiratory Rate: 16 breaths/min, Blood Pressure: 117/69 mmHg, Capillary Blood Glucose: 127 mg plantar foot. A skin/subcutaneous tissue level excisional/surgical debridement with a total area debrided of 0.16 sq cm was performed by Michael Fernandes MD. Subcutaneous was removed along with devitalized tissue: biofilm.  The following instrument(s) wer Walk in

## 2019-02-20 PROBLEM — Z47.89 AFTERCARE FOLLOWING SURGERY OF THE MUSCULOSKELETAL SYSTEM, NEC: Status: RESOLVED | Noted: 2019-01-02 | Resolved: 2019-02-20

## 2019-02-20 PROBLEM — Z48.817 AFTERCARE FOLLOWING SURGERY OF THE SKIN OR SUBCUTANEOUS TISSUE: Status: RESOLVED | Noted: 2018-01-16 | Resolved: 2019-02-20

## 2019-02-20 PROBLEM — Z47.89 AFTERCARE FOLLOWING SURGERY OF THE MUSCULOSKELETAL SYSTEM, NEC: Status: ACTIVE | Noted: 2019-02-20

## 2019-05-06 ENCOUNTER — APPOINTMENT (OUTPATIENT)
Dept: CT IMAGING | Facility: HOSPITAL | Age: 64
End: 2019-05-06
Attending: EMERGENCY MEDICINE
Payer: COMMERCIAL

## 2019-05-06 ENCOUNTER — HOSPITAL ENCOUNTER (EMERGENCY)
Facility: HOSPITAL | Age: 64
Discharge: HOME | End: 2019-05-06
Attending: EMERGENCY MEDICINE
Payer: COMMERCIAL

## 2019-05-06 VITALS
SYSTOLIC BLOOD PRESSURE: 132 MMHG | OXYGEN SATURATION: 99 % | TEMPERATURE: 99 F | HEART RATE: 80 BPM | DIASTOLIC BLOOD PRESSURE: 70 MMHG | RESPIRATION RATE: 19 BRPM

## 2019-05-06 DIAGNOSIS — R11.2 NON-INTRACTABLE VOMITING WITH NAUSEA, UNSPECIFIED VOMITING TYPE: Primary | ICD-10-CM

## 2019-05-06 PROCEDURE — 85025 COMPLETE CBC W/AUTO DIFF WBC: CPT | Performed by: EMERGENCY MEDICINE

## 2019-05-06 PROCEDURE — 80053 COMPREHEN METABOLIC PANEL: CPT | Performed by: EMERGENCY MEDICINE

## 2019-05-06 PROCEDURE — 83690 ASSAY OF LIPASE: CPT | Performed by: EMERGENCY MEDICINE

## 2019-05-06 PROCEDURE — 99284 EMERGENCY DEPT VISIT MOD MDM: CPT

## 2019-05-06 PROCEDURE — 96360 HYDRATION IV INFUSION INIT: CPT

## 2019-05-06 PROCEDURE — 99285 EMERGENCY DEPT VISIT HI MDM: CPT

## 2019-05-06 PROCEDURE — 96361 HYDRATE IV INFUSION ADD-ON: CPT

## 2019-05-06 PROCEDURE — 74177 CT ABD & PELVIS W/CONTRAST: CPT | Performed by: EMERGENCY MEDICINE

## 2019-05-06 PROCEDURE — 85025 COMPLETE CBC W/AUTO DIFF WBC: CPT

## 2019-05-06 RX ORDER — ONDANSETRON 4 MG/1
4 TABLET, ORALLY DISINTEGRATING ORAL EVERY 4 HOURS PRN
Qty: 10 TABLET | Refills: 0 | Status: ON HOLD | OUTPATIENT
Start: 2019-05-06 | End: 2019-05-23

## 2019-05-06 NOTE — ED PROVIDER NOTES
Patient Seen in: [facility] Emergency Department    History   Patient presents with:  Nausea/Vomiting/Diarrhea (gastrointestinal)    Stated Complaint: vomiting    HPI    Micah Avila is a pleasant 34-year-old male presenting with vomiting.   Patient has be signs reviewed. All other systems reviewed and negative except as noted above.     Physical Exam     ED Triage Vitals [05/06/19 1438]   /61   Pulse 94   Resp 20   Temp 98.9 °F (37.2 °C)   Temp src Temporal   SpO2 99 %   O2 Device None (Room air) reviewed with the patient was given IV fluids has had no further vomiting here he will be discharged home is to return to emerge point worsening symptoms with complaints            Disposition and Plan     Clinical Impression:  Non-intractable vomiting wit

## 2019-05-10 ENCOUNTER — APPOINTMENT (OUTPATIENT)
Dept: GENERAL RADIOLOGY | Facility: HOSPITAL | Age: 64
End: 2019-05-10
Attending: EMERGENCY MEDICINE
Payer: COMMERCIAL

## 2019-05-10 ENCOUNTER — APPOINTMENT (OUTPATIENT)
Dept: GENERAL RADIOLOGY | Facility: HOSPITAL | Age: 64
End: 2019-05-10
Attending: NURSE PRACTITIONER
Payer: COMMERCIAL

## 2019-05-10 ENCOUNTER — HOSPITAL ENCOUNTER (EMERGENCY)
Facility: HOSPITAL | Age: 64
Discharge: HOME | End: 2019-05-10
Attending: EMERGENCY MEDICINE
Payer: COMMERCIAL

## 2019-05-10 VITALS
RESPIRATION RATE: 16 BRPM | TEMPERATURE: 99 F | HEART RATE: 84 BPM | BODY MASS INDEX: 30.8 KG/M2 | SYSTOLIC BLOOD PRESSURE: 146 MMHG | HEIGHT: 71 IN | OXYGEN SATURATION: 100 % | WEIGHT: 220 LBS | DIASTOLIC BLOOD PRESSURE: 67 MMHG

## 2019-05-10 DIAGNOSIS — M19.90 ARTHRITIS: ICD-10-CM

## 2019-05-10 DIAGNOSIS — S60.212A CONTUSION OF LEFT WRIST, INITIAL ENCOUNTER: Primary | ICD-10-CM

## 2019-05-10 PROCEDURE — 99283 EMERGENCY DEPT VISIT LOW MDM: CPT

## 2019-05-10 PROCEDURE — 99284 EMERGENCY DEPT VISIT MOD MDM: CPT

## 2019-05-10 PROCEDURE — 73110 X-RAY EXAM OF WRIST: CPT | Performed by: NURSE PRACTITIONER

## 2019-05-10 PROCEDURE — 85025 COMPLETE CBC W/AUTO DIFF WBC: CPT | Performed by: NURSE PRACTITIONER

## 2019-05-10 PROCEDURE — 36415 COLL VENOUS BLD VENIPUNCTURE: CPT

## 2019-05-10 PROCEDURE — 73130 X-RAY EXAM OF HAND: CPT | Performed by: EMERGENCY MEDICINE

## 2019-05-10 PROCEDURE — 80053 COMPREHEN METABOLIC PANEL: CPT | Performed by: NURSE PRACTITIONER

## 2019-05-10 RX ORDER — HYDROCODONE BITARTRATE AND ACETAMINOPHEN 5; 325 MG/1; MG/1
1 TABLET ORAL ONCE
Status: COMPLETED | OUTPATIENT
Start: 2019-05-10 | End: 2019-05-10

## 2019-05-10 RX ORDER — ACETAMINOPHEN 500 MG
1000 TABLET ORAL ONCE
Status: DISCONTINUED | OUTPATIENT
Start: 2019-05-10 | End: 2019-05-10

## 2019-05-10 NOTE — ED INITIAL ASSESSMENT (HPI)
Pt reports fall out of bed this am and onto left hand - pain in wrist and thumb - denies LOC and hitting head    Pt reports frequent falls d/t history of stroke

## 2019-05-10 NOTE — ED PROVIDER NOTES
Patient Seen in: [facility] Emergency Department    History   Patient presents with:  Upper Extremity Injury (musculoskeletal)    Stated Complaint: fall, left wrist/ thumb injury    69-year-old male who presents to the emergency room with complaints at [facility] MAIN OR   • INCISION AND DRAINAGE Right 10/4/2017    Performed by Kimberly Orozco DPM at [facility] MAIN OR   • OTHER Left     AC joint x 2   • OTHER  06/2017    1st metatarsal osteotomy, 2nd, and 3rd metatarsal head resections, right foot.             Soc exam/strength/hand : normal   ED Course     Labs Reviewed   COMP METABOLIC PANEL (14) - Abnormal; Notable for the following components:       Result Value    Glucose 240 (*)     Sodium 133 (*)     BUN 38 (*)     Creatinine 1.40 (*)     BUN/CREA Ratio 2 and 3rd metacarpophalangeal joints and moderate joint space narrowing of the 4th and 5th metacarpophalangeal joints are seen. Moderate joint space narrowing of the PIP and DIP joints of the 2nd through  5th fingers.   There is a rounded 6 mm small lucency plan, warning signs and symptoms which should prompt immediate return. The patient and/or family expressed clear understanding of these instructions and agrees to the following plan provided.   The patient and/or family was also given written discharge ins

## 2019-05-12 ENCOUNTER — HOSPITAL ENCOUNTER (INPATIENT)
Facility: HOSPITAL | Age: 64
LOS: 12 days | Discharge: NURSING, REHAB OR LONG-TERM CARE | DRG: 485 | End: 2019-05-24
Attending: EMERGENCY MEDICINE | Admitting: INTERNAL MEDICINE
Payer: COMMERCIAL

## 2019-05-12 ENCOUNTER — APPOINTMENT (OUTPATIENT)
Dept: CT IMAGING | Facility: HOSPITAL | Age: 64
DRG: 485 | End: 2019-05-12
Attending: EMERGENCY MEDICINE
Payer: COMMERCIAL

## 2019-05-12 ENCOUNTER — APPOINTMENT (OUTPATIENT)
Dept: GENERAL RADIOLOGY | Facility: HOSPITAL | Age: 64
DRG: 485 | End: 2019-05-12
Attending: EMERGENCY MEDICINE
Payer: COMMERCIAL

## 2019-05-12 DIAGNOSIS — L03.114 CELLULITIS OF LEFT UPPER EXTREMITY: ICD-10-CM

## 2019-05-12 DIAGNOSIS — L03.116 CELLULITIS AND ABSCESS OF LEFT LEG: ICD-10-CM

## 2019-05-12 DIAGNOSIS — L03.90 CELLULITIS: ICD-10-CM

## 2019-05-12 DIAGNOSIS — R50.9 FEVER AND CHILLS: Primary | ICD-10-CM

## 2019-05-12 DIAGNOSIS — L02.416 CELLULITIS AND ABSCESS OF LEFT LEG: ICD-10-CM

## 2019-05-12 DIAGNOSIS — R41.0 CONFUSION: ICD-10-CM

## 2019-05-12 DIAGNOSIS — R73.9 HYPERGLYCEMIA: ICD-10-CM

## 2019-05-12 PROCEDURE — 73560 X-RAY EXAM OF KNEE 1 OR 2: CPT | Performed by: EMERGENCY MEDICINE

## 2019-05-12 PROCEDURE — 71046 X-RAY EXAM CHEST 2 VIEWS: CPT | Performed by: EMERGENCY MEDICINE

## 2019-05-12 PROCEDURE — 70450 CT HEAD/BRAIN W/O DYE: CPT | Performed by: EMERGENCY MEDICINE

## 2019-05-12 RX ORDER — INSULIN ASPART 100 [IU]/ML
0.15 INJECTION, SOLUTION INTRAVENOUS; SUBCUTANEOUS ONCE
Status: COMPLETED | OUTPATIENT
Start: 2019-05-12 | End: 2019-05-12

## 2019-05-12 RX ORDER — ACETAMINOPHEN 325 MG/1
650 TABLET ORAL EVERY 6 HOURS PRN
Status: DISCONTINUED | OUTPATIENT
Start: 2019-05-12 | End: 2019-05-24

## 2019-05-12 RX ORDER — ASPIRIN 325 MG
325 TABLET ORAL DAILY
Status: DISCONTINUED | OUTPATIENT
Start: 2019-05-13 | End: 2019-05-24

## 2019-05-12 RX ORDER — HEPARIN SODIUM 5000 [USP'U]/ML
5000 INJECTION, SOLUTION INTRAVENOUS; SUBCUTANEOUS EVERY 8 HOURS SCHEDULED
Status: DISCONTINUED | OUTPATIENT
Start: 2019-05-12 | End: 2019-05-24

## 2019-05-12 RX ORDER — CARBAMAZEPINE 200 MG/1
200 TABLET ORAL 3 TIMES DAILY
Status: DISCONTINUED | OUTPATIENT
Start: 2019-05-12 | End: 2019-05-13

## 2019-05-12 RX ORDER — ACETAMINOPHEN 500 MG
1000 TABLET ORAL ONCE
Status: COMPLETED | OUTPATIENT
Start: 2019-05-12 | End: 2019-05-12

## 2019-05-12 RX ORDER — DEXTROSE MONOHYDRATE 25 G/50ML
50 INJECTION, SOLUTION INTRAVENOUS
Status: DISCONTINUED | OUTPATIENT
Start: 2019-05-12 | End: 2019-05-24

## 2019-05-12 NOTE — ED PROVIDER NOTES
Patient Seen in: [facility] Emergency Department    History   Patient presents with:  Fatigue (constitutional, neurologic)    Stated Complaint: left hand cellulitis.  Right leg pain    HPI    21-year-old white male who presents emerged from today for Performed by Nabeel Schmidt DPM at [address] [facility] Right 10/4/2017    Performed by Jeane Hamm DPM at [facility] MAIN OR   • OTHER Left     AC joint x 2   • OTHER  06/2017    1st metatarsal osteotomy, 2nd, and 3rd metatarsal head r extending up to the wrist.  There is some erythema across the dorsum of the left hand.     There are no rashes noted on skin exam.      ED Course     Labs Reviewed   COMP METABOLIC PANEL (14) - Abnormal; Notable for the following components:       Result Va -----------         ------                     CBC W/ DIFFERENTIAL[[id number]]          Abnormal            Final result                 Please view results for these tests on the individual orders.    RAINBOW DRAW BLUE   RAINBOW DRAW LAV intracranial pathology. MDM   Patient had an IV established here in the emergency room was given IV fluid resuscitation and had laboratory studies sent.   Lab work-up reveals a blood sugar 406 with a delusional hyponatremia of 127 related to his h

## 2019-05-12 NOTE — ED INITIAL ASSESSMENT (HPI)
Pt to ED with multiple complaints including balance issues at home, weakness and vomiting since last week. Pt seen in this ED twice this week for similar symptoms. Pt reports able to tolerate some fluids, not able to keep food down.

## 2019-05-13 ENCOUNTER — APPOINTMENT (OUTPATIENT)
Dept: GENERAL RADIOLOGY | Facility: HOSPITAL | Age: 64
DRG: 485 | End: 2019-05-13
Attending: INTERNAL MEDICINE
Payer: COMMERCIAL

## 2019-05-13 ENCOUNTER — APPOINTMENT (OUTPATIENT)
Dept: CT IMAGING | Facility: HOSPITAL | Age: 64
DRG: 485 | End: 2019-05-13
Attending: ORTHOPAEDIC SURGERY
Payer: COMMERCIAL

## 2019-05-13 PROCEDURE — 73130 X-RAY EXAM OF HAND: CPT | Performed by: INTERNAL MEDICINE

## 2019-05-13 PROCEDURE — 73201 CT UPPER EXTREMITY W/DYE: CPT | Performed by: ORTHOPAEDIC SURGERY

## 2019-05-13 RX ORDER — MORPHINE SULFATE 4 MG/ML
2 INJECTION, SOLUTION INTRAMUSCULAR; INTRAVENOUS EVERY 2 HOUR PRN
Status: DISCONTINUED | OUTPATIENT
Start: 2019-05-13 | End: 2019-05-24

## 2019-05-13 RX ORDER — MORPHINE SULFATE 4 MG/ML
2 INJECTION, SOLUTION INTRAMUSCULAR; INTRAVENOUS ONCE
Status: COMPLETED | OUTPATIENT
Start: 2019-05-13 | End: 2019-05-13

## 2019-05-13 RX ORDER — ONDANSETRON 2 MG/ML
4 INJECTION INTRAMUSCULAR; INTRAVENOUS EVERY 4 HOURS PRN
Status: DISCONTINUED | OUTPATIENT
Start: 2019-05-13 | End: 2019-05-24

## 2019-05-13 RX ORDER — CARBAMAZEPINE 200 MG/1
400 TABLET ORAL 3 TIMES DAILY
Status: DISCONTINUED | OUTPATIENT
Start: 2019-05-13 | End: 2019-05-24

## 2019-05-13 RX ORDER — MORPHINE SULFATE 4 MG/ML
4 INJECTION, SOLUTION INTRAMUSCULAR; INTRAVENOUS EVERY 2 HOUR PRN
Status: DISCONTINUED | OUTPATIENT
Start: 2019-05-13 | End: 2019-05-24

## 2019-05-13 RX ORDER — HYDROCODONE BITARTRATE AND ACETAMINOPHEN 5; 325 MG/1; MG/1
1 TABLET ORAL EVERY 4 HOURS PRN
Status: DISCONTINUED | OUTPATIENT
Start: 2019-05-13 | End: 2019-05-18

## 2019-05-13 RX ORDER — POTASSIUM CHLORIDE 20 MEQ/1
40 TABLET, EXTENDED RELEASE ORAL EVERY 4 HOURS
Status: COMPLETED | OUTPATIENT
Start: 2019-05-13 | End: 2019-05-13

## 2019-05-13 NOTE — ED NOTES
Full report given to [facility] [place] RN, RN aware that 259ml NS to be infused to complete fluid bolus.

## 2019-05-13 NOTE — PROGRESS NOTES
05/13/19 0145   Provider Notification   Reason for Communication Med Rec; Order clarification  (Pharmacy )   Provider Name Other (comment)  (Chiquita Martinez )   Method of Communication Call   Response Phone call   Notification Time 0742       Pharmacy ca

## 2019-05-13 NOTE — CM/SW NOTE
05/13/19 1400   CM/SW Screening   Referral Source    Information Source Chart review;Nursing rounds   Patient's Mental Status Memory Impairments;Oriented; Alert   Patient lives with Spouse   Patient Status Prior to Admission   Independent wit

## 2019-05-13 NOTE — H&P
[facility] Hospitalist History and Physical      Patient presents with:  Fatigue (constitutional, neurologic)       PCP: Joseph Pacheco MD      History of Present Illness: Patient is a 71year old male with PMH sig for CAD, HTN, PVD, chronic osteomyelitis, prior MRS x 2   • OTHER  06/2017    1st metatarsal osteotomy, 2nd, and 3rd metatarsal head resections, right foot. ALL:    Clindamycin             RASH  Statins                 RASH       No current outpatient medications on file.     Social History    Tob[employer]  05/13/2019    CA 8.5 05/13/2019    ALB 1.9 05/13/2019    ALKPHO 112 05/13/2019    BILT 2.0 05/13/2019    TP 7.1 05/13/2019    AST 30 05/13/2019    ALT 28 05/13/2019    PTT 47.3 05/12/2019    INR 1.30 05/12/2019    PTP 16.8 05/12/2019    PGLU 187 5/10/2019  PROCEDURE:  XR HAND (MIN 3 VIEWS), LEFT (CPT=73130)  TECHNIQUE:  Three views were obtained. COMPARISON:  None.   INDICATIONS:  fall, left wrist/ thumb injury  PATIENT STATED HISTORY: (As transcribed by Technologist)  Patient stated he fell out o to his thumb. FINDINGS:  BONES:  Severe arthritic changes are noted involving the carpal 1st metacarpal joint with subchondral sclerosis and marked spurring and joint space narrowing. No acute fracture identified.  SOFT TISSUES:  Arterial vascular calci Ct Abdomen+pelvis(contrast Only)(cpt=74177)    Result Date: 5/6/2019  PROCEDURE:  CT ABDOMEN+PELVIS (CONTRAST ONLY) (CPT=74177)  COMPARISON:  None.   INDICATIONS:  vomiting  TECHNIQUE:  CT scanning was performed from the dome of the diaphragm to the pub lymph node is measured at 2.1 x 1.2 cm. CONCLUSION:  1. There are scattered small lymph nodes. The largest are borderline to mildly enlarged by size criteria. Reactive, lymphoproliferative, and metastatic etiologies are possible.  2. Mild colonic div

## 2019-05-13 NOTE — PROGRESS NOTES
05/13/19 0430   Provider Notification   Reason for Communication Patient request  (Pain medication )   Provider Name Other (comment)  Tone Bolder)   Method of Communication Page   Response Phone call   Notification Time [id number]     Pt requested more pa

## 2019-05-13 NOTE — PHYSICAL THERAPY NOTE
PHYSICAL THERAPY EVALUATION - INPATIENT     Room Number: 510/510-A  Evaluation Date: 5/13/2019  Type of Evaluation: Initial  Physician Order: PT Eval and Treat    Presenting Problem: fever and chills  Reason for Therapy: Mobility Dysfunction and Disc Abductor Hallucia & Digiti Rotational Muscle flap, Achilles Tendon Lenghtening R -Sx 1/11/18 NADEGE 4/11/18 1/16/2018   • Peripheral vascular disease [facility])        Past Surgical History  Past Surgical History:   Procedure Laterality Date   • APPENDECTOMY     • L hand with significant swelling. Pt able to tolerate full extension of fingers, little c/o's pain with movement. Lower extremity ROM is within functional limits     Unable to perform MMT 2/2 cognition and pain.   Pt demos grossly >3/5 by observation al Pt educated on importance of continued L hand movement to assist with edema management. Rolling R and L completed with dependent assist as pt yelling out and not assisting with activity. Increased time spent educating on activity at hand.   Pt receptive at training;Range of motion;Strengthening;Stair training;Transfer training;Balance training  Rehab Potential : Guarded  Frequency (Obs): 3x/week  Number of Visits to Meet Established Goals: 3;Trial      CURRENT GOALS    Goal #1 Patient is able to demonstrate

## 2019-05-13 NOTE — PROGRESS NOTES
05/13/19 0243   Provider Notification   Reason for Communication Patient request  (requesting pain medication)   Provider Name Other (comment)  (Ramon)   Method of Communication Page   Response Waiting for response   Notification Time 2440

## 2019-05-13 NOTE — HOME CARE LIAISON
Referral from [facility]. Met with patient to offer home health. Patient agreeable to [facility] for RN/PT. Brochure and contact information given to patient for any further questions/concerns.   Thanks for the referral.

## 2019-05-13 NOTE — CONSULTS
INFECTIOUS DISEASE CONSULTATION    Saul Hinders Patient Status:  Inpatient    1955 MRN [id number]   [facility] 5NW-A Attending Saleem Merida MD   [facility] Day # 1 PCP Denis Dobson, never smoked. He has never used smokeless tobacco. He reports that he does not drink alcohol or use drugs.     Allergies:    Clindamycin             RASH  Statins                 RASH    Medications:    Current Facility-Administered Medications:   •  carBAM mouth every 4 (four) hours as needed for Nausea. Disp: 10 tablet Rfl: 0   Evolocumab (REPATHA SURECLICK) 539 MG/ML Subcutaneous Solution Auto-injector Inject 140 mg into the skin every 14 (fourteen) days.  Disp: 6 pen Rfl: 1   CARBAMAZEPINE 200 MG Oral Tab 351-400> 10 UNITS Disp: 15 mL Rfl: 6   Insulin Pen Needle (BD PEN NEEDLE SHARONDA U/F) 32G X 4 MM Does not apply Misc USE TWICE DAILY AS DIRECTED; DX:  E11.39 on insulin Disp: 100 Box Rfl: prn   ACCU-CHEK YOLANDA PLUS In Vitro Strip USE TO TEST ONCE DAILY Disp: Clear to auscultation bilaterally. No wheezes. No rhonchi. Cardiovascular: S1, S2.  Regular rate and rhythm. No murmurs. Abdomen: Soft, nontender, nondistended. Positive bowel sounds.   Musculoskeletal:left hand, swelling dorsum, tender, unable to make (chronic kidney disease) stage 3, GFR 30-59 ml/min (HCC)     Adjustment disorder with mixed anxiety and depressed mood     Chronic diastolic CHF (congestive heart failure) ([facility])     Chronic osteomyelitis of foot ([facility] [address]     Mixed hyperlipidemia     Essential

## 2019-05-13 NOTE — PAYOR COMM NOTE
--------------  ADMISSION REVIEW     Payor: [address] [address]  Subscriber #:  [id number]  Authorization Number: [id number]     Admit date: 5/12/19  Admit time: 2225       Admitting Physician: Courtney Valdes MD  Attending Physician:  Niesha Melo up to the wrist.  There is some erythema across the dorsum of the left hand.       ED Course     Labs Reviewed   COMP METABOLIC PANEL (14) - Abnormal; Notable for the following components:       Result Value    Glucose 406 (*)     Sodium 127 (*)     Chlorid had an IV established here in the emergency room was given IV fluid resuscitation and had laboratory studies sent. Lab work-up reveals a blood sugar 406 with a delusional hyponatremia of 127 related to his hyperglycemia. Bicarb is normal at 25.   Chloride weakness/dehydration and L hand pain.       He is currently mildly confused and not the best historian as a result.      Pt seen in ER 5/6 for vomiting. CT abd wihtout acute path. GIving IVF and zofran and dced home.       Assessment/Plan:      # L hand s

## 2019-05-13 NOTE — PROGRESS NOTES
NURSING ADMISSION NOTE      Patient admitted via Cart  Oriented to room. Safety precautions initiated. Bed in low position. Call light in reach. Pt oriented to unit upon arrival. Contact precautions put into place along with all fall precautions.

## 2019-05-14 ENCOUNTER — ANESTHESIA (OUTPATIENT)
Dept: SURGERY | Facility: HOSPITAL | Age: 64
End: 2019-05-14

## 2019-05-14 ENCOUNTER — ANESTHESIA EVENT (OUTPATIENT)
Dept: SURGERY | Facility: HOSPITAL | Age: 64
End: 2019-05-14

## 2019-05-14 ENCOUNTER — APPOINTMENT (OUTPATIENT)
Dept: ULTRASOUND IMAGING | Facility: HOSPITAL | Age: 64
DRG: 485 | End: 2019-05-14
Attending: INTERNAL MEDICINE
Payer: COMMERCIAL

## 2019-05-14 ENCOUNTER — APPOINTMENT (OUTPATIENT)
Dept: CV DIAGNOSTICS | Facility: HOSPITAL | Age: 64
DRG: 485 | End: 2019-05-14
Attending: INTERNAL MEDICINE
Payer: COMMERCIAL

## 2019-05-14 PROCEDURE — 0RBX0ZZ EXCISION OF LEFT FINGER PHALANGEAL JOINT, OPEN APPROACH: ICD-10-PCS | Performed by: ORTHOPAEDIC SURGERY

## 2019-05-14 PROCEDURE — 93306 TTE W/DOPPLER COMPLETE: CPT | Performed by: INTERNAL MEDICINE

## 2019-05-14 PROCEDURE — 93971 EXTREMITY STUDY: CPT | Performed by: INTERNAL MEDICINE

## 2019-05-14 PROCEDURE — 0S9C3ZX DRAINAGE OF RIGHT KNEE JOINT, PERCUTANEOUS APPROACH, DIAGNOSTIC: ICD-10-PCS | Performed by: ORTHOPAEDIC SURGERY

## 2019-05-14 PROCEDURE — 0J9K0ZZ DRAINAGE OF LEFT HAND SUBCUTANEOUS TISSUE AND FASCIA, OPEN APPROACH: ICD-10-PCS | Performed by: ORTHOPAEDIC SURGERY

## 2019-05-14 RX ORDER — METOCLOPRAMIDE HYDROCHLORIDE 5 MG/ML
10 INJECTION INTRAMUSCULAR; INTRAVENOUS AS NEEDED
Status: DISCONTINUED | OUTPATIENT
Start: 2019-05-14 | End: 2019-05-14 | Stop reason: HOSPADM

## 2019-05-14 RX ORDER — HYDROMORPHONE HYDROCHLORIDE 1 MG/ML
0.4 INJECTION, SOLUTION INTRAMUSCULAR; INTRAVENOUS; SUBCUTANEOUS EVERY 5 MIN PRN
Status: DISCONTINUED | OUTPATIENT
Start: 2019-05-14 | End: 2019-05-14 | Stop reason: HOSPADM

## 2019-05-14 RX ORDER — NALOXONE HYDROCHLORIDE 0.4 MG/ML
80 INJECTION, SOLUTION INTRAMUSCULAR; INTRAVENOUS; SUBCUTANEOUS AS NEEDED
Status: DISCONTINUED | OUTPATIENT
Start: 2019-05-14 | End: 2019-05-14 | Stop reason: HOSPADM

## 2019-05-14 RX ORDER — ACETAMINOPHEN 10 MG/ML
INJECTION, SOLUTION INTRAVENOUS
Status: COMPLETED
Start: 2019-05-14 | End: 2019-05-14

## 2019-05-14 RX ORDER — INSULIN ASPART 100 [IU]/ML
INJECTION, SOLUTION INTRAVENOUS; SUBCUTANEOUS ONCE
Status: COMPLETED | OUTPATIENT
Start: 2019-05-14 | End: 2019-05-14

## 2019-05-14 RX ORDER — DEXAMETHASONE SODIUM PHOSPHATE 4 MG/ML
4 VIAL (ML) INJECTION AS NEEDED
Status: DISCONTINUED | OUTPATIENT
Start: 2019-05-14 | End: 2019-05-14 | Stop reason: HOSPADM

## 2019-05-14 RX ORDER — SODIUM CHLORIDE, SODIUM LACTATE, POTASSIUM CHLORIDE, CALCIUM CHLORIDE 600; 310; 30; 20 MG/100ML; MG/100ML; MG/100ML; MG/100ML
INJECTION, SOLUTION INTRAVENOUS CONTINUOUS
Status: DISCONTINUED | OUTPATIENT
Start: 2019-05-14 | End: 2019-05-14 | Stop reason: HOSPADM

## 2019-05-14 RX ORDER — ONDANSETRON 2 MG/ML
4 INJECTION INTRAMUSCULAR; INTRAVENOUS AS NEEDED
Status: DISCONTINUED | OUTPATIENT
Start: 2019-05-14 | End: 2019-05-14 | Stop reason: HOSPADM

## 2019-05-14 RX ORDER — INSULIN ASPART 100 [IU]/ML
INJECTION, SOLUTION INTRAVENOUS; SUBCUTANEOUS
Status: COMPLETED
Start: 2019-05-14 | End: 2019-05-14

## 2019-05-14 RX ORDER — MIDAZOLAM HYDROCHLORIDE 1 MG/ML
1 INJECTION INTRAMUSCULAR; INTRAVENOUS EVERY 5 MIN PRN
Status: DISCONTINUED | OUTPATIENT
Start: 2019-05-14 | End: 2019-05-14 | Stop reason: HOSPADM

## 2019-05-14 RX ORDER — ACETAMINOPHEN 10 MG/ML
1000 INJECTION, SOLUTION INTRAVENOUS EVERY 6 HOURS PRN
Status: DISCONTINUED | OUTPATIENT
Start: 2019-05-14 | End: 2019-05-14 | Stop reason: HOSPADM

## 2019-05-14 RX ORDER — DIPHENHYDRAMINE HYDROCHLORIDE 50 MG/ML
12.5 INJECTION INTRAMUSCULAR; INTRAVENOUS AS NEEDED
Status: DISCONTINUED | OUTPATIENT
Start: 2019-05-14 | End: 2019-05-14 | Stop reason: HOSPADM

## 2019-05-14 RX ORDER — LABETALOL HYDROCHLORIDE 5 MG/ML
5 INJECTION, SOLUTION INTRAVENOUS EVERY 5 MIN PRN
Status: DISCONTINUED | OUTPATIENT
Start: 2019-05-14 | End: 2019-05-14 | Stop reason: HOSPADM

## 2019-05-14 RX ORDER — DEXTROSE MONOHYDRATE 25 G/50ML
50 INJECTION, SOLUTION INTRAVENOUS
Status: DISCONTINUED | OUTPATIENT
Start: 2019-05-14 | End: 2019-05-14 | Stop reason: HOSPADM

## 2019-05-14 RX ORDER — SODIUM CHLORIDE, SODIUM LACTATE, POTASSIUM CHLORIDE, CALCIUM CHLORIDE 600; 310; 30; 20 MG/100ML; MG/100ML; MG/100ML; MG/100ML
INJECTION, SOLUTION INTRAVENOUS CONTINUOUS
Status: DISCONTINUED | OUTPATIENT
Start: 2019-05-14 | End: 2019-05-18

## 2019-05-14 NOTE — ANESTHESIA PREPROCEDURE EVALUATION
PRE-OP EVALUATION    Patient Name: Andrae Henderson    Pre-op Diagnosis: * No pre-op diagnosis entered *    Procedure(s):  INCISION AND DRAINAGE LEFT THENER SPACE, ASPIRATION RIGHT KNEE    Surgeon(s) and Role:     Kisha Perez MD - Primary    Pre-op vit liquid 30 g 30 g Oral Q15 Min PRN   Or      Glucose-Vitamin C (DEX-4) 4-6 GM-MG chewable tab 8 tablet 8 tablet Oral Q15 Min PRN   aspirin tab 325 mg 325 mg Oral Daily   Heparin Sodium (Porcine) 5000 UNIT/ML injection 5,000 Units 5,000 Units Subcutaneous Q8 USE TWICE DAILY AS DIRECTED; DX:  E11.39 on insulin Disp: 1 kit Rfl: 0   folic acid 1 MG Oral Tab Take 1 tablet (1 mg total) by mouth daily. Disp: 30 tablet Rfl: 3   Losartan Potassium 50 MG Oral Tab Take 1 tablet (50 mg total) by mouth every evening.  Disp (+) CHF                Endo/Other      (+) diabetes  type 2, using insulin                         Pulmonary    Negative pulmonary ROS.                        Neuro/Psych          (+) CVA                    Patient Active Problem List:     Essential hyper Performed by Macarena Dickens DPM at [facility] MAIN OR   • OTHER Left     AC joint x 2   • OTHER  06/2017    1st metatarsal osteotomy, 2nd, and 3rd metatarsal head resections, right foot.       Social History    Tobacco Use      Smoking status: Never Smoker polyneuropathy     PVD (peripheral vascular disease) (HCC)     Coronary artery disease involving native coronary artery of native heart without angina pectoris     Non-rheumatic mitral regurgitation     Diabetic sensorimotor polyneuropathy (HCC)     Lumbar

## 2019-05-14 NOTE — DIETARY NOTE
[address] [place] [address]     Admitting diagnosis:  Confusion [R41.0]  Hyperglycemia [R73.9]  Fever and chills [R50.9]  Cellulitis of left upper extremity [W53.330]    Ht: 180.3 cm (5' 11\")  Wt: 98.5 kg (217 lb 3.2 oz).    BMI:

## 2019-05-14 NOTE — PROGRESS NOTES
05/13/19 4232   Provider Notification   Reason for Communication Other (comment)  (temp 103)   Provider Name Other (comment)  (Krissy)   Method of Communication Page   Response Waiting for response   Notification Time [phone]   just FYI evening temp

## 2019-05-14 NOTE — PROGRESS NOTES
[facility]                INFECTIOUS DISEASE PROGRESS NOTE    Geovani Alba Patient Status:  Inpatient    1955 MRN [id number]   [facility] 5NW-A Attending Shana Lesches, MD   Hosp Day # 2 PCP Marlee Osler, MD Cletus Deis Ref Range    Blood Culture Result Staphylococcus aureus (A) N/A    Blood Culture Smear Gram positive cocci in clusters (A) N/A           Problem list reviewed:  Patient Active Problem List:     Essential hypertension, benign     Gout, unspecified     Histo

## 2019-05-14 NOTE — PROGRESS NOTES
[facility] Hospitalist Progress Note                                                                   [address] [facility]  2/21/1955    SUBJECTIVE:  Ongoing L hand pain. Plan I and D today.   No fevers ondansetron HCl, glucose **OR** Glucose-Vitamin C **OR** dextrose **OR** glucose **OR** Glucose-Vitamin C, acetaminophen    Assessment/Plan:  Principal Problem:    Fever and chills  Active Problems:    Confusion    Cellulitis of left upper extremity    Hyp no vascular compromise

## 2019-05-14 NOTE — PROGRESS NOTES
Patient examined and CT reviewed. Plan drainage of thenar space today    PAR consultation held.   Questions encouraged and answered

## 2019-05-14 NOTE — PROGRESS NOTES
05/14/19 0305   Provider Notification   Reason for Communication Other (comment)  (CT left hand results)   Provider Name Other (comment)  Jeana Bates)   Method of Communication Page   Response Waiting for response   Notification Time 4651

## 2019-05-14 NOTE — OCCUPATIONAL THERAPY NOTE
Attempted to see pt this AM, pt with increased pain at this time, RN requesting OT return later, OT will follow up later as schedule permits. RN aware and in agreement.

## 2019-05-14 NOTE — PAYOR COMM NOTE
--------------  CONTINUED STAY REVIEW    Payor: [facility]  Subscriber #:  [id number]  Authorization Number: [id number]     Admit date: 5/12/19  Admit time: 2225    Admitting Physician: Jenise Farnsworth MD  Attending Physician:  Janelle Ramos

## 2019-05-14 NOTE — PLAN OF CARE
Problem: Diabetes/Glucose Control  Goal: Glucose maintained within prescribed range  Description  INTERVENTIONS:  - Monitor Blood Glucose as ordered  - Assess for signs and symptoms of hyperglycemia and hypoglycemia  - Administer ordered medications to m influences on pain and pain management  - Manage/alleviate anxiety  - Utilize distraction and/or relaxation techniques  - Monitor for opioid side effects  - Notify MD/LIP if interventions unsuccessful or patient reports new pain  - Anticipate increased lela Patient/Family Short Term Goal  Description  Patient's Short Term Goal: Manage fever and chills  5/12 PM: manage pain and get sleep   5/13 AM:  Pain management   5/13(noc): pain/fever management    Interventions:   - PRN iv pain meds, monitor temps- PRN ty physical deficits and behaviors that affect risk of falls.   - [place] fall precautions as indicated by assessment.  - Educate pt/family on patient safety including physical limitations  - Instruct pt to call for assistance with activity based on assessme

## 2019-05-14 NOTE — PROGRESS NOTES
Problem: Bacteremia , generalized weakness      Data: Pt A&Ox4. RA. VSS. Pt void per urinal. Pt c/o neuropathy pain and also pain to the R leg. Treated w/ scheduled tegretol and PRN pain medication. L hand red and swollen and painful to touch.  R foot amput

## 2019-05-14 NOTE — OCCUPATIONAL THERAPY NOTE
Attempted to see pt this PM, pt is leaving floor for UE to r/o DVT, OT will follow up as results are available and schedule permits.

## 2019-05-15 ENCOUNTER — ANESTHESIA EVENT (OUTPATIENT)
Dept: SURGERY | Facility: HOSPITAL | Age: 64
End: 2019-05-15

## 2019-05-15 ENCOUNTER — ANESTHESIA (OUTPATIENT)
Dept: SURGERY | Facility: HOSPITAL | Age: 64
End: 2019-05-15

## 2019-05-15 PROCEDURE — 0SBC4ZZ EXCISION OF RIGHT KNEE JOINT, PERCUTANEOUS ENDOSCOPIC APPROACH: ICD-10-PCS | Performed by: ORTHOPAEDIC SURGERY

## 2019-05-15 PROCEDURE — 0QDD0ZZ EXTRACTION OF RIGHT PATELLA, OPEN APPROACH: ICD-10-PCS | Performed by: ORTHOPAEDIC SURGERY

## 2019-05-15 RX ORDER — NALOXONE HYDROCHLORIDE 0.4 MG/ML
80 INJECTION, SOLUTION INTRAMUSCULAR; INTRAVENOUS; SUBCUTANEOUS AS NEEDED
Status: DISCONTINUED | OUTPATIENT
Start: 2019-05-15 | End: 2019-05-15 | Stop reason: HOSPADM

## 2019-05-15 RX ORDER — HYDROMORPHONE HYDROCHLORIDE 1 MG/ML
0.4 INJECTION, SOLUTION INTRAMUSCULAR; INTRAVENOUS; SUBCUTANEOUS EVERY 5 MIN PRN
Status: DISCONTINUED | OUTPATIENT
Start: 2019-05-15 | End: 2019-05-15 | Stop reason: HOSPADM

## 2019-05-15 RX ORDER — DEXTROSE MONOHYDRATE 25 G/50ML
50 INJECTION, SOLUTION INTRAVENOUS
Status: DISCONTINUED | OUTPATIENT
Start: 2019-05-15 | End: 2019-05-15 | Stop reason: HOSPADM

## 2019-05-15 RX ORDER — HYDROMORPHONE HYDROCHLORIDE 1 MG/ML
INJECTION, SOLUTION INTRAMUSCULAR; INTRAVENOUS; SUBCUTANEOUS
Status: COMPLETED
Start: 2019-05-15 | End: 2019-05-15

## 2019-05-15 RX ORDER — HYDROMORPHONE HYDROCHLORIDE 1 MG/ML
INJECTION, SOLUTION INTRAMUSCULAR; INTRAVENOUS; SUBCUTANEOUS
Status: DISCONTINUED
Start: 2019-05-15 | End: 2019-05-15 | Stop reason: WASHOUT

## 2019-05-15 RX ORDER — SODIUM CHLORIDE 9 MG/ML
INJECTION, SOLUTION INTRAVENOUS CONTINUOUS
Status: DISCONTINUED | OUTPATIENT
Start: 2019-05-15 | End: 2019-05-15 | Stop reason: HOSPADM

## 2019-05-15 RX ORDER — MEPERIDINE HYDROCHLORIDE 25 MG/ML
12.5 INJECTION INTRAMUSCULAR; INTRAVENOUS; SUBCUTANEOUS AS NEEDED
Status: DISCONTINUED | OUTPATIENT
Start: 2019-05-15 | End: 2019-05-15 | Stop reason: HOSPADM

## 2019-05-15 RX ORDER — MIDAZOLAM HYDROCHLORIDE 1 MG/ML
1 INJECTION INTRAMUSCULAR; INTRAVENOUS EVERY 5 MIN PRN
Status: DISCONTINUED | OUTPATIENT
Start: 2019-05-15 | End: 2019-05-15 | Stop reason: HOSPADM

## 2019-05-15 RX ORDER — MEPERIDINE HYDROCHLORIDE 25 MG/ML
INJECTION INTRAMUSCULAR; INTRAVENOUS; SUBCUTANEOUS
Status: COMPLETED
Start: 2019-05-15 | End: 2019-05-15

## 2019-05-15 RX ORDER — INSULIN ASPART 100 [IU]/ML
INJECTION, SOLUTION INTRAVENOUS; SUBCUTANEOUS ONCE
Status: DISCONTINUED | OUTPATIENT
Start: 2019-05-15 | End: 2019-05-15 | Stop reason: HOSPADM

## 2019-05-15 RX ORDER — METOCLOPRAMIDE HYDROCHLORIDE 5 MG/ML
10 INJECTION INTRAMUSCULAR; INTRAVENOUS AS NEEDED
Status: DISCONTINUED | OUTPATIENT
Start: 2019-05-15 | End: 2019-05-15 | Stop reason: HOSPADM

## 2019-05-15 RX ORDER — ONDANSETRON 2 MG/ML
4 INJECTION INTRAMUSCULAR; INTRAVENOUS AS NEEDED
Status: DISCONTINUED | OUTPATIENT
Start: 2019-05-15 | End: 2019-05-15 | Stop reason: HOSPADM

## 2019-05-15 NOTE — PLAN OF CARE
Problem: Diabetes/Glucose Control  Goal: Glucose maintained within prescribed range  Description  INTERVENTIONS:  - Monitor Blood Glucose as ordered  - Assess for signs and symptoms of hyperglycemia and hypoglycemia  - Administer ordered medications to m Incision(s), wounds(s) or drain site(s) healing without S/S of infection  Description  INTERVENTIONS:  - Assess and document risk factors for pressure ulcer development  - Assess and document skin integrity  - Assess and document dressing/incision, wound b assistive devices as appropriate  - Consider OT/PT consult to assist with strengthening/mobility  - Encourage toileting schedule  Outcome: Progressing

## 2019-05-15 NOTE — BRIEF OP NOTE
Pre-Operative Diagnosis: Cellulitis [L03.90]     Post-Operative Diagnosis: right knee infection, lateral meniscus tear      Procedure Performed:   Procedure(s):  RIGHT KNEE ARTHROSCOPY,  DRAINAGE, PARTIAL LATERAL MENISECTOMY    Surgeon(s) and Role:     * P

## 2019-05-15 NOTE — PROGRESS NOTES
[facility]                INFECTIOUS DISEASE PROGRESS NOTE    Kaylen Isac Patient Status:  Inpatient    1955 MRN [id number]   [facility] 5NW-A Attending Citlaly Fitzgerald MD   Hosp Day # 3 PCP Yakov Perez MD     Antibio (ug/mL)   Date Value   01/29/2018 14.7     Microbiology  Procedure Component Value Units Date/Time   Blood Culture FREQ X 2 [[id number]] (Abnormal) Collected: 05/12/19 1821   Order Status: Completed Lab Status: Preliminary result Updated: 05/14/19 0653   Sp (peripheral vascular disease) ([facility] [address]     Coronary artery disease involving native coronary artery of native heart without angina pectoris     Non-rheumatic mitral regurgitation     Diabetic sensorimotor polyneuropathy (HCC)     Lumbar foraminal stenosis

## 2019-05-15 NOTE — BRIEF OP NOTE
Pre-Operative Diagnosis: * No pre-op diagnosis entered *     Post-Operative Diagnosis: * No post-op diagnosis entered *      Procedure Performed:   Procedure(s):  INCISION AND DRAINAGE LEFT THENER SPACE, ASPIRATION RIGHT KNEE    Surgeon(s) and Role:     *

## 2019-05-15 NOTE — ANESTHESIA PREPROCEDURE EVALUATION
PRE-OP EVALUATION    Patient Name: Jerad Lemons    Pre-op Diagnosis: Cellulitis [L03.90]    Procedure(s):  RIGHT KNEE ARTHROSCOPY    Surgeon(s) and Role:     * Kourtney Boyle MD - Primary    Pre-op vitals reviewed.   Temp: 101.3 °F (38.5 °C)  Pulse: 96 dextrose 50 % injection 50 mL 50 mL Intravenous Q15 Min PRN   Or      glucose (DEX4) oral liquid 30 g 30 g Oral Q15 Min PRN   Or      Glucose-Vitamin C (DEX-4) 4-6 GM-MG chewable tab 8 tablet 8 tablet Oral Q15 Min PRN   aspirin tab 325 mg 325 mg Oral Roddy Chavez tablet Rfl: 0   Blood Glucose Monitoring Suppl (ACCU-CHEK YOLANDA PLUS) w/Device Does not apply Kit USE TWICE DAILY AS DIRECTED; DX:  E11.39 on insulin Disp: 1 kit Rfl: 0   folic acid 1 MG Oral Tab Take 1 tablet (1 mg total) by mouth daily.  Disp: 30 tablet R hyperlipidemia      (+) CABG/stent    (+) valvular problems/murmurs and AS and MR       (+) CHF                Endo/Other      (+) diabetes  type 2, using insulin                         Pulmonary                           Neuro/Psych      (+) depression 4/11/18 5/14/19  Conclusions:    1. Left ventricle: The cavity size was normal. Wall thickness was at the     upper limits of normal. Systolic function was normal. The estimated     ejection fraction was 55-60%.  There was no diagnostic evidence for  [facility] RBC 2.80 (L) 05/13/2019    HGB 8.5 (L) 05/13/2019    HCT 25.3 (L) 05/13/2019    MCV 90.4 05/13/2019    MCH 30.4 05/13/2019    MCHC 33.6 05/13/2019    RDW 14.5 05/13/2019    .0 05/13/2019     Lab Results   Component Value Date     (L) 05/13/201

## 2019-05-15 NOTE — PLAN OF CARE
Problem: Diabetes/Glucose Control  Goal: Glucose maintained within prescribed range  Description  INTERVENTIONS:  - Monitor Blood Glucose as ordered  - Assess for signs and symptoms of hyperglycemia and hypoglycemia  - Administer ordered medications to m needed  Outcome: Progressing  Goal: Incision(s), wounds(s) or drain site(s) healing without S/S of infection  Description  INTERVENTIONS:  - Assess and document risk factors for pressure ulcer development  - Assess and document skin integrity  - Assess and reduce risk of injury  - Provide assistive devices as appropriate  - Consider OT/PT consult to assist with strengthening/mobility  - Encourage toileting schedule  Outcome: Progressing     Pt A+Ox4, drowsy from surgery. WNL on [facility] per  monitoring.   VSS

## 2019-05-15 NOTE — ANESTHESIA POSTPROCEDURE EVALUATION
224 [facility] Patient Status:  Inpatient   Age/Gender 71year old male MRN [id number]   [facility] SURGERY Attending Geovany Blake MD   Hosp Day # 3 PCP Tong Bullock MD       Anesthesia Post-op Note    Procedure(s):

## 2019-05-15 NOTE — CM/SW NOTE
SW spoke w/pt's wife. Informed her PT is recommending [facility]. Wife stating they recently moved to [place]. She stated she would prefer facilities in the [place] area.  Wife was provided w/a list. She stated she will review the facilities and speak w/SW later today

## 2019-05-15 NOTE — OPERATIVE REPORT
[facility]    PATIENT'S NAME: Asya Jacques   ATTENDING PHYSICIAN: Selina Thomas M.D. OPERATING PHYSICIAN: Romelia Hernandez M.D.    PATIENT ACCOUNT#:   [de-identified]    LOCATION:  [facility] PERIOP 5 EDWP 10  MEDICAL RECORD #:   [id number]       MICKEY

## 2019-05-15 NOTE — CONSULTS
[facility] dosing service    Follow-up Pharmacokinetic Consult for Vancomycin Dosing     Emma Arizmendi is a 71year old male who is being treated for sepsis. Patient is on day 3 of Vancomycin and add is currently receiving 1.5 gm IV Q 12 hours.   Goal t care.    Reba Restrepo [facility]  5/15/2019  1:23 PM  [address] [place] [address] Extension: [phone]

## 2019-05-15 NOTE — PLAN OF CARE
Dr. Jessica Connors paged regarding pt's first void since surgery was \"tea colored and malodorous\". Pt has been on IV abx. Wants BMP in am and to monitor.

## 2019-05-15 NOTE — PROGRESS NOTES
05/15/19 1809   Clinical Encounter Type   Visited With Patient   Routine Visit Introduction   Patient's Supportive Strategies/Resources Patient struggling with pain.  Patient expressing integrating his feeling of dissonance between expectation and realit

## 2019-05-15 NOTE — PROGRESS NOTES
Problem: Bacteremia, weakness     Data: Pt A&Ox4. RA. VSS. Pt void per urinal. Pt c/o neuropathy pain and also pain to the R leg. Treated w/ scheduled tegretol and PRN pain medication. L hand red and swollen and painful to touch. R foot amputated.  Kalpesh Merino

## 2019-05-15 NOTE — OCCUPATIONAL THERAPY NOTE
Attempted to see pt this PM, pt off floor for procedure, OT will place ON HOLD as pt under general, OT will require new orders when approp to resume therapy.

## 2019-05-15 NOTE — CM/SW NOTE
SW spoke w/wife who requested referrals to [address] [place] [address] and [place]. SW sent referrals via [address].

## 2019-05-15 NOTE — PROGRESS NOTES
[facility] Hospitalist Progress Note                                                                   [address] [facility]  2/21/1955    SUBJECTIVE:  Ongoing L hand pain. Plan I and D today.   No fevers Insulin Aspart Pen  1-5 Units Subcutaneous TID CC and HS     Continuous Infusions:   • sodium chloride     • lactated ringers 100 mL/hr at 05/15/19 1023     PRN: glucose **OR** Glucose-Vitamin C **OR** dextrose **OR** glucose **OR** Glucose-Vitamin C, fent

## 2019-05-15 NOTE — ANESTHESIA POSTPROCEDURE EVALUATION
224 [facility] Patient Status:  Inpatient   Age/Gender 71year old male MRN [id number]   [facility] SURGERY Attending Reyes Bruno MD   Hosp Day # 2 PCP Sherley Romero MD       Anesthesia Post-op Note    Procedure(s):

## 2019-05-15 NOTE — PLAN OF CARE
S/p right knee arthro with I&D. Ace wrap to incision site. POD#1 of Left hand I&D. Ace wrap in place to that site. Pt severe to left hand with any touch. MS IV given. Hand elevated on pillows, +1 pitting edema noted to LUE. RLE toes missing to foot.  Tele p

## 2019-05-15 NOTE — PAYOR COMM NOTE
--------------  CONTINUED STAY REVIEW    Payor: [facility]  Subscriber #:  [id number]  Authorization Number: [id number]     Admit date: 5/12/19  Admit time: 2225    Admitting Physician: Matias Peck MD  Attending Physician:  Pavel Silvestre

## 2019-05-16 RX ORDER — CARBAMAZEPINE 200 MG/1
200 TABLET ORAL 3 TIMES DAILY
Status: DISCONTINUED | OUTPATIENT
Start: 2019-05-16 | End: 2019-05-16

## 2019-05-16 RX ORDER — CARVEDILOL 6.25 MG/1
6.25 TABLET ORAL 2 TIMES DAILY WITH MEALS
Status: DISCONTINUED | OUTPATIENT
Start: 2019-05-16 | End: 2019-05-24

## 2019-05-16 RX ORDER — RISPERIDONE 0.25 MG/1
0.5 TABLET, FILM COATED ORAL 2 TIMES DAILY PRN
Status: DISCONTINUED | OUTPATIENT
Start: 2019-05-16 | End: 2019-05-24

## 2019-05-16 RX ORDER — LOSARTAN POTASSIUM 50 MG/1
50 TABLET ORAL EVERY EVENING
Status: DISCONTINUED | OUTPATIENT
Start: 2019-05-16 | End: 2019-05-24

## 2019-05-16 RX ORDER — GABAPENTIN 600 MG/1
600 TABLET ORAL 3 TIMES DAILY
Status: DISCONTINUED | OUTPATIENT
Start: 2019-05-16 | End: 2019-05-24

## 2019-05-16 RX ORDER — DULOXETIN HYDROCHLORIDE 20 MG/1
20 CAPSULE, DELAYED RELEASE ORAL DAILY
Status: DISCONTINUED | OUTPATIENT
Start: 2019-05-16 | End: 2019-05-24

## 2019-05-16 RX ORDER — AMLODIPINE BESYLATE 5 MG/1
10 TABLET ORAL
Status: DISCONTINUED | OUTPATIENT
Start: 2019-05-16 | End: 2019-05-24

## 2019-05-16 RX ORDER — ALLOPURINOL 100 MG/1
100 TABLET ORAL DAILY
Status: DISCONTINUED | OUTPATIENT
Start: 2019-05-16 | End: 2019-05-24

## 2019-05-16 RX ORDER — PRAMIPEXOLE DIHYDROCHLORIDE 1 MG/1
1 TABLET ORAL NIGHTLY
Status: DISCONTINUED | OUTPATIENT
Start: 2019-05-16 | End: 2019-05-24

## 2019-05-16 RX ORDER — EZETIMIBE 10 MG/1
10 TABLET ORAL DAILY
Status: DISCONTINUED | OUTPATIENT
Start: 2019-05-16 | End: 2019-05-24

## 2019-05-16 RX ORDER — FOLIC ACID 1 MG/1
1 TABLET ORAL DAILY
Status: DISCONTINUED | OUTPATIENT
Start: 2019-05-16 | End: 2019-05-24

## 2019-05-16 NOTE — OPERATIVE REPORT
[facility]    PATIENT'S NAME: Yokasta Jamesdmont   ATTENDING PHYSICIAN: Haven Shearer M.D. OPERATING PHYSICIAN: Mack De La Torre M.D.    PATIENT ACCOUNT#:   [de-identified]    LOCATION:  [facility] PACU 3 EDWP 10  MEDICAL RECORD #:   [id number]       DATE OF compartment. The superior gutter showed areas of synovitis, and this was debrided. Medial and lateral gutters also had loose, synovitic, friable tissue. This was debrided. 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   The femoral groove sh

## 2019-05-16 NOTE — PROGRESS NOTES
[facility]                INFECTIOUS DISEASE PROGRESS NOTE    Teri Zhou Patient Status:  Inpatient    1955 MRN [id number]   [facility] 5NW-A Attending Hria Nicole MD   Hosp Day # 4 PCP MD Monica Ruelas 25.0 24.0  --   --   --  19.0* 23.0   ALKPHO 94 125* 112  --   --   --   --   --    AST 28 33 30  --   --   --   --   --    ALT 35 35 28  --   --   --   --   --    BILT 0.9 1.5 2.0  --   --   --   --   --    TP 8.7* 7.9 7.1  --   --   --   --   --     < > Ref Range    Blood Culture Result No Growth 2 Days N/A     Blood Culture Once [[id number]] Collected: 05/14/19 1110   Order Status: Sent Lab Status:  In process Updated: 05/14/19 1119   Specimen: Blood,peripheral        Cell Count,Diff, and Crystals Synovial abscess 5/14    2. MRSA septic arthritis of right knee   Pain swelling with MRSA bacteremia  -s/p right knee washout    2.  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

## 2019-05-16 NOTE — PROGRESS NOTES
Ortho hand  POD 2    Hand feels better    No drainage  Thenars soft  Swelling better but not gone    Sensation is intact    S/p I&D thenar space infection  Continue cuurent care

## 2019-05-16 NOTE — PLAN OF CARE
Pain well controlled with Morphine IV. Pt noted forgetful and confused at times but easily reoriented. Ace wrap dsg to lt hand and rt knee cdi. Able to move fingers and do  with mod strength. Denies n/t to rle, no calf pain c/o.  Reinforced use of call

## 2019-05-16 NOTE — PROGRESS NOTES
Received critical result from lab from rt knee fluid: +1 gram positive cocci and 4+ WBC. Dr Sebas Stein made aware, no new order.

## 2019-05-16 NOTE — PHYSICAL THERAPY NOTE
PHYSICAL THERAPY EVALUATION - INPATIENT     Room Number: 379/379-A    Evaluation Date: 5/16/19  Type of Evaluation: Re-evaluation  Physician Order: PT Eval and Treat    Presenting Problem: s/p right partial menisectomy wth aspiration 5/15/19, left th 6/10/2016   • History of coronary artery stent placement 6/10/2016   • History of CVA (cerebrovascular accident) 6/10/2016   • Hyperlipidemia    • Midtarsal Amp, Peroneus Brevis &Tibialis Anetrior Tendon Transfer, Abductor Hallucia & Digiti Rotational Musc with use of fingers only, shoes) shoes with inserts for standing, transfers, ambulation  Fall Risk: High fall risk    WEIGHT BEARING RESTRICTION  Weight Bearing Restriction: R lower extremity;L lower extremity        R Lower Extremity: Weight Bearing as To to and from a bed to a chair (including a wheelchair)?: A Lot   -   Need to walk in hospital room?: Total   -   Climbing 3-5 steps with a railing?: Total       AM-PAC Score:  Raw Score: 11   Approx Degree of Impairment: 72.57%   Standardized Score (AM-PAC left hand I&D 5/14/19, right knee aspiration with partial menisectomy 5/15/19.   Pertinent comorbidities and personal factors impacting therapy include h/o CVA, right forefoot amputation(now FWB per MD as of 5/1/19 and confirmed following surgery 5/15/19), platform at assistance level: minimum assistance     Goal #4 HEP with min a   Goal #5    Goal #6    Goal Comments: Goals established on 5/16/2019

## 2019-05-16 NOTE — PROGRESS NOTES
[facility] Hospitalist Progress Note                                                                   [address] [facility]  2/21/1955    SUBJECTIVE:  Reports severe hand pain which improves with pain med amLODIPine Besylate  10 mg Oral Daily   • carvedilol  6.25 mg Oral BID with meals   • DULoxetine HCl  20 mg Oral Daily   • ezetimibe  10 mg Oral Daily   • folic acid  1 mg Oral Daily   • gabapentin  600 mg Oral TID   • Losartan Potassium  50 mg Oral QPM Will add haldol if pt becomes agitated     Prev:  Hep SQ     Dispo: floor    Tiffanie Cooper MD  [facility] IM Hospitalist  Pager: [phone]

## 2019-05-16 NOTE — CM/SW NOTE
ECIN response from [facility] ([facility]) that facility may be able to accept for rehab but would not be able to provide Repatha medication, which is an injection every 14 days.   Requested that facility reach out to pt's wife about this  (she had requested referra

## 2019-05-17 RX ORDER — SENNA AND DOCUSATE SODIUM 50; 8.6 MG/1; MG/1
2 TABLET, FILM COATED ORAL 2 TIMES DAILY
Status: DISCONTINUED | OUTPATIENT
Start: 2019-05-17 | End: 2019-05-24

## 2019-05-17 RX ORDER — POLYETHYLENE GLYCOL 3350 17 G/17G
17 POWDER, FOR SOLUTION ORAL DAILY PRN
Status: DISCONTINUED | OUTPATIENT
Start: 2019-05-17 | End: 2019-05-24

## 2019-05-17 RX ORDER — BISACODYL 10 MG
10 SUPPOSITORY, RECTAL RECTAL
Status: DISCONTINUED | OUTPATIENT
Start: 2019-05-17 | End: 2019-05-24

## 2019-05-17 NOTE — PAYOR COMM NOTE
--------------  CONTINUED STAY REVIEW    Payor: [address]  Subscriber #:  [id number]  Authorization Number: [id number]     Admit date: 5/12/19  Admit time: 2225    Admitting Physician: Shaquille Barrow MD  Attending Physician:  Asim Nicole neg nitrite and neg LE, small elevation in WBC. Will f/u UCx. - positive RN delirium screen - EKG, Risperdal PRN per protocol.  Will add haldol if pt becomes agitated     # acute constipation   - no BM since 5/12  - docusate-senna  - miralax PRN, MOM PRN

## 2019-05-17 NOTE — PHYSICAL THERAPY NOTE
PHYSICAL THERAPY TREATMENT NOTE - INPATIENT    Room Number: 112/567-J     Session: 1   Number of Visits to Meet Established Goals: 6    Presenting Problem: s/p right partial menisectomy wth aspiration 5/15/19, left thenar I&D 5/14/19  History related to c (cerebrovascular accident) 6/10/2016   • Hyperlipidemia    • Midtarsal Amp, Peroneus Brevis &Tibialis Anetrior Tendon Transfer, Abductor Hallucia & Digiti Rotational Muscle flap, Achilles Tendon Lenghtening R -Sx 1/11/18 NADEGE 4/11/18 1/16/2018   • Keisha Jraa +    ACTIVITY TOLERANCE                         O2 WALK                    AM-PAC '6-Clicks' INPATIENT SHORT FORM - BASIC MOBILITY  How much difficulty does the patient currently have. ..  -   Turning over in bed (including adjusting bedclothes, sheets and break due to fatigue. Pt became diaphoretic, placed in semi supine position in recliner, /86, rn aware. Pt improved, returned to upright sitting, made comfortable in recliner.   Pt encouraged to cont to perform AROM bilat LE as able, pt in agreement platform at assistance level: minimum assistance      Goal #4 HEP with min a   Goal #5     Goal #6     Goal Comments: Goals established on 5/16/2019, in progress 5/17/19

## 2019-05-17 NOTE — PLAN OF CARE
Patient not A/0X4 in AM - delirium screening performed- page to Dr. Elmer Gooden at approx [phone] with return call and discussed results. MD and RN reviewed delirium protocol as screening positive for delirium, patient's LOC waxing and waning.  Will continue to mo

## 2019-05-17 NOTE — PROGRESS NOTES
120 [facility] dosing service    Follow-up Pharmacokinetic Consult for Vancomycin Dosing     Hanane Saleh is a 71year old male who is being treated for sepsis. Patient is on day 5 of Vancomycin and add is currently receiving 1.75 gm IV Q 12 hours.   Goal 05/14/19 11:10 AM   Result Value Ref Range    Blood Culture Result No Growth 2 Days N/A       Based on the above:    1.  Continue Vancomycin at 1.75 gm IVPB Q 12 hours (based on Trough of 15.2 ug/mL, pharmacokinetics, actual body weight of 98.8 kg and renal

## 2019-05-17 NOTE — PROGRESS NOTES
[facility] Hospitalist Progress Note                                                                   [address] [facility]  2/21/1955    SUBJECTIVE:  Feels sweaty. Denies fever, chills.  Moderate pain in Meds:   Scheduled:   • Senna-Docusate Sodium  2 tablet Oral BID   • allopurinol  100 mg Oral Daily   • amLODIPine Besylate  10 mg Oral Daily   • carvedilol  6.25 mg Oral BID with meals   • DULoxetine HCl  20 mg Oral Daily   • ezetimibe  10 mg Oral Da from yesterday  - UA neg nitrite and neg LE, small elevation in WBC. Will f/u UCx. - positive RN delirium screen - EKG, Risperdal PRN per protocol.  Will add haldol if pt becomes agitated    # acute constipation   - no BM since 5/12  - docusate-senna  - m

## 2019-05-17 NOTE — PLAN OF CARE
Pt is drowsy, but oriented x4. Generalized weakness and fatigue noted. C/o pain to surgical sites, and generalized pain. Sinus rhythm on tele. Tolerating IV ABX as ordered.  Vanco trough done last night, no change in dosage per pharmacist. Dressings are jostin

## 2019-05-17 NOTE — PROGRESS NOTES
[facility]                INFECTIOUS DISEASE PROGRESS NOTE    Maribell Morales Patient Status:  Inpatient    1955 MRN [id number]   [facility] 5NW-A Attending Brittany Sanches MD   Hosp Day # 5 PCP MD Katya Dhaliwal --   --   --    AST 28 33 30  --   --   --   --    ALT 35 35 28  --   --   --   --    BILT 0.9 1.5 2.0  --   --   --   --    TP 8.7* 7.9 7.1  --   --   --   --     < > = values in this interval not displayed.        Vancomycin Trough (ug/mL)   Date Value Erythromycin >=8 Resistant      Gentamicin <=0.5 Sensitive      Levofloxacin 0.25 Sensitive      Oxacillin >=4 Resistant      Tetracycline >=16 Resistant      Trimethoprim/Sulfa <=10 Sensitive      Vancomycin 1 Sensitive      Minocycline* 25 Sensitive Adjustment disorder with mixed anxiety and depressed mood     Chronic diastolic CHF (congestive heart failure) (HCC)     Chronic osteomyelitis of foot (HCC)     Mixed hyperlipidemia     Essential hypertension     Arthroscopic ankle fusion R ankle.  Sx 1/2

## 2019-05-17 NOTE — PROGRESS NOTES
Still c/o pain    /67 (BP Location: Right arm)   Pulse 82   Temp 98.9 °F (37.2 °C) (Oral)   Resp 22   Ht 5' 11\" (1.803 m)   Wt 217 lb 12.8 oz (98.8 kg)   SpO2 96%   BMI 30.38 kg/m²       Swelling improved even from yesterday.   sesation intact    Lab

## 2019-05-18 ENCOUNTER — APPOINTMENT (OUTPATIENT)
Dept: GENERAL RADIOLOGY | Facility: HOSPITAL | Age: 64
DRG: 485 | End: 2019-05-18
Attending: STUDENT IN AN ORGANIZED HEALTH CARE EDUCATION/TRAINING PROGRAM
Payer: COMMERCIAL

## 2019-05-18 PROCEDURE — 71045 X-RAY EXAM CHEST 1 VIEW: CPT | Performed by: STUDENT IN AN ORGANIZED HEALTH CARE EDUCATION/TRAINING PROGRAM

## 2019-05-18 PROCEDURE — 99291 CRITICAL CARE FIRST HOUR: CPT | Performed by: STUDENT IN AN ORGANIZED HEALTH CARE EDUCATION/TRAINING PROGRAM

## 2019-05-18 PROCEDURE — 5A09357 ASSISTANCE WITH RESPIRATORY VENTILATION, LESS THAN 24 CONSECUTIVE HOURS, CONTINUOUS POSITIVE AIRWAY PRESSURE: ICD-10-PCS | Performed by: HOSPITALIST

## 2019-05-18 RX ORDER — HYDROCODONE BITARTRATE AND ACETAMINOPHEN 10; 325 MG/1; MG/1
1 TABLET ORAL EVERY 4 HOURS PRN
Status: DISCONTINUED | OUTPATIENT
Start: 2019-05-18 | End: 2019-05-24

## 2019-05-18 RX ORDER — FUROSEMIDE 20 MG/1
20 TABLET ORAL DAILY
Status: DISCONTINUED | OUTPATIENT
Start: 2019-05-19 | End: 2019-05-24

## 2019-05-18 RX ORDER — FUROSEMIDE 10 MG/ML
20 INJECTION INTRAMUSCULAR; INTRAVENOUS ONCE
Status: COMPLETED | OUTPATIENT
Start: 2019-05-18 | End: 2019-05-18

## 2019-05-18 RX ORDER — FUROSEMIDE 10 MG/ML
INJECTION INTRAMUSCULAR; INTRAVENOUS
Status: COMPLETED
Start: 2019-05-18 | End: 2019-05-18

## 2019-05-18 RX ORDER — FUROSEMIDE 10 MG/ML
40 INJECTION INTRAMUSCULAR; INTRAVENOUS ONCE
Status: DISCONTINUED | OUTPATIENT
Start: 2019-05-18 | End: 2019-05-18

## 2019-05-18 RX ORDER — FUROSEMIDE 10 MG/ML
40 INJECTION INTRAMUSCULAR; INTRAVENOUS ONCE
Status: COMPLETED | OUTPATIENT
Start: 2019-05-18 | End: 2019-05-18

## 2019-05-18 RX ORDER — CYCLOBENZAPRINE HCL 10 MG
10 TABLET ORAL 3 TIMES DAILY PRN
Status: DISCONTINUED | OUTPATIENT
Start: 2019-05-18 | End: 2019-05-24

## 2019-05-18 RX ORDER — HYDROCODONE BITARTRATE AND ACETAMINOPHEN 10; 325 MG/1; MG/1
2 TABLET ORAL EVERY 4 HOURS PRN
Status: DISCONTINUED | OUTPATIENT
Start: 2019-05-18 | End: 2019-05-24

## 2019-05-18 NOTE — PROGRESS NOTES
[facility]                INFECTIOUS DISEASE PROGRESS NOTE    Jean Marie Morin Patient Status:  Inpatient    1955 MRN [id number]   [facility] 5NW-A Attending Shanna Orta MD   Hosp Day # 6 PCP MD Alina Noel 4.2 4.5   CL 92* 99   < > 99 99 99   CO2 25.0 24.0   < > 23.0 26.0 23.0   ALKPHO 125* 112  --   --   --   --    AST 33 30  --   --   --   --    ALT 35 28  --   --   --   --    BILT 1.5 2.0  --   --   --   --    TP 7.9 7.1  --   --   --   --     < > = value 4+ WBCs seen N/A    Aerobic Smear 3+ Gram positive cocci in clusters N/A       Susceptibility    Staphylococcus aureus, MRSA -  (no method available)     Cefazolin  Resistant      Clindamycin <=0.25 Sensitive      Erythromycin >=8 Resistant      Gentamicin

## 2019-05-18 NOTE — CONSULTS
Pulmonary Consult       NAME: Judy Murcia - ROOM: 515/846-U - MRN: [id number] - Age: 71year old - :  1955    Date of Admission: 2019  5:36 PM  Admission Diagnosis: Confusion [R41.0]  Hyperglycemia [R73.9]  Fever and chills [R50.9]  Celluliti metatarsal head resections, right foot.          Medications Prior to Admission:  DULOXETINE HCL 20 MG Oral Cap DR Particles TAKE 1 CAPSULE(20 MG) BY MOUTH DAILY Disp: 90 capsule Rfl: 0 5/11/2019 at Unknown time   GABAPENTIN 600 MG Oral Tab TAKE 2 TABLETS B DX: E11.39 on insulin Disp: 1 kit Rfl: 0 5/12/2019 at Unknown time   folic acid 1 MG Oral Tab Take 1 tablet (1 mg total) by mouth daily.  Disp: 30 tablet Rfl: 3 5/12/2019 at Unknown time   Losartan Potassium 50 MG Oral Tab Take 1 tablet (50 mg total) by mo (REPATHA) 140 MG/ML Subcutaneous Solution Prefilled Syringe Inject 140 mg into the skin every 14 (fourteen) days.  Disp: 2 mL Rfl: 0 Unknown at Unknown time   Tadalafil (CIALIS) 20 MG Oral Tab Take 1 tablet (20 mg total) by mouth daily as needed for [employer] Physically abused: Not on file        Forced sexual activity: Not on file    Other Topics      Concerns:        Caffeine Concern: Not Asked        Exercise: Not Asked        Seat Belt: Not Asked        Special Diet: Not Asked        Stress Concern: No denies abdominal pain, denies heartburn   : denies hematuria  MUSCULOSKELETAL: knee pain   NEURO: denies headaches   PSYCHE: denies uncontrolled depression or anxiety   HEMATOLOGIC: denies hx of VTE  ENDOCRINE: denies thyroid history          OBJECTIVE: DIFFERENTIAL    Collection Time: 05/18/19  5:41 AM   Result Value Ref Range    WBC 16.2 (H) 4.0 - 11.0 x10(3) uL    RBC 3.18 (L) 3.80 - 5.80 x10(6)uL    HGB 9.7 (L) 13.0 - 17.5 g/dL    HCT 30.1 (L) 39.0 - 53.0 %    .0 (H) 150.0 - 450.0 10(3)uL    MC mEq/L    ABG Base Excess -2.8 (L) -2.0 - 2.0 mmol/L    ABG O2 Saturation 89 (L) 92 - 100 %    Calculated O2 Saturation 91 (L) 92 - 100 %    Patient Temperature 98.2 F    Total Hemoglobin 8.4 (L) 12.6 - 17.4 g/dL    Carboxyhemoglobin 0.8 0.0 - 3.0 % SAT -wean O2  -lasix  2. ID - infected right knee and left hand abscess with MRSA.    -antibiotics per ID  -no need for pneumonia coverage from pulm standpoint  3.  Dispo - will follow                  Oc Christensen  5/18/2019

## 2019-05-18 NOTE — PROGRESS NOTES
[facility] Hospitalist Progress Note                                                                   [address] [facility]  2/21/1955    24hr events/SUBJECTIVE:    RRT early AM for acute respiratory dist 170* 181* 189* 250*    < > = values in this interval not displayed.        Recent Labs   Lab 05/12/19  1821 05/13/19  0610   ALT 35 28   AST 33 30   ALB 2.3* 1.9*       Recent Labs   Lab 05/16/19  2106 05/17/19  0835 05/17/19  1317 05/17/19  1846 05/17/19 CKD  - Hgb 8.6--> 9.7     # PVD  - asa currently on hold     # neuropathy  - cont gabapentin     # HTN  - cont home meds    # thrombocytosis  - ?reactive  - uptrending, if continues to increase consider heme consultation    # confusion - resolved  - noted

## 2019-05-18 NOTE — PLAN OF CARE
At approx 0530 this writer and the PCT were in the process of cleaning up the Pt and the linens when the Pt stated that he couldn't breath, was going to die, and started to hyperventilate, HR went tachy.  Linen change was completed, Pt was repositioned sitt

## 2019-05-18 NOTE — OCCUPATIONAL THERAPY NOTE
OT order received, Chart reviewed, spoke with RN, RN reporting pt had rapid called this AM and is now on BiPAP, RN reporting pt will move to 373, OT will follow up as pt is approp and as notes regarding events this AM are in chart.

## 2019-05-18 NOTE — H&P (VIEW-ONLY)
Pulmonary Consult       NAME: Judy Murcia - ROOM: 896/970-T - MRN: [id number] - Age: 71year old - :  1955    Date of Admission: 2019  5:36 PM  Admission Diagnosis: Confusion [R41.0]  Hyperglycemia [R73.9]  Fever and chills [R50.9]  Celluliti metatarsal head resections, right foot.          Medications Prior to Admission:  DULOXETINE HCL 20 MG Oral Cap DR Particles TAKE 1 CAPSULE(20 MG) BY MOUTH DAILY Disp: 90 capsule Rfl: 0 5/11/2019 at Unknown time   GABAPENTIN 600 MG Oral Tab TAKE 2 TABLETS B DX: E11.39 on insulin Disp: 1 kit Rfl: 0 5/12/2019 at Unknown time   folic acid 1 MG Oral Tab Take 1 tablet (1 mg total) by mouth daily.  Disp: 30 tablet Rfl: 3 5/12/2019 at Unknown time   Losartan Potassium 50 MG Oral Tab Take 1 tablet (50 mg total) by mo (REPATHA) 140 MG/ML Subcutaneous Solution Prefilled Syringe Inject 140 mg into the skin every 14 (fourteen) days.  Disp: 2 mL Rfl: 0 Unknown at Unknown time   Tadalafil (CIALIS) 20 MG Oral Tab Take 1 tablet (20 mg total) by mouth daily as needed for [employer] Physically abused: Not on file        Forced sexual activity: Not on file    Other Topics      Concerns:        Caffeine Concern: Not Asked        Exercise: Not Asked        Seat Belt: Not Asked        Special Diet: Not Asked        Stress Concern: No denies abdominal pain, denies heartburn   : denies hematuria  MUSCULOSKELETAL: knee pain   NEURO: denies headaches   PSYCHE: denies uncontrolled depression or anxiety   HEMATOLOGIC: denies hx of VTE  ENDOCRINE: denies thyroid history          OBJECTIVE: DIFFERENTIAL    Collection Time: 05/18/19  5:41 AM   Result Value Ref Range    WBC 16.2 (H) 4.0 - 11.0 x10(3) uL    RBC 3.18 (L) 3.80 - 5.80 x10(6)uL    HGB 9.7 (L) 13.0 - 17.5 g/dL    HCT 30.1 (L) 39.0 - 53.0 %    .0 (H) 150.0 - 450.0 10(3)uL    MC mEq/L    ABG Base Excess -2.8 (L) -2.0 - 2.0 mmol/L    ABG O2 Saturation 89 (L) 92 - 100 %    Calculated O2 Saturation 91 (L) 92 - 100 %    Patient Temperature 98.2 F    Total Hemoglobin 8.4 (L) 12.6 - 17.4 g/dL    Carboxyhemoglobin 0.8 0.0 - 3.0 % SAT -wean O2  -lasix  2. ID - infected right knee and left hand abscess with MRSA.    -antibiotics per ID  -no need for pneumonia coverage from pulm standpoint  3.  Dispo - will follow                  Gerardo Greer  5/18/2019

## 2019-05-18 NOTE — OCCUPATIONAL THERAPY NOTE
OCCUPATIONAL THERAPY EVALUATION - INPATIENT     Room Number: 373/373-A  Evaluation Date: 5/18/2019  Type of Evaluation: Initial  Presenting Problem: fever    Physician Order: IP Consult to Occupational Therapy  Reason for Therapy: ADL/IADL Dysfunction and placement 6/10/2016   • History of CVA (cerebrovascular accident) 6/10/2016   • Hyperlipidemia    • Midtarsal Amp, Peroneus Brevis &Tibialis Anetrior Tendon Transfer, Abductor Hallucia & Digiti Rotational Muscle flap, Achilles Tendon Lenghtening R -Sx 1/11 alarm(left hand surgery/bandaging with use of fingers only, shoes)  Fall Risk: High fall risk    WEIGHT BEARING RESTRICTION  Weight Bearing Restriction: R lower extremity;L lower extremity        R Lower Extremity: Weight Bearing as Tolerated(\"in offloadi ASSESSMENT  Supine to Sit : Minimum assistance  Sit to Stand:  Moderate assistance    Skilled Therapy Provided: Pt was received supine in bed for session, pt t/f to EOB with min assist, pt completed donning of shoes and socks with max assist, therapist comp None    PLAN  OT Treatment Plan: Balance activities; Energy conservation/work simplification techniques;ADL training;IADL training;Continued evaluation; Compensatory technique education; Neuromuscluar reeducation;Equipment eval/education;Patient/Family traini

## 2019-05-18 NOTE — PLAN OF CARE
Patient received on the bipap this morning and was able to be weaned off it and now he is on 2L of O2 per NC. He has been coughing occasionally and bringing up thick yellow sputum. He has been placed on droplet precautions as well to rule out flu.  Zosyn wa

## 2019-05-18 NOTE — PLAN OF CARE
Problem: POD 3 Rt knee arthroscopy, I&D, and partial menisectomy (+MRSA infection)    Data: Pt is A+Ox3-4 confused at times d/t infection per spouse report, on 2-3L O2 NC at shift start, VSS. , SCD's & Tele applied.  ACE wrap dressings are C/D/I w/ no

## 2019-05-18 NOTE — PLAN OF CARE
Patient able to ambulate today with PT. Up to chair, states feeling better in chair, more alert. Pain medication given for patient reported neuropathy pain to feet'   Dressing changed by Dr. Jazmine Swain to L hand.    Confirmed asa 325 daily and heparin ok per ort

## 2019-05-18 NOTE — PHYSICAL THERAPY NOTE
PHYSICAL THERAPY TREATMENT NOTE - INPATIENT    Room Number: 980/558-R     Session: 2  Number of Visits to Meet Established Goals: 6    Presenting Problem: s/p right partial menisectomy wth aspiration 5/15/19, left thenar I&D 5/14/19  History related to cu (cerebrovascular accident) 6/10/2016   • Hyperlipidemia    • Midtarsal Amp, Peroneus Brevis &Tibialis Anetrior Tendon Transfer, Abductor Hallucia & Digiti Rotational Muscle flap, Achilles Tendon Lenghtening R -Sx 1/11/18 NADEGE 4/11/18 1/16/2018   • Neel Roper Good  Dynamic Sitting: Fair           Static Standing: Poor +  Dynamic Standing: Poor +    ACTIVITY TOLERANCE   O2 WALK   SPO2 Ambulation on Oxygen: 94  Ambulation oxygen flow (liters per minute): 2        AM-PAC '6-Clicks' INPATIENT SHORT FORM - BASIC MOB to ambulate <> 3 ft to bedside chair with mod assist of 2.  Stand to sit in chair required min assist.    THERAPEUTIC EXERCISES  Lower Extremity Ankle pumps  Glut sets  Hip AB/AD  Heel slides  Knee extension  LAQ  Quad sets  SAQ  SLR     Position Supine walker - platform at assistance level: minimum assistance      Goal #4 HEP with min a   Goal #5     Goal #6     Goal Comments: Goals established on 5/16/2019, in progress 5/17/19,Ongoing 05/18/19

## 2019-05-19 NOTE — PHYSICAL THERAPY NOTE
PHYSICAL THERAPY TREATMENT NOTE - INPATIENT    Room Number: 984/079-L     Session:3  Number of Visits to Meet Established Goals: 6    Presenting Problem: s/p right partial menisectomy wth aspiration 5/15/19, left thenar I&D 5/14/19  History related to cur of coronary artery stent placement 6/10/2016   • History of CVA (cerebrovascular accident) 6/10/2016   • Hyperlipidemia    • Midtarsal Amp, Peroneus Brevis &Tibialis Anetrior Tendon Transfer, Abductor Hallucia & Digiti Rotational Muscle flap, Achilles Tend Standing: Poor +    ACTIVITY TOLERANCE   O2 WALK               AM-PAC '6-Clicks' INPATIENT SHORT FORM - BASIC MOBILITY  How much difficulty does the patient currently have. ..  -   Turning over in bed (including adjusting bedclothes, sheets and blankets)?: understanding of proper technique and strength. THERAPEUTIC EXERCISES  Lower Extremity Ankle pumps  Glut sets  Hip AB/AD  Heel slides  Knee extension  LAQ  Quad sets  SAQ  SLR     Position In recliner, all the way back.       Repetitions   10   Sets   1 assistance level: minimum assistance      Goal #4 HEP with min a   Goal #5     Goal #6     Goal Comments: Goals established on 5/16/2019, in progress 5/17/19,Ongoing 05/19/19

## 2019-05-19 NOTE — PROGRESS NOTES
[facility]                INFECTIOUS DISEASE PROGRESS NOTE    Betty Price Patient Status:  Inpatient    1955 MRN [id number]   [facility] 5NW-A Attending Saleem Merida MD   Hosp Day # 7 PCP MD Mariel Sage 23.0 26.0 23.0  --    ALKPHO 125* 112  --   --   --   --   --    AST 33 30  --   --   --   --   --    ALT 35 28  --   --   --   --   --    BILT 1.5 2.0  --   --   --   --   --    TP 7.9 7.1  --   --   --   --   --     < > = values in this interval not disp Aerobic Smear 3+ Gram positive cocci in clusters N/A       Susceptibility    Staphylococcus aureus, MRSA -  (no method available)     Cefazolin  Resistant      Clindamycin <=0.25 Sensitive      Erythromycin >=8 Resistant      Gentamicin <=0.5 Sensitive

## 2019-05-19 NOTE — PROGRESS NOTES
[facility] Hospitalist Progress Note                                                                   [address] [facility]  2/21/1955    24hr events/SUBJECTIVE:    Pt laying in bed.  Breathing ok, knee pa Daily   • Senna-Docusate Sodium  2 tablet Oral BID   • allopurinol  100 mg Oral Daily   • amLODIPine Besylate  10 mg Oral Daily   • carvedilol  6.25 mg Oral BID with meals   • DULoxetine HCl  20 mg Oral Daily   • ezetimibe  10 mg Oral Daily   • folic acid thrombocytosis  - ?reactive  - uptrending and now down, follow, consider heme consultation if needed    # confusion - resolved  - noted 5/16 and this has since resolved  - likely multifactorial in setting of infection, pain medications, and prolonged hospi

## 2019-05-19 NOTE — PLAN OF CARE
Problem: POD 4 Rt knee arthroscopy, I&D, and partial menisectomy (+MRSA infection)     Data: Pt is A+Ox3-4 forgetful at times. Overall mentation better than prior day, on 2-3L O2 NC at shift start, VSS. , SCD's & Tele applied.  ACE wrap dressings are C/D

## 2019-05-19 NOTE — PROGRESS NOTES
[facility] HOSPITALIST  Progress Note     Juliane Darnell Patient Status:  Inpatient    1955 MRN [id number]   [facility] 3SW-A Attending Zulema Bates MD   Hosp Day # 6 PCP Mariel Proctor MD     Chief Complaint: Rapid response - LATE ENTRY --   --    BILT 1.5 2.0  --   --   --   --    TP 7.9 7.1  --   --   --   --     < > = values in this interval not displayed. Estimated Creatinine Clearance: 74.7 mL/min (based on SCr of 1.05 mg/dL).     Recent Labs   Lab 05/12/19  1821   PTP 16.8*

## 2019-05-19 NOTE — PLAN OF CARE
Patient continues to reports his pain as severe , he is drowsy after taking the pain medications but reports his pain as 9/10. Dressing on his right knee and left hand was changed this morning. VS remain stable, remains on RA throughout the day.  Needs f

## 2019-05-19 NOTE — PROGRESS NOTES
Pulmonary Progress Note        NAME: Paras Pantoja - ROOM: 60[facility]1-O - MRN: [id number] - Age: 71year old - : 1955    Past Medical History:   Diagnosis Date   • 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  Sx 18; NADEGE 3/28/19 2019   • Coronary ath (Oral)   Resp 18   Ht 5' 11\" (1.803 m)   Wt 217 lb 12.8 oz (98.8 kg)   SpO2 96%   BMI 30.38 kg/m²   General:  Alert, no distress   Lungs:   Clear to auscultation bilaterally.    Heart:   S1, S2 normal,    Abdomen:   Soft, non-tender, non distended   Extrem

## 2019-05-20 NOTE — PAYOR COMM NOTE
--------------  CONTINUED STAY REVIEW    Payor: [address] [facility]  Subscriber #:  [id number]  Authorization Number: [id number]     Admit date: 5/12/19  Admit time: 2225    Admitting Physician: Lucinda Blackwood MD  Attending Physician:  Ronald Marte

## 2019-05-20 NOTE — PROGRESS NOTES
[facility]                                                                   [address]  2/21/1955    24hr events/SUBJECTIVE:    Attempted to see earlier, seen by ortho 2 tablet Oral BID   • allopurinol  100 mg Oral Daily   • amLODIPine Besylate  10 mg Oral Daily   • carvedilol  6.25 mg Oral BID with meals   • DULoxetine HCl  20 mg Oral Daily   • ezetimibe  10 mg Oral Daily   • folic acid  1 mg Oral Daily   • gabapentin consider heme consultation if needed    # confusion - resolved  - noted 5/16 and this has since resolved  - likely multifactorial in setting of infection, pain medications, and prolonged hospital stay  - no focal neuro deficits  - UA neg nitrite and neg LE

## 2019-05-20 NOTE — PLAN OF CARE
Pt Aox4. R.A. C/o moderate pain to right knee relieved by Norco.  Lidocaine patch to left foot d/t neuropathy, remove @ 2100. Up with walker and shoes, tolerates well. Ace to right knee removed, bandaides replaced x3.   Kerlix/ace to left hand, dressing ch

## 2019-05-20 NOTE — PLAN OF CARE
Received in bed resting. Discussed plan of care. Contact Isolation maintained for MRSA. Complains of pain in right knee, Medicated as needed for pain see MAR. IV antibiotic infusing intermittently as ordered. Blood sugar elevated.  Covered with sliding scal

## 2019-05-20 NOTE — CM/SW NOTE
Spoke with RN - wife has concerns about meeting pt's needs at home but is aware of pt wanting to d/c home and not to [facility]. She will be here this evening to discuss with pt. Pt will have PICC line placed for LT IVAB tomorrow. PT rec continues to be [facility].

## 2019-05-20 NOTE — PROGRESS NOTES
[address] [facility] Patient Status:  Inpatient    1955 MRN [id number]   [facility] 3SW-A Attending Kimani Lawrence MD   Hosp Day # 8 PCP Pradeep Hardwick MD         S:  Patient doing well regarding hand.   O:  Blood pressur

## 2019-05-20 NOTE — PHYSICAL THERAPY NOTE
PHYSICAL THERAPY TREATMENT NOTE - INPATIENT    Room Number: 530/895-R     Session:4  Number of Visits to Meet Established Goals: 6    Presenting Problem: s/p right partial menisectomy wth aspiration 5/15/19, left thenar I&D 5/14/19  History related to cur of coronary artery stent placement 6/10/2016   • History of CVA (cerebrovascular accident) 6/10/2016   • Hyperlipidemia    • Midtarsal Amp, Peroneus Brevis &Tibialis Anetrior Tendon Transfer, Abductor Hallucia & Digiti Rotational Muscle flap, Achilles Tend Standing: Poor +    ACTIVITY TOLERANCE   O2 WALK               AM-PAC '6-Clicks' INPATIENT SHORT FORM - BASIC MOBILITY  How much difficulty does the patient currently have. ..  -   Turning over in bed (including adjusting bedclothes, sheets and blankets)?: following fall. Pt underwent left hand I&D 5/14/19, right knee aspiration with partial menisectomy 5/15/19.   Pertinent comorbidities and personal factors impacting therapy include h/o CVA, right forefoot amputation(now FWB per MD as of 5/1/19 and confirme Ongoing 05/20/19

## 2019-05-20 NOTE — PROGRESS NOTES
[facility]                INFECTIOUS DISEASE PROGRESS NOTE    Berlin Sosa Patient Status:  Inpatient    1955 MRN [id number]   [facility] 5NW-A Attending Anita Burrell MD   Hosp Day # 8 PCP MD Mariaelena Livingston Result Value Ref Range    Urine Culture No Growth at 18-24 hrs. N/A   3.  BODY FLUID CULT,AEROBIC AND ANAEROBIC     Status: Abnormal    Collection Time: 05/15/19 12:45 PM   Result Value Ref Range    Body Fluid Culture Result 1+ growth Staphylococcus aureus, Specimen: Blood,peripheral     Blood Culture Result Staphylococcus aureusAbnormal     Comment: For susceptibility results see previous culture.        Blood Culture Smear Gram positive cocci in clustersAbnormal     Comment: Previous positive      Blood Cult Specimen: Blood,peripheral     Blood Culture Result No Growth 1 Day   Blood Culture FREQ X 2 [[id number]] Collected: 05/18/19 1222   Order Status: Completed Lab Status: Preliminary result Updated: 05/19/19 1300   Specimen: Blood,peripheral     Blood Culture PICC Line 5/21 if no new pos blood cx  Vancomycin level to be repeated before next dose    3.   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

## 2019-05-20 NOTE — OCCUPATIONAL THERAPY NOTE
OCCUPATIONAL THERAPY TREATMENT NOTE - INPATIENT     Room Number: 373/373-A  Session: 1/5  Number of Visits to Meet Established Goals: 5    Presenting Problem: fever    History related to current admission:   Pt is 71year old male admitted on 5/12/2019 fro of CVA (cerebrovascular accident) 6/10/2016   • Hyperlipidemia    • Midtarsal Amp, Peroneus Brevis &Tibialis Anetrior Tendon Transfer, Abductor Hallucia & Digiti Rotational Muscle flap, Achilles Tendon Lenghtening R -Sx 1/11/18 NADEGE 4/11/18 1/16/2018   • Pe Bathing (including washing, rinsing, drying)?: A Lot  -   Toileting, which includes using toilet, bedpan or urinal? : A Lot  -   Putting on and taking off regular upper body clothing?: A Little  -   Taking care of personal grooming such as brushing teeth?: Discharge Recommendations: Sub-acute rehabilitation([facility] 10-12 days)  OT Device Recommendations: None    PLAN  OT Treatment Plan: Balance activities; Energy conservation/work simplification techniques;ADL training;IADL training;Continued evaluation; [employer]

## 2019-05-20 NOTE — PAYOR COMM NOTE
--------------  CONTINUED STAY REVIEW    Payor: [facility]  Subscriber #:  [id number]  Authorization Number: [id number]     Admit date: 5/12/19  Admit time: 2225    Admitting Physician: Hakeem Boone MD  Attending Physician:  Long Rhodes taken off bipap at time of my eval, O2 stable in mid-90s on 3L NC s/p lasix. On lasix 20mg PO at home - restarted. 5/14/19 with nl EF and no mention of diastolic dysfunction. Minimize IVF as able. Pt hasn't been eating well and had [facility]. Hold IVF for now.  R BIPAP    ASSESSMENT/PLAN:     1. Left hand abscess/MRSA bacteremia  SP I/D thenar space abscess I&D on  5/14     2.  MRSA septic arthritis of right knee   Pain swelling with MRSA bacteremia  - s/p right knee washout  - repeat Bcx again with MRSA, but done b

## 2019-05-21 PROCEDURE — B548ZZA ULTRASONOGRAPHY OF SUPERIOR VENA CAVA, GUIDANCE: ICD-10-PCS | Performed by: HOSPITALIST

## 2019-05-21 PROCEDURE — 02HV33Z INSERTION OF INFUSION DEVICE INTO SUPERIOR VENA CAVA, PERCUTANEOUS APPROACH: ICD-10-PCS | Performed by: HOSPITALIST

## 2019-05-21 RX ORDER — SODIUM CHLORIDE 0.9 % (FLUSH) 0.9 %
10 SYRINGE (ML) INJECTION AS NEEDED
Status: DISCONTINUED | OUTPATIENT
Start: 2019-05-21 | End: 2019-05-24

## 2019-05-21 NOTE — PAYOR COMM NOTE
--------------  CONTINUED STAY REVIEW    Payor: [facility]  Subscriber #:  [id number]  Authorization Number: [id number]     Admit date: 5/12/19  Admit time: 2225    Admitting Physician: Adali Euceda MD  Attending Physician:  Tony Higginbotham

## 2019-05-21 NOTE — PROGRESS NOTES
[facility]                INFECTIOUS DISEASE PROGRESS NOTE    Betty Price Patient Status:  Inpatient    1955 MRN [id number]   [facility] 5NW-A Attending Saleem Merida MD   Hosp Day # 9 PCP MD Mariel Sage (Preliminary result)    Collection Time: 05/18/19 12:22 PM   Result Value Ref Range    Blood Culture Result No Growth 2 Days N/A   2. URINE CULTURE, ROUTINE     Status: None    Collection Time: 05/16/19  4:35 PM   Result Value Ref Range    Urine Culture No complaints     History of snoring     Chronic midline low back pain without sciatica     Mixed axonal-demyelinating polyneuropathy     PVD (peripheral vascular disease) (HCC)     Coronary artery disease involving native coronary artery of native heart with

## 2019-05-21 NOTE — PLAN OF CARE
Pt's pain is controlled with the PO pain meds. Pt moves all extremities well. Encouraged ankle pump exercises. Dressings are clean and dry. Tolerating IV ABX as ordered. Fall precautions in place. Will continue to monitor.

## 2019-05-21 NOTE — CM/SW NOTE
Informed by [facility] that cost of medications are too great for facility to accept. Message sent to facility, asking if wife can bring in the [facility] [address] (pt gets injection every 14 days). Awaiting response.   SW contacted facility and left a message for A

## 2019-05-21 NOTE — PLAN OF CARE
Received call from lab with a critical vanco trough of 28.1. This lab was supposed to be drawn tonight, before the 2300 vanco dose, and it was ordered as such, however phlebotomy angel it with the AM labs.  Notified Mary Lopez, pharmacist. Per Mary Lopez, ignore this

## 2019-05-21 NOTE — CM/SW NOTE
Informed by RN that pt may be cleared for d/c to [facility] today. He is agreeable now to [facility]. [facility] choices from wife via [address] [place] [address], [address] [place] [address], and [place].  [facility] unit would not be able to manage IVAB for pt.   Referr

## 2019-05-21 NOTE — PLAN OF CARE
Pt A&O. On room air. Encouraged use of incentive spirometer and ankle pump exercises every hour while awake. Scds to BLE. Tele and  monitoring maintained as ordered. Tolerating diet as ordered. Blood sugars monitored and insulin given per sliding scale.

## 2019-05-21 NOTE — PROGRESS NOTES
Mil knee pain  I&d arthroscopic Knee scope  Calf st nt  nvi  Incisions clean    Cont ABX  F/u office 2-3 weeks  Remove sutures before discharge

## 2019-05-21 NOTE — PHYSICAL THERAPY NOTE
PHYSICAL THERAPY TREATMENT NOTE - INPATIENT    Room Number: 465/324-B     Session: 5   Number of Visits to Meet Established Goals: 6    Presenting Problem: s/p right partial menisectomy wth aspiration 5/15/19, left thenar I&D 5/14/19  History related to c History of coronary artery stent placement 6/10/2016   • History of CVA (cerebrovascular accident) 6/10/2016   • Hyperlipidemia    • Midtarsal Amp, Peroneus Brevis &Tibialis Anetrior Tendon Transfer, Abductor Hallucia & Digiti Rotational Muscle flap, Angelina Poor +    ACTIVITY TOLERANCE           BP: (!) 175/79             O2 WALK                    AM-PAC '6-Clicks' INPATIENT SHORT FORM - BASIC MOBILITY  How much difficulty does the patient currently have. ..  -   Turning over in bed (including adjusting bedcl Discussed assist needed with rn, pct. THERAPEUTIC EXERCISES  Lower Extremity Ankle pumps  Glut sets  Hip AB/AD  Heel slides  Quad sets     Upper Extremity      Position Supine     Repetitions   10   Sets   1     Patient End of Session: Up in chair; Molly Wilson Goals established on 5/16/2019, updated and Ongoing 05/21/19

## 2019-05-22 NOTE — PROGRESS NOTES
[facility]                INFECTIOUS DISEASE PROGRESS NOTE    Saulo Berry Patient Status:  Inpatient    1955 MRN [id number]   [facility] 5NW-A Attending Adali Davila MD   Hosp Day # 10 PCP Dewey Stewart MD     Antibi result)    Collection Time: 05/18/19 12:22 PM   Result Value Ref Range    Blood Culture Result No Growth 3 Days N/A   2. URINE CULTURE, ROUTINE     Status: None    Collection Time: 05/16/19  4:35 PM   Result Value Ref Range    Urine Culture No Growth at 18 History of snoring     Chronic midline low back pain without sciatica     Mixed axonal-demyelinating polyneuropathy     PVD (peripheral vascular disease) (HCC)     Coronary artery disease involving native coronary artery of native heart without angina pect

## 2019-05-22 NOTE — PLAN OF CARE
Pt A&O. On room air. Encouraged use of incentive spirometer and ankle pump exercises every hour while awake. Scds to BLE. Tele and  monitoring maintained. Tolerating diet with fair appetite. Blood sugars monitored and insulin given per sliding scale.  Sherren Rasher

## 2019-05-22 NOTE — PAYOR COMM NOTE
--------------  CONTINUED STAY REVIEW    Payor: [address] [employer]  Subscriber #:  [id number]  Authorization Number: [id number]     Admit date: 5/12/19  Admit time: 2225    PLEASE SEE HIGHLIGHTED  NOTES     Jean Marie Morin Patient Status:  I CO2 27.0 27.0 27.0             Vancomycin Trough (ug/mL)   Date Value   05/21/2019 18.7      Microbiology     [facility] Encounter on 05/12/19   1.  BLOOD CULTURE     Status: None (Preliminary result)     Collection Time: 05/18/19 History of CVA (cerebrovascular accident)     History of coronary artery stent placement     Type 2 diabetes mellitus with other ophthalmic complication, with long-term current use of insulin (HCC)     Nonrheumatic aortic valve stenosis     Cerebral tito Electronically signed by Marla Hills MD at 5/22/2019 12:00 PM       Corby Pepper      Case Management   CM/SW Note   Addendum   Date of Service:  5/22/2019  9:37 AM                          Call out to [facility] to see if 5/21/2019 1736 Given 6.25 mg Oral Larry Villarreal, RN      Cyclobenzaprine HCl (cyclobenzaprine) tab 10 mg     Date Action Dose Route User    5/22/2019 1138 Given 10 mg Oral Soocrro Epps RN    5/22/2019 1347 Given 10 mg Oral Emiliano Cummins RN Date Action Dose Route User    5/22/2019 1139 Given 16 Units Subcutaneous (Left Upper Arm) Furman Dancer, RN    5/22/2019 0756 Given 4 Units Subcutaneous (Left Upper Arm) Furman Dancer, RN    5/21/2019 2154 Given 12 Units Subcutaneous (Left Upper Ar

## 2019-05-22 NOTE — CM/SW NOTE
[facility] can accept pt- wife states facility is too far. SW sent referrals to [address] [facility] in [facility] and [facility].   Message left for [facility] in [facility] -

## 2019-05-22 NOTE — PROGRESS NOTES
[facility] Hospitalist Progress Note                                                                   [address] [facility]  2/21/1955    24hr events/SUBJECTIVE:    Pt laying in chair.  Some pain in AM. Daily   • amLODIPine Besylate  10 mg Oral Daily   • carvedilol  6.25 mg Oral BID with meals   • DULoxetine HCl  20 mg Oral Daily   • ezetimibe  10 mg Oral Daily   • folic acid  1 mg Oral Daily   • gabapentin  600 mg Oral TID   • Losartan Potassium  50 mg O and this has since resolved  - likely multifactorial in setting of infection, pain medications, and prolonged hospital stay  - no focal neuro deficits  - UA neg nitrite and neg LE, small elevation in WBC.  UCx NGTD x 1d  - positive RN delirium screen - EKG,

## 2019-05-22 NOTE — CM/SW NOTE
Wife wants referral to [facility] in [facility]. ECIN sent. [facility] and [facility] in [place] can accept also. Wait response from [place]- closer to home. Angelica Fountain [facility] in the [place] States[place]ITHA  /Dischage Planner  [phone]

## 2019-05-22 NOTE — PROGRESS NOTES
[facility] Hospitalist Progress Note                                                                   [address] [facility]  2/21/1955    24hr events/SUBJECTIVE:    Pt laying in chair.  Feels vanco wipes h Daily   • Senna-Docusate Sodium  2 tablet Oral BID   • allopurinol  100 mg Oral Daily   • amLODIPine Besylate  10 mg Oral Daily   • carvedilol  6.25 mg Oral BID with meals   • DULoxetine HCl  20 mg Oral Daily   • ezetimibe  10 mg Oral Daily   • folic acid thrombocytosis  - ?reactive  - stable, consider heme consultation if needed but likely ok to follow as o/p    # confusion - resolved  - noted 5/16 and this has since resolved  - likely multifactorial in setting of infection, pain medications, and prolonged

## 2019-05-22 NOTE — PHYSICAL THERAPY NOTE
PHYSICAL THERAPY TREATMENT NOTE - INPATIENT    Room Number: 977/965-G     Session: 6  Number of Visits to Meet Established Goals: 10    Presenting Problem: s/p right partial menisectomy wth aspiration 5/15/19, left thenar I&D 5/14/19  History related to c History of coronary artery stent placement 6/10/2016   • History of CVA (cerebrovascular accident) 6/10/2016   • Hyperlipidemia    • Midtarsal Amp, Peroneus Brevis &Tibialis Anetrior Tendon Transfer, Abductor Hallucia & Digiti Rotational Muscle flap, Angelina Static Standing: Poor +  Dynamic Standing: Poor +    ACTIVITY TOLERANCE                        O2 WALK                    AM-PAC '6-Clicks' INPATIENT SHORT FORM - BASIC MOBILITY  How much difficulty does the patient currently have. ..  -   Turning over in amb 50' then requesting seated rest break due to fatigue, improved distance and tolerance from previous session. Pt made comfortable in recliner. Discussed assist needed with rn, pct.       THERAPEUTIC EXERCISES  Lower Extremity Ankle pumps  Glut sets assistance     Updated to amb 48' with platform rolling walker and supervision.     Goal #4 HEP with min a   Goal #5     Goal #6     Goal Comments: Goals established on 5/16/2019, updated and Ongoing 05/22/19

## 2019-05-22 NOTE — PROGRESS NOTES
Spoke to pt's spouse, Anita Boone, yesterday evening regarding home cholesterol medication.  Per Anita Boone, she spoke to the nurse at [facility] physician's office and was told that the physician said that it would be fine for him to just skip a dose of that medication,

## 2019-05-22 NOTE — PLAN OF CARE
A&O x 4. VSS. On RA. Tele- NSR. Right knee pain well controlled with Norco PRN. Neuropathy to BLE's per baseline. Left hand dressing C/D/I. Bandaids to right knee C/D/I. Voiding without issue per urinal. SCD's/Heparin.  Reviewed POC, pain management, and fa

## 2019-05-22 NOTE — CM/SW NOTE
Call out to [facility] to see if they can accept pt after the medication has been provided on Friday- awaiting call back. sw also placed call to [facility] to see if they are able to accept and have an iso bed available.  No other accepting facil

## 2019-05-22 NOTE — OCCUPATIONAL THERAPY NOTE
OCCUPATIONAL THERAPY TREATMENT NOTE - INPATIENT     Room Number: 373/373-A  Session:  2/5  Number of Visits to Meet Established Goals: 5    Presenting Problem: fever    History related to current admission:   Pt is 71year old male admitted on 5/12/2019 fr of CVA (cerebrovascular accident) 6/10/2016   • Hyperlipidemia    • Midtarsal Amp, Peroneus Brevis &Tibialis Anetrior Tendon Transfer, Abductor Hallucia & Digiti Rotational Muscle flap, Achilles Tendon Lenghtening R -Sx 1/11/18 NADEGE 4/11/18 1/16/2018   • Pe clothing?: A Lot  -   Bathing (including washing, rinsing, drying)?: A Lot  -   Toileting, which includes using toilet, bedpan or urinal? : A Lot  -   Putting on and taking off regular upper body clothing?: A Little  -   Taking care of personal grooming cervantes days)  OT Device Recommendations: None    PLAN  OT Treatment Plan: Balance activities; Energy conservation/work simplification techniques;ADL training;IADL training;Continued evaluation; Compensatory technique education; Neuromuscluar reeducation;Equipment ev

## 2019-05-22 NOTE — CM/SW NOTE
Wife also wants referral to [facility] in [place]. ECIN sent. Wife states her order of preference is :  1. [place]  2. [place]  3. [facility]  [place], [address] [facility] /Dischage Planner  [phone]  Pager [phone]

## 2019-05-23 RX ORDER — RISPERIDONE 0.5 MG/1
0.5 TABLET, FILM COATED ORAL 2 TIMES DAILY PRN
Qty: 30 TABLET | Refills: 0 | Status: SHIPPED | OUTPATIENT
Start: 2019-05-23 | End: 2019-11-26

## 2019-05-23 RX ORDER — HYDROCODONE BITARTRATE AND ACETAMINOPHEN 10; 325 MG/1; MG/1
1-2 TABLET ORAL EVERY 4 HOURS PRN
Qty: 20 TABLET | Refills: 0 | Status: SHIPPED | OUTPATIENT
Start: 2019-05-23 | End: 2019-07-08

## 2019-05-23 RX ORDER — SENNA AND DOCUSATE SODIUM 50; 8.6 MG/1; MG/1
2 TABLET, FILM COATED ORAL 2 TIMES DAILY
Qty: 30 TABLET | Refills: 0 | Status: SHIPPED | OUTPATIENT
Start: 2019-05-23 | End: 2020-12-14 | Stop reason: ALTCHOICE

## 2019-05-23 NOTE — PAYOR COMM NOTE
--------------  CONTINUED STAY REVIEW    Payor: [address] PPO  Subscriber #:  [id number]  Authorization Number: [id number]     Admit date: 5/12/19  Admit time: 255 [address]    Admitting Physician: Kimani Lawrence MD  Attending Physician:  José Miguel Urban 05/22/19  1738 05/22/19  2109 05/23/19  0753   PGLU 164* 265* 131* 232* 171*         Meds:   Scheduled:   • vancomycin  1,250 mg Intravenous Q12H   • lidocaine-menthol  1 patch Transdermal Daily   • Insulin Aspart Pen  4-20 Units Subcutaneous TID CC and HS some improvement      # anemia  - chronic secondary to CKD  -stable     # PVD  - asa 325mg     # neuropathy  - cont gabapentin     # HTN  - cont home meds     # thrombocytosis  - ?reactive  - stable,  F/u with PCP     # confusion - resolved  - noted 5/16 a

## 2019-05-23 NOTE — PLAN OF CARE
Pt in bed. Pain moderate to right knee, denies pain to LUE. Both open to air. Right knee steristrips. Alert but forgetful. RA, BP elevated before BP medications this am. Tele per orders. Heparin sub q. Voiding well. Suppository today if no BM.  ADA diet, to

## 2019-05-23 NOTE — PLAN OF CARE
Pain well controlled with oral medication. Lt hand incision JOSR, rt knee inc with SS CDI. Pt ambulatory using hemiwalker with 1 person assist.  VSS, NSR on monitor. Uses IS as directed, aware to call staff when assistance needed.   Plan is d/c to [facility] when r

## 2019-05-23 NOTE — PHYSICAL THERAPY NOTE
PHYSICAL THERAPY TREATMENT NOTE - INPATIENT    Room Number: 323/466-I     Session: 7  Number of Visits to Meet Established Goals: 10    Presenting Problem: s/p right partial menisectomy wth aspiration 5/15/19, left thenar I&D 5/14/19     History related t History of coronary artery stent placement 6/10/2016   • History of CVA (cerebrovascular accident) 6/10/2016   • Hyperlipidemia    • Midtarsal Amp, Peroneus Brevis &Tibialis Anetrior Tendon Transfer, Abductor Hallucia & Digiti Rotational Muscle flap, Angelina Sitting: Good  Dynamic Sitting: Fair           Static Standing: Poor +  Dynamic Standing: Poor +    ACTIVITY TOLERANCE                        O2 WALK                    AM-PAC '6-Clicks' INPATIENT SHORT FORM - BASIC MOBILITY  How much difficulty does the p Position Supine     Repetitions   10   Sets   1     Patient End of Session: Other (Comment)(pt was left on toilet; RN and pct informed.)    ASSESSMENT   Pt demonstrating progress today, able to improve transfers, able to initiate gait training with platf

## 2019-05-23 NOTE — PROGRESS NOTES
[facility] Hospitalist Progress Note                                                                   [address] [facility]  2/21/1955    24hr events/SUBJECTIVE:    Pt laying in bed, some moderate lela • Insulin Aspart Pen  4-20 Units Subcutaneous TID CC and HS   • furosemide  20 mg Oral Daily   • Senna-Docusate Sodium  2 tablet Oral BID   • allopurinol  100 mg Oral Daily   • amLODIPine Besylate  10 mg Oral Daily   • carvedilol  6.25 mg Oral BID with m F/u with PCP    # confusion - resolved  - noted 5/16 and this has since resolved  - likely multifactorial in setting of infection, pain medications, and prolonged hospital stay  - no focal neuro deficits  - UA neg nitrite and neg LE, small elevation in WBC

## 2019-05-23 NOTE — CM/SW NOTE
HIMANSHU sent referral to [facility] for eventual home IV abs. Pt will be on IV abs until 6/28 per ID. Anticipate pt not needing to be in rehab that long. [facility] also aware of probable need for IV abs at [place]. Angelica Fountain, JESSW  /[place]

## 2019-05-23 NOTE — CM/SW NOTE
[facility] in [place]  [phone] is clinically able to accept pt. They are working on insurance auth. SW sent updateds to them this am: Ab order, micro and 5/22 therapy. Kathy L. Bobby Spatz  /Dischage Planner  [phone]  Pa

## 2019-05-23 NOTE — PROGRESS NOTES
[facility] dosing service    Follow-up Pharmacokinetic Consult for Vancomycin Dosing     Chema Camarillo is a 71year old male who is being treated for sepsis. Patient is on day 11 of Vancomycin and is currently receiving 1.5 gm IV Q 12 hours.   Goal trou Anaerobes isolated N/A   5. AEROBIC BACTERIAL CULTURE     Status: Abnormal    Collection Time: 05/14/19  7:25 PM   Result Value Ref Range    Aerobic Culture Result 4+ growth Staphylococcus aureus, MRSA (A) N/A    Aerobic Smear 4+ WBCs seen N/A    Aerobic S

## 2019-05-24 VITALS
WEIGHT: 217.81 LBS | HEIGHT: 71 IN | SYSTOLIC BLOOD PRESSURE: 159 MMHG | OXYGEN SATURATION: 96 % | RESPIRATION RATE: 15 BRPM | TEMPERATURE: 99 F | HEART RATE: 73 BPM | DIASTOLIC BLOOD PRESSURE: 74 MMHG | BODY MASS INDEX: 30.49 KG/M2

## 2019-05-24 NOTE — PLAN OF CARE
Pt up to chair. Ambulates with min assist and lino walker. Pain moderate to severe to right knee. Denies pain to left wrist. Steri strips to right knee, some old drainage, small amount of swelling.  Left hand swelling, dried blood to under part of hand inci

## 2019-05-24 NOTE — PLAN OF CARE
A&Ox4. Pain controlled with PO prn pain meds. Steri trips to left hand and right knee incisions are dry and intact. Hand elevated on pillow. Continuous tele monitoring. VSS. On room air. Voiding in urinal. Updated on POC.

## 2019-05-24 NOTE — DISCHARGE SUMMARY
General Medicine Discharge Summary     Patient ID:  Chema Camarillo  71year old  [id number]  2/21/1955    Admit date: 5/12/2019    Discharge date and time: 5/24/19    Attending Physician: Lele Harmon, *     Primary Care Physician: Tong Bullock MD dehydration, poor po, improving w IVF.      #LELIA on CKD3- resolved w IVF  - Cr reviewed and wnl     # DM2  - severe hyperglycemia on admission now improved   - hold orals, ssi coverage high dose, + improvement      # anemia  - chronic secondary to CKD  -st inspiration. Magnified heart and upper normal pulmonary vascularity. Mildly tortuous and calcified aorta. Bibasilar interstitial opacities. No focal consolidation. No pleural effusion.       CONCLUSION:  Shallow inspiration with bibasilar hypoaeration/ COMPARISON:  None. INDICATIONS:  fall, left wrist/ thumb injury  PATIENT STATED HISTORY: (As transcribed by Technologist)  Patient stated he fell out of bed this morning and onto his left hand.     FINDINGS:  Arthritic changes are seen at the carpal-first metacarpal joint with subchondral sclerosis and marked spurring and joint space narrowing. No acute fracture identified. SOFT TISSUES:  Arterial vascular calcifications are seen. CONCLUSION:  Arthritic changes at the carpal-first metacarpal joint.   Allegra Baar LEFT(CONTRAST ONLY) (CPT=73201)  COMPARISON:  [facility] , XR HAND (MIN 3 VIEWS), LEFT (CPT=73130), 5/13/2019, 11:47.   INDICATIONS:  evaluate infection dorsal hand and thenar  TECHNIQUE:  Multi-planar CT images were created with non-ionic intravenous contrast extremity venous system. B-mode two-dimensional images of the vascular structures, Doppler spectral analysis, and color flow. Doppler imaging were performed.   The following veins were imaged:  Common, deep, and superficial femoral, popliteal, sapheno-femo measuring 1.5 x 1.1 cm. BOWEL/MESENTERY:  Normal bowel caliber. Moderate to large amount of scattered stool. Surgically absent gallbladder. There is mild colonic diverticulosis without acute diverticulitis. No free air or ascites.  ABDOMINAL WALL:  Smal focal alveolar edema or pneumonia in right lower lung. Dictated by: Boubacar Lisa MD on 5/18/2019 at 7:45     Approved by: Boubacar Lisa MD            Radiology Result Scan    Result Date: 5/22/2019  Ordered by an unspecified provider.       Operative Pr areas    AMLODIPINE BESYLATE 10 MG Oral Tab  TAKE 1 TABLET BY MOUTH DAILY    Blood Glucose Monitoring Suppl (ACCU-CHEK YOLANDA PLUS) w/Device Does not apply Kit  USE TWICE DAILY AS DIRECTED; DX:  E11.39 on insulin    folic acid 1 MG Oral Tab  Take 1 tablet ( directed  Diet: as directed  Wound Care: as directed  Code Status: Full Code  O2: prn    Follow-up with      Call Romelia Hernandez MD   Orthopedics (for hand)  [address]   [phone]           Sc

## 2019-05-24 NOTE — PROGRESS NOTES
[facility]                INFECTIOUS DISEASE PROGRESS NOTE    Costella Runner Patient Status:  Inpatient    1955 MRN [id number]   [facility] 5NW-A Attending Genet Benitez MD   Hosp Day # 15 PCP Mishel Morin MD     Antibi 05/12/19   1.  BLOOD CULTURE     Status: None    Collection Time: 05/18/19 12:22 PM   Result Value Ref Range    Blood Culture Result No Growth 5 Days N/A   2. URINE CULTURE, ROUTINE     Status: None    Collection Time: 05/16/19  4:35 PM   Result Value Ref R microvasculopathy     Cognitive complaints     History of snoring     Chronic midline low back pain without sciatica     Mixed axonal-demyelinating polyneuropathy     PVD (peripheral vascular disease) (HCC)     Coronary artery disease involving native zach

## 2019-05-24 NOTE — PROGRESS NOTES
[facility] Hospitalist Progress Note                                                                   [address] [facility]  2/21/1955    24hr events/SUBJECTIVE:    Pt sleeping in chair- arousable t 1,250 mg Intravenous Q12H   • lidocaine-menthol  1 patch Transdermal Daily   • Insulin Aspart Pen  4-20 Units Subcutaneous TID CC and HS   • furosemide  20 mg Oral Daily   • Senna-Docusate Sodium  2 tablet Oral BID   • allopurinol  100 mg Oral Daily   • a neuropathy  - cont gabapentin     # HTN-with accelerated HTN d/t pain- pain control  - cont home meds    # thrombocytosis  - ?reactive  - stable,  F/u with PCP    # confusion - resolved  - noted 5/16 and this has since resolved  - likely multifactorial in

## 2019-05-24 NOTE — CONSULTS
[facility] dosing service    Follow-up Pharmacokinetic Consult for Vancomycin Dosing     Araceli Oleary is a 71year old male who is being treated for sepsis. Patient is on day 12 of Vancomycin and add is currently receiving 1.25 gm IV Q 12 hours.   Goal BACTERIAL CULTURE     Status: Abnormal    Collection Time: 05/14/19  7:25 PM   Result Value Ref Range    Aerobic Culture Result 4+ growth Staphylococcus aureus, MRSA (A) N/A    Aerobic Smear 4+ WBCs seen N/A    Aerobic Smear 3+ Gram positive cocci in clust

## 2019-05-24 NOTE — CM/SW NOTE
MSW spoke with Sunita Guzman from [facility] in [place]. They still do not have insurance auth. MSW will continue to monitor.     Ebonie Clifford LCSW

## 2019-05-24 NOTE — CM/SW NOTE
MSW received auth for the patient to dc to [facility] today.   He will dc to room 327    RN report:  [phone]  Family to transport    [facility]

## 2019-05-24 NOTE — PLAN OF CARE
Report called to receiving RN at [facility]. Script and paperwork in envelope with wife to give to RN. Paperwork explained to wife. Questions answered and concerns addressed. Will d/c via wife's car.

## 2019-05-24 NOTE — PAYOR COMM NOTE
--------------  CONTINUED STAY REVIEW    Payor: [address]  Subscriber #:  [id number]  Authorization Number: [id number]     Admit date: 5/12/19  Admit time: 255 [address]    Admitting Physician: Camelia Cerda MD  Attending Physician:  Sylvie Santos (ug/mL)   Date Value   05/22/2019 21.2 (H)      Microbiology     [facility] Encounter on 05/12/19   1.  BLOOD CULTURE     Status: None     Collection Time: 05/18/19 12:22 PM   Result Value Ref Range     Blood Culture Result No Grow Type 2 diabetes mellitus with other ophthalmic complication, with long-term current use of insulin (HCC)     Nonrheumatic aortic valve stenosis     Cerebral microvasculopathy     Cognitive complaints     History of snoring     Chronic midline low back lela for the patient to dc to [facility] today.   He will dc to room 327     RN report:  [phone]  Family to [address] [place] [address]            Electronically signed by Danisha Redding LCSW at 5/24/2019  2:00 PM

## 2019-05-24 NOTE — H&P (VIEW-ONLY)
120 [facility] dosing service    Follow-up Pharmacokinetic Consult for Vancomycin Dosing     Reginold Gottron is a 71year old male who is being treated for sepsis. Patient is on day 12 of Vancomycin and add is currently receiving 1.25 gm IV Q 12 hours.   Goal BACTERIAL CULTURE     Status: Abnormal    Collection Time: 05/14/19  7:25 PM   Result Value Ref Range    Aerobic Culture Result 4+ growth Staphylococcus aureus, MRSA (A) N/A    Aerobic Smear 4+ WBCs seen N/A    Aerobic Smear 3+ Gram positive cocci in clust

## 2019-05-24 NOTE — OCCUPATIONAL THERAPY NOTE
OCCUPATIONAL THERAPY TREATMENT NOTE - INPATIENT     Room Number: 373/373-A  Session: 3/5  Number of Visits to Meet Established Goals: 5    Presenting Problem: fever    History related to current admission:  Pt is 71year old male admitted on 5/12/2019 from of CVA (cerebrovascular accident) 6/10/2016   • Hyperlipidemia    • Midtarsal Amp, Peroneus Brevis &Tibialis Anetrior Tendon Transfer, Abductor Hallucia & Digiti Rotational Muscle flap, Achilles Tendon Lenghtening R -Sx 1/11/18 NADEGE 4/11/18 1/16/2018   • Pe (including washing, rinsing, drying)?: A Lot  -   Toileting, which includes using toilet, bedpan or urinal? : A Lot  -   Putting on and taking off regular upper body clothing?: A Little  -   Taking care of personal grooming such as brushing teeth?: A Littl evaluation; Compensatory technique education; Neuromuscluar reeducation;Equipment eval/education;Patient/Family training;Patient/Family education; Endurance training;UE strengthening/ROM; Functional transfer training  Rehab Potential : Good  Frequency (Obs): 5

## 2019-06-11 ENCOUNTER — LAB ENCOUNTER (OUTPATIENT)
Dept: LAB | Age: 64
End: 2019-06-11
Attending: ORTHOPAEDIC SURGERY
Payer: COMMERCIAL

## 2019-06-11 DIAGNOSIS — M00.9 PYOGENIC ARTHRITIS OF RIGHT KNEE JOINT, DUE TO UNSPECIFIED ORGANISM (HCC): Primary | ICD-10-CM

## 2019-06-11 DIAGNOSIS — M00.9 PYOGENIC ARTHRITIS OF RIGHT KNEE JOINT, DUE TO UNSPECIFIED ORGANISM (HCC): ICD-10-CM

## 2019-06-11 PROCEDURE — 86140 C-REACTIVE PROTEIN: CPT

## 2019-06-11 PROCEDURE — 87205 SMEAR GRAM STAIN: CPT

## 2019-06-11 PROCEDURE — 85652 RBC SED RATE AUTOMATED: CPT

## 2019-06-11 PROCEDURE — 87070 CULTURE OTHR SPECIMN AEROBIC: CPT

## 2019-06-11 PROCEDURE — 85025 COMPLETE CBC W/AUTO DIFF WBC: CPT

## 2019-06-11 PROCEDURE — 89060 EXAM SYNOVIAL FLUID CRYSTALS: CPT

## 2019-06-11 PROCEDURE — 89051 BODY FLUID CELL COUNT: CPT

## 2019-06-11 PROCEDURE — 87075 CULTR BACTERIA EXCEPT BLOOD: CPT

## 2019-06-11 PROCEDURE — 36415 COLL VENOUS BLD VENIPUNCTURE: CPT

## 2019-06-11 PROCEDURE — 89050 BODY FLUID CELL COUNT: CPT

## 2019-06-12 NOTE — PROGRESS NOTES
Called patient; wife answered. Told her results. Left staff message and text to Dr Vazquez Diaz. Asked her to call Dr Vazquez Diaz. Possible readmission, possible knee scope.    Will try to coordinate with Dr. Vazquez Diaz

## 2019-06-13 ENCOUNTER — LAB ENCOUNTER (OUTPATIENT)
Dept: LAB | Age: 64
End: 2019-06-13
Attending: ORTHOPAEDIC SURGERY
Payer: COMMERCIAL

## 2019-06-13 DIAGNOSIS — M00.9 PYOGENIC ARTHRITIS OF RIGHT KNEE JOINT, DUE TO UNSPECIFIED ORGANISM (HCC): ICD-10-CM

## 2019-06-13 PROCEDURE — 89051 BODY FLUID CELL COUNT: CPT

## 2019-06-13 PROCEDURE — 87102 FUNGUS ISOLATION CULTURE: CPT

## 2019-06-13 PROCEDURE — 87070 CULTURE OTHR SPECIMN AEROBIC: CPT

## 2019-06-13 PROCEDURE — 87206 SMEAR FLUORESCENT/ACID STAI: CPT

## 2019-06-13 PROCEDURE — 89060 EXAM SYNOVIAL FLUID CRYSTALS: CPT

## 2019-06-14 ENCOUNTER — ANESTHESIA EVENT (OUTPATIENT)
Dept: SURGERY | Facility: HOSPITAL | Age: 64
End: 2019-06-14
Payer: COMMERCIAL

## 2019-06-14 RX ORDER — ACETAMINOPHEN 500 MG
500 TABLET ORAL EVERY 6 HOURS PRN
COMMUNITY
End: 2021-01-05

## 2019-06-14 RX ORDER — SODIUM CHLORIDE 9 MG/ML
INJECTION, SOLUTION INTRAVENOUS CONTINUOUS
Status: CANCELLED | OUTPATIENT
Start: 2019-06-14

## 2019-06-14 RX ORDER — GABAPENTIN 600 MG/1
600 TABLET ORAL 3 TIMES DAILY
COMMUNITY
End: 2019-10-22

## 2019-06-17 ENCOUNTER — HOSPITAL ENCOUNTER (OUTPATIENT)
Facility: HOSPITAL | Age: 64
Setting detail: HOSPITAL OUTPATIENT SURGERY
Discharge: HOME | End: 2019-06-17
Attending: ORTHOPAEDIC SURGERY | Admitting: ORTHOPAEDIC SURGERY
Payer: COMMERCIAL

## 2019-06-17 ENCOUNTER — ANESTHESIA (OUTPATIENT)
Dept: SURGERY | Facility: HOSPITAL | Age: 64
End: 2019-06-17
Payer: COMMERCIAL

## 2019-06-17 VITALS
SYSTOLIC BLOOD PRESSURE: 133 MMHG | HEIGHT: 71 IN | BODY MASS INDEX: 27.58 KG/M2 | OXYGEN SATURATION: 100 % | HEART RATE: 69 BPM | RESPIRATION RATE: 14 BRPM | WEIGHT: 197 LBS | TEMPERATURE: 98 F | DIASTOLIC BLOOD PRESSURE: 61 MMHG

## 2019-06-17 DIAGNOSIS — M00.9 PYOGENIC ARTHRITIS OF RIGHT KNEE JOINT, DUE TO UNSPECIFIED ORGANISM (HCC): ICD-10-CM

## 2019-06-17 PROCEDURE — 82962 GLUCOSE BLOOD TEST: CPT

## 2019-06-17 PROCEDURE — 0SBC4ZZ EXCISION OF RIGHT KNEE JOINT, PERCUTANEOUS ENDOSCOPIC APPROACH: ICD-10-PCS | Performed by: ORTHOPAEDIC SURGERY

## 2019-06-17 PROCEDURE — 89060 EXAM SYNOVIAL FLUID CRYSTALS: CPT | Performed by: ORTHOPAEDIC SURGERY

## 2019-06-17 PROCEDURE — 87116 MYCOBACTERIA CULTURE: CPT | Performed by: ORTHOPAEDIC SURGERY

## 2019-06-17 PROCEDURE — 87206 SMEAR FLUORESCENT/ACID STAI: CPT | Performed by: ORTHOPAEDIC SURGERY

## 2019-06-17 PROCEDURE — 89050 BODY FLUID CELL COUNT: CPT | Performed by: ORTHOPAEDIC SURGERY

## 2019-06-17 PROCEDURE — 89051 BODY FLUID CELL COUNT: CPT | Performed by: ORTHOPAEDIC SURGERY

## 2019-06-17 PROCEDURE — 87102 FUNGUS ISOLATION CULTURE: CPT | Performed by: ORTHOPAEDIC SURGERY

## 2019-06-17 PROCEDURE — 87070 CULTURE OTHR SPECIMN AEROBIC: CPT | Performed by: ORTHOPAEDIC SURGERY

## 2019-06-17 RX ORDER — SODIUM CHLORIDE, SODIUM LACTATE, POTASSIUM CHLORIDE, CALCIUM CHLORIDE 600; 310; 30; 20 MG/100ML; MG/100ML; MG/100ML; MG/100ML
INJECTION, SOLUTION INTRAVENOUS CONTINUOUS
Status: DISCONTINUED | OUTPATIENT
Start: 2019-06-17 | End: 2019-06-17

## 2019-06-17 RX ORDER — HYDROMORPHONE HYDROCHLORIDE 1 MG/ML
INJECTION, SOLUTION INTRAMUSCULAR; INTRAVENOUS; SUBCUTANEOUS
Status: COMPLETED
Start: 2019-06-17 | End: 2019-06-17

## 2019-06-17 RX ORDER — DIPHENHYDRAMINE HYDROCHLORIDE 50 MG/ML
12.5 INJECTION INTRAMUSCULAR; INTRAVENOUS AS NEEDED
Status: DISCONTINUED | OUTPATIENT
Start: 2019-06-17 | End: 2019-06-17

## 2019-06-17 RX ORDER — MEPERIDINE HYDROCHLORIDE 25 MG/ML
12.5 INJECTION INTRAMUSCULAR; INTRAVENOUS; SUBCUTANEOUS AS NEEDED
Status: DISCONTINUED | OUTPATIENT
Start: 2019-06-17 | End: 2019-06-17

## 2019-06-17 RX ORDER — HYDROCODONE BITARTRATE AND ACETAMINOPHEN 5; 325 MG/1; MG/1
2 TABLET ORAL AS NEEDED
Status: DISCONTINUED | OUTPATIENT
Start: 2019-06-17 | End: 2019-06-17

## 2019-06-17 RX ORDER — BUPIVACAINE HYDROCHLORIDE 5 MG/ML
INJECTION, SOLUTION EPIDURAL; INTRACAUDAL AS NEEDED
Status: DISCONTINUED | OUTPATIENT
Start: 2019-06-17 | End: 2019-06-17 | Stop reason: HOSPADM

## 2019-06-17 RX ORDER — DEXTROSE MONOHYDRATE 25 G/50ML
50 INJECTION, SOLUTION INTRAVENOUS
Status: DISCONTINUED | OUTPATIENT
Start: 2019-06-17 | End: 2019-06-17

## 2019-06-17 RX ORDER — ONDANSETRON 2 MG/ML
4 INJECTION INTRAMUSCULAR; INTRAVENOUS AS NEEDED
Status: DISCONTINUED | OUTPATIENT
Start: 2019-06-17 | End: 2019-06-17

## 2019-06-17 RX ORDER — ACETAMINOPHEN 500 MG
1000 TABLET ORAL ONCE
Status: DISCONTINUED | OUTPATIENT
Start: 2019-06-17 | End: 2019-06-17 | Stop reason: HOSPADM

## 2019-06-17 RX ORDER — HYDROCODONE BITARTRATE AND ACETAMINOPHEN 5; 325 MG/1; MG/1
1 TABLET ORAL AS NEEDED
Status: DISCONTINUED | OUTPATIENT
Start: 2019-06-17 | End: 2019-06-17

## 2019-06-17 RX ORDER — HYDROMORPHONE HYDROCHLORIDE 1 MG/ML
0.4 INJECTION, SOLUTION INTRAMUSCULAR; INTRAVENOUS; SUBCUTANEOUS EVERY 5 MIN PRN
Status: DISCONTINUED | OUTPATIENT
Start: 2019-06-17 | End: 2019-06-17

## 2019-06-17 RX ORDER — DEXTROSE MONOHYDRATE 25 G/50ML
50 INJECTION, SOLUTION INTRAVENOUS
Status: DISCONTINUED | OUTPATIENT
Start: 2019-06-17 | End: 2019-06-17 | Stop reason: HOSPADM

## 2019-06-17 RX ORDER — MIDAZOLAM HYDROCHLORIDE 1 MG/ML
1 INJECTION INTRAMUSCULAR; INTRAVENOUS EVERY 5 MIN PRN
Status: DISCONTINUED | OUTPATIENT
Start: 2019-06-17 | End: 2019-06-17

## 2019-06-17 RX ORDER — METOCLOPRAMIDE HYDROCHLORIDE 5 MG/ML
10 INJECTION INTRAMUSCULAR; INTRAVENOUS AS NEEDED
Status: DISCONTINUED | OUTPATIENT
Start: 2019-06-17 | End: 2019-06-17

## 2019-06-17 RX ORDER — NALOXONE HYDROCHLORIDE 0.4 MG/ML
80 INJECTION, SOLUTION INTRAMUSCULAR; INTRAVENOUS; SUBCUTANEOUS AS NEEDED
Status: DISCONTINUED | OUTPATIENT
Start: 2019-06-17 | End: 2019-06-17

## 2019-06-17 NOTE — ANESTHESIA POSTPROCEDURE EVALUATION
224 [facility] Patient Status:  Hospital Outpatient Surgery   Age/Gender 71year old male MRN [id number]   [facility] SURGERY Attending Andrew Boyle MD   Hosp Day # 0 PCP Antoine Phillips MD       Anesthesia Post-op Note    P

## 2019-06-17 NOTE — OR NURSING
Dr. Naty Whaley called. Pt can resume all prior meds upon return to rehab. Resume PT tomorrow. Nursing to remove ace dressing tomorrow and apply bandaids. Pt discharge instructions called to [facility], Spoke with Ahsan. Discharge instructions reviewed.  No

## 2019-06-17 NOTE — INTERVAL H&P NOTE
Pre-op Diagnosis: Pyogenic arthritis of right knee joint, due to unspecified organism [facility]) [M00.9]    The above referenced H&P was reviewed by Leny Carias MD on 6/17/2019, the patient was examined and no significant changes have occurred in the patie

## 2019-06-17 NOTE — BRIEF OP NOTE
Pre-Operative Diagnosis: Pyogenic arthritis of right knee joint, due to unspecified organism [facility]) [M00.9]     Post-Operative Diagnosis: Pyogenic arthritis of right knee joint, due to unspecified organism [facility]) [M00.9]      Procedure Performed:   Procedure

## 2019-06-17 NOTE — INTERVAL H&P NOTE
Pre-op Diagnosis: Pyogenic arthritis of right knee joint, due to unspecified organism [facility]) [M00.9]    The above referenced H&P was reviewed by Sheila Espinoza MD on 6/17/2019, the patient was examined and no significant changes have occurred in the patie

## 2019-06-17 NOTE — ANESTHESIA PREPROCEDURE EVALUATION
PRE-OP EVALUATION    Patient Name: Andrae Henderson    Pre-op Diagnosis: Pyogenic arthritis of right knee joint, due to unspecified organism ([facility] [address] [M00.9]    Procedure(s):  RIGHT KNEE ARTHROSCOPY IRRIGATION AND DEBRIDEMENT    Surgeon(s) and Role:     * Clement Rfl: 0   sodium chloride 0.9% SOLN 500 mL with Vancomycin HCl 500 MG SOLR 1,500 mg Inject 1,500 mg into the vein every 12 (twelve) hours.  (Patient taking differently: Inject 1,500 mg into the vein daily.  ) Disp: 14 Bag Rfl: 5   DULOXETINE HCL 20 MG Oral C MG Oral Tab Take 1 tablet (10 mg total) by mouth daily. Disp: 90 tablet Rfl: 3   carvedilol 6.25 MG Oral Tab Take 1 tablet (6.25 mg total) by mouth 2 (two) times daily with meals.  (Patient taking differently: Take 12.5 mg by mouth 2 (two) times daily with Neuro/Psych      (+) depression  (+) anxiety  (+) CVA       (+) neuromuscular disease             Essential hypertension, benign Gout, unspecified  History of CVA (cerebrovascular accident) History of coronary artery stent placement  Type 2 diabetes mellit was no diagnostic evidence for     regional wall motion abnormalities. 2. Right ventricle: The cavity size was normal. Wall thickness was normal.     Systolic function was normal.  3. Aortic valve: Trileaflet; mildly thickened, mildly calcified leaflets. (L) 06/11/2019    HGB 8.7 (L) 06/11/2019    HCT 28.5 (L) 06/11/2019    MCV 95.6 06/11/2019    MCH 29.2 06/11/2019    MCHC 30.5 (L) 06/11/2019    RDW 13.5 06/11/2019    .0 (H) 06/11/2019     Lab Results   Component Value Date     (L) 05/23/2019

## 2019-06-18 NOTE — OPERATIVE REPORT
[facility]    PATIENT'S NAME: Delma Shani   ATTENDING PHYSICIAN: Aster Goldstein M.D. OPERATING PHYSICIAN: Aster Goldstein M.D.    PATIENT ACCOUNT#:   [de-identified]    LOCATION:  [address] EDWP 10  MEDICAL RECORD #:   [id number]       D with no new meniscal tears. Some degeneration of the ACL was noted. Significant synovitis was noted around the fat pad; this was debrided. The incisions were closed with 4-0 nylon. A sterile dressing was applied.   Then, 20 mL of 0.5% Marcaine without e

## 2019-06-25 ENCOUNTER — PATIENT OUTREACH (OUTPATIENT)
Dept: INFECTIOUS DISEASE | Facility: CLINIC | Age: 64
End: 2019-06-25

## 2019-06-25 PROBLEM — Z47.89 AFTERCARE FOLLOWING SURGERY OF THE MUSCULOSKELETAL SYSTEM, NEC: Status: ACTIVE | Noted: 2019-06-25

## 2019-06-25 PROBLEM — Z47.89 AFTERCARE FOLLOWING SURGERY OF THE MUSCULOSKELETAL SYSTEM, NEC: Status: RESOLVED | Noted: 2019-02-20 | Resolved: 2019-06-25

## 2019-06-25 NOTE — PROGRESS NOTES
INFECTIOUS DISEASE PROGRESS NOTE    Antibiotics: VANC#33    Subjective:  Developed recurrent effusion and aspiration showed gpr, but no growth, returned for washout last week, swelling improved, repeat cx all negative.    Objective:    Gener L 2nd digit flexor tenotomy on 6/11/19; NADEGE 9/9/19      ASSESSMENT/PLAN:  1. Left hand abscess/MRSA bacteremia  SP I/D thenar space abscess 5/14    2.  Septic arthritis of right knee MRSA  Sp I/D 5/15  Repeat I/D 6.17 for recurrent effusion, elevated wbc

## 2019-07-01 ENCOUNTER — LAB REQUISITION (OUTPATIENT)
Dept: LAB | Facility: HOSPITAL | Age: 64
End: 2019-07-01
Payer: COMMERCIAL

## 2019-07-01 DIAGNOSIS — L03.114 CELLULITIS OF LEFT UPPER EXTREMITY: ICD-10-CM

## 2019-07-01 LAB
ANION GAP SERPL CALC-SCNC: 9 MMOL/L (ref 0–18)
BUN BLD-MCNC: 25 MG/DL (ref 7–18)
BUN/CREAT SERPL: 25 (ref 10–20)
CALCIUM BLD-MCNC: 9.6 MG/DL (ref 8.5–10.1)
CHLORIDE SERPL-SCNC: 104 MMOL/L (ref 98–112)
CO2 SERPL-SCNC: 25 MMOL/L (ref 21–32)
CREAT BLD-MCNC: 1 MG/DL (ref 0.7–1.3)
GLUCOSE BLD-MCNC: 123 MG/DL (ref 70–99)
OSMOLALITY SERPL CALC.SUM OF ELEC: 292 MOSM/KG (ref 275–295)
POTASSIUM SERPL-SCNC: 4.5 MMOL/L (ref 3.5–5.1)
SODIUM SERPL-SCNC: 138 MMOL/L (ref 136–145)
VANCOMYCIN TROUGH SERPL-MCNC: 12.9 UG/ML (ref 10–20)

## 2019-07-01 PROCEDURE — 80202 ASSAY OF VANCOMYCIN: CPT | Performed by: INTERNAL MEDICINE

## 2019-07-01 PROCEDURE — 80048 BASIC METABOLIC PNL TOTAL CA: CPT | Performed by: INTERNAL MEDICINE

## 2019-07-08 ENCOUNTER — LAB REQUISITION (OUTPATIENT)
Dept: LAB | Facility: HOSPITAL | Age: 64
End: 2019-07-08
Payer: COMMERCIAL

## 2019-07-08 DIAGNOSIS — L03.114 CELLULITIS OF LEFT UPPER EXTREMITY: ICD-10-CM

## 2019-07-08 LAB
ANION GAP SERPL CALC-SCNC: 7 MMOL/L (ref 0–18)
BUN BLD-MCNC: 38 MG/DL (ref 7–18)
BUN/CREAT SERPL: 32.2 (ref 10–20)
CALCIUM BLD-MCNC: 9.7 MG/DL (ref 8.5–10.1)
CHLORIDE SERPL-SCNC: 105 MMOL/L (ref 98–112)
CO2 SERPL-SCNC: 25 MMOL/L (ref 21–32)
CREAT BLD-MCNC: 1.18 MG/DL (ref 0.7–1.3)
GLUCOSE BLD-MCNC: 132 MG/DL (ref 70–99)
OSMOLALITY SERPL CALC.SUM OF ELEC: 295 MOSM/KG (ref 275–295)
POTASSIUM SERPL-SCNC: 4.7 MMOL/L (ref 3.5–5.1)
SODIUM SERPL-SCNC: 137 MMOL/L (ref 136–145)
VANCOMYCIN TROUGH SERPL-MCNC: 12.1 UG/ML (ref 10–20)

## 2019-07-08 PROCEDURE — 80048 BASIC METABOLIC PNL TOTAL CA: CPT | Performed by: INTERNAL MEDICINE

## 2019-07-08 PROCEDURE — 80202 ASSAY OF VANCOMYCIN: CPT | Performed by: INTERNAL MEDICINE

## 2019-07-15 ENCOUNTER — LAB REQUISITION (OUTPATIENT)
Dept: LAB | Facility: HOSPITAL | Age: 64
End: 2019-07-15
Payer: COMMERCIAL

## 2019-07-15 DIAGNOSIS — L03.114 CELLULITIS OF LEFT UPPER EXTREMITY: ICD-10-CM

## 2019-07-15 LAB
ANION GAP SERPL CALC-SCNC: 7 MMOL/L (ref 0–18)
BUN BLD-MCNC: 31 MG/DL (ref 7–18)
BUN/CREAT SERPL: 28.2 (ref 10–20)
CALCIUM BLD-MCNC: 9.9 MG/DL (ref 8.5–10.1)
CHLORIDE SERPL-SCNC: 106 MMOL/L (ref 98–112)
CO2 SERPL-SCNC: 25 MMOL/L (ref 21–32)
CREAT BLD-MCNC: 1.1 MG/DL (ref 0.7–1.3)
GLUCOSE BLD-MCNC: 114 MG/DL (ref 70–99)
OSMOLALITY SERPL CALC.SUM OF ELEC: 293 MOSM/KG (ref 275–295)
POTASSIUM SERPL-SCNC: 4.5 MMOL/L (ref 3.5–5.1)
SODIUM SERPL-SCNC: 138 MMOL/L (ref 136–145)
VANCOMYCIN TROUGH SERPL-MCNC: 10.1 UG/ML (ref 10–20)

## 2019-07-15 PROCEDURE — 80202 ASSAY OF VANCOMYCIN: CPT | Performed by: INTERNAL MEDICINE

## 2019-07-15 PROCEDURE — 80048 BASIC METABOLIC PNL TOTAL CA: CPT | Performed by: INTERNAL MEDICINE

## 2019-07-26 ENCOUNTER — APPOINTMENT (OUTPATIENT)
Dept: GENERAL RADIOLOGY | Facility: HOSPITAL | Age: 64
DRG: 496 | End: 2019-07-26
Attending: EMERGENCY MEDICINE
Payer: COMMERCIAL

## 2019-07-26 ENCOUNTER — HOSPITAL ENCOUNTER (INPATIENT)
Facility: HOSPITAL | Age: 64
LOS: 4 days | Discharge: HOME HEALTH | DRG: 496 | End: 2019-07-30
Attending: EMERGENCY MEDICINE | Admitting: INTERNAL MEDICINE
Payer: COMMERCIAL

## 2019-07-26 ENCOUNTER — APPOINTMENT (OUTPATIENT)
Dept: CT IMAGING | Facility: HOSPITAL | Age: 64
DRG: 496 | End: 2019-07-26
Attending: INTERNAL MEDICINE
Payer: COMMERCIAL

## 2019-07-26 DIAGNOSIS — M79.604 RIGHT LEG PAIN: Primary | ICD-10-CM

## 2019-07-26 DIAGNOSIS — L08.9: ICD-10-CM

## 2019-07-26 DIAGNOSIS — S90.811A: ICD-10-CM

## 2019-07-26 LAB
ALBUMIN SERPL-MCNC: 2.9 G/DL (ref 3.4–5)
ALBUMIN/GLOB SERPL: 0.6 {RATIO} (ref 1–2)
ALP LIVER SERPL-CCNC: 88 U/L (ref 45–117)
ALT SERPL-CCNC: 19 U/L (ref 16–61)
ANION GAP SERPL CALC-SCNC: 6 MMOL/L (ref 0–18)
AST SERPL-CCNC: 14 U/L (ref 15–37)
BASOPHILS # BLD AUTO: 0.03 X10(3) UL (ref 0–0.2)
BASOPHILS NFR BLD AUTO: 0.3 %
BILIRUB SERPL-MCNC: 0.6 MG/DL (ref 0.1–2)
BUN BLD-MCNC: 32 MG/DL (ref 7–18)
BUN/CREAT SERPL: 25.4 (ref 10–20)
CALCIUM BLD-MCNC: 9.8 MG/DL (ref 8.5–10.1)
CHLORIDE SERPL-SCNC: 103 MMOL/L (ref 98–112)
CO2 SERPL-SCNC: 27 MMOL/L (ref 21–32)
CREAT BLD-MCNC: 1.26 MG/DL (ref 0.7–1.3)
DEPRECATED RDW RBC AUTO: 49.6 FL (ref 35.1–46.3)
EOSINOPHIL # BLD AUTO: 0.03 X10(3) UL (ref 0–0.7)
EOSINOPHIL NFR BLD AUTO: 0.3 %
ERYTHROCYTE [DISTWIDTH] IN BLOOD BY AUTOMATED COUNT: 14.7 % (ref 11–15)
EST. AVERAGE GLUCOSE BLD GHB EST-MCNC: 146 MG/DL (ref 68–126)
GLOBULIN PLAS-MCNC: 5.2 G/DL (ref 2.8–4.4)
GLUCOSE BLD-MCNC: 113 MG/DL (ref 70–99)
GLUCOSE BLD-MCNC: 156 MG/DL (ref 70–99)
GLUCOSE BLD-MCNC: 156 MG/DL (ref 70–99)
GLUCOSE BLD-MCNC: 214 MG/DL (ref 70–99)
GLUCOSE BLD-MCNC: 278 MG/DL (ref 70–99)
HBA1C MFR BLD HPLC: 6.7 % (ref ?–5.7)
HCT VFR BLD AUTO: 23.7 % (ref 39–53)
HGB BLD-MCNC: 7.6 G/DL (ref 13–17.5)
IMM GRANULOCYTES # BLD AUTO: 0.05 X10(3) UL (ref 0–1)
IMM GRANULOCYTES NFR BLD: 0.5 %
LACTATE SERPL-SCNC: 0.7 MMOL/L (ref 0.4–2)
LYMPHOCYTES # BLD AUTO: 1.05 X10(3) UL (ref 1–4)
LYMPHOCYTES NFR BLD AUTO: 11.3 %
M PROTEIN MFR SERPL ELPH: 8.1 G/DL (ref 6.4–8.2)
MCH RBC QN AUTO: 29.3 PG (ref 26–34)
MCHC RBC AUTO-ENTMCNC: 32.1 G/DL (ref 31–37)
MCV RBC AUTO: 91.5 FL (ref 80–100)
MONOCYTES # BLD AUTO: 1.12 X10(3) UL (ref 0.1–1)
MONOCYTES NFR BLD AUTO: 12 %
NEUTROPHILS # BLD AUTO: 7.03 X10 (3) UL (ref 1.5–7.7)
NEUTROPHILS # BLD AUTO: 7.03 X10(3) UL (ref 1.5–7.7)
NEUTROPHILS NFR BLD AUTO: 75.6 %
OSMOLALITY SERPL CALC.SUM OF ELEC: 292 MOSM/KG (ref 275–295)
PLATELET # BLD AUTO: 315 10(3)UL (ref 150–450)
POTASSIUM SERPL-SCNC: 4.7 MMOL/L (ref 3.5–5.1)
RBC # BLD AUTO: 2.59 X10(6)UL (ref 3.8–5.8)
SODIUM SERPL-SCNC: 136 MMOL/L (ref 136–145)
WBC # BLD AUTO: 9.3 X10(3) UL (ref 4–11)

## 2019-07-26 PROCEDURE — 85025 COMPLETE CBC W/AUTO DIFF WBC: CPT | Performed by: EMERGENCY MEDICINE

## 2019-07-26 PROCEDURE — 99285 EMERGENCY DEPT VISIT HI MDM: CPT

## 2019-07-26 PROCEDURE — 82962 GLUCOSE BLOOD TEST: CPT

## 2019-07-26 PROCEDURE — 83605 ASSAY OF LACTIC ACID: CPT | Performed by: EMERGENCY MEDICINE

## 2019-07-26 PROCEDURE — 73701 CT LOWER EXTREMITY W/DYE: CPT | Performed by: INTERNAL MEDICINE

## 2019-07-26 PROCEDURE — 73610 X-RAY EXAM OF ANKLE: CPT | Performed by: EMERGENCY MEDICINE

## 2019-07-26 PROCEDURE — 36415 COLL VENOUS BLD VENIPUNCTURE: CPT

## 2019-07-26 PROCEDURE — 96375 TX/PRO/DX INJ NEW DRUG ADDON: CPT

## 2019-07-26 PROCEDURE — 87040 BLOOD CULTURE FOR BACTERIA: CPT | Performed by: EMERGENCY MEDICINE

## 2019-07-26 PROCEDURE — 96374 THER/PROPH/DIAG INJ IV PUSH: CPT

## 2019-07-26 PROCEDURE — 80053 COMPREHEN METABOLIC PANEL: CPT | Performed by: EMERGENCY MEDICINE

## 2019-07-26 PROCEDURE — 83036 HEMOGLOBIN GLYCOSYLATED A1C: CPT | Performed by: INTERNAL MEDICINE

## 2019-07-26 RX ORDER — EZETIMIBE 10 MG/1
10 TABLET ORAL DAILY
Status: DISCONTINUED | OUTPATIENT
Start: 2019-07-26 | End: 2019-07-30

## 2019-07-26 RX ORDER — CARBAMAZEPINE 200 MG/1
400 TABLET ORAL 3 TIMES DAILY
Status: DISCONTINUED | OUTPATIENT
Start: 2019-07-26 | End: 2019-07-30

## 2019-07-26 RX ORDER — GABAPENTIN 600 MG/1
600 TABLET ORAL 3 TIMES DAILY
Status: DISCONTINUED | OUTPATIENT
Start: 2019-07-26 | End: 2019-07-30

## 2019-07-26 RX ORDER — DULOXETIN HYDROCHLORIDE 20 MG/1
20 CAPSULE, DELAYED RELEASE ORAL DAILY
COMMUNITY
End: 2020-07-22

## 2019-07-26 RX ORDER — ALLOPURINOL 100 MG/1
100 TABLET ORAL DAILY
Status: DISCONTINUED | OUTPATIENT
Start: 2019-07-26 | End: 2019-07-30

## 2019-07-26 RX ORDER — POLYETHYLENE GLYCOL 3350 17 G/17G
17 POWDER, FOR SOLUTION ORAL DAILY PRN
Status: DISCONTINUED | OUTPATIENT
Start: 2019-07-26 | End: 2019-07-30

## 2019-07-26 RX ORDER — CARVEDILOL 6.25 MG/1
6.25 TABLET ORAL 2 TIMES DAILY WITH MEALS
Status: DISCONTINUED | OUTPATIENT
Start: 2019-07-26 | End: 2019-07-30

## 2019-07-26 RX ORDER — HYDROCODONE BITARTRATE AND ACETAMINOPHEN 10; 325 MG/1; MG/1
1 TABLET ORAL EVERY 6 HOURS PRN
Status: DISCONTINUED | OUTPATIENT
Start: 2019-07-26 | End: 2019-07-30

## 2019-07-26 RX ORDER — DULOXETIN HYDROCHLORIDE 20 MG/1
20 CAPSULE, DELAYED RELEASE ORAL EVERY EVENING
Status: DISCONTINUED | OUTPATIENT
Start: 2019-07-26 | End: 2019-07-30

## 2019-07-26 RX ORDER — AMLODIPINE BESYLATE 10 MG/1
10 TABLET ORAL DAILY
COMMUNITY
End: 2020-06-17

## 2019-07-26 RX ORDER — MORPHINE SULFATE 4 MG/ML
4 INJECTION, SOLUTION INTRAMUSCULAR; INTRAVENOUS EVERY 30 MIN PRN
Status: COMPLETED | OUTPATIENT
Start: 2019-07-26 | End: 2019-07-26

## 2019-07-26 RX ORDER — SODIUM PHOSPHATE, DIBASIC AND SODIUM PHOSPHATE, MONOBASIC 7; 19 G/133ML; G/133ML
1 ENEMA RECTAL ONCE AS NEEDED
Status: DISCONTINUED | OUTPATIENT
Start: 2019-07-26 | End: 2019-07-30

## 2019-07-26 RX ORDER — ACETAMINOPHEN 325 MG/1
650 TABLET ORAL EVERY 6 HOURS PRN
Status: DISCONTINUED | OUTPATIENT
Start: 2019-07-26 | End: 2019-07-30

## 2019-07-26 RX ORDER — LIDOCAINE 50 MG/G
1 PATCH TOPICAL EVERY 24 HOURS
COMMUNITY
End: 2019-11-01 | Stop reason: ALTCHOICE

## 2019-07-26 RX ORDER — ALLOPURINOL 100 MG/1
100 TABLET ORAL DAILY
COMMUNITY
End: 2019-11-01 | Stop reason: ALTCHOICE

## 2019-07-26 RX ORDER — LOSARTAN POTASSIUM 50 MG/1
50 TABLET ORAL EVERY EVENING
Status: DISCONTINUED | OUTPATIENT
Start: 2019-07-26 | End: 2019-07-30

## 2019-07-26 RX ORDER — ASPIRIN 325 MG
325 TABLET ORAL DAILY
Status: DISCONTINUED | OUTPATIENT
Start: 2019-07-26 | End: 2019-07-30

## 2019-07-26 RX ORDER — PRAMIPEXOLE DIHYDROCHLORIDE 1 MG/1
1 TABLET ORAL NIGHTLY
COMMUNITY
End: 2019-11-01 | Stop reason: ALTCHOICE

## 2019-07-26 RX ORDER — GLYBURIDE 5 MG/1
5 TABLET ORAL 2 TIMES DAILY WITH MEALS
COMMUNITY
End: 2019-12-25

## 2019-07-26 RX ORDER — FUROSEMIDE 20 MG/1
20 TABLET ORAL DAILY
COMMUNITY
End: 2019-12-20

## 2019-07-26 RX ORDER — ENOXAPARIN SODIUM 100 MG/ML
40 INJECTION SUBCUTANEOUS DAILY
Status: DISCONTINUED | OUTPATIENT
Start: 2019-07-26 | End: 2019-07-30

## 2019-07-26 RX ORDER — FOLIC ACID 1 MG/1
1 TABLET ORAL DAILY
Status: DISCONTINUED | OUTPATIENT
Start: 2019-07-26 | End: 2019-07-30

## 2019-07-26 RX ORDER — AMLODIPINE BESYLATE 5 MG/1
10 TABLET ORAL DAILY
Status: DISCONTINUED | OUTPATIENT
Start: 2019-07-26 | End: 2019-07-30

## 2019-07-26 RX ORDER — METOCLOPRAMIDE HYDROCHLORIDE 5 MG/ML
10 INJECTION INTRAMUSCULAR; INTRAVENOUS EVERY 8 HOURS PRN
Status: DISCONTINUED | OUTPATIENT
Start: 2019-07-26 | End: 2019-07-30

## 2019-07-26 RX ORDER — BISACODYL 10 MG
10 SUPPOSITORY, RECTAL RECTAL
Status: DISCONTINUED | OUTPATIENT
Start: 2019-07-26 | End: 2019-07-30

## 2019-07-26 RX ORDER — FUROSEMIDE 20 MG/1
20 TABLET ORAL DAILY
Status: DISCONTINUED | OUTPATIENT
Start: 2019-07-26 | End: 2019-07-30

## 2019-07-26 RX ORDER — PRAMIPEXOLE DIHYDROCHLORIDE 0.25 MG/1
1 TABLET ORAL NIGHTLY
Status: DISCONTINUED | OUTPATIENT
Start: 2019-07-26 | End: 2019-07-30

## 2019-07-26 RX ORDER — RISPERIDONE 0.5 MG/1
0.5 TABLET, FILM COATED ORAL 2 TIMES DAILY PRN
Status: DISCONTINUED | OUTPATIENT
Start: 2019-07-26 | End: 2019-07-30

## 2019-07-26 RX ORDER — DOCUSATE SODIUM 100 MG/1
100 CAPSULE, LIQUID FILLED ORAL 2 TIMES DAILY
Status: DISCONTINUED | OUTPATIENT
Start: 2019-07-26 | End: 2019-07-30

## 2019-07-26 RX ORDER — ONDANSETRON 2 MG/ML
4 INJECTION INTRAMUSCULAR; INTRAVENOUS EVERY 6 HOURS PRN
Status: DISCONTINUED | OUTPATIENT
Start: 2019-07-26 | End: 2019-07-30

## 2019-07-26 RX ORDER — FENOFIBRATE 134 MG/1
134 CAPSULE ORAL
Status: DISCONTINUED | OUTPATIENT
Start: 2019-07-27 | End: 2019-07-30

## 2019-07-26 RX ORDER — CARBAMAZEPINE 200 MG/1
400 TABLET ORAL 2 TIMES DAILY
COMMUNITY
End: 2020-02-04 | Stop reason: ALTCHOICE

## 2019-07-26 RX ORDER — DEXTROSE MONOHYDRATE 25 G/50ML
50 INJECTION, SOLUTION INTRAVENOUS
Status: DISCONTINUED | OUTPATIENT
Start: 2019-07-26 | End: 2019-07-30

## 2019-07-26 NOTE — H&P
[facility] Hospitalist History and Physical      Patient presents with:  Lower Extremity Injury (musculoskeletal)       PCP: Joseph Pacheco MD    History of Present Illness: Patient is a 71year old male with PMH sig for CAD, HTN, PVD, chronic osteomyelitis, prior M right foot. • OTHER  01/2019    Right ankle fusion        ALL:    Clindamycin             RASH  Statins                 RASH       No current outpatient medications on file.     Social History    Tobacco Use      Smoking status: Never Smoker      Smokele 156 07/26/2019    CA 9.8 07/26/2019    ALB 2.9 07/26/2019    ALKPHO 88 07/26/2019    BILT 0.6 07/26/2019    TP 8.1 07/26/2019    AST 14 07/26/2019    ALT 19 07/26/2019    PGLU 113 07/26/2019       CXR: image personally reviewed.       Radiology: Xr Ankle (m meds     Prev:  lovenox       Outpatient records or previous hospital records reviewed. [facility] hospitalist to continue to follow patient while in house  A total of 75  minutes taken with patient and coordinating care.   Greater than 50% face to face encounte

## 2019-07-26 NOTE — ED PROVIDER NOTES
Patient Seen in: [facility] Emergency Department    History   Patient presents with:  Lower Extremity Injury (musculoskeletal)    Stated Complaint: Foot problem    LIZBETH Carnye is a pleasant 42-year-old presenting to the emerge department for foot ARTHROSCOPY Right 6/17/2019    Performed by Dwight Doe MD at [facility] MAIN OR   • KNEE ARTHROSCOPY Right 5/15/2019    Performed by Augustin Boyle MD at [facility] MAIN OR   • OTHER Left     AC joint x 2   • OTHER  06/2017    1st metatarsal osteotomy, 2nd, and for the following components:    RBC 2.59 (*)     HGB 7.6 (*)     HCT 23.7 (*)     RDW-SD 49.6 (*)     Monocyte Absolute 1.12 (*)     All other components within normal limits   LACTIC ACID, PLASMA - Normal   CBC WITH DIFFERENTIAL WITH PLATELET    Facundo Alvarenga

## 2019-07-26 NOTE — PAYOR COMM NOTE
--------------  ADMISSION REVIEW     Payor: [address] PPO  Subscriber #:  [id number]  Authorization Number: [id number]    Admit date: 7/26/19  Admit time: 1234       Admitting Physician: Ceasar West MD  Attending Physician:  Abhilash Rader, RDW-SD 49.6 (*)     Monocyte Absolute 1.12 (*)           MDM   I discussed the patient in detail with his infectious disease doctors are familiar with him patient will be admitted      Disposition and Plan     Clinical Impression:  Right leg pain  (primary

## 2019-07-26 NOTE — ED INITIAL ASSESSMENT (HPI)
Patient reports his right foot is extremely painful. Had a partial amputation of right foot years ago. Had a sandro placed in January. Recently here for MRSA, went to rehab and cleared from all.   Patient had been able to walk without difficulty until yest

## 2019-07-26 NOTE — CONSULTS
INFECTIOUS DISEASE CONSULTATION    Jerad Cristo Patient Status:  Inpatient    1955 MRN [id number]   [facility] 4NW-A Attending Dany Davis MD   [facility] Day # 0 PCP Colin Andrews, Maria Urban at [facility] MAIN OR   • HAND MASS EXCISION Left 5/14/2019    Performed by oSurav Romo MD at [facility] MAIN OR   • Waldo Jatinder Rabia 950 Right 10/4/2017    Performed by Evette Dick DPM at [facility] MAIN OR   • KNEE ARTHROSCOPY Right 6/17/2019    Perform meals  •  DULoxetine HCl (CYMBALTA) DR particles cap 20 mg, 20 mg, Oral, QPM  •  ezetimibe (ZETIA) tab 10 mg, 10 mg, Oral, Daily  •  [START ON 7/27/2019] fenofibrate micronized (LOFIBRA) cap 134 mg, 134 mg, Oral, Daily with breakfast  •  folic acid (FOLVIT Evolocumab (REPATHA SURECLICK) 698 MG/ML Subcutaneous Solution Auto-injector Inject 140 mg into the skin every 14 (fourteen) days.  Disp: 6 pen Rfl: 3   HYDROcodone-acetaminophen (NORCO)  MG Oral Tab Take 1 tablet by mouth every 6 (six) hours as nee tablet (0.5 mg total) by mouth 2 (two) times daily as needed (RASS +2). Disp: 30 tablet Rfl: 0   Tadalafil (CIALIS) 20 MG Oral Tab Take 1 tablet (20 mg total) by mouth daily as needed for Erectile Dysfunction.  Disp: 30 tablet Rfl: 3   vitamin E 1000 UNITS accident)     History of coronary artery stent placement     Type 2 diabetes mellitus with other ophthalmic complication, with long-term current use of insulin (HCC)     Nonrheumatic aortic valve stenosis     Cerebral microvasculopathy     Cognitive compla

## 2019-07-27 ENCOUNTER — ANESTHESIA EVENT (OUTPATIENT)
Dept: SURGERY | Facility: HOSPITAL | Age: 64
DRG: 496 | End: 2019-07-27
Payer: COMMERCIAL

## 2019-07-27 LAB
ANION GAP SERPL CALC-SCNC: 7 MMOL/L (ref 0–18)
BASOPHILS # BLD AUTO: 0.03 X10(3) UL (ref 0–0.2)
BASOPHILS NFR BLD AUTO: 0.4 %
BUN BLD-MCNC: 28 MG/DL (ref 7–18)
BUN/CREAT SERPL: 22.2 (ref 10–20)
CALCIUM BLD-MCNC: 9.3 MG/DL (ref 8.5–10.1)
CHLORIDE SERPL-SCNC: 103 MMOL/L (ref 98–112)
CO2 SERPL-SCNC: 26 MMOL/L (ref 21–32)
CREAT BLD-MCNC: 1.26 MG/DL (ref 0.7–1.3)
CREAT BLD-MCNC: 1.26 MG/DL (ref 0.7–1.3)
DEPRECATED RDW RBC AUTO: 47.8 FL (ref 35.1–46.3)
EOSINOPHIL # BLD AUTO: 0.03 X10(3) UL (ref 0–0.7)
EOSINOPHIL NFR BLD AUTO: 0.4 %
ERYTHROCYTE [DISTWIDTH] IN BLOOD BY AUTOMATED COUNT: 14.6 % (ref 11–15)
GLUCOSE BLD-MCNC: 117 MG/DL (ref 70–99)
GLUCOSE BLD-MCNC: 138 MG/DL (ref 70–99)
GLUCOSE BLD-MCNC: 141 MG/DL (ref 70–99)
GLUCOSE BLD-MCNC: 148 MG/DL (ref 70–99)
GLUCOSE BLD-MCNC: 221 MG/DL (ref 70–99)
HCT VFR BLD AUTO: 23.4 % (ref 39–53)
HGB BLD-MCNC: 7.5 G/DL (ref 13–17.5)
IMM GRANULOCYTES # BLD AUTO: 0.07 X10(3) UL (ref 0–1)
IMM GRANULOCYTES NFR BLD: 0.9 %
LYMPHOCYTES # BLD AUTO: 1.31 X10(3) UL (ref 1–4)
LYMPHOCYTES NFR BLD AUTO: 15.9 %
MCH RBC QN AUTO: 28.5 PG (ref 26–34)
MCHC RBC AUTO-ENTMCNC: 32.1 G/DL (ref 31–37)
MCV RBC AUTO: 89 FL (ref 80–100)
MONOCYTES # BLD AUTO: 1.12 X10(3) UL (ref 0.1–1)
MONOCYTES NFR BLD AUTO: 13.6 %
NEUTROPHILS # BLD AUTO: 5.66 X10 (3) UL (ref 1.5–7.7)
NEUTROPHILS # BLD AUTO: 5.66 X10(3) UL (ref 1.5–7.7)
NEUTROPHILS NFR BLD AUTO: 68.8 %
OSMOLALITY SERPL CALC.SUM OF ELEC: 289 MOSM/KG (ref 275–295)
PLATELET # BLD AUTO: 297 10(3)UL (ref 150–450)
POTASSIUM SERPL-SCNC: 4.1 MMOL/L (ref 3.5–5.1)
RBC # BLD AUTO: 2.63 X10(6)UL (ref 3.8–5.8)
SODIUM SERPL-SCNC: 136 MMOL/L (ref 136–145)
WBC # BLD AUTO: 8.2 X10(3) UL (ref 4–11)

## 2019-07-27 PROCEDURE — 80048 BASIC METABOLIC PNL TOTAL CA: CPT | Performed by: INTERNAL MEDICINE

## 2019-07-27 PROCEDURE — 82565 ASSAY OF CREATININE: CPT | Performed by: INTERNAL MEDICINE

## 2019-07-27 PROCEDURE — 82962 GLUCOSE BLOOD TEST: CPT

## 2019-07-27 PROCEDURE — 85025 COMPLETE CBC W/AUTO DIFF WBC: CPT | Performed by: INTERNAL MEDICINE

## 2019-07-27 NOTE — PLAN OF CARE
A/O. Numbness and tingling to left foot, not new per pt. Right foot toe amputation. Infected hardware right foot. Some redness to right foot. No open areas noted. NWB right foot. RA 98%. Denies SOB. NSR on tele. Voids via urinal. C/o pain to right foot.  Pr Instruct patient on fluid and nutrition restrictions as appropriate  Outcome: Progressing     Problem: SKIN/TISSUE INTEGRITY - ADULT  Goal: Skin integrity remains intact  Description  INTERVENTIONS  - Assess and document risk factors for pressure ulcer dev

## 2019-07-27 NOTE — CONSULTS
Chema Camarillo 71year old male with past medical history of CAD, hypertension, PVD, diabetic neuropathy, right midfoot amputation, status post right ankle fusion and history of MRSA bacteremia admitted for right foot and ankle erythema and pain.   Patient h Andres Parks MD at [facility] OR   • KNEE ARTHROSCOPY Right 5/15/2019    Performed by Andres Boyle MD at [facility] OR   • OTHER Left     AC joint x 2   • OTHER  06/2017    1st metatarsal osteotomy, 2nd, and 3rd metatarsal head resections, right foot.     • OTHE Asked        Weight Concern: Not Asked    Social History Narrative      : 1975      Children: 2      Exercise: walks      Employment: disabled, paramedic, video production      Caffeine intake: minimal      Clindamycin             RASH  Statins midfoot. Extensive soft tissue swelling at the distal right lower extremity is nonspecific but clinical correlation is recommended to exclude cellulitis. No acute displaced osseous fracture is seen.     Right ankle CT: Focal fluid collection is present po

## 2019-07-27 NOTE — PROGRESS NOTES
[facility]                INFECTIOUS DISEASE PROGRESS NOTE    Teri Zhou Patient Status:  Inpatient    1955 MRN [id number]   [facility] 4NW-A Attending Hira Nicole MD   Hosp Day # 1 PCP MD Monica Ruelas benign     Gout, unspecified     History of CVA (cerebrovascular accident)     History of coronary artery stent placement     Type 2 diabetes mellitus with other ophthalmic complication, with long-term current use of insulin (HCC)     Nonrheumatic aortic v bacteremia. Dr. Munoz Don on consult  Continue vancomycin    2. Depressed mood-anxiety /related to concern for potential amputation  Currently on antidepressants, hospitalist following      3.  HTN/DM per hospitalist        MD Fortino Og

## 2019-07-27 NOTE — PLAN OF CARE
Consent for right foot I&D, possible hardware removal and placement antibiotics spacer signed and on chart. NPO after midnight.

## 2019-07-27 NOTE — PROGRESS NOTES
[facility] Hospitalist Progress Note     PCP: Fernandez Mittal MD    Chief Complaint: follow-up    Overnight/Interim Events:      SUBJECTIVE:  Pt laying in bed. Last night had fever. sxs started Thursday.     OBJECTIVE:  Temp:  [98 °F (36.7 °C)-100.2 °F (37.9 °C)] 98 156* 117*       Recent Labs   Lab 07/26/19  0835   ALT 19   AST 14*   ALB 2.9*       Recent Labs   Lab 07/26/19  1412 07/26/19  1646 07/26/19  2252 07/27/19  0744 07/27/19  1316   PGLU 113* 214* 278* 141* 138*       No results for input(s): TROP in the las Charla Gary, [facility] Hospitalist  [phone]  7/27/2019  4:12 PM

## 2019-07-27 NOTE — ANESTHESIA PREPROCEDURE EVALUATION
PRE-OP EVALUATION    Patient Name: Hanane Saleh    Pre-op Diagnosis: Infected abrasion of right foot [S90.811A, L08.9]    Procedure(s):  INCISION AND DEBRIDMENT - REMOVAL HARDWARE - RIGHT FOOT    Surgeon(s) and Role:     Hansa Brownlee DPM - Primary Q6H PRN   losartan Potassium (COZAAR) tab 50 mg 50 mg Oral QPM   Pramipexole Dihydrochloride (MIRAPEX) tab 1 mg 1 mg Oral Nightly   risperiDONE (RISPERDAL) tab 0.5 mg 0.5 mg Oral BID PRN   glucose (DEX4) oral liquid 15 g 15 g Oral Q15 Min PRN   Or      Glu Take 2 tablets by mouth 2 (two) times daily. Disp: 30 tablet Rfl: 0   folic acid 1 MG Oral Tab Take 1 tablet (1 mg total) by mouth daily. Disp: 30 tablet Rfl: 3   Losartan Potassium 50 MG Oral Tab Take 1 tablet (50 mg total) by mouth every evening.  Disp: 9 artery stent placement  Type 2 diabetes mellitus with other ophthalmic complication, with long-term current use of insulin (HCC) Nonrheumatic aortic valve stenosis  Cerebral microvasculopathy Cognitive complaints  History of snoring Chronic midline low rayna osteotomy, 2nd, and 3rd metatarsal head resections, right foot.     • OTHER  01/2019    Right ankle fusion     Social History    Tobacco Use      Smoking status: Never Smoker      Smokeless tobacco: Never Used    Alcohol use: No      Alcohol/week: 0.0 stand

## 2019-07-27 NOTE — HOME CARE LIAISON
ptnt current with [facility] for sn/pt/ot  eoe 8/25/19  Will need a rock order on or before  Dc    Thanks  Astrid Guillory

## 2019-07-27 NOTE — PLAN OF CARE
Problem: GASTROINTESTINAL - ADULT  Goal: Maintains adequate nutritional intake (undernourished)  Description  INTERVENTIONS:  - Monitor percentage of each meal consumed  - Identify factors contributing to decreased intake, treat as appropriate  - Assist hemorrhage  - Monitor labs and vital signs for trends  - Administer supportive blood products/factors, fluids and medications as ordered and appropriate  - Administer supportive blood products/factors as ordered and appropriate  Outcome: Progressing    Ass

## 2019-07-27 NOTE — PLAN OF CARE
A&Ox4; RA; tele- NSR   Contact recent MRSA  Admitted for right foot swelling/redness/pain  Partial R foot amputation  IV vanco  CT ankle/foot  Accucheck  Will continue

## 2019-07-28 ENCOUNTER — ANESTHESIA (OUTPATIENT)
Dept: SURGERY | Facility: HOSPITAL | Age: 64
DRG: 496 | End: 2019-07-28
Payer: COMMERCIAL

## 2019-07-28 ENCOUNTER — APPOINTMENT (OUTPATIENT)
Dept: GENERAL RADIOLOGY | Facility: HOSPITAL | Age: 64
DRG: 496 | End: 2019-07-28
Attending: PODIATRIST
Payer: COMMERCIAL

## 2019-07-28 LAB
CREAT BLD-MCNC: 0.98 MG/DL (ref 0.7–1.3)
GLUCOSE BLD-MCNC: 145 MG/DL (ref 70–99)
GLUCOSE BLD-MCNC: 148 MG/DL (ref 70–99)
GLUCOSE BLD-MCNC: 151 MG/DL (ref 70–99)
GLUCOSE BLD-MCNC: 157 MG/DL (ref 70–99)
GLUCOSE BLD-MCNC: 173 MG/DL (ref 70–99)
VANCOMYCIN TROUGH SERPL-MCNC: 21.9 UG/ML (ref 10–20)

## 2019-07-28 PROCEDURE — 87147 CULTURE TYPE IMMUNOLOGIC: CPT | Performed by: PODIATRIST

## 2019-07-28 PROCEDURE — 87070 CULTURE OTHR SPECIMN AEROBIC: CPT | Performed by: PODIATRIST

## 2019-07-28 PROCEDURE — 82565 ASSAY OF CREATININE: CPT | Performed by: INTERNAL MEDICINE

## 2019-07-28 PROCEDURE — 87205 SMEAR GRAM STAIN: CPT | Performed by: PODIATRIST

## 2019-07-28 PROCEDURE — 87186 SC STD MICRODIL/AGAR DIL: CPT | Performed by: PODIATRIST

## 2019-07-28 PROCEDURE — 87075 CULTR BACTERIA EXCEPT BLOOD: CPT | Performed by: PODIATRIST

## 2019-07-28 PROCEDURE — 73610 X-RAY EXAM OF ANKLE: CPT | Performed by: PODIATRIST

## 2019-07-28 PROCEDURE — 87077 CULTURE AEROBIC IDENTIFY: CPT | Performed by: PODIATRIST

## 2019-07-28 PROCEDURE — 82962 GLUCOSE BLOOD TEST: CPT

## 2019-07-28 PROCEDURE — 0J9Q0ZZ DRAINAGE OF RIGHT FOOT SUBCUTANEOUS TISSUE AND FASCIA, OPEN APPROACH: ICD-10-PCS | Performed by: PODIATRIST

## 2019-07-28 PROCEDURE — 87176 TISSUE HOMOGENIZATION CULTR: CPT | Performed by: PODIATRIST

## 2019-07-28 PROCEDURE — 80202 ASSAY OF VANCOMYCIN: CPT | Performed by: INTERNAL MEDICINE

## 2019-07-28 PROCEDURE — 0QPL04Z REMOVAL OF INTERNAL FIXATION DEVICE FROM RIGHT TARSAL, OPEN APPROACH: ICD-10-PCS | Performed by: PODIATRIST

## 2019-07-28 RX ORDER — METOCLOPRAMIDE HYDROCHLORIDE 5 MG/ML
10 INJECTION INTRAMUSCULAR; INTRAVENOUS AS NEEDED
Status: DISCONTINUED | OUTPATIENT
Start: 2019-07-28 | End: 2019-07-28 | Stop reason: HOSPADM

## 2019-07-28 RX ORDER — NALOXONE HYDROCHLORIDE 0.4 MG/ML
80 INJECTION, SOLUTION INTRAMUSCULAR; INTRAVENOUS; SUBCUTANEOUS AS NEEDED
Status: DISCONTINUED | OUTPATIENT
Start: 2019-07-28 | End: 2019-07-28 | Stop reason: HOSPADM

## 2019-07-28 RX ORDER — DEXTROSE MONOHYDRATE 25 G/50ML
50 INJECTION, SOLUTION INTRAVENOUS
Status: DISCONTINUED | OUTPATIENT
Start: 2019-07-28 | End: 2019-07-28 | Stop reason: HOSPADM

## 2019-07-28 RX ORDER — ONDANSETRON 2 MG/ML
4 INJECTION INTRAMUSCULAR; INTRAVENOUS AS NEEDED
Status: DISCONTINUED | OUTPATIENT
Start: 2019-07-28 | End: 2019-07-28 | Stop reason: HOSPADM

## 2019-07-28 RX ORDER — HYDROMORPHONE HYDROCHLORIDE 1 MG/ML
0.4 INJECTION, SOLUTION INTRAMUSCULAR; INTRAVENOUS; SUBCUTANEOUS EVERY 5 MIN PRN
Status: DISCONTINUED | OUTPATIENT
Start: 2019-07-28 | End: 2019-07-28 | Stop reason: HOSPADM

## 2019-07-28 RX ORDER — HYDROMORPHONE HYDROCHLORIDE 1 MG/ML
INJECTION, SOLUTION INTRAMUSCULAR; INTRAVENOUS; SUBCUTANEOUS
Status: COMPLETED
Start: 2019-07-28 | End: 2019-07-28

## 2019-07-28 RX ORDER — HYDROCODONE BITARTRATE AND ACETAMINOPHEN 10; 325 MG/1; MG/1
1 TABLET ORAL AS NEEDED
Status: DISCONTINUED | OUTPATIENT
Start: 2019-07-28 | End: 2019-07-28 | Stop reason: HOSPADM

## 2019-07-28 RX ORDER — MORPHINE SULFATE 4 MG/ML
1 INJECTION, SOLUTION INTRAMUSCULAR; INTRAVENOUS
Status: DISCONTINUED | OUTPATIENT
Start: 2019-07-28 | End: 2019-07-30

## 2019-07-28 RX ORDER — MORPHINE SULFATE 4 MG/ML
2 INJECTION, SOLUTION INTRAMUSCULAR; INTRAVENOUS
Status: DISCONTINUED | OUTPATIENT
Start: 2019-07-28 | End: 2019-07-30

## 2019-07-28 RX ORDER — HYDROCODONE BITARTRATE AND ACETAMINOPHEN 10; 325 MG/1; MG/1
2 TABLET ORAL AS NEEDED
Status: DISCONTINUED | OUTPATIENT
Start: 2019-07-28 | End: 2019-07-28 | Stop reason: HOSPADM

## 2019-07-28 RX ORDER — SODIUM CHLORIDE, SODIUM LACTATE, POTASSIUM CHLORIDE, CALCIUM CHLORIDE 600; 310; 30; 20 MG/100ML; MG/100ML; MG/100ML; MG/100ML
INJECTION, SOLUTION INTRAVENOUS CONTINUOUS
Status: DISCONTINUED | OUTPATIENT
Start: 2019-07-28 | End: 2019-07-28 | Stop reason: HOSPADM

## 2019-07-28 RX ORDER — HYDRALAZINE HYDROCHLORIDE 20 MG/ML
10 INJECTION INTRAMUSCULAR; INTRAVENOUS EVERY 6 HOURS PRN
Status: DISCONTINUED | OUTPATIENT
Start: 2019-07-28 | End: 2019-07-30

## 2019-07-28 RX ORDER — BACITRACIN 50000 [USP'U]/1
INJECTION, POWDER, LYOPHILIZED, FOR SOLUTION INTRAMUSCULAR AS NEEDED
Status: DISCONTINUED | OUTPATIENT
Start: 2019-07-28 | End: 2019-07-28 | Stop reason: HOSPADM

## 2019-07-28 RX ORDER — CEFAZOLIN SODIUM/WATER 2 G/20 ML
2 SYRINGE (ML) INTRAVENOUS ONCE
Status: DISCONTINUED | OUTPATIENT
Start: 2019-07-28 | End: 2019-07-28 | Stop reason: HOSPADM

## 2019-07-28 RX ORDER — MORPHINE SULFATE 4 MG/ML
3 INJECTION, SOLUTION INTRAMUSCULAR; INTRAVENOUS
Status: DISCONTINUED | OUTPATIENT
Start: 2019-07-28 | End: 2019-07-30

## 2019-07-28 NOTE — PROGRESS NOTES
[facility]                INFECTIOUS DISEASE PROGRESS NOTE    Reginold Gottron Patient Status:  Inpatient    1955 MRN [id number]   [facility] 4NW-A Attending Dora Lang MD   Hosp Day # 2 PCP MD Yoselyn Toussaint hypertension, benign     Gout, unspecified     History of CVA (cerebrovascular accident)     History of coronary artery stent placement     Type 2 diabetes mellitus with other ophthalmic complication, with long-term current use of insulin ([facility] [address]     Nonrheu Vazquez Diaz MD  [facility]  [phone]

## 2019-07-28 NOTE — ANESTHESIA POSTPROCEDURE EVALUATION
[facility] Patient Status:  Inpatient   Age/Gender 71year old male MRN [id number]   [facility] SURGERY Attending Romulo Lyons MD   Hosp Day # 2 PCP Inna Tovar MD       Anesthesia Post-op Note    Procedure(s):  I

## 2019-07-28 NOTE — PROGRESS NOTES
[facility] Hospitalist Progress Note     PCP: Dewey Stewart MD    Chief Complaint: follow-up    Overnight/Interim Events:      SUBJECTIVE:  Pt laying in bed. anxious about surgery, \"impending doom\". No f/c.  In good spirits o/w    OBJECTIVE:  Temp:  [98 °F (36.7 103 103  --    CO2 27.0 26.0  --    BUN 32* 28*  --    CREATSERUM 1.26 1.26  1.26 0.98   CA 9.8 9.3  --    * 117*  --        Recent Labs   Lab 07/26/19  0835   ALT 19   AST 14*   ALB 2.9*       Recent Labs   Lab 07/27/19  1316 07/27/19  1741 07/27/1 home asa     # neuropathy  - cont gabapentin     # HTN  - cont home meds, IV hydralazine prn while npo     Prev:  lovenox       Dispo: med    Questions/concerns were discussed with patient by bedside.  D/w RN Yury Osei MD  [facility] Hospitalist

## 2019-07-28 NOTE — PLAN OF CARE
Problem: GASTROINTESTINAL - ADULT  Goal: Maintains adequate nutritional intake (undernourished)  Description  INTERVENTIONS:  - Monitor percentage of each meal consumed  - Identify factors contributing to decreased intake, treat as appropriate  - Assist hemorrhage  - Monitor labs and vital signs for trends  - Administer supportive blood products/factors, fluids and medications as ordered and appropriate  - Administer supportive blood products/factors as ordered and appropriate  Outcome: Progressing     NP

## 2019-07-28 NOTE — PLAN OF CARE
Problem: GASTROINTESTINAL - ADULT  Goal: Maintains adequate nutritional intake (undernourished)  Description  INTERVENTIONS:  - Monitor percentage of each meal consumed  - Identify factors contributing to decreased intake, treat as appropriate  - Assist ordered Q 8hrs. Vitals stable.      Problem: HEMATOLOGIC - ADULT  Goal: Maintains hematologic stability  Description  INTERVENTIONS  - Assess for signs and symptoms of bleeding or hemorrhage  - Monitor labs and vital signs for trends  - Administer supporti

## 2019-07-28 NOTE — BRIEF OP NOTE
Pre-Operative Diagnosis: Abscess right foot      Post-Operative Diagnosis: same     Procedure Performed:   INCISION AND DRAINAGE COMPLEX ABSCESS RIGHT HEEL AND POSTERIOR TALUS  REMOVAL HARDWARE RIGHT HEEL    Surgeon(s) and Role:     Re Grant DPM

## 2019-07-29 LAB
CREAT BLD-MCNC: 0.95 MG/DL (ref 0.7–1.3)
GLUCOSE BLD-MCNC: 150 MG/DL (ref 70–99)
GLUCOSE BLD-MCNC: 156 MG/DL (ref 70–99)
GLUCOSE BLD-MCNC: 163 MG/DL (ref 70–99)
GLUCOSE BLD-MCNC: 263 MG/DL (ref 70–99)
VANCOMYCIN SERPL-MCNC: 17.9 UG/ML

## 2019-07-29 PROCEDURE — 36573 INSJ PICC RS&I 5 YR+: CPT

## 2019-07-29 PROCEDURE — 97116 GAIT TRAINING THERAPY: CPT

## 2019-07-29 PROCEDURE — 97165 OT EVAL LOW COMPLEX 30 MIN: CPT

## 2019-07-29 PROCEDURE — B548ZZA ULTRASONOGRAPHY OF SUPERIOR VENA CAVA, GUIDANCE: ICD-10-PCS | Performed by: INTERNAL MEDICINE

## 2019-07-29 PROCEDURE — 97535 SELF CARE MNGMENT TRAINING: CPT

## 2019-07-29 PROCEDURE — 80202 ASSAY OF VANCOMYCIN: CPT | Performed by: PODIATRIST

## 2019-07-29 PROCEDURE — 97162 PT EVAL MOD COMPLEX 30 MIN: CPT

## 2019-07-29 PROCEDURE — 02HV33Z INSERTION OF INFUSION DEVICE INTO SUPERIOR VENA CAVA, PERCUTANEOUS APPROACH: ICD-10-PCS | Performed by: INTERNAL MEDICINE

## 2019-07-29 PROCEDURE — 97530 THERAPEUTIC ACTIVITIES: CPT

## 2019-07-29 PROCEDURE — 82565 ASSAY OF CREATININE: CPT | Performed by: PODIATRIST

## 2019-07-29 PROCEDURE — 82962 GLUCOSE BLOOD TEST: CPT

## 2019-07-29 RX ORDER — SODIUM CHLORIDE 0.9 % (FLUSH) 0.9 %
10 SYRINGE (ML) INJECTION AS NEEDED
Status: DISCONTINUED | OUTPATIENT
Start: 2019-07-29 | End: 2019-07-30

## 2019-07-29 NOTE — CM/SW NOTE
07/29/19 1400   CM/SW Referral Data   Referral Source Social Work (self-referral)   Reason for Referral Discharge planning   Informant Patient   Patient Info   Patient's Mental Status Alert;Oriented   Patient's Home Environment House   Patient lives wit

## 2019-07-29 NOTE — PROGRESS NOTES
[facility]                INFECTIOUS DISEASE PROGRESS NOTE    [facility] Patient Status:  Inpatient    1955 MRN [id number]   [facility] 4NW-A Attending Oc Eaton MD   Hosp Day # 3 PCP Crystal Mckeon MD     Antibio 12:17 PM   Result Value Ref Range    Aerobic Smear 4+ WBCs seen N/A    Aerobic Smear 1+ Gram Positive Cocci N/A   3.  BLOOD CULTURE     Status: None (Preliminary result)    Collection Time: 07/26/19  8:35 AM   Result Value Ref Range    Blood Culture Result right ankle fusion in 1/2018 with Dr. Virgen Grajeda report scanned into media file from [facility])  -presenting with pain swelling foot - site is boggy, tender, and CT showing fluid collection adjacent to hardware cw abscess    -had MRSA bacteremia 5/201

## 2019-07-29 NOTE — PHYSICAL THERAPY NOTE
PHYSICAL THERAPY EVALUATION - INPATIENT     Room Number: 564/670-C  Evaluation Date: 7/29/2019  Type of Evaluation: Initial  Physician Order: PT Eval and Treat    Presenting Problem: s/p I&D complex abscess, R post talus, R inf heel, removal of hardw 5/14/2019    Performed by Ko Payne MD at [facility] MAIN OR   • Waldo Jatinder Rabia 950 Right 10/4/2017    Performed by Radha Manrique DPM at [facility] MAIN OR   • KNEE ARTHROSCOPY Right 6/17/2019    Performed by Miranda Boyle MD at [address] [place] [address] R hip flex 4/5, knee/ankle NT d/t recent surgical intervention    BALANCE                ADDITIONAL TESTS                                    NEUROLOGICAL FINDINGS  Neurological Findings: Sensation           Sensation: mild dull sensation grossly along RLE training  Posture  Transfer training    Patient End of Session: Up in chair;Needs met;Call light within reach;RN aware of session/findings; All patient questions and concerns addressed    ASSESSMENT   Patient is a 71year old male admitted on 7/26/2019 for level: modified independent     Goal #2 Patient is able to demonstrate transfers Sit to/from Stand at assistance level: supervision     Goal #3 Patient is able to ambulate 10 feet with assist device: walker - rolling at assistance level: supervision     Go

## 2019-07-29 NOTE — PAYOR COMM NOTE
--------------  CONTINUED STAY REVIEW    Payor: [address]  Subscriber #:  [id number]  Authorization Number: [id number]    Admit date: 7/26/19  Admit time: 1234    Admitting Physician: Ngozi Leon MD  Attending Physician:  Honey Matthew right posterior talus and right inferior heel. 2.       Removal of hardware right calcaneus.       Per ID - Vanc level 21 today, and received dose  --hold vancomycin tonight, check random level in am          7/29/19 - POD #1    07/29/19 0751 98.3 °F (36.

## 2019-07-29 NOTE — OPERATIVE REPORT
[facility]    PATIENT'S NAME: GÉNESIS MYERS   ATTENDING PHYSICIAN: Sunshine Marroquin. Loyde Buerger, M.D. OPERATING PHYSICIAN: Kirk Barron D.P.M.    PATIENT ACCOUNT#:   [de-identified]    LOCATION:  [address] [place] [address]  MEDICAL RECORD #:   [id number]       DATE OF BIR completely drained and evacuated and this area was then pulsed lavage with bacitracin irrigation.     Attention was then directed to the inferior aspect of the patient's right heel, where a linear longitudinal incision was placed overlying the previous inci infectious process was noted and the hardware was noted to be intact; however, the screw was still removed and sent for aerobic/anaerobic cultures. This incision was irrigated.   A few antibiotic beads were placed deep along the bone, and then this incisio

## 2019-07-29 NOTE — PROGRESS NOTES
[facility] Hospitalist Progress Note     PCP: Delvin Stahl MD    Chief Complaint: follow-up    Overnight/Interim Events:      SUBJECTIVE:  Laying in chair. Pain c drsg change, ok at rest. No f/c.  Eating, +u/o, BM. 120/56    OBJECTIVE:  Temp:  [97.3 °F (36.3 °C)- 07/29/19  0541    136  --   --    K 4.7 4.1  --   --     103  --   --    CO2 27.0 26.0  --   --    BUN 32* 28*  --   --    CREATSERUM 1.26 1.26  1.26 0.98 0.95   CA 9.8 9.3  --   --    * 117*  --   --        Recent Labs   Lab 07/26/19  0 podiatry  -NWB  -PT/OT    #  CKD3  - cr near bnaseline     # DM2  - ssi   - hold orals, ssi coverage     # anemia  - chronic secondary to CKD  - follow     # PVD  - cont home asa     # neuropathy  - cont gabapentin     # HTN  - cont home meds, IV hydralazi

## 2019-07-29 NOTE — PROGRESS NOTES
Alert and oriented x 4;dressing intact to right foot;tele hr-77;dr.pinsky dunham  Podiatrist came and change dressing (DSD) to right foot/heel;noted staples incission clean and healing ;no drainage noted during dressing change;

## 2019-07-29 NOTE — PROGRESS NOTES
Pt aox4. C/O rt foot pain 9/10. (7/28/19) I &D  and removal of hardware on the rt heel. .Acewrapp dressing in place,c/d/i. Non wgt bearing on that rt foot. Emile Colvin HX of MRSA,contact isolation sign in place. Sleeping when rounds was made. ,2 units of Novolog give

## 2019-07-29 NOTE — PROGRESS NOTES
POD#1 s/p I&D and removal of hardware  Dressing CDI  Pain controlled    AFVSS  Staples intact. Small open wound noted posterior lateral right heel  No drainage. Cellulitis and edema improved  No active bleeding    Hospital Encounter on 07/26/19   Ghassan AARON

## 2019-07-29 NOTE — OCCUPATIONAL THERAPY NOTE
OCCUPATIONAL THERAPY EVALUATION - INPATIENT     Room Number: 969/922-G  Evaluation Date: 7/29/2019  Type of Evaluation: Initial  Presenting Problem: R foot I&D and R ankle hardware removal    Physician Order: IP Consult to Occupational Therapy  Reason for at [facility] MAIN OR   • INCISION AND DRAINAGE Right 10/4/2017    Performed by Juan Sears DPM at [facility] MAIN OR   • KNEE ARTHROSCOPY Right 6/17/2019    Performed by Dav Boyle MD at [facility] MAIN OR   • KNEE ARTHROSCOPY Right 5/15/2019    Performed by Clement R    COORDINATION  Gross Motor    WFL    Fine Motor    WFL      ADDITIONAL TESTS                                    NEUROLOGICAL FINDINGS                   ACTIVITY TOLERANCE                         O2 SATURATIONS                ACTIVITIES OF DAILY LIVING often benefit from discharge home. . In this OT evaluation patient presents with the following performance deficits: use of adaptive techniques, force generating capacity, balance, pain management.  These deficits impact the patient’s ability to participa

## 2019-07-30 VITALS
DIASTOLIC BLOOD PRESSURE: 64 MMHG | OXYGEN SATURATION: 98 % | SYSTOLIC BLOOD PRESSURE: 145 MMHG | TEMPERATURE: 98 F | RESPIRATION RATE: 14 BRPM | HEART RATE: 59 BPM | WEIGHT: 217 LBS | HEIGHT: 71 IN | BODY MASS INDEX: 30.38 KG/M2

## 2019-07-30 LAB
ANION GAP SERPL CALC-SCNC: 8 MMOL/L (ref 0–18)
BUN BLD-MCNC: 21 MG/DL (ref 7–18)
BUN/CREAT SERPL: 21 (ref 10–20)
CALCIUM BLD-MCNC: 10 MG/DL (ref 8.5–10.1)
CHLORIDE SERPL-SCNC: 100 MMOL/L (ref 98–112)
CO2 SERPL-SCNC: 27 MMOL/L (ref 21–32)
CREAT BLD-MCNC: 1 MG/DL (ref 0.7–1.3)
CREAT BLD-MCNC: 1 MG/DL (ref 0.7–1.3)
DEPRECATED RDW RBC AUTO: 46.6 FL (ref 35.1–46.3)
ERYTHROCYTE [DISTWIDTH] IN BLOOD BY AUTOMATED COUNT: 14.4 % (ref 11–15)
GLUCOSE BLD-MCNC: 162 MG/DL (ref 70–99)
GLUCOSE BLD-MCNC: 171 MG/DL (ref 70–99)
GLUCOSE BLD-MCNC: 220 MG/DL (ref 70–99)
HAV IGM SER QL: 2 MG/DL (ref 1.6–2.6)
HCT VFR BLD AUTO: 22.8 % (ref 39–53)
HGB BLD-MCNC: 7.2 G/DL (ref 13–17.5)
MCH RBC QN AUTO: 28.2 PG (ref 26–34)
MCHC RBC AUTO-ENTMCNC: 31.6 G/DL (ref 31–37)
MCV RBC AUTO: 89.4 FL (ref 80–100)
OSMOLALITY SERPL CALC.SUM OF ELEC: 287 MOSM/KG (ref 275–295)
PLATELET # BLD AUTO: 453 10(3)UL (ref 150–450)
POTASSIUM SERPL-SCNC: 4 MMOL/L (ref 3.5–5.1)
RBC # BLD AUTO: 2.55 X10(6)UL (ref 3.8–5.8)
SODIUM SERPL-SCNC: 135 MMOL/L (ref 136–145)
WBC # BLD AUTO: 7.1 X10(3) UL (ref 4–11)

## 2019-07-30 PROCEDURE — 85027 COMPLETE CBC AUTOMATED: CPT | Performed by: INTERNAL MEDICINE

## 2019-07-30 PROCEDURE — 80048 BASIC METABOLIC PNL TOTAL CA: CPT | Performed by: INTERNAL MEDICINE

## 2019-07-30 PROCEDURE — 82962 GLUCOSE BLOOD TEST: CPT

## 2019-07-30 PROCEDURE — 82565 ASSAY OF CREATININE: CPT | Performed by: PODIATRIST

## 2019-07-30 PROCEDURE — 83735 ASSAY OF MAGNESIUM: CPT | Performed by: INTERNAL MEDICINE

## 2019-07-30 NOTE — PAYOR COMM NOTE
--------------  CONTINUED STAY REVIEW    Payor: [facility]  Subscriber #:  [id number]  Authorization Number: [id number]    Admit date: 7/26/19  Admit time: 1234    Admitting Physician: Joni Hampton MD  Attending Physician:  Juan Grullon

## 2019-07-30 NOTE — PROGRESS NOTES
Patient seen at bedside. Resting comfortably without pain. Dressing to the lower extremity foot is clean dry and intact. Afebrile vital signs stable. Surgical I&D sites are nicely coapted. There is no surrounding cellulitis there is no dehiscence.   Ca

## 2019-07-30 NOTE — PROGRESS NOTES
[facility]                INFECTIOUS DISEASE PROGRESS NOTE    Jerad Lemons Patient Status:  Inpatient    1955 MRN [id number]   [facility] 4NW-A Attending Dany Davis MD   Hosp Day # 4 PCP MD Manjula De Leon Time: 07/28/19 12:21 PM   Result Value Ref Range    Tissue Culture Result No Growth at 18-24 hrs. N/A   2.  ANAEROBIC CULTURE     Status: None (Preliminary result)    Collection Time: 07/28/19 12:17 PM   Result Value Ref Range    Anaerobic Culture Pending N stenosis     H/O: CVA (cerebrovascular accident)     Infected traumatic leg ulcer (HCC)     Anemia     Acute kidney injury ([facility] [address]     CKD (chronic kidney disease) stage 3, GFR 30-59 ml/min ([facility])     Adjustment disorder with mixed anxiety and depressed mood

## 2019-07-30 NOTE — PLAN OF CARE
1930 received patient in handoff. In good spirits. Denies pain at this time. HS meds given. BS was elevated at 263. Covered with med dose sliding scale. R foot dressed with ace wrap. Is clean dry and itact. Stated he was tired and wanted to sleep at 2130.  Ion Marie

## 2019-07-30 NOTE — CM/SW NOTE
Received call from [facility]. IV ABT to be delivered to patient home 7/31 @ 11am. Start of care with [facility] will be after 12 pm 7/31. LIVIA Thomas and patient informed of the above.       Silva Whitaker, 07/30/19, 2:30 [phone]

## 2019-07-30 NOTE — PROGRESS NOTES
Dressing intact to right heel/foot;elevated on  A pillow;remains on iv abt;afebrile;pain rating at 8/10 for right heel descrebes as constant pain;norco 2 tabs given

## 2019-07-30 NOTE — DISCHARGE SUMMARY
[facility] Internal Medicine Discharge Summary   Patient ID:  Berlin Sosa  [id number]  28 year old  2/21/1955    Admit date: 7/26/2019    Discharge date and time: 7/30/2019     Attending Physician: Celio Capps MD     Primary Care Physician: Samuel Donnelly, RRR, +s1/s2  Lungs: CTAB, good respiratory effort  Abdomen: s/nt/nd  Ext: Moves all 4 extremities, no c/c/e, foot in ace drsg  Neuro: CN Intact, no focal deficits      Discharge meds     Medication List      CHANGE how you take these medications    NOVOLOG Tabs  Commonly known as:  COZAAR  Take 1 tablet (50 mg total) by mouth every evening.      metFORMIN HCl 1000 MG Tabs  Commonly known as:  GLUCOPHAGE     MULTI-VITAMIN Tabs     Pramipexole Dihydrochloride 1 MG Tabs  Commonly known as:  MIRAPEX     risperiDO obtained. COMPARISON:  None. INDICATIONS:  Foot problem  PATIENT STATED HISTORY: (As transcribed by Technologist)  Patient had surgery  and has been having shotting pain in the right ankle for a few days. . Patient has had multiple surgeries on his FINDINGS:  BONES:  Postsurgical changes with hardware involve the distal tibia extending through the talus and terminating along the inferior aspect of the calcaneus with several threaded screws proximally and distally.   Partial fusion of the tibio-talar j with therapeutic plan as outlined.  D/w RN Isaías Fuchs MD  [facility] Hospitalist  [phone]  7/30/2019  4:31 PM

## 2019-07-30 NOTE — CM/SW NOTE
CM called [facility] to confirm IV ABT delivery time. [facility] is currently verifying insurance authorization of medication and reaching out to Dr Primo Pruitt for final discharge orders. [facility] to contact [facility] and myself when medication will be ready for delivery.      Heat

## 2019-07-31 NOTE — PAYOR COMM NOTE
--------------  DISCHARGE REVIEW    Payor: [address]  Subscriber #:  [id number]  Authorization Number: [id number]    Admit date: 7/26/19  Admit time:  3474  Discharge Date: 7/30/2019  5:43 PM     Admitting Physician: Janelle Rice MD

## 2019-08-05 ENCOUNTER — LAB REQUISITION (OUTPATIENT)
Dept: LAB | Facility: HOSPITAL | Age: 64
End: 2019-08-05
Payer: COMMERCIAL

## 2019-08-05 DIAGNOSIS — R69 ILLNESS: ICD-10-CM

## 2019-08-05 LAB
ANION GAP SERPL CALC-SCNC: 6 MMOL/L (ref 0–18)
BASOPHILS # BLD AUTO: 0.09 X10(3) UL (ref 0–0.2)
BASOPHILS NFR BLD AUTO: 1 %
BUN BLD-MCNC: 32 MG/DL (ref 7–18)
BUN/CREAT SERPL: 22.5 (ref 10–20)
CALCIUM BLD-MCNC: 9.5 MG/DL (ref 8.5–10.1)
CHLORIDE SERPL-SCNC: 104 MMOL/L (ref 98–112)
CO2 SERPL-SCNC: 27 MMOL/L (ref 21–32)
CREAT BLD-MCNC: 1.42 MG/DL (ref 0.7–1.3)
CRP SERPL-MCNC: 6.06 MG/DL (ref ?–0.3)
DEPRECATED RDW RBC AUTO: 51.3 FL (ref 35.1–46.3)
EOSINOPHIL # BLD AUTO: 0.24 X10(3) UL (ref 0–0.7)
EOSINOPHIL NFR BLD AUTO: 2.7 %
ERYTHROCYTE [DISTWIDTH] IN BLOOD BY AUTOMATED COUNT: 14.6 % (ref 11–15)
GLUCOSE BLD-MCNC: 72 MG/DL (ref 70–99)
HCT VFR BLD AUTO: 27.8 % (ref 39–53)
HGB BLD-MCNC: 8.1 G/DL (ref 13–17.5)
IMM GRANULOCYTES # BLD AUTO: 0.13 X10(3) UL (ref 0–1)
IMM GRANULOCYTES NFR BLD: 1.4 %
LYMPHOCYTES # BLD AUTO: 2.16 X10(3) UL (ref 1–4)
LYMPHOCYTES NFR BLD AUTO: 24 %
MCH RBC QN AUTO: 27.7 PG (ref 26–34)
MCHC RBC AUTO-ENTMCNC: 29.1 G/DL (ref 31–37)
MCV RBC AUTO: 95.2 FL (ref 80–100)
MONOCYTES # BLD AUTO: 0.65 X10(3) UL (ref 0.1–1)
MONOCYTES NFR BLD AUTO: 7.2 %
NEUTROPHILS # BLD AUTO: 5.73 X10 (3) UL (ref 1.5–7.7)
NEUTROPHILS # BLD AUTO: 5.73 X10(3) UL (ref 1.5–7.7)
NEUTROPHILS NFR BLD AUTO: 63.7 %
OSMOLALITY SERPL CALC.SUM OF ELEC: 289 MOSM/KG (ref 275–295)
PLATELET # BLD AUTO: 729 10(3)UL (ref 150–450)
POTASSIUM SERPL-SCNC: 5.2 MMOL/L (ref 3.5–5.1)
RBC # BLD AUTO: 2.92 X10(6)UL (ref 3.8–5.8)
SODIUM SERPL-SCNC: 137 MMOL/L (ref 136–145)
VANCOMYCIN TROUGH SERPL-MCNC: 14 UG/ML (ref 10–20)
WBC # BLD AUTO: 9 X10(3) UL (ref 4–11)

## 2019-08-05 PROCEDURE — 80048 BASIC METABOLIC PNL TOTAL CA: CPT | Performed by: INTERNAL MEDICINE

## 2019-08-05 PROCEDURE — 80202 ASSAY OF VANCOMYCIN: CPT | Performed by: INTERNAL MEDICINE

## 2019-08-05 PROCEDURE — 86140 C-REACTIVE PROTEIN: CPT | Performed by: INTERNAL MEDICINE

## 2019-08-05 PROCEDURE — 85025 COMPLETE CBC W/AUTO DIFF WBC: CPT | Performed by: INTERNAL MEDICINE

## 2019-08-12 ENCOUNTER — LAB REQUISITION (OUTPATIENT)
Dept: LAB | Facility: HOSPITAL | Age: 64
End: 2019-08-12
Payer: COMMERCIAL

## 2019-08-12 DIAGNOSIS — L03.114 CELLULITIS OF LEFT UPPER EXTREMITY: ICD-10-CM

## 2019-08-12 DIAGNOSIS — L03.115 CELLULITIS OF RIGHT LOWER EXTREMITY: ICD-10-CM

## 2019-08-12 LAB
ANION GAP SERPL CALC-SCNC: 8 MMOL/L (ref 0–18)
BASOPHILS # BLD AUTO: 0.08 X10(3) UL (ref 0–0.2)
BASOPHILS NFR BLD AUTO: 1 %
BUN BLD-MCNC: 41 MG/DL (ref 7–18)
BUN/CREAT SERPL: 29.5 (ref 10–20)
CALCIUM BLD-MCNC: 9.4 MG/DL (ref 8.5–10.1)
CHLORIDE SERPL-SCNC: 102 MMOL/L (ref 98–112)
CO2 SERPL-SCNC: 27 MMOL/L (ref 21–32)
CREAT BLD-MCNC: 1.39 MG/DL (ref 0.7–1.3)
CRP SERPL-MCNC: 7.72 MG/DL (ref ?–0.3)
DEPRECATED RDW RBC AUTO: 51.4 FL (ref 35.1–46.3)
EOSINOPHIL # BLD AUTO: 0.15 X10(3) UL (ref 0–0.7)
EOSINOPHIL NFR BLD AUTO: 1.8 %
ERYTHROCYTE [DISTWIDTH] IN BLOOD BY AUTOMATED COUNT: 15 % (ref 11–15)
GLUCOSE BLD-MCNC: 218 MG/DL (ref 70–99)
HCT VFR BLD AUTO: 29.4 % (ref 39–53)
HGB BLD-MCNC: 8.8 G/DL (ref 13–17.5)
IMM GRANULOCYTES # BLD AUTO: 0.04 X10(3) UL (ref 0–1)
IMM GRANULOCYTES NFR BLD: 0.5 %
LYMPHOCYTES # BLD AUTO: 1.44 X10(3) UL (ref 1–4)
LYMPHOCYTES NFR BLD AUTO: 17.1 %
MCH RBC QN AUTO: 28 PG (ref 26–34)
MCHC RBC AUTO-ENTMCNC: 29.9 G/DL (ref 31–37)
MCV RBC AUTO: 93.6 FL (ref 80–100)
MONOCYTES # BLD AUTO: 0.64 X10(3) UL (ref 0.1–1)
MONOCYTES NFR BLD AUTO: 7.6 %
NEUTROPHILS # BLD AUTO: 6.07 X10 (3) UL (ref 1.5–7.7)
NEUTROPHILS # BLD AUTO: 6.07 X10(3) UL (ref 1.5–7.7)
NEUTROPHILS NFR BLD AUTO: 72 %
OSMOLALITY SERPL CALC.SUM OF ELEC: 301 MOSM/KG (ref 275–295)
PLATELET # BLD AUTO: 440 10(3)UL (ref 150–450)
POTASSIUM SERPL-SCNC: 4.8 MMOL/L (ref 3.5–5.1)
RBC # BLD AUTO: 3.14 X10(6)UL (ref 3.8–5.8)
SODIUM SERPL-SCNC: 137 MMOL/L (ref 136–145)
VANCOMYCIN TROUGH SERPL-MCNC: 17.5 UG/ML (ref 10–20)
WBC # BLD AUTO: 8.4 X10(3) UL (ref 4–11)

## 2019-08-12 PROCEDURE — 80048 BASIC METABOLIC PNL TOTAL CA: CPT | Performed by: INTERNAL MEDICINE

## 2019-08-12 PROCEDURE — 86140 C-REACTIVE PROTEIN: CPT | Performed by: INTERNAL MEDICINE

## 2019-08-12 PROCEDURE — 80202 ASSAY OF VANCOMYCIN: CPT | Performed by: INTERNAL MEDICINE

## 2019-08-12 PROCEDURE — 85025 COMPLETE CBC W/AUTO DIFF WBC: CPT | Performed by: INTERNAL MEDICINE

## 2019-08-13 PROBLEM — Z47.89 AFTERCARE FOLLOWING SURGERY OF THE MUSCULOSKELETAL SYSTEM, NEC: Status: ACTIVE | Noted: 2019-08-13

## 2019-08-13 PROBLEM — Z47.89 AFTERCARE FOLLOWING SURGERY OF THE MUSCULOSKELETAL SYSTEM, NEC: Status: RESOLVED | Noted: 2019-06-25 | Resolved: 2019-08-13

## 2019-08-19 ENCOUNTER — LAB REQUISITION (OUTPATIENT)
Dept: LAB | Facility: HOSPITAL | Age: 64
End: 2019-08-19
Payer: COMMERCIAL

## 2019-08-19 DIAGNOSIS — L03.114 CELLULITIS OF LEFT UPPER EXTREMITY: ICD-10-CM

## 2019-08-19 DIAGNOSIS — L03.115 CELLULITIS OF RIGHT LOWER EXTREMITY: ICD-10-CM

## 2019-08-19 LAB
ANION GAP SERPL CALC-SCNC: 9 MMOL/L (ref 0–18)
BASOPHILS # BLD AUTO: 0.06 X10(3) UL (ref 0–0.2)
BASOPHILS NFR BLD AUTO: 0.8 %
BUN BLD-MCNC: 35 MG/DL (ref 7–18)
BUN/CREAT SERPL: 25.4 (ref 10–20)
CALCIUM BLD-MCNC: 9.3 MG/DL (ref 8.5–10.1)
CHLORIDE SERPL-SCNC: 102 MMOL/L (ref 98–112)
CO2 SERPL-SCNC: 26 MMOL/L (ref 21–32)
CREAT BLD-MCNC: 1.38 MG/DL (ref 0.7–1.3)
CRP SERPL-MCNC: 2.97 MG/DL (ref ?–0.3)
DEPRECATED RDW RBC AUTO: 50.8 FL (ref 35.1–46.3)
EOSINOPHIL # BLD AUTO: 0.18 X10(3) UL (ref 0–0.7)
EOSINOPHIL NFR BLD AUTO: 2.4 %
ERYTHROCYTE [DISTWIDTH] IN BLOOD BY AUTOMATED COUNT: 15.1 % (ref 11–15)
GLUCOSE BLD-MCNC: 251 MG/DL (ref 70–99)
HCT VFR BLD AUTO: 32.5 % (ref 39–53)
HGB BLD-MCNC: 9.8 G/DL (ref 13–17.5)
IMM GRANULOCYTES # BLD AUTO: 0.03 X10(3) UL (ref 0–1)
IMM GRANULOCYTES NFR BLD: 0.4 %
LYMPHOCYTES # BLD AUTO: 1.49 X10(3) UL (ref 1–4)
LYMPHOCYTES NFR BLD AUTO: 20.2 %
MCH RBC QN AUTO: 28 PG (ref 26–34)
MCHC RBC AUTO-ENTMCNC: 30.2 G/DL (ref 31–37)
MCV RBC AUTO: 92.9 FL (ref 80–100)
MONOCYTES # BLD AUTO: 0.48 X10(3) UL (ref 0.1–1)
MONOCYTES NFR BLD AUTO: 6.5 %
NEUTROPHILS # BLD AUTO: 5.15 X10 (3) UL (ref 1.5–7.7)
NEUTROPHILS # BLD AUTO: 5.15 X10(3) UL (ref 1.5–7.7)
NEUTROPHILS NFR BLD AUTO: 69.7 %
OSMOLALITY SERPL CALC.SUM OF ELEC: 300 MOSM/KG (ref 275–295)
PLATELET # BLD AUTO: 293 10(3)UL (ref 150–450)
POTASSIUM SERPL-SCNC: 4.6 MMOL/L (ref 3.5–5.1)
RBC # BLD AUTO: 3.5 X10(6)UL (ref 3.8–5.8)
SODIUM SERPL-SCNC: 137 MMOL/L (ref 136–145)
VANCOMYCIN TROUGH SERPL-MCNC: 15.9 UG/ML (ref 10–20)
WBC # BLD AUTO: 7.4 X10(3) UL (ref 4–11)

## 2019-08-19 PROCEDURE — 80048 BASIC METABOLIC PNL TOTAL CA: CPT | Performed by: INTERNAL MEDICINE

## 2019-08-19 PROCEDURE — 86140 C-REACTIVE PROTEIN: CPT | Performed by: INTERNAL MEDICINE

## 2019-08-19 PROCEDURE — 80202 ASSAY OF VANCOMYCIN: CPT | Performed by: INTERNAL MEDICINE

## 2019-08-19 PROCEDURE — 85025 COMPLETE CBC W/AUTO DIFF WBC: CPT | Performed by: INTERNAL MEDICINE

## 2019-08-26 ENCOUNTER — LAB REQUISITION (OUTPATIENT)
Dept: LAB | Facility: HOSPITAL | Age: 64
End: 2019-08-26
Payer: COMMERCIAL

## 2019-08-26 DIAGNOSIS — L03.114 CELLULITIS OF LEFT UPPER EXTREMITY: ICD-10-CM

## 2019-08-26 DIAGNOSIS — L03.115 CELLULITIS OF RIGHT LOWER EXTREMITY: ICD-10-CM

## 2019-08-26 LAB
ANION GAP SERPL CALC-SCNC: 8 MMOL/L (ref 0–18)
BASOPHILS # BLD AUTO: 0.06 X10(3) UL (ref 0–0.2)
BASOPHILS NFR BLD AUTO: 0.8 %
BUN BLD-MCNC: 42 MG/DL (ref 7–18)
BUN/CREAT SERPL: 28.2 (ref 10–20)
CALCIUM BLD-MCNC: 9.2 MG/DL (ref 8.5–10.1)
CHLORIDE SERPL-SCNC: 104 MMOL/L (ref 98–112)
CO2 SERPL-SCNC: 27 MMOL/L (ref 21–32)
CREAT BLD-MCNC: 1.49 MG/DL (ref 0.7–1.3)
CRP SERPL-MCNC: 4.08 MG/DL (ref ?–0.3)
DEPRECATED RDW RBC AUTO: 51.2 FL (ref 35.1–46.3)
EOSINOPHIL # BLD AUTO: 0.14 X10(3) UL (ref 0–0.7)
EOSINOPHIL NFR BLD AUTO: 1.8 %
ERYTHROCYTE [DISTWIDTH] IN BLOOD BY AUTOMATED COUNT: 15.5 % (ref 11–15)
GLUCOSE BLD-MCNC: 313 MG/DL (ref 70–99)
HCT VFR BLD AUTO: 28.1 % (ref 39–53)
HGB BLD-MCNC: 8.6 G/DL (ref 13–17.5)
IMM GRANULOCYTES # BLD AUTO: 0.03 X10(3) UL (ref 0–1)
IMM GRANULOCYTES NFR BLD: 0.4 %
LYMPHOCYTES # BLD AUTO: 1.2 X10(3) UL (ref 1–4)
LYMPHOCYTES NFR BLD AUTO: 15.6 %
MCH RBC QN AUTO: 28 PG (ref 26–34)
MCHC RBC AUTO-ENTMCNC: 30.6 G/DL (ref 31–37)
MCV RBC AUTO: 91.5 FL (ref 80–100)
MONOCYTES # BLD AUTO: 0.59 X10(3) UL (ref 0.1–1)
MONOCYTES NFR BLD AUTO: 7.7 %
NEUTROPHILS # BLD AUTO: 5.67 X10 (3) UL (ref 1.5–7.7)
NEUTROPHILS # BLD AUTO: 5.67 X10(3) UL (ref 1.5–7.7)
NEUTROPHILS NFR BLD AUTO: 73.7 %
OSMOLALITY SERPL CALC.SUM OF ELEC: 310 MOSM/KG (ref 275–295)
PLATELET # BLD AUTO: 289 10(3)UL (ref 150–450)
POTASSIUM SERPL-SCNC: 5 MMOL/L (ref 3.5–5.1)
RBC # BLD AUTO: 3.07 X10(6)UL (ref 3.8–5.8)
SODIUM SERPL-SCNC: 139 MMOL/L (ref 136–145)
VANCOMYCIN TROUGH SERPL-MCNC: 18.2 UG/ML (ref 10–20)
WBC # BLD AUTO: 7.7 X10(3) UL (ref 4–11)

## 2019-08-26 PROCEDURE — 85025 COMPLETE CBC W/AUTO DIFF WBC: CPT | Performed by: INTERNAL MEDICINE

## 2019-08-26 PROCEDURE — 86140 C-REACTIVE PROTEIN: CPT | Performed by: INTERNAL MEDICINE

## 2019-08-26 PROCEDURE — 80048 BASIC METABOLIC PNL TOTAL CA: CPT | Performed by: INTERNAL MEDICINE

## 2019-08-26 PROCEDURE — 80202 ASSAY OF VANCOMYCIN: CPT | Performed by: INTERNAL MEDICINE

## 2019-09-03 ENCOUNTER — LAB REQUISITION (OUTPATIENT)
Dept: LAB | Facility: HOSPITAL | Age: 64
End: 2019-09-03
Payer: COMMERCIAL

## 2019-09-03 DIAGNOSIS — L03.114 CELLULITIS OF LEFT UPPER EXTREMITY: ICD-10-CM

## 2019-09-03 DIAGNOSIS — L03.115 CELLULITIS OF RIGHT LOWER EXTREMITY: ICD-10-CM

## 2019-09-03 LAB
ANION GAP SERPL CALC-SCNC: 6 MMOL/L (ref 0–18)
BASOPHILS # BLD AUTO: 0.06 X10(3) UL (ref 0–0.2)
BASOPHILS NFR BLD AUTO: 0.9 %
BUN BLD-MCNC: 36 MG/DL (ref 7–18)
BUN/CREAT SERPL: 30 (ref 10–20)
CALCIUM BLD-MCNC: 9.5 MG/DL (ref 8.5–10.1)
CHLORIDE SERPL-SCNC: 106 MMOL/L (ref 98–112)
CO2 SERPL-SCNC: 28 MMOL/L (ref 21–32)
CREAT BLD-MCNC: 1.2 MG/DL (ref 0.7–1.3)
CRP SERPL-MCNC: 3.37 MG/DL (ref ?–0.3)
DEPRECATED RDW RBC AUTO: 49.2 FL (ref 35.1–46.3)
EOSINOPHIL # BLD AUTO: 0.22 X10(3) UL (ref 0–0.7)
EOSINOPHIL NFR BLD AUTO: 3.1 %
ERYTHROCYTE [DISTWIDTH] IN BLOOD BY AUTOMATED COUNT: 14.8 % (ref 11–15)
GLUCOSE BLD-MCNC: 125 MG/DL (ref 70–99)
HCT VFR BLD AUTO: 32.1 % (ref 39–53)
HGB BLD-MCNC: 10.1 G/DL (ref 13–17.5)
IMM GRANULOCYTES # BLD AUTO: 0.03 X10(3) UL (ref 0–1)
IMM GRANULOCYTES NFR BLD: 0.4 %
LYMPHOCYTES # BLD AUTO: 1.57 X10(3) UL (ref 1–4)
LYMPHOCYTES NFR BLD AUTO: 22.3 %
MCH RBC QN AUTO: 28.4 PG (ref 26–34)
MCHC RBC AUTO-ENTMCNC: 31.5 G/DL (ref 31–37)
MCV RBC AUTO: 90.2 FL (ref 80–100)
MONOCYTES # BLD AUTO: 0.68 X10(3) UL (ref 0.1–1)
MONOCYTES NFR BLD AUTO: 9.7 %
NEUTROPHILS # BLD AUTO: 4.48 X10 (3) UL (ref 1.5–7.7)
NEUTROPHILS # BLD AUTO: 4.48 X10(3) UL (ref 1.5–7.7)
NEUTROPHILS NFR BLD AUTO: 63.6 %
OSMOLALITY SERPL CALC.SUM OF ELEC: 300 MOSM/KG (ref 275–295)
PLATELET # BLD AUTO: 358 10(3)UL (ref 150–450)
POTASSIUM SERPL-SCNC: 4.5 MMOL/L (ref 3.5–5.1)
RBC # BLD AUTO: 3.56 X10(6)UL (ref 3.8–5.8)
SODIUM SERPL-SCNC: 140 MMOL/L (ref 136–145)
VANCOMYCIN TROUGH SERPL-MCNC: 13.3 UG/ML (ref 10–20)
WBC # BLD AUTO: 7 X10(3) UL (ref 4–11)

## 2019-09-03 PROCEDURE — 80202 ASSAY OF VANCOMYCIN: CPT | Performed by: INTERNAL MEDICINE

## 2019-09-03 PROCEDURE — 85025 COMPLETE CBC W/AUTO DIFF WBC: CPT | Performed by: INTERNAL MEDICINE

## 2019-09-03 PROCEDURE — 86140 C-REACTIVE PROTEIN: CPT | Performed by: INTERNAL MEDICINE

## 2019-09-03 PROCEDURE — 80048 BASIC METABOLIC PNL TOTAL CA: CPT | Performed by: INTERNAL MEDICINE

## 2019-09-09 ENCOUNTER — LAB REQUISITION (OUTPATIENT)
Dept: LAB | Facility: HOSPITAL | Age: 64
End: 2019-09-09
Payer: COMMERCIAL

## 2019-09-09 DIAGNOSIS — L03.115 CELLULITIS OF RIGHT LOWER EXTREMITY: ICD-10-CM

## 2019-09-09 DIAGNOSIS — L03.114 CELLULITIS OF LEFT UPPER EXTREMITY: ICD-10-CM

## 2019-09-09 LAB
ANION GAP SERPL CALC-SCNC: 8 MMOL/L (ref 0–18)
BASOPHILS # BLD AUTO: 0.04 X10(3) UL (ref 0–0.2)
BASOPHILS NFR BLD AUTO: 0.6 %
BUN BLD-MCNC: 40 MG/DL (ref 7–18)
BUN/CREAT SERPL: 28.8 (ref 10–20)
CALCIUM BLD-MCNC: 9.1 MG/DL (ref 8.5–10.1)
CHLORIDE SERPL-SCNC: 105 MMOL/L (ref 98–112)
CO2 SERPL-SCNC: 26 MMOL/L (ref 21–32)
CREAT BLD-MCNC: 1.39 MG/DL (ref 0.7–1.3)
CRP SERPL-MCNC: 20 MG/DL (ref ?–0.3)
DEPRECATED RDW RBC AUTO: 49.5 FL (ref 35.1–46.3)
EOSINOPHIL # BLD AUTO: 0.16 X10(3) UL (ref 0–0.7)
EOSINOPHIL NFR BLD AUTO: 2.2 %
ERYTHROCYTE [DISTWIDTH] IN BLOOD BY AUTOMATED COUNT: 14.9 % (ref 11–15)
GLUCOSE BLD-MCNC: 132 MG/DL (ref 70–99)
HCT VFR BLD AUTO: 28.9 % (ref 39–53)
HGB BLD-MCNC: 9 G/DL (ref 13–17.5)
IMM GRANULOCYTES # BLD AUTO: 0.03 X10(3) UL (ref 0–1)
IMM GRANULOCYTES NFR BLD: 0.4 %
LYMPHOCYTES # BLD AUTO: 1.37 X10(3) UL (ref 1–4)
LYMPHOCYTES NFR BLD AUTO: 19.1 %
MCH RBC QN AUTO: 28.2 PG (ref 26–34)
MCHC RBC AUTO-ENTMCNC: 31.1 G/DL (ref 31–37)
MCV RBC AUTO: 90.6 FL (ref 80–100)
MONOCYTES # BLD AUTO: 0.83 X10(3) UL (ref 0.1–1)
MONOCYTES NFR BLD AUTO: 11.6 %
NEUTROPHILS # BLD AUTO: 4.75 X10 (3) UL (ref 1.5–7.7)
NEUTROPHILS # BLD AUTO: 4.75 X10(3) UL (ref 1.5–7.7)
NEUTROPHILS NFR BLD AUTO: 66.1 %
OSMOLALITY SERPL CALC.SUM OF ELEC: 300 MOSM/KG (ref 275–295)
PLATELET # BLD AUTO: 234 10(3)UL (ref 150–450)
POTASSIUM SERPL-SCNC: 4.6 MMOL/L (ref 3.5–5.1)
RBC # BLD AUTO: 3.19 X10(6)UL (ref 3.8–5.8)
SODIUM SERPL-SCNC: 139 MMOL/L (ref 136–145)
VANCOMYCIN TROUGH SERPL-MCNC: 13.4 UG/ML (ref 10–20)
WBC # BLD AUTO: 7.2 X10(3) UL (ref 4–11)

## 2019-09-09 PROCEDURE — 80202 ASSAY OF VANCOMYCIN: CPT | Performed by: INTERNAL MEDICINE

## 2019-09-09 PROCEDURE — 86140 C-REACTIVE PROTEIN: CPT | Performed by: INTERNAL MEDICINE

## 2019-09-09 PROCEDURE — 85025 COMPLETE CBC W/AUTO DIFF WBC: CPT | Performed by: INTERNAL MEDICINE

## 2019-09-09 PROCEDURE — 80048 BASIC METABOLIC PNL TOTAL CA: CPT | Performed by: INTERNAL MEDICINE

## 2019-09-16 ENCOUNTER — LAB REQUISITION (OUTPATIENT)
Dept: LAB | Age: 64
End: 2019-09-16
Payer: COMMERCIAL

## 2019-09-16 DIAGNOSIS — L03.115 CELLULITIS OF RIGHT LOWER EXTREMITY: ICD-10-CM

## 2019-09-16 DIAGNOSIS — L03.114 CELLULITIS OF LEFT UPPER EXTREMITY: ICD-10-CM

## 2019-09-16 LAB
ANION GAP SERPL CALC-SCNC: 9 MMOL/L (ref 0–18)
BASOPHILS # BLD AUTO: 0.06 X10(3) UL (ref 0–0.2)
BASOPHILS NFR BLD AUTO: 0.8 %
BUN BLD-MCNC: 34 MG/DL (ref 7–18)
BUN/CREAT SERPL: 27.4 (ref 10–20)
CALCIUM BLD-MCNC: 9.3 MG/DL (ref 8.5–10.1)
CHLORIDE SERPL-SCNC: 102 MMOL/L (ref 98–112)
CO2 SERPL-SCNC: 25 MMOL/L (ref 21–32)
CREAT BLD-MCNC: 1.24 MG/DL (ref 0.7–1.3)
CRP SERPL-MCNC: 1.47 MG/DL (ref ?–0.3)
DEPRECATED RDW RBC AUTO: 48 FL (ref 35.1–46.3)
EOSINOPHIL # BLD AUTO: 0.2 X10(3) UL (ref 0–0.7)
EOSINOPHIL NFR BLD AUTO: 2.8 %
ERYTHROCYTE [DISTWIDTH] IN BLOOD BY AUTOMATED COUNT: 14.6 % (ref 11–15)
GLUCOSE BLD-MCNC: 103 MG/DL (ref 70–99)
HCT VFR BLD AUTO: 33.8 % (ref 39–53)
HGB BLD-MCNC: 10.6 G/DL (ref 13–17.5)
IMM GRANULOCYTES # BLD AUTO: 0.03 X10(3) UL (ref 0–1)
IMM GRANULOCYTES NFR BLD: 0.4 %
LYMPHOCYTES # BLD AUTO: 1.64 X10(3) UL (ref 1–4)
LYMPHOCYTES NFR BLD AUTO: 23 %
MCH RBC QN AUTO: 28.3 PG (ref 26–34)
MCHC RBC AUTO-ENTMCNC: 31.4 G/DL (ref 31–37)
MCV RBC AUTO: 90.1 FL (ref 80–100)
MONOCYTES # BLD AUTO: 0.64 X10(3) UL (ref 0.1–1)
MONOCYTES NFR BLD AUTO: 9 %
NEUTROPHILS # BLD AUTO: 4.57 X10 (3) UL (ref 1.5–7.7)
NEUTROPHILS # BLD AUTO: 4.57 X10(3) UL (ref 1.5–7.7)
NEUTROPHILS NFR BLD AUTO: 64 %
OSMOLALITY SERPL CALC.SUM OF ELEC: 290 MOSM/KG (ref 275–295)
PLATELET # BLD AUTO: 401 10(3)UL (ref 150–450)
POTASSIUM SERPL-SCNC: 4.9 MMOL/L (ref 3.5–5.1)
RBC # BLD AUTO: 3.75 X10(6)UL (ref 3.8–5.8)
SODIUM SERPL-SCNC: 136 MMOL/L (ref 136–145)
VANCOMYCIN TROUGH SERPL-MCNC: 14.2 UG/ML (ref 10–20)
WBC # BLD AUTO: 7.1 X10(3) UL (ref 4–11)

## 2019-09-16 PROCEDURE — 80048 BASIC METABOLIC PNL TOTAL CA: CPT | Performed by: INTERNAL MEDICINE

## 2019-09-16 PROCEDURE — 86140 C-REACTIVE PROTEIN: CPT | Performed by: INTERNAL MEDICINE

## 2019-09-16 PROCEDURE — 80202 ASSAY OF VANCOMYCIN: CPT | Performed by: INTERNAL MEDICINE

## 2019-09-16 PROCEDURE — 85025 COMPLETE CBC W/AUTO DIFF WBC: CPT | Performed by: INTERNAL MEDICINE

## 2019-09-17 ENCOUNTER — PATIENT OUTREACH (OUTPATIENT)
Dept: INFECTIOUS DISEASE | Facility: CLINIC | Age: 64
End: 2019-09-17

## 2019-09-17 PROBLEM — Z47.89 AFTERCARE FOLLOWING SURGERY OF THE MUSCULOSKELETAL SYSTEM, NEC: Status: ACTIVE | Noted: 2019-09-17

## 2019-09-17 PROBLEM — Z47.89 AFTERCARE FOLLOWING SURGERY OF THE MUSCULOSKELETAL SYSTEM, NEC: Status: RESOLVED | Noted: 2019-08-13 | Resolved: 2019-09-17

## 2019-09-17 NOTE — PROGRESS NOTES
INFECTIOUS DISEASE PROGRESS NOTE    Kaylen Chau     1955 MRN [id number]           PCP Yakov Perez MD     Antibiotics: Vanc#52  POD#11  Subjective:  Underwent removal of IM nail right ankle at [facility] with Dr. Walters Sender on 19 Cellulitis of left upper extremity     Hyperglycemia     Acute respiratory failure with hypoxia (HCC)     Hypertensive emergency     Right leg pain     Removal of intramedullary sandro R ankle. Removal of screws R tibia, R talus and calc.  Sx 9/6/19; NADEGE 12/5/

## 2019-09-23 ENCOUNTER — LAB REQUISITION (OUTPATIENT)
Dept: LAB | Facility: HOSPITAL | Age: 64
End: 2019-09-23
Payer: COMMERCIAL

## 2019-09-23 DIAGNOSIS — L03.115 CELLULITIS OF RIGHT LOWER EXTREMITY: ICD-10-CM

## 2019-09-23 DIAGNOSIS — L03.114 CELLULITIS OF LEFT UPPER EXTREMITY: ICD-10-CM

## 2019-09-23 LAB
ANION GAP SERPL CALC-SCNC: 6 MMOL/L (ref 0–18)
BASOPHILS # BLD AUTO: 0.07 X10(3) UL (ref 0–0.2)
BASOPHILS NFR BLD AUTO: 1 %
BUN BLD-MCNC: 32 MG/DL (ref 7–18)
BUN/CREAT SERPL: 26.2 (ref 10–20)
CALCIUM BLD-MCNC: 9.6 MG/DL (ref 8.5–10.1)
CHLORIDE SERPL-SCNC: 104 MMOL/L (ref 98–112)
CO2 SERPL-SCNC: 27 MMOL/L (ref 21–32)
CREAT BLD-MCNC: 1.22 MG/DL (ref 0.7–1.3)
CRP SERPL-MCNC: 0.8 MG/DL (ref ?–0.3)
DEPRECATED RDW RBC AUTO: 49.1 FL (ref 35.1–46.3)
EOSINOPHIL # BLD AUTO: 0.22 X10(3) UL (ref 0–0.7)
EOSINOPHIL NFR BLD AUTO: 3.2 %
ERYTHROCYTE [DISTWIDTH] IN BLOOD BY AUTOMATED COUNT: 14.8 % (ref 11–15)
GLUCOSE BLD-MCNC: 155 MG/DL (ref 70–99)
HCT VFR BLD AUTO: 32.9 % (ref 39–53)
HGB BLD-MCNC: 10.4 G/DL (ref 13–17.5)
IMM GRANULOCYTES # BLD AUTO: 0.05 X10(3) UL (ref 0–1)
IMM GRANULOCYTES NFR BLD: 0.7 %
LYMPHOCYTES # BLD AUTO: 1.72 X10(3) UL (ref 1–4)
LYMPHOCYTES NFR BLD AUTO: 25.2 %
MCH RBC QN AUTO: 28.7 PG (ref 26–34)
MCHC RBC AUTO-ENTMCNC: 31.6 G/DL (ref 31–37)
MCV RBC AUTO: 90.6 FL (ref 80–100)
MONOCYTES # BLD AUTO: 0.63 X10(3) UL (ref 0.1–1)
MONOCYTES NFR BLD AUTO: 9.2 %
NEUTROPHILS # BLD AUTO: 4.14 X10 (3) UL (ref 1.5–7.7)
NEUTROPHILS # BLD AUTO: 4.14 X10(3) UL (ref 1.5–7.7)
NEUTROPHILS NFR BLD AUTO: 60.7 %
OSMOLALITY SERPL CALC.SUM OF ELEC: 294 MOSM/KG (ref 275–295)
PLATELET # BLD AUTO: 286 10(3)UL (ref 150–450)
POTASSIUM SERPL-SCNC: 4.4 MMOL/L (ref 3.5–5.1)
RBC # BLD AUTO: 3.63 X10(6)UL (ref 3.8–5.8)
SODIUM SERPL-SCNC: 137 MMOL/L (ref 136–145)
VANCOMYCIN TROUGH SERPL-MCNC: 13 UG/ML (ref 10–20)
WBC # BLD AUTO: 6.8 X10(3) UL (ref 4–11)

## 2019-09-23 PROCEDURE — 86140 C-REACTIVE PROTEIN: CPT | Performed by: INTERNAL MEDICINE

## 2019-09-23 PROCEDURE — 80048 BASIC METABOLIC PNL TOTAL CA: CPT | Performed by: INTERNAL MEDICINE

## 2019-09-23 PROCEDURE — 80202 ASSAY OF VANCOMYCIN: CPT | Performed by: INTERNAL MEDICINE

## 2019-09-23 PROCEDURE — 85025 COMPLETE CBC W/AUTO DIFF WBC: CPT | Performed by: INTERNAL MEDICINE

## 2019-09-30 ENCOUNTER — LAB REQUISITION (OUTPATIENT)
Dept: LAB | Facility: HOSPITAL | Age: 64
End: 2019-09-30
Payer: COMMERCIAL

## 2019-09-30 DIAGNOSIS — T84.69XA: ICD-10-CM

## 2019-09-30 PROCEDURE — 80202 ASSAY OF VANCOMYCIN: CPT | Performed by: INTERNAL MEDICINE

## 2019-09-30 PROCEDURE — 80048 BASIC METABOLIC PNL TOTAL CA: CPT | Performed by: INTERNAL MEDICINE

## 2019-09-30 PROCEDURE — 85025 COMPLETE CBC W/AUTO DIFF WBC: CPT | Performed by: INTERNAL MEDICINE

## 2019-09-30 PROCEDURE — 86140 C-REACTIVE PROTEIN: CPT | Performed by: INTERNAL MEDICINE

## 2019-10-07 ENCOUNTER — LAB REQUISITION (OUTPATIENT)
Dept: LAB | Facility: HOSPITAL | Age: 64
End: 2019-10-07
Payer: COMMERCIAL

## 2019-10-07 DIAGNOSIS — L03.115 CELLULITIS OF RIGHT LOWER LIMB: ICD-10-CM

## 2019-10-07 DIAGNOSIS — L03.114 CELLULITIS OF LEFT UPPER LIMB: ICD-10-CM

## 2019-10-07 PROCEDURE — 80202 ASSAY OF VANCOMYCIN: CPT | Performed by: INTERNAL MEDICINE

## 2019-10-07 PROCEDURE — 80048 BASIC METABOLIC PNL TOTAL CA: CPT | Performed by: INTERNAL MEDICINE

## 2019-10-07 PROCEDURE — 85025 COMPLETE CBC W/AUTO DIFF WBC: CPT | Performed by: INTERNAL MEDICINE

## 2019-10-07 PROCEDURE — 86140 C-REACTIVE PROTEIN: CPT | Performed by: INTERNAL MEDICINE

## 2019-10-14 ENCOUNTER — LAB REQUISITION (OUTPATIENT)
Dept: LAB | Facility: HOSPITAL | Age: 64
End: 2019-10-14
Payer: COMMERCIAL

## 2019-10-14 DIAGNOSIS — L03.115 CELLULITIS OF RIGHT LOWER LIMB: ICD-10-CM

## 2019-10-14 DIAGNOSIS — L03.114 CELLULITIS OF LEFT UPPER LIMB: ICD-10-CM

## 2019-10-14 PROCEDURE — 80048 BASIC METABOLIC PNL TOTAL CA: CPT | Performed by: INTERNAL MEDICINE

## 2019-10-14 PROCEDURE — 86140 C-REACTIVE PROTEIN: CPT | Performed by: INTERNAL MEDICINE

## 2019-10-14 PROCEDURE — 80202 ASSAY OF VANCOMYCIN: CPT | Performed by: INTERNAL MEDICINE

## 2019-10-14 PROCEDURE — 85025 COMPLETE CBC W/AUTO DIFF WBC: CPT | Performed by: INTERNAL MEDICINE

## 2019-10-16 ENCOUNTER — PATIENT OUTREACH (OUTPATIENT)
Dept: INFECTIOUS DISEASE | Facility: CLINIC | Age: 64
End: 2019-10-16

## 2019-11-26 PROBLEM — R20.2 NUMBNESS AND TINGLING OF LEFT HAND: Status: ACTIVE | Noted: 2019-11-26

## 2019-11-26 PROBLEM — R20.0 NUMBNESS AND TINGLING OF LEFT HAND: Status: ACTIVE | Noted: 2019-11-26

## 2020-02-04 PROBLEM — N18.30 CKD (CHRONIC KIDNEY DISEASE) STAGE 3, GFR 30-59 ML/MIN (HCC): Status: RESOLVED | Noted: 2017-09-18 | Resolved: 2020-02-04

## 2020-03-11 ENCOUNTER — HOSPITAL (OUTPATIENT)
Dept: OTHER | Age: 65
End: 2020-03-11
Attending: INTERNAL MEDICINE

## 2020-03-11 LAB
A/G RATIO_: 1
ABS LYMPH: 0.8 K/CUMM (ref 1–3.5)
ABS MONO: 0.8 K/CUMM (ref 0.1–0.8)
ABS NEUTRO: 8.6 K/CUMM (ref 2–8)
ALBUMIN: 4.1 G/DL (ref 3.5–5)
ALK PHOS: 74 UNIT/L (ref 50–124)
ALT/GPT: 23 UNIT/L (ref 0–55)
ANION GAP SERPL CALC-SCNC: 14 MEQ/L (ref 10–20)
APTT PPP: 27 SECONDS (ref 22–30)
AST/GOT: 23 UNIT/L (ref 5–34)
BASOPHIL: 0 % (ref 0–1)
BILI TOTAL: 0.2 MG/DL (ref 0.2–1)
BUN SERPL-MCNC: 26 MG/DL (ref 6–20)
CALCIUM: 8.8 MG/DL (ref 8.4–10.2)
CHLORIDE: 101 MEQ/L (ref 97–107)
CONTROL LINE: PRESENT
CREATININE: 1.41 MG/DL (ref 0.6–1.3)
DEVICE SN: ABNORMAL
DIFF_TYPE?: ABNORMAL
EOSINOPHIL: 0 % (ref 0–6)
GLOBULIN_: 4.1 G/DL (ref 2–4.1)
GLUCOSE LVL: 147 MG/DL (ref 70–99)
HCT VFR BLD CALC: 38 % (ref 36–51)
HEMOLYSIS 2+: NEGATIVE
HEMOLYSIS 2+: NEGATIVE
HEMOLYSIS 4+: NEGATIVE
HGB BLD-MCNC: 12.5 G/DL (ref 12–17)
ICTERIC 4+: NEGATIVE
IMMATURE GRAN: 0.8 % (ref 0–0.3)
INFLUENZ A: NORMAL
INFLUENZ B: NORMAL
INFLUENZ COMMNT: NORMAL
INR: 0.9 (ref 0.9–1.1)
INSTR WBC: 10.4 K/CUMM (ref 4–11)
LACTIC ACID: 1.8 MMOL/L (ref 0.5–2.2)
LIPEMIC 1+: NEGATIVE
LIPEMIC 3+: NEGATIVE
LYMPHOCYTE: 7 %
MAGNESIUM LEVEL: 1.9 MG/DL (ref 1.6–2.6)
MCH RBC QN AUTO: 31 PG (ref 25–35)
MCHC RBC AUTO-ENTMCNC: 33 G/DL (ref 32–37)
MCV RBC AUTO: 94 FL (ref 78–97)
MONOCYTE: 8 %
NEUTROPHIL: 83 %
NRBC BLD MANUAL-RTO: 0 % (ref 0–0.2)
PLATELET: 195 K/CUMM (ref 150–450)
POC_GLU: 142 MG/DL (ref 70–99)
POTASSIUM: 4.7 MEQ/L (ref 3.5–5.1)
PROTHROMBIN TIME: 10.1 SECONDS (ref 9.7–11.6)
RBC # BLD: 4.07 M/CUMM (ref 4.2–6)
RDW: 12.4 % (ref 11.5–14.5)
SODIUM: 137 MEQ/L (ref 136–145)
TCO2: 27 MEQ/L (ref 19–29)
TECH_ID: ABNORMAL
TOTAL PROTEIN: 8.2 G/DL (ref 6.4–8.3)
WBC # BLD: 10.4 K/CUMM (ref 4–11)

## 2020-03-12 ENCOUNTER — HOSPITAL (OUTPATIENT)
Dept: OTHER | Age: 65
End: 2020-03-12

## 2020-03-12 LAB
ABS LYMPH: 1 K/CUMM (ref 1–3.5)
ABS MONO: 1.2 K/CUMM (ref 0.1–0.8)
ABS NEUTRO: 8.8 K/CUMM (ref 2–8)
ANION GAP SERPL CALC-SCNC: 15 MEQ/L (ref 10–20)
BASOPHIL: 0 % (ref 0–1)
BUN SERPL-MCNC: 25 MG/DL (ref 6–20)
CALCIUM: 9.3 MG/DL (ref 8.4–10.2)
CHLORIDE: 100 MEQ/L (ref 97–107)
CREATININE: 1.41 MG/DL (ref 0.6–1.3)
DEVICE SN: ABNORMAL
DIFF_TYPE?: ABNORMAL
EOSINOPHIL: 0 % (ref 0–6)
GLUCOSE LVL: 170 MG/DL (ref 70–99)
HCT VFR BLD CALC: 35 % (ref 36–51)
HEMOLYSIS 2+: NEGATIVE
HEMOLYSIS 2+: NEGATIVE
HEMOLYSIS 4+: ABNORMAL
HEMOLYSIS 4+: NEGATIVE
HEMOLYSIS 4+: NEGATIVE
HGB A1C: 8.4 %
HGB BLD-MCNC: 11.6 G/DL (ref 12–17)
ICTERIC 4+: NEGATIVE
IMMATURE GRAN: 0.5 % (ref 0–0.3)
INSTR WBC: 11 K/CUMM (ref 4–11)
LACTIC ACID: 1.2 MMOL/L (ref 0.5–2.2)
LACTIC ACID: 1.8 MMOL/L (ref 0.5–2.2)
LACTIC ACID: 2.4 MMOL/L (ref 0.5–2.2)
LIPEMIC 1+: NEGATIVE
LIPEMIC 2+: NEGATIVE
LYMPHOCYTE: 9 %
MCH RBC QN AUTO: 31 PG (ref 25–35)
MCHC RBC AUTO-ENTMCNC: 33 G/DL (ref 32–37)
MCV RBC AUTO: 94 FL (ref 78–97)
MONOCYTE: 11 %
NEUTROPHIL: 79 %
NRBC BLD MANUAL-RTO: 0 % (ref 0–0.2)
PLATELET: 173 K/CUMM (ref 150–450)
POC_GLU: 196 MG/DL (ref 70–99)
POC_GLU: 200 MG/DL (ref 70–99)
POC_GLU: 204 MG/DL (ref 70–99)
POC_GLU: 254 MG/DL (ref 70–99)
POTASSIUM: 4 MEQ/L (ref 3.5–5.1)
RBC # BLD: 3.76 M/CUMM (ref 4.2–6)
RDW: 12.4 % (ref 11.5–14.5)
SODIUM: 135 MEQ/L (ref 136–145)
TCO2: 24 MEQ/L (ref 19–29)
TECH_ID: ABNORMAL
UA APPEAR: CLEAR
UA BACTERIA: ABNORMAL
UA BILI: NEGATIVE
UA BLOOD: ABNORMAL
UA COLOR: YELLOW
UA EPITHELIAL: ABNORMAL
UA GLUCOSE: NEGATIVE
UA KETONES: NEGATIVE
UA LEUK EST: NEGATIVE
UA NITRITE: NEGATIVE
UA PH: 6.5 (ref 5–7)
UA PROTEIN: ABNORMAL
UA RBC: ABNORMAL
UA SPEC GRAV: 1.02 (ref 1.01–1.02)
UA UROBILINOGEN: 0.2 MG/DL (ref 0.2–1)
UA WBC: ABNORMAL
URIC ACID: 3.9 MG/DL (ref 3.5–7.2)
WBC # BLD: 11 K/CUMM (ref 4–11)

## 2020-03-13 LAB
ABS LYMPH: 0.9 K/CUMM (ref 1–3.5)
ABS MONO: 1.7 K/CUMM (ref 0.1–0.8)
ABS NEUTRO: 13.8 K/CUMM (ref 2–8)
ANION GAP SERPL CALC-SCNC: 13 MEQ/L (ref 10–20)
BASOPHIL: 0 % (ref 0–1)
BUN SERPL-MCNC: 22 MG/DL (ref 6–20)
CALCIUM: 8.9 MG/DL (ref 8.4–10.2)
CHLORIDE: 100 MEQ/L (ref 97–107)
CREATININE: 1.28 MG/DL (ref 0.6–1.3)
DEVICE SN: ABNORMAL
DIFF_TYPE?: ABNORMAL
EOSINOPHIL: 0 % (ref 0–6)
GLUCOSE LVL: 223 MG/DL (ref 70–99)
HCT VFR BLD CALC: 32 % (ref 36–51)
HEMOLYSIS 2+: NEGATIVE
HGB BLD-MCNC: 10.3 G/DL (ref 12–17)
IMMATURE GRAN: 0.7 % (ref 0–0.3)
INSTR WBC: 16.5 K/CUMM (ref 4–11)
LYMPHOCYTE: 6 %
MCH RBC QN AUTO: 31 PG (ref 25–35)
MCHC RBC AUTO-ENTMCNC: 33 G/DL (ref 32–37)
MCV RBC AUTO: 95 FL (ref 78–97)
MONOCYTE: 10 %
NEUTROPHIL: 83 %
NRBC BLD MANUAL-RTO: 0 % (ref 0–0.2)
PLATELET MORPH: ABNORMAL
PLATELET: 156 K/CUMM (ref 150–450)
PLT ESTIMATE: ADEQUATE
POC_GLU: 172 MG/DL (ref 70–99)
POC_GLU: 219 MG/DL (ref 70–99)
POC_GLU: 242 MG/DL (ref 70–99)
POC_GLU: 349 MG/DL (ref 70–99)
POTASSIUM: 4 MEQ/L (ref 3.5–5.1)
RBC # BLD: 3.33 M/CUMM (ref 4.2–6)
RBC MORPH: ABNORMAL
RDW: 12.4 % (ref 11.5–14.5)
SODIUM: 134 MEQ/L (ref 136–145)
TCO2: 25 MEQ/L (ref 19–29)
TECH_ID: ABNORMAL
VANCO TR: 14 UG/ML (ref 5–15)
WBC # BLD: 16.5 K/CUMM (ref 4–11)

## 2020-03-24 PROBLEM — Z47.89 AFTERCARE FOLLOWING SURGERY OF THE MUSCULOSKELETAL SYSTEM, NEC: Status: ACTIVE | Noted: 2020-03-24

## 2020-03-24 PROBLEM — Z47.89 AFTERCARE FOLLOWING SURGERY OF THE MUSCULOSKELETAL SYSTEM, NEC: Status: RESOLVED | Noted: 2019-09-17 | Resolved: 2020-03-24

## 2020-05-20 PROBLEM — Z47.89 AFTERCARE FOLLOWING SURGERY OF THE MUSCULOSKELETAL SYSTEM, NEC: Status: ACTIVE | Noted: 2020-05-20

## 2020-05-20 PROBLEM — Z47.89 AFTERCARE FOLLOWING SURGERY OF THE MUSCULOSKELETAL SYSTEM, NEC: Status: RESOLVED | Noted: 2020-03-24 | Resolved: 2020-05-20

## 2020-07-01 ENCOUNTER — LAB SERVICES (OUTPATIENT)
Dept: LAB | Age: 65
End: 2020-07-01

## 2020-07-01 DIAGNOSIS — Z01.812 PRE-PROCEDURAL LABORATORY EXAMINATIONS: ICD-10-CM

## 2020-07-02 ENCOUNTER — HOSPITAL ENCOUNTER (OUTPATIENT)
Dept: PREADMISSION TESTING | Age: 65
Discharge: HOME | End: 2020-07-02
Attending: PODIATRIST

## 2020-07-02 LAB
SARS-COV-2 RNA RESP QL NAA+PROBE: NOT DETECTED
SERVICE CMNT-IMP: NORMAL
SPECIMEN SOURCE: NORMAL

## 2020-07-02 RX ORDER — INSULIN LISPRO 100 [IU]/ML
INJECTION, SOLUTION INTRAVENOUS; SUBCUTANEOUS
COMMUNITY

## 2020-07-02 RX ORDER — GABAPENTIN 600 MG/1
600 TABLET ORAL 3 TIMES DAILY
COMMUNITY

## 2020-07-02 RX ORDER — FOLIC ACID 1 MG/1
1 TABLET ORAL DAILY
COMMUNITY

## 2020-07-02 RX ORDER — FENOFIBRATE 145 MG/1
145 TABLET, COATED ORAL DAILY
COMMUNITY

## 2020-07-02 RX ORDER — FUROSEMIDE 20 MG/1
20 TABLET ORAL DAILY
COMMUNITY

## 2020-07-02 RX ORDER — AMOXICILLIN 250 MG
2 CAPSULE ORAL 2 TIMES DAILY
COMMUNITY

## 2020-07-02 RX ORDER — ALLOPURINOL 100 MG/1
100 TABLET ORAL DAILY
COMMUNITY

## 2020-07-02 RX ORDER — DULOXETIN HYDROCHLORIDE 20 MG/1
20 CAPSULE, DELAYED RELEASE ORAL DAILY
COMMUNITY

## 2020-07-02 RX ORDER — MULTIVIT WITH MINERALS/LUTEIN
1000 TABLET ORAL DAILY
COMMUNITY

## 2020-07-02 RX ORDER — PRAMIPEXOLE DIHYDROCHLORIDE 1 MG/1
1 TABLET ORAL NIGHTLY
COMMUNITY

## 2020-07-02 RX ORDER — GLYBURIDE 5 MG/1
5 TABLET ORAL 2 TIMES DAILY WITH MEALS
COMMUNITY

## 2020-07-02 RX ORDER — CARVEDILOL 6.25 MG/1
6.25 TABLET ORAL 2 TIMES DAILY WITH MEALS
COMMUNITY

## 2020-07-02 RX ORDER — EZETIMIBE 10 MG/1
10 TABLET ORAL DAILY
COMMUNITY

## 2020-07-02 RX ORDER — LOSARTAN POTASSIUM 50 MG/1
50 TABLET ORAL 2 TIMES DAILY
COMMUNITY

## 2020-07-02 RX ORDER — AMLODIPINE BESYLATE 10 MG/1
10 TABLET ORAL DAILY
COMMUNITY

## 2020-07-02 RX ORDER — DOCUSATE SODIUM 100 MG/1
100 CAPSULE, LIQUID FILLED ORAL 2 TIMES DAILY
COMMUNITY

## 2020-07-02 RX ORDER — ASPIRIN 325 MG
325 TABLET ORAL DAILY
COMMUNITY

## 2020-07-02 RX ORDER — ACETAMINOPHEN 500 MG
500 TABLET ORAL EVERY 6 HOURS PRN
COMMUNITY

## 2020-07-02 SDOH — HEALTH STABILITY: MENTAL HEALTH: HOW OFTEN DO YOU HAVE A DRINK CONTAINING ALCOHOL?: NEVER

## 2020-07-02 ASSESSMENT — ACTIVITIES OF DAILY LIVING (ADL)
ADL_BEFORE_ADMISSION: NEEDS/REQUIRES ASSISTANCE
MOBILITY_ASSIST_DEVICES: WHEELCHAIR
ADL_SCORE: 0

## 2020-07-03 ENCOUNTER — ANESTHESIA EVENT (OUTPATIENT)
Dept: SURGERY | Age: 65
End: 2020-07-03

## 2020-07-03 ENCOUNTER — HOSPITAL ENCOUNTER (OUTPATIENT)
Age: 65
Discharge: HOME | End: 2020-07-03
Attending: PODIATRIST | Admitting: PODIATRIST

## 2020-07-03 ENCOUNTER — ANESTHESIA (OUTPATIENT)
Dept: SURGERY | Age: 65
End: 2020-07-03

## 2020-07-03 DIAGNOSIS — Z01.812 PRE-PROCEDURAL LABORATORY EXAMINATIONS: Primary | ICD-10-CM

## 2020-07-03 LAB
GLUCOSE BLDC GLUCOMTR-MCNC: 134 MG/DL (ref 70–99)
GLUCOSE BLDC GLUCOMTR-MCNC: 175 MG/DL (ref 70–99)

## 2020-07-03 PROCEDURE — 82962 GLUCOSE BLOOD TEST: CPT

## 2020-07-03 PROCEDURE — 88300 SURGICAL PATH GROSS: CPT

## 2020-07-03 PROCEDURE — 10002800 HB RX 250 W HCPCS: Performed by: ANESTHESIOLOGY

## 2020-07-03 PROCEDURE — 10004451 HB PACU RECOVERY 1ST 30 MINUTES: Performed by: PODIATRIST

## 2020-07-03 PROCEDURE — 13000114 HB ORTHO BASIC CASE S/U + 1ST 15 MIN: Performed by: PODIATRIST

## 2020-07-03 PROCEDURE — 10002807 HB RX 258: Performed by: ANESTHESIOLOGY

## 2020-07-03 PROCEDURE — 13000006 HB ANESTHESIA MAC S/U + 1ST 15 MIN: Performed by: PODIATRIST

## 2020-07-03 PROCEDURE — 10002803 HB RX 637

## 2020-07-03 PROCEDURE — 13000001 HB PHASE II RECOVERY EA 30 MINUTES: Performed by: PODIATRIST

## 2020-07-03 PROCEDURE — 10002807 HB RX 258

## 2020-07-03 RX ORDER — SODIUM CHLORIDE, SODIUM LACTATE, POTASSIUM CHLORIDE, CALCIUM CHLORIDE 600; 310; 30; 20 MG/100ML; MG/100ML; MG/100ML; MG/100ML
INJECTION, SOLUTION INTRAVENOUS CONTINUOUS
Status: DISCONTINUED | OUTPATIENT
Start: 2020-07-03 | End: 2020-07-03 | Stop reason: HOSPADM

## 2020-07-03 RX ORDER — HYDRALAZINE HYDROCHLORIDE 20 MG/ML
5 INJECTION INTRAMUSCULAR; INTRAVENOUS EVERY 10 MIN PRN
Status: DISCONTINUED | OUTPATIENT
Start: 2020-07-03 | End: 2020-07-03 | Stop reason: HOSPADM

## 2020-07-03 RX ORDER — DEXTROSE MONOHYDRATE 50 MG/ML
INJECTION, SOLUTION INTRAVENOUS CONTINUOUS PRN
Status: DISCONTINUED | OUTPATIENT
Start: 2020-07-03 | End: 2020-07-03 | Stop reason: HOSPADM

## 2020-07-03 RX ORDER — 0.9 % SODIUM CHLORIDE 0.9 %
2 VIAL (ML) INJECTION EVERY 12 HOURS SCHEDULED
Status: DISCONTINUED | OUTPATIENT
Start: 2020-07-03 | End: 2020-07-03 | Stop reason: HOSPADM

## 2020-07-03 RX ORDER — NALBUPHINE HYDROCHLORIDE 10 MG/ML
2.5 INJECTION, SOLUTION INTRAMUSCULAR; INTRAVENOUS; SUBCUTANEOUS
Status: DISCONTINUED | OUTPATIENT
Start: 2020-07-03 | End: 2020-07-03 | Stop reason: HOSPADM

## 2020-07-03 RX ORDER — TRAMADOL HYDROCHLORIDE 50 MG/1
50 TABLET ORAL EVERY 6 HOURS PRN
Status: DISCONTINUED | OUTPATIENT
Start: 2020-07-03 | End: 2020-07-03 | Stop reason: HOSPADM

## 2020-07-03 RX ORDER — EPHEDRINE SULFATE/0.9% NACL/PF 50 MG/10ML
5 SYRINGE (ML) INTRAVENOUS
Status: DISCONTINUED | OUTPATIENT
Start: 2020-07-03 | End: 2020-07-03 | Stop reason: HOSPADM

## 2020-07-03 RX ORDER — HUMAN INSULIN 100 [IU]/ML
INJECTION, SOLUTION SUBCUTANEOUS
Status: DISCONTINUED | OUTPATIENT
Start: 2020-07-03 | End: 2020-07-03 | Stop reason: HOSPADM

## 2020-07-03 RX ORDER — ALBUTEROL SULFATE 2.5 MG/3ML
5 SOLUTION RESPIRATORY (INHALATION) ONCE
Status: DISCONTINUED | OUTPATIENT
Start: 2020-07-03 | End: 2020-07-03 | Stop reason: HOSPADM

## 2020-07-03 RX ORDER — ENALAPRILAT 1.25 MG/ML
1.25 INJECTION INTRAVENOUS PRN
Status: DISCONTINUED | OUTPATIENT
Start: 2020-07-03 | End: 2020-07-03 | Stop reason: HOSPADM

## 2020-07-03 RX ORDER — LIDOCAINE HYDROCHLORIDE 10 MG/ML
5-10 INJECTION, SOLUTION INFILTRATION; PERINEURAL PRN
Status: DISCONTINUED | OUTPATIENT
Start: 2020-07-03 | End: 2020-07-03 | Stop reason: HOSPADM

## 2020-07-03 RX ORDER — SODIUM CHLORIDE, SODIUM LACTATE, POTASSIUM CHLORIDE, CALCIUM CHLORIDE 600; 310; 30; 20 MG/100ML; MG/100ML; MG/100ML; MG/100ML
INJECTION, SOLUTION INTRAVENOUS CONTINUOUS PRN
Status: DISCONTINUED | OUTPATIENT
Start: 2020-07-03 | End: 2020-07-03

## 2020-07-03 RX ORDER — METOCLOPRAMIDE HYDROCHLORIDE 5 MG/ML
5 INJECTION INTRAMUSCULAR; INTRAVENOUS EVERY 6 HOURS PRN
Status: DISCONTINUED | OUTPATIENT
Start: 2020-07-03 | End: 2020-07-03 | Stop reason: HOSPADM

## 2020-07-03 RX ORDER — ONDANSETRON 2 MG/ML
4 INJECTION INTRAMUSCULAR; INTRAVENOUS 2 TIMES DAILY PRN
Status: DISCONTINUED | OUTPATIENT
Start: 2020-07-03 | End: 2020-07-03 | Stop reason: HOSPADM

## 2020-07-03 RX ORDER — NALOXONE HCL 0.4 MG/ML
0.2 VIAL (ML) INJECTION EVERY 5 MIN PRN
Status: DISCONTINUED | OUTPATIENT
Start: 2020-07-03 | End: 2020-07-03 | Stop reason: HOSPADM

## 2020-07-03 RX ORDER — ACETAMINOPHEN 325 MG/1
650 TABLET ORAL EVERY 4 HOURS PRN
Status: DISCONTINUED | OUTPATIENT
Start: 2020-07-03 | End: 2020-07-03 | Stop reason: HOSPADM

## 2020-07-03 RX ORDER — FAMOTIDINE 20 MG/1
20 TABLET, FILM COATED ORAL
Status: COMPLETED | OUTPATIENT
Start: 2020-07-03 | End: 2020-07-03

## 2020-07-03 RX ORDER — DIPHENHYDRAMINE HYDROCHLORIDE 50 MG/ML
25 INJECTION INTRAMUSCULAR; INTRAVENOUS
Status: DISCONTINUED | OUTPATIENT
Start: 2020-07-03 | End: 2020-07-03 | Stop reason: HOSPADM

## 2020-07-03 RX ORDER — SODIUM CHLORIDE 9 MG/ML
INJECTION, SOLUTION INTRAVENOUS CONTINUOUS
Status: DISCONTINUED | OUTPATIENT
Start: 2020-07-03 | End: 2020-07-03 | Stop reason: HOSPADM

## 2020-07-03 RX ORDER — DEXTROSE MONOHYDRATE 25 G/50ML
25 INJECTION, SOLUTION INTRAVENOUS PRN
Status: DISCONTINUED | OUTPATIENT
Start: 2020-07-03 | End: 2020-07-03 | Stop reason: HOSPADM

## 2020-07-03 RX ORDER — PROCHLORPERAZINE EDISYLATE 5 MG/ML
5 INJECTION INTRAMUSCULAR; INTRAVENOUS EVERY 4 HOURS PRN
Status: DISCONTINUED | OUTPATIENT
Start: 2020-07-03 | End: 2020-07-03 | Stop reason: HOSPADM

## 2020-07-03 RX ADMIN — SODIUM CHLORIDE, POTASSIUM CHLORIDE, SODIUM LACTATE AND CALCIUM CHLORIDE: 600; 310; 30; 20 INJECTION, SOLUTION INTRAVENOUS at 07:24

## 2020-07-03 RX ADMIN — SODIUM CHLORIDE, SODIUM LACTATE, POTASSIUM CHLORIDE, AND CALCIUM CHLORIDE: .6; .31; .03; .02 INJECTION, SOLUTION INTRAVENOUS at 07:03

## 2020-07-03 RX ADMIN — PROPOFOL 75 MCG/KG/MIN: 10 INJECTION, EMULSION INTRAVENOUS at 08:06

## 2020-07-03 RX ADMIN — FENTANYL CITRATE 100 MCG: 50 INJECTION INTRAMUSCULAR; INTRAVENOUS at 08:05

## 2020-07-03 RX ADMIN — FAMOTIDINE 20 MG: 20 TABLET ORAL at 06:51

## 2020-07-03 SDOH — HEALTH STABILITY: MENTAL HEALTH: HOW OFTEN DO YOU HAVE A DRINK CONTAINING ALCOHOL?: NEVER

## 2020-07-03 ASSESSMENT — PAIN SCALES - GENERAL
PAINLEVEL_OUTOF10: 0

## 2020-07-06 LAB — PATHOLOGIST NAME: NORMAL

## 2020-07-15 PROBLEM — Z47.89 AFTERCARE FOLLOWING SURGERY OF THE MUSCULOSKELETAL SYSTEM, NEC: Status: ACTIVE | Noted: 2020-07-15

## 2020-07-15 PROBLEM — Z47.89 AFTERCARE FOLLOWING SURGERY OF THE MUSCULOSKELETAL SYSTEM, NEC: Status: RESOLVED | Noted: 2020-05-20 | Resolved: 2020-07-15

## 2020-07-22 PROBLEM — R20.2 NUMBNESS AND TINGLING OF HAND: Status: ACTIVE | Noted: 2020-07-22

## 2020-07-22 PROBLEM — M54.12 CERVICAL RADICULOPATHY: Status: ACTIVE | Noted: 2020-07-22

## 2020-07-22 PROBLEM — R20.0 NUMBNESS AND TINGLING OF HAND: Status: ACTIVE | Noted: 2020-07-22

## 2020-09-09 ENCOUNTER — APPOINTMENT (OUTPATIENT)
Dept: GENERAL RADIOLOGY | Facility: HOSPITAL | Age: 65
DRG: 474 | End: 2020-09-09
Attending: EMERGENCY MEDICINE
Payer: COMMERCIAL

## 2020-09-09 ENCOUNTER — HOSPITAL ENCOUNTER (INPATIENT)
Facility: HOSPITAL | Age: 65
LOS: 5 days | Discharge: HOME HEALTH | DRG: 474 | End: 2020-09-16
Attending: EMERGENCY MEDICINE | Admitting: HOSPITALIST
Payer: COMMERCIAL

## 2020-09-09 DIAGNOSIS — M86.9 OSTEOMYELITIS (HCC): ICD-10-CM

## 2020-09-09 DIAGNOSIS — I10 ESSENTIAL HYPERTENSION, BENIGN: ICD-10-CM

## 2020-09-09 DIAGNOSIS — L03.115 CELLULITIS OF RIGHT LOWER EXTREMITY: Primary | ICD-10-CM

## 2020-09-09 PROBLEM — E87.1 HYPONATREMIA: Status: ACTIVE | Noted: 2020-09-09

## 2020-09-09 LAB
ALBUMIN SERPL-MCNC: 3.7 G/DL (ref 3.4–5)
ALBUMIN/GLOB SERPL: 0.6 {RATIO} (ref 1–2)
ALP LIVER SERPL-CCNC: 52 U/L (ref 45–117)
ALT SERPL-CCNC: 43 U/L (ref 16–61)
ANION GAP SERPL CALC-SCNC: 4 MMOL/L (ref 0–18)
AST SERPL-CCNC: 28 U/L (ref 15–37)
BASOPHILS # BLD AUTO: 0.04 X10(3) UL (ref 0–0.2)
BASOPHILS NFR BLD AUTO: 0.3 %
BILIRUB SERPL-MCNC: 0.4 MG/DL (ref 0.1–2)
BILIRUB UR QL STRIP.AUTO: NEGATIVE
BUN BLD-MCNC: 31 MG/DL (ref 7–18)
BUN/CREAT SERPL: 18.9 (ref 10–20)
CALCIUM BLD-MCNC: 10.1 MG/DL (ref 8.5–10.1)
CHLORIDE SERPL-SCNC: 103 MMOL/L (ref 98–112)
CO2 SERPL-SCNC: 28 MMOL/L (ref 21–32)
COLOR UR AUTO: YELLOW
CREAT BLD-MCNC: 1.64 MG/DL (ref 0.7–1.3)
CRP SERPL-MCNC: 13.8 MG/DL (ref ?–0.3)
DEPRECATED RDW RBC AUTO: 52.2 FL (ref 35.1–46.3)
EOSINOPHIL # BLD AUTO: 0.04 X10(3) UL (ref 0–0.7)
EOSINOPHIL NFR BLD AUTO: 0.3 %
ERYTHROCYTE [DISTWIDTH] IN BLOOD BY AUTOMATED COUNT: 15.6 % (ref 11–15)
EST. AVERAGE GLUCOSE BLD GHB EST-MCNC: 146 MG/DL (ref 68–126)
GLOBULIN PLAS-MCNC: 5.7 G/DL (ref 2.8–4.4)
GLUCOSE BLD-MCNC: 107 MG/DL (ref 70–99)
GLUCOSE BLD-MCNC: 168 MG/DL (ref 70–99)
GLUCOSE BLD-MCNC: 182 MG/DL (ref 70–99)
GLUCOSE UR STRIP.AUTO-MCNC: NEGATIVE MG/DL
HBA1C MFR BLD HPLC: 6.7 % (ref ?–5.7)
HCT VFR BLD AUTO: 30.6 % (ref 39–53)
HGB BLD-MCNC: 9.7 G/DL (ref 13–17.5)
IMM GRANULOCYTES # BLD AUTO: 0.06 X10(3) UL (ref 0–1)
IMM GRANULOCYTES NFR BLD: 0.5 %
KETONES UR STRIP.AUTO-MCNC: NEGATIVE MG/DL
LACTATE SERPL-SCNC: 1.6 MMOL/L (ref 0.4–2)
LEUKOCYTE ESTERASE UR QL STRIP.AUTO: NEGATIVE
LYMPHOCYTES # BLD AUTO: 0.57 X10(3) UL (ref 1–4)
LYMPHOCYTES NFR BLD AUTO: 4.4 %
M PROTEIN MFR SERPL ELPH: 9.4 G/DL (ref 6.4–8.2)
MCH RBC QN AUTO: 29.1 PG (ref 26–34)
MCHC RBC AUTO-ENTMCNC: 31.7 G/DL (ref 31–37)
MCV RBC AUTO: 91.9 FL (ref 80–100)
MONOCYTES # BLD AUTO: 0.65 X10(3) UL (ref 0.1–1)
MONOCYTES NFR BLD AUTO: 5.1 %
NEUTROPHILS # BLD AUTO: 11.46 X10 (3) UL (ref 1.5–7.7)
NEUTROPHILS # BLD AUTO: 11.46 X10(3) UL (ref 1.5–7.7)
NEUTROPHILS NFR BLD AUTO: 89.4 %
NITRITE UR QL STRIP.AUTO: NEGATIVE
OSMOLALITY SERPL CALC.SUM OF ELEC: 290 MOSM/KG (ref 275–295)
PH UR STRIP.AUTO: 5 [PH] (ref 4.5–8)
PLATELET # BLD AUTO: 262 10(3)UL (ref 150–450)
POTASSIUM SERPL-SCNC: 4.3 MMOL/L (ref 3.5–5.1)
PROT UR STRIP.AUTO-MCNC: 100 MG/DL
RBC # BLD AUTO: 3.33 X10(6)UL (ref 3.8–5.8)
RBC UR QL AUTO: NEGATIVE
SARS-COV-2 RNA RESP QL NAA+PROBE: NOT DETECTED
SODIUM SERPL-SCNC: 135 MMOL/L (ref 136–145)
SP GR UR STRIP.AUTO: 1.02 (ref 1–1.03)
UROBILINOGEN UR STRIP.AUTO-MCNC: <2 MG/DL
WBC # BLD AUTO: 12.8 X10(3) UL (ref 4–11)

## 2020-09-09 PROCEDURE — 82962 GLUCOSE BLOOD TEST: CPT

## 2020-09-09 PROCEDURE — 87147 CULTURE TYPE IMMUNOLOGIC: CPT | Performed by: EMERGENCY MEDICINE

## 2020-09-09 PROCEDURE — 83615 LACTATE (LD) (LDH) ENZYME: CPT | Performed by: HOSPITALIST

## 2020-09-09 PROCEDURE — 96365 THER/PROPH/DIAG IV INF INIT: CPT

## 2020-09-09 PROCEDURE — 86140 C-REACTIVE PROTEIN: CPT | Performed by: EMERGENCY MEDICINE

## 2020-09-09 PROCEDURE — 85025 COMPLETE CBC W/AUTO DIFF WBC: CPT | Performed by: EMERGENCY MEDICINE

## 2020-09-09 PROCEDURE — 96366 THER/PROPH/DIAG IV INF ADDON: CPT

## 2020-09-09 PROCEDURE — 71045 X-RAY EXAM CHEST 1 VIEW: CPT | Performed by: EMERGENCY MEDICINE

## 2020-09-09 PROCEDURE — 99285 EMERGENCY DEPT VISIT HI MDM: CPT

## 2020-09-09 PROCEDURE — 80053 COMPREHEN METABOLIC PANEL: CPT | Performed by: EMERGENCY MEDICINE

## 2020-09-09 PROCEDURE — 87150 DNA/RNA AMPLIFIED PROBE: CPT | Performed by: EMERGENCY MEDICINE

## 2020-09-09 PROCEDURE — 83605 ASSAY OF LACTIC ACID: CPT | Performed by: EMERGENCY MEDICINE

## 2020-09-09 PROCEDURE — 83036 HEMOGLOBIN GLYCOSYLATED A1C: CPT | Performed by: HOSPITALIST

## 2020-09-09 PROCEDURE — 87186 SC STD MICRODIL/AGAR DIL: CPT | Performed by: EMERGENCY MEDICINE

## 2020-09-09 PROCEDURE — 81001 URINALYSIS AUTO W/SCOPE: CPT | Performed by: EMERGENCY MEDICINE

## 2020-09-09 PROCEDURE — 87040 BLOOD CULTURE FOR BACTERIA: CPT | Performed by: EMERGENCY MEDICINE

## 2020-09-09 PROCEDURE — 96368 THER/DIAG CONCURRENT INF: CPT

## 2020-09-09 PROCEDURE — 96375 TX/PRO/DX INJ NEW DRUG ADDON: CPT

## 2020-09-09 PROCEDURE — 96361 HYDRATE IV INFUSION ADD-ON: CPT

## 2020-09-09 RX ORDER — VANCOMYCIN 2 GRAM/500 ML IN 0.9 % SODIUM CHLORIDE INTRAVENOUS
25 ONCE
Status: COMPLETED | OUTPATIENT
Start: 2020-09-09 | End: 2020-09-10

## 2020-09-09 RX ORDER — HYDROCODONE BITARTRATE AND ACETAMINOPHEN 5; 325 MG/1; MG/1
2 TABLET ORAL EVERY 4 HOURS PRN
Status: DISCONTINUED | OUTPATIENT
Start: 2020-09-09 | End: 2020-09-12

## 2020-09-09 RX ORDER — DOCUSATE SODIUM 100 MG/1
100 CAPSULE, LIQUID FILLED ORAL 2 TIMES DAILY
Status: DISCONTINUED | OUTPATIENT
Start: 2020-09-10 | End: 2020-09-16

## 2020-09-09 RX ORDER — HEPARIN SODIUM 5000 [USP'U]/ML
5000 INJECTION, SOLUTION INTRAVENOUS; SUBCUTANEOUS EVERY 8 HOURS SCHEDULED
Status: DISCONTINUED | OUTPATIENT
Start: 2020-09-09 | End: 2020-09-11

## 2020-09-09 RX ORDER — ACETAMINOPHEN 500 MG
1000 TABLET ORAL ONCE
Status: COMPLETED | OUTPATIENT
Start: 2020-09-09 | End: 2020-09-09

## 2020-09-09 RX ORDER — ONDANSETRON 2 MG/ML
4 INJECTION INTRAMUSCULAR; INTRAVENOUS ONCE
Status: COMPLETED | OUTPATIENT
Start: 2020-09-09 | End: 2020-09-09

## 2020-09-09 RX ORDER — LIDOCAINE 50 MG/G
1 PATCH TOPICAL DAILY PRN
COMMUNITY
End: 2021-07-08

## 2020-09-09 RX ORDER — DEXTROSE MONOHYDRATE 25 G/50ML
50 INJECTION, SOLUTION INTRAVENOUS
Status: DISCONTINUED | OUTPATIENT
Start: 2020-09-09 | End: 2020-09-16

## 2020-09-09 RX ORDER — POLYETHYLENE GLYCOL 3350 17 G/17G
17 POWDER, FOR SOLUTION ORAL DAILY PRN
Status: DISCONTINUED | OUTPATIENT
Start: 2020-09-09 | End: 2020-09-12

## 2020-09-09 RX ORDER — ONDANSETRON 2 MG/ML
4 INJECTION INTRAMUSCULAR; INTRAVENOUS EVERY 4 HOURS PRN
Status: DISCONTINUED | OUTPATIENT
Start: 2020-09-09 | End: 2020-09-09

## 2020-09-09 RX ORDER — MORPHINE SULFATE 2 MG/ML
4 INJECTION, SOLUTION INTRAMUSCULAR; INTRAVENOUS ONCE
Status: COMPLETED | OUTPATIENT
Start: 2020-09-09 | End: 2020-09-09

## 2020-09-09 RX ORDER — CARVEDILOL 6.25 MG/1
6.25 TABLET ORAL 2 TIMES DAILY WITH MEALS
Status: DISCONTINUED | OUTPATIENT
Start: 2020-09-09 | End: 2020-09-16

## 2020-09-09 RX ORDER — ACETAMINOPHEN 325 MG/1
650 TABLET ORAL EVERY 4 HOURS PRN
Status: DISCONTINUED | OUTPATIENT
Start: 2020-09-09 | End: 2020-09-12

## 2020-09-09 RX ORDER — GABAPENTIN 600 MG/1
1200 TABLET ORAL NIGHTLY
Status: DISCONTINUED | OUTPATIENT
Start: 2020-09-09 | End: 2020-09-16

## 2020-09-09 RX ORDER — FOLIC ACID 1 MG/1
1 TABLET ORAL DAILY
Status: DISCONTINUED | OUTPATIENT
Start: 2020-09-10 | End: 2020-09-16

## 2020-09-09 RX ORDER — AMLODIPINE BESYLATE 5 MG/1
10 TABLET ORAL DAILY
Status: DISCONTINUED | OUTPATIENT
Start: 2020-09-10 | End: 2020-09-16

## 2020-09-09 RX ORDER — SODIUM CHLORIDE 9 MG/ML
INJECTION, SOLUTION INTRAVENOUS CONTINUOUS
Status: ACTIVE | OUTPATIENT
Start: 2020-09-09 | End: 2020-09-09

## 2020-09-09 RX ORDER — SODIUM PHOSPHATE, DIBASIC AND SODIUM PHOSPHATE, MONOBASIC 7; 19 G/133ML; G/133ML
1 ENEMA RECTAL ONCE AS NEEDED
Status: DISCONTINUED | OUTPATIENT
Start: 2020-09-09 | End: 2020-09-16

## 2020-09-09 RX ORDER — ONDANSETRON 2 MG/ML
4 INJECTION INTRAMUSCULAR; INTRAVENOUS EVERY 6 HOURS PRN
Status: DISCONTINUED | OUTPATIENT
Start: 2020-09-09 | End: 2020-09-12

## 2020-09-09 RX ORDER — BISACODYL 10 MG
10 SUPPOSITORY, RECTAL RECTAL
Status: DISCONTINUED | OUTPATIENT
Start: 2020-09-09 | End: 2020-09-12

## 2020-09-09 RX ORDER — FUROSEMIDE 20 MG/1
20 TABLET ORAL DAILY
COMMUNITY
End: 2021-04-21

## 2020-09-09 RX ORDER — METOCLOPRAMIDE HYDROCHLORIDE 5 MG/ML
5 INJECTION INTRAMUSCULAR; INTRAVENOUS EVERY 8 HOURS PRN
Status: DISCONTINUED | OUTPATIENT
Start: 2020-09-09 | End: 2020-09-16

## 2020-09-09 RX ORDER — PRAMIPEXOLE DIHYDROCHLORIDE 1 MG/1
1 TABLET ORAL NIGHTLY
COMMUNITY
End: 2020-09-25

## 2020-09-09 RX ORDER — FENOFIBRATE 145 MG/1
145 TABLET, COATED ORAL DAILY
COMMUNITY
End: 2021-04-21

## 2020-09-09 RX ORDER — GABAPENTIN 600 MG/1
600 TABLET ORAL EVERY 24 HOURS
Status: DISCONTINUED | OUTPATIENT
Start: 2020-09-10 | End: 2020-09-16

## 2020-09-09 RX ORDER — GABAPENTIN 600 MG/1
600 TABLET ORAL SEE ADMIN INSTRUCTIONS
Status: DISCONTINUED | OUTPATIENT
Start: 2020-09-09 | End: 2020-09-09 | Stop reason: DRUGHIGH

## 2020-09-09 RX ORDER — ALLOPURINOL 100 MG/1
100 TABLET ORAL DAILY
Status: DISCONTINUED | OUTPATIENT
Start: 2020-09-10 | End: 2020-09-16

## 2020-09-09 RX ORDER — TRAMADOL HYDROCHLORIDE 50 MG/1
100 TABLET ORAL ONCE
Status: COMPLETED | OUTPATIENT
Start: 2020-09-09 | End: 2020-09-09

## 2020-09-09 RX ORDER — LOSARTAN POTASSIUM 50 MG/1
50 TABLET ORAL 2 TIMES DAILY
Status: DISCONTINUED | OUTPATIENT
Start: 2020-09-09 | End: 2020-09-16

## 2020-09-09 RX ORDER — TRAMADOL HYDROCHLORIDE 50 MG/1
50 TABLET ORAL EVERY 12 HOURS PRN
Status: DISCONTINUED | OUTPATIENT
Start: 2020-09-09 | End: 2020-09-16

## 2020-09-09 RX ORDER — FOLIC ACID 1 MG/1
1 TABLET ORAL DAILY
COMMUNITY

## 2020-09-09 RX ORDER — PRAMIPEXOLE DIHYDROCHLORIDE 0.25 MG/1
1 TABLET ORAL NIGHTLY
Status: DISCONTINUED | OUTPATIENT
Start: 2020-09-09 | End: 2020-09-16

## 2020-09-09 RX ORDER — ASPIRIN 81 MG/1
81 TABLET ORAL DAILY
Status: DISCONTINUED | OUTPATIENT
Start: 2020-09-10 | End: 2020-09-16

## 2020-09-09 RX ORDER — ALLOPURINOL 100 MG/1
100 TABLET ORAL DAILY
COMMUNITY
End: 2020-09-23

## 2020-09-09 RX ORDER — HYDROCODONE BITARTRATE AND ACETAMINOPHEN 5; 325 MG/1; MG/1
1 TABLET ORAL EVERY 4 HOURS PRN
Status: DISCONTINUED | OUTPATIENT
Start: 2020-09-09 | End: 2020-09-12

## 2020-09-09 RX ORDER — GABAPENTIN 600 MG/1
1200 TABLET ORAL
Status: DISCONTINUED | OUTPATIENT
Start: 2020-09-10 | End: 2020-09-16

## 2020-09-09 NOTE — PROGRESS NOTES
[facility] Pharmacy Note: Antimicrobial Weight Based Dose Adjustment for: piperacillin/tazobactam (Servando Gordon)    Anamaria Marion is a 76year old patient who has been prescribed piperacillin/tazobactam (ZOSYN) 3.375 g IV x 1 dose. Estimated Creatinine Clearance: 47.

## 2020-09-09 NOTE — ED PROVIDER NOTES
Patient Seen in: [facility] Emergency Department      History   Patient presents with:   Body ache and/or chills  Nausea/Vomiting/Diarrhea    Stated Complaint: chills, vomiting    HPI    Patient is a 58-year-old male with a history of diabetes, parti Surgical History:   Procedure Laterality Date   • APPENDECTOMY     • APPENDECTOMY     • COLONOSCOPY  2014   • CREATE EARDRUM OPENING,GEN ANESTH  08/10/2017   • EXTREMITY LOWER IRRIGATION & DEBRIDEMENT Right 7/28/2019    Performed by Shiela Reynaga DPM at Dry mucous membranes. No meningismus. No adenopathy  Lungs: No tachypnea. Lungs clear to auscultation bilaterally without rales/rhonchi. Equal breath sounds bilaterally  Cardiac: Mild tachycardia. No murmurs. Regular rate and rhythm.   Abdomen: Soft BY PCR - Normal   CBC WITH DIFFERENTIAL WITH PLATELET    Narrative: The following orders were created for panel order CBC WITH DIFFERENTIAL WITH PLATELET.   Procedure                               Abnormality         Status                     --------- Clinical Impression:  Cellulitis of right lower extremity  (primary encounter diagnosis)  Fevers and chills, possible bacteremia  Possible cellulitis  Person of interest for possible COVID  Disposition:  Admit  9/9/2020  5:57 pm    Follow-up:  No follo

## 2020-09-10 ENCOUNTER — APPOINTMENT (OUTPATIENT)
Dept: GENERAL RADIOLOGY | Facility: HOSPITAL | Age: 65
DRG: 474 | End: 2020-09-10
Attending: PODIATRIST
Payer: COMMERCIAL

## 2020-09-10 ENCOUNTER — APPOINTMENT (OUTPATIENT)
Dept: MRI IMAGING | Facility: HOSPITAL | Age: 65
DRG: 474 | End: 2020-09-10
Attending: PODIATRIST
Payer: COMMERCIAL

## 2020-09-10 LAB
ANION GAP SERPL CALC-SCNC: 4 MMOL/L (ref 0–18)
BASOPHILS # BLD AUTO: 0.04 X10(3) UL (ref 0–0.2)
BASOPHILS NFR BLD AUTO: 0.5 %
BUN BLD-MCNC: 27 MG/DL (ref 7–18)
BUN/CREAT SERPL: 18.8 (ref 10–20)
CALCIUM BLD-MCNC: 9.4 MG/DL (ref 8.5–10.1)
CHLORIDE SERPL-SCNC: 105 MMOL/L (ref 98–112)
CO2 SERPL-SCNC: 28 MMOL/L (ref 21–32)
CREAT BLD-MCNC: 1.44 MG/DL (ref 0.7–1.3)
CRP SERPL-MCNC: 15.6 MG/DL (ref ?–0.3)
DEPRECATED HBV CORE AB SER IA-ACNC: 136.3 NG/ML (ref 30–530)
DEPRECATED RDW RBC AUTO: 53 FL (ref 35.1–46.3)
EOSINOPHIL # BLD AUTO: 0.01 X10(3) UL (ref 0–0.7)
EOSINOPHIL NFR BLD AUTO: 0.1 %
ERYTHROCYTE [DISTWIDTH] IN BLOOD BY AUTOMATED COUNT: 15.8 % (ref 11–15)
GLUCOSE BLD-MCNC: 119 MG/DL (ref 70–99)
GLUCOSE BLD-MCNC: 126 MG/DL (ref 70–99)
GLUCOSE BLD-MCNC: 141 MG/DL (ref 70–99)
GLUCOSE BLD-MCNC: 153 MG/DL (ref 70–99)
GLUCOSE BLD-MCNC: 156 MG/DL (ref 70–99)
HAV IGM SER QL: 2.2 MG/DL (ref 1.6–2.6)
HCT VFR BLD AUTO: 26.2 % (ref 39–53)
HGB BLD-MCNC: 8.2 G/DL (ref 13–17.5)
IMM GRANULOCYTES # BLD AUTO: 0.04 X10(3) UL (ref 0–1)
IMM GRANULOCYTES NFR BLD: 0.5 %
LDH SERPL L TO P-CCNC: 157 U/L
LYMPHOCYTES # BLD AUTO: 0.9 X10(3) UL (ref 1–4)
LYMPHOCYTES NFR BLD AUTO: 11.9 %
MCH RBC QN AUTO: 28.7 PG (ref 26–34)
MCHC RBC AUTO-ENTMCNC: 31.3 G/DL (ref 31–37)
MCV RBC AUTO: 91.6 FL (ref 80–100)
MONOCYTES # BLD AUTO: 0.67 X10(3) UL (ref 0.1–1)
MONOCYTES NFR BLD AUTO: 8.8 %
NEUTROPHILS # BLD AUTO: 5.92 X10 (3) UL (ref 1.5–7.7)
NEUTROPHILS # BLD AUTO: 5.92 X10(3) UL (ref 1.5–7.7)
NEUTROPHILS NFR BLD AUTO: 78.2 %
OSMOLALITY SERPL CALC.SUM OF ELEC: 291 MOSM/KG (ref 275–295)
PLATELET # BLD AUTO: 201 10(3)UL (ref 150–450)
POTASSIUM SERPL-SCNC: 4 MMOL/L (ref 3.5–5.1)
PROCALCITONIN SERPL-MCNC: 0.23 NG/ML (ref ?–0.16)
RBC # BLD AUTO: 2.86 X10(6)UL (ref 3.8–5.8)
SARS-COV-2 RNA RESP QL NAA+PROBE: NOT DETECTED
SODIUM SERPL-SCNC: 137 MMOL/L (ref 136–145)
WBC # BLD AUTO: 7.6 X10(3) UL (ref 4–11)

## 2020-09-10 PROCEDURE — 82962 GLUCOSE BLOOD TEST: CPT

## 2020-09-10 PROCEDURE — 83735 ASSAY OF MAGNESIUM: CPT | Performed by: HOSPITALIST

## 2020-09-10 PROCEDURE — 87040 BLOOD CULTURE FOR BACTERIA: CPT | Performed by: INTERNAL MEDICINE

## 2020-09-10 PROCEDURE — 73721 MRI JNT OF LWR EXTRE W/O DYE: CPT | Performed by: PODIATRIST

## 2020-09-10 PROCEDURE — 80048 BASIC METABOLIC PNL TOTAL CA: CPT | Performed by: HOSPITALIST

## 2020-09-10 PROCEDURE — 84145 PROCALCITONIN (PCT): CPT | Performed by: HOSPITALIST

## 2020-09-10 PROCEDURE — 85025 COMPLETE CBC W/AUTO DIFF WBC: CPT | Performed by: HOSPITALIST

## 2020-09-10 PROCEDURE — 86140 C-REACTIVE PROTEIN: CPT | Performed by: HOSPITALIST

## 2020-09-10 PROCEDURE — 73610 X-RAY EXAM OF ANKLE: CPT | Performed by: PODIATRIST

## 2020-09-10 PROCEDURE — 82728 ASSAY OF FERRITIN: CPT | Performed by: HOSPITALIST

## 2020-09-10 RX ORDER — VANCOMYCIN HYDROCHLORIDE
15 EVERY 12 HOURS
Status: DISCONTINUED | OUTPATIENT
Start: 2020-09-10 | End: 2020-09-14

## 2020-09-10 RX ORDER — VANCOMYCIN HYDROCHLORIDE
15 EVERY 24 HOURS
Status: DISCONTINUED | OUTPATIENT
Start: 2020-09-10 | End: 2020-09-10

## 2020-09-10 NOTE — PLAN OF CARE
Pt A0x4. On room air. NSR on tele, ST with exertion. VSS stable. Raised a 102 fever, ID notified and blood cultures re-ordered. Remains afebrile at this time. Voids via urinal. C/o neuropathic pain bilateral legs. Up sba, transfer with wheelchair.  Carb con

## 2020-09-10 NOTE — CONSULTS
INFECTIOUS DISEASE CONSULTATION    Reginold Gottron Patient Status:  Inpatient    1955 MRN [id number]   [facility] 5NW-A Attending Gustavo Benitez MD   Hosp Day # 1 PCP Barney Gloria Peripheral vascular disease ([facility] [address]    • Pirgott amputation R ankle. Application ex fix R ankle. Sx 5/14/20; NADEGE 8/12/20 5/20/2020   • Removal of intramedullary sandro R ankle. Removal of screws R tibia, R talus and calc.  Sx 9/6/19; NADEGE 12/5/19 9/17/2019     Pa acid (FOLVITE) tab 1 mg, 1 mg, Oral, Daily  •  insulin detemir (LEVEMIR) 100 UNIT/ML flextouch 10 Units, 10 Units, Subcutaneous, Nightly  •  losartan Potassium (COZAAR) tab 50 mg, 50 mg, Oral, BID  •  Pramipexole Dihydrochloride (MIRAPEX) tab 1 mg, 1 mg, O , Rfl: Evolocumab (REPATHA SURECLICK) 430 MG/ML Subcutaneous Solution Auto-injector, Inject 140 mg into the skin every 14 (fourteen) days. , Disp: , Rfl:   Fenofibrate 145 MG Oral Tab, Take 145 mg by mouth daily. , Disp: , Rfl:   furosemide 20 MG Oral Tab, Oral Tab, Take 2 tablets by mouth 2 (two) times daily. , Disp: 30 tablet, Rfl: 0  carvedilol 6.25 MG Oral Tab, Take 1 tablet (6.25 mg total) by mouth 2 (two) times daily with meals. , Disp: 180 tablet, Rfl: 3  docusate sodium 100 MG Oral Cap, Take 100 mg by Lab 09/09/20  1541 09/10/20  0751   * 126*   BUN 31* 27*   CREATSERUM 1.64* 1.44*   GFRAA 50* 58*   GFRNAA 43* 50*   CA 10.1 9.4   ALB 3.7  --    * 137   K 4.3 4.0    105   CO2 28.0 28.0   ALKPHO 52  --    AST 28  --    ALT 43  --    B extremity      ASSESSMENT/PLAN:  1.  Recurrent MRSA infections  Chronic osteomyelitis/abscess right foot sp partial amp 1/2018- MRSA  MRSA bactremia/septic arthritis knee 5/2019 MRSA  SP I/D ankle abscess 3/2020  Removal of hardare recently, and external fi

## 2020-09-10 NOTE — CONSULTS
[facility]     Reason for Consultation:  Right foot stump infection     History of Present Illness:  Jerad Lemons is a a(n) 76year old male history of chronic osteomyelits/abscess right foot with MRSA and underwent partial amput          Past Surgical History:   Procedure Laterality Date   • APPENDECTOMY       • APPENDECTOMY       • COLONOSCOPY   2014   • CREATE EARDRUM OPENING,GEN ANESTH   08/10/2017   • EXTREMITY LOWER IRRIGATION & DEBRIDEMENT Right 7/28/2019     Performed by [facility] 8.2*   HCT 26.2*   MCV 91.6   MCH 28.7   MCHC 31.3   RDW 15.8*   NEPRELIM 5.92   WBC 7.6   .0              Recent Labs   Lab 09/09/20  1541 09/10/20  0751   * 126*   BUN 31* 27*   CREATSERUM 1.64* 1.44*   GFRAA 50* 58*   GFRNAA 43* 50*

## 2020-09-10 NOTE — H&P
[facility] Hospitalist H&P       CC: nausea, vomiting, chills    PCP: Dawit Lau MD    History of Present Illness: Pt is a 77 yo with mmp including but not limited to CAD, HTN/HL, insulin dependent DMII with neuropathy and nephropathy, PVD, R ankle Performed by Luke Currie DPM at [facility] MAIN OR   • [place] Right 7/13/2017    Performed by Luke Currie DPM at [address] [place] [address]   • FOOT OPEN REDUCTION INTERNAL FIXATION Right 1/11/2018    Performed by LONDON Sutherland (two) times daily with meals. , Disp: 180 tablet, Rfl: 1  Insulin Lispro, 1 Unit Dial, (HUMALOG KWIKPEN) 100 UNIT/ML Subcutaneous Solution Pen-injector, Per sliding scale: call MD if FBS <70. 150-200 give 2 units, 201-250 give 4 units,251-300 give 6 units, Cholesterol Father        Review of Systems  Comprehensive ROS reviewed and negative except for what's stated above. Including negative for chest pain,  syncope.        OBJECTIVE:  /67   Pulse 106   Temp 99.6 °F (37.6 °C) (Temporal)   Resp 23   Ht 5' amputation s/p removal of external fixator 7/8/20, is admitted for n/v/c.    **sepsis with n/v/c- unclear etiology- r/o covid  -R stump could be source of infectious process but per pt looks unchanged from last visit with podiatry  -currently on empiric zo

## 2020-09-10 NOTE — PROGRESS NOTES
09/09/20 2200   Provider Notification   Reason for Communication New consult   Provider Name Other (comment)  (Dr. Boubacar Sexton)   Method of Communication Page   Response See orders   New consult. Discontinue iv abx-vanco and zoysn per MD orders.  Will asses

## 2020-09-10 NOTE — PLAN OF CARE
Pt is AOx4, very pleasant. On room air, . NSR on tele. Low grade temps, Tmax 99.5. Pt complaining of pain to right residual limb, managed with scheduled Gabapentin and PRN Norco. Up with standby, NWB on right side. Xray of ankle completed, MRI pending. PLANNING  Goal: Discharge to home or other facility with appropriate resources  Description  INTERVENTIONS:  - Identify barriers to discharge w/pt and caregiver  - Include patient/family/discharge partner in discharge planning  - Arrange for needed dischar

## 2020-09-10 NOTE — PROGRESS NOTES
09/09/20 2357   Provider Notification   Reason for Communication Critical value   Provider Name Other (comment)  (Dr. Henrik Estrada)   Method of Communication Page   Response See orders   Notified MD of patients 102 fever. Repeat blood cultures ordered.

## 2020-09-10 NOTE — PROGRESS NOTES
Pharmacy renal dose adjustment for metoclopramide (Reglan)    Saulo Berry has been prescribed metoclopramide 10 mg every 8 hours as needed for nausea/vomiting. Estimated Creatinine Clearance: 47.2 mL/min (A) (based on SCr of 1.64 mg/dL (H)).     The e

## 2020-09-10 NOTE — PROGRESS NOTES
[facility] Hospitalist Progress Note     PCP: Johnathan Aguilar MD    CC:  Follow up    SUBJECTIVE:  Sitting up in bed, feels lousy- having neuropathic pain to legs for which he's on gabapentin. +U, +Flatus.  tired    Required IV morphine   OBJECTIVE:  Temp: Nightly   • losartan Potassium  50 mg Oral BID   • Pramipexole Dihydrochloride  1 mg Oral Nightly   • Heparin Sodium (Porcine)  5,000 Units Subcutaneous Q8H [facility]   • Insulin Aspart Pen  1-5 Units Subcutaneous TID CC and HS   • gabapentin  1,200 mg Oral Monico were discussed with patient and/or family by bedside.          Thank Octavio Vásquez MD    [facility] Hospitalist  Answering Service number: [phone]

## 2020-09-10 NOTE — PROGRESS NOTES
NURSING ADMISSION NOTE      Patient admitted via Cart  Oriented to room. Safety precautions initiated. Bed in low position. Call light in reach. Pt stable upon admission.

## 2020-09-11 LAB
ANTIBODY SCREEN: NEGATIVE
BASOPHILS # BLD AUTO: 0.03 X10(3) UL (ref 0–0.2)
BASOPHILS NFR BLD AUTO: 0.5 %
CREAT BLD-MCNC: 1.17 MG/DL (ref 0.7–1.3)
DEPRECATED RDW RBC AUTO: 50.4 FL (ref 35.1–46.3)
EOSINOPHIL # BLD AUTO: 0.08 X10(3) UL (ref 0–0.7)
EOSINOPHIL NFR BLD AUTO: 1.3 %
ERYTHROCYTE [DISTWIDTH] IN BLOOD BY AUTOMATED COUNT: 15.2 % (ref 11–15)
GLUCOSE BLD-MCNC: 146 MG/DL (ref 70–99)
GLUCOSE BLD-MCNC: 176 MG/DL (ref 70–99)
GLUCOSE BLD-MCNC: 191 MG/DL (ref 70–99)
GLUCOSE BLD-MCNC: 201 MG/DL (ref 70–99)
HCT VFR BLD AUTO: 27.5 % (ref 39–53)
HGB BLD-MCNC: 8.6 G/DL (ref 13–17.5)
IMM GRANULOCYTES # BLD AUTO: 0.02 X10(3) UL (ref 0–1)
IMM GRANULOCYTES NFR BLD: 0.3 %
LYMPHOCYTES # BLD AUTO: 1.28 X10(3) UL (ref 1–4)
LYMPHOCYTES NFR BLD AUTO: 20.1 %
MCH RBC QN AUTO: 28.3 PG (ref 26–34)
MCHC RBC AUTO-ENTMCNC: 31.3 G/DL (ref 31–37)
MCV RBC AUTO: 90.5 FL (ref 80–100)
MONOCYTES # BLD AUTO: 0.76 X10(3) UL (ref 0.1–1)
MONOCYTES NFR BLD AUTO: 11.9 %
NEUTROPHILS # BLD AUTO: 4.2 X10 (3) UL (ref 1.5–7.7)
NEUTROPHILS # BLD AUTO: 4.2 X10(3) UL (ref 1.5–7.7)
NEUTROPHILS NFR BLD AUTO: 65.9 %
PLATELET # BLD AUTO: 195 10(3)UL (ref 150–450)
RBC # BLD AUTO: 3.04 X10(6)UL (ref 3.8–5.8)
RH BLOOD TYPE: POSITIVE
VANCOMYCIN TROUGH SERPL-MCNC: 16 UG/ML (ref 10–20)
WBC # BLD AUTO: 6.4 X10(3) UL (ref 4–11)

## 2020-09-11 PROCEDURE — 97165 OT EVAL LOW COMPLEX 30 MIN: CPT

## 2020-09-11 PROCEDURE — 80202 ASSAY OF VANCOMYCIN: CPT | Performed by: INTERNAL MEDICINE

## 2020-09-11 PROCEDURE — 97161 PT EVAL LOW COMPLEX 20 MIN: CPT

## 2020-09-11 PROCEDURE — 82962 GLUCOSE BLOOD TEST: CPT

## 2020-09-11 PROCEDURE — 86850 RBC ANTIBODY SCREEN: CPT | Performed by: ORTHOPAEDIC SURGERY

## 2020-09-11 PROCEDURE — 86901 BLOOD TYPING SEROLOGIC RH(D): CPT | Performed by: ORTHOPAEDIC SURGERY

## 2020-09-11 PROCEDURE — 85025 COMPLETE CBC W/AUTO DIFF WBC: CPT | Performed by: HOSPITALIST

## 2020-09-11 PROCEDURE — 86900 BLOOD TYPING SEROLOGIC ABO: CPT | Performed by: ORTHOPAEDIC SURGERY

## 2020-09-11 PROCEDURE — 87040 BLOOD CULTURE FOR BACTERIA: CPT | Performed by: INTERNAL MEDICINE

## 2020-09-11 PROCEDURE — 97535 SELF CARE MNGMENT TRAINING: CPT

## 2020-09-11 PROCEDURE — 82565 ASSAY OF CREATININE: CPT | Performed by: INTERNAL MEDICINE

## 2020-09-11 NOTE — PLAN OF CARE
Problem: Patient/Family Goals  Goal: Patient/Family Long Term Goal  Description  Patient's Long Term Goal:   To go home. Interventions:  - Cluster care.   -Abx  - See additional Care Plan goals for specific interventions   Outcome: Progressing  Goal: P Management Department for coordinating discharge planning if the patient needs post-hospital services based on physician/LIP order or complex needs related to functional status, cognitive ability or social support system  Outcome: Progressing   Pt alert an

## 2020-09-11 NOTE — OCCUPATIONAL THERAPY NOTE
OCCUPATIONAL THERAPY QUICK EVALUATION - INPATIENT    Room Number: 523/523-A  Evaluation Date: 9/11/2020     Type of Evaluation: Quick Eval  Presenting Problem: RLE cellulitis    Physician Order: IP Consult to Occupational Therapy  Reason for Therapy:  ADL/ Lenghtening R -Sx 1/11/18 NADEGE 4/11/18 1/16/2018   • Peripheral vascular disease ([facility] [address]    • Pirgott amputation R ankle. Application ex fix R ankle. Sx 5/14/20; NADEGE 8/12/20 5/20/2020   • Removal of intramedullary sandro R ankle.  Removal of screws R tibia, R jamie Patient states his home is a smart home and accessible and has a ramp to enter. Patient states he does not walk but is able to transfer without assist.  Patient reports no recent falls. SUBJECTIVE  \"I have everything I need. \"    Patient self-stated g set-up  Patient instructed to ask nursing team to use commode for bowel movement needs. Patient reports understanding. Discussed session findings and recommendations w/ nursing team.    Patient End of Session: Up in chair; With [facility] staff;Needs met;Call lig

## 2020-09-11 NOTE — CM/SW NOTE
MSW received order to check coverage for IV abx. MSW sent initial clinical to [facility] via [employer] who will also arrange [place] [id number] if they can accept the patient. Per Dr. Duc Camarena, no dc until early next week.   SW will follow up Monday once drug, duration and frequenc

## 2020-09-11 NOTE — PLAN OF CARE
Problem: Patient/Family Goals  Goal: Patient/Family Long Term Goal  Description  Patient's Long Term Goal:   To go home. Interventions:  - Cluster care.   -Abx  - See additional Care Plan goals for specific interventions   Outcome: Progressing  Goal: P Management Department for coordinating discharge planning if the patient needs post-hospital services based on physician/LIP order or complex needs related to functional status, cognitive ability or social support system  Outcome: Progressing    Pt A0x4.  O

## 2020-09-11 NOTE — PROGRESS NOTES
[facility]                INFECTIOUS DISEASE PROGRESS NOTE    Suzette Shetty Patient Status:  Inpatient    1955 MRN [id number]   [facility] 5NW-A Attending Jose Roberto Vázquez, *   Hosp Day # 0 PCP Josie Guillen MD BLOOD CULTURE     Status: None (Preliminary result)    Collection Time: 09/10/20 12:38 AM   Result Value Ref Range    Blood Culture Result No Growth 1 Day N/A         Radiology    MRI 9/10: talus with bony destruction, no abscess    Problem list reviewed: arthritis knee 5/2019 MRSA  SP I/D ankle abscess 3/2020  Removal of hardare recently, and external fixator, and completed iv vancomycin a couple months ago    Continue Vancomycin  -renal function improved, level pending  MRI reviewed  --Dr. Vineet Alcazar to Sayra Evans

## 2020-09-11 NOTE — PROGRESS NOTES
[facility] Hospitalist Progress Note     PCP: Stanford Botello MD    CC:  Follow up    SUBJECTIVE:  Sitting up in chair, wife at bedside.  Frustrated with recurrent MRSA infections    OBJECTIVE:  Temp:  [98 °F (36.7 °C)-99.8 °F (37.7 °C)] 99.8 °F (37.7 °C) sodium  100 mg Oral BID   • folic acid  1 mg Oral Daily   • insulin detemir  10 Units Subcutaneous Nightly   • losartan Potassium  50 mg Oral BID   • Pramipexole Dihydrochloride  1 mg Oral Nightly   • Heparin Sodium (Porcine)  5,000 Units Subcutaneous Q8H above     **PPx-heparin subq     D/w RN Sarah    Questions/concerns were discussed with patient and/or family by bedside.          Thank Stanislaw Herndon MD    [facility] Hospitalist  Answering Service number: [phone]

## 2020-09-11 NOTE — PAYOR COMM NOTE
--------------  ADMISSION REVIEW     Payor: [address] [facility]  Subscriber #:  [id number]  Authorization Number: [id number]     Admit date: 9/11/20  Admit time: 80       Admit Orders (From admission, onward)     Start     Ordered    09/11/20 0728  A guarding  Extremities: Partial amputation with dressing on the right foot. Dressing removed. There is a small ulceration on the heel. Somewhat erythematous. No drainage.   Ulceration on the posterior aspect of the stump of the right lower extremity mild is elevated at 13.8. White count is 12.8. Hemoglobin 9.7.     Disposition and Plan     Clinical Impression:  Cellulitis of right lower extremity  (primary encounter diagnosis)  Fevers and chills, possible bacteremia  Possible cellulitis  Person of interes R tibia, R talus and calc.  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 Continue insulin.  Iss, accuchecks    **CKD stage III-stable creatinine, monitor  **anemia secondary to CKD III and suspect AOCD-stable, m amputation s/p removal of external fixator 7/8/20, is admitted for n/v/c.     **sepsis secondary to MRSA bacteremia, concern for RLE ankle stump infection  -BC noted with MRSA. ID and podiatry on consult, appreciate.  IV vancomycin  -rapid covid neg,  09/09/20  1541 09/10/20  0751 09/11/20  0713   * 126*  --    BUN 31* 27*  --    CREATSERUM 1.64* 1.44* 1.17   GFRAA 50* 58* 75   GFRNAA 43* 50* 65   CA 10.1 9.4  --    ALB 3.7  --   --    * 137  --    K 4.3 4.0  --     105  --    CO2 28. Date Action Dose Route     9/11/2020 0819 Given 1200 mg Oral       gabapentin (NEURONTIN) tab 600 mg     Date Action Dose Route     9/10/2020 1501 Given 600 mg Oral       gabapentin (NEURONTIN) tab 1,200 mg     Date Action Dose Route     9/10/2020 2050

## 2020-09-12 ENCOUNTER — ANESTHESIA EVENT (OUTPATIENT)
Dept: SURGERY | Facility: HOSPITAL | Age: 65
DRG: 474 | End: 2020-09-12
Payer: COMMERCIAL

## 2020-09-12 ENCOUNTER — ANESTHESIA (OUTPATIENT)
Dept: SURGERY | Facility: HOSPITAL | Age: 65
DRG: 474 | End: 2020-09-12
Payer: COMMERCIAL

## 2020-09-12 LAB
CREAT BLD-MCNC: 1.2 MG/DL (ref 0.7–1.3)
DEPRECATED RDW RBC AUTO: 49.4 FL (ref 35.1–46.3)
ERYTHROCYTE [DISTWIDTH] IN BLOOD BY AUTOMATED COUNT: 14.8 % (ref 11–15)
GLUCOSE BLD-MCNC: 159 MG/DL (ref 70–99)
GLUCOSE BLD-MCNC: 182 MG/DL (ref 70–99)
GLUCOSE BLD-MCNC: 206 MG/DL (ref 70–99)
GLUCOSE BLD-MCNC: 217 MG/DL (ref 70–99)
HCT VFR BLD AUTO: 28.7 % (ref 39–53)
HGB BLD-MCNC: 8.9 G/DL (ref 13–17.5)
MCH RBC QN AUTO: 28.2 PG (ref 26–34)
MCHC RBC AUTO-ENTMCNC: 31 G/DL (ref 31–37)
MCV RBC AUTO: 90.8 FL (ref 80–100)
PLATELET # BLD AUTO: 229 10(3)UL (ref 150–450)
RBC # BLD AUTO: 3.16 X10(6)UL (ref 3.8–5.8)
WBC # BLD AUTO: 5.9 X10(3) UL (ref 4–11)

## 2020-09-12 PROCEDURE — 82565 ASSAY OF CREATININE: CPT | Performed by: INTERNAL MEDICINE

## 2020-09-12 PROCEDURE — 88307 TISSUE EXAM BY PATHOLOGIST: CPT | Performed by: ORTHOPAEDIC SURGERY

## 2020-09-12 PROCEDURE — 82962 GLUCOSE BLOOD TEST: CPT

## 2020-09-12 PROCEDURE — 88311 DECALCIFY TISSUE: CPT | Performed by: ORTHOPAEDIC SURGERY

## 2020-09-12 PROCEDURE — 0Y6H0Z2 DETACHMENT AT RIGHT LOWER LEG, MID, OPEN APPROACH: ICD-10-PCS | Performed by: ORTHOPAEDIC SURGERY

## 2020-09-12 PROCEDURE — 85027 COMPLETE CBC AUTOMATED: CPT | Performed by: HOSPITALIST

## 2020-09-12 RX ORDER — BISACODYL 10 MG
10 SUPPOSITORY, RECTAL RECTAL
Status: DISCONTINUED | OUTPATIENT
Start: 2020-09-12 | End: 2020-09-16

## 2020-09-12 RX ORDER — MEPERIDINE HYDROCHLORIDE 25 MG/ML
12.5 INJECTION INTRAMUSCULAR; INTRAVENOUS; SUBCUTANEOUS AS NEEDED
Status: DISCONTINUED | OUTPATIENT
Start: 2020-09-12 | End: 2020-09-12 | Stop reason: HOSPADM

## 2020-09-12 RX ORDER — HYDROMORPHONE HYDROCHLORIDE 1 MG/ML
0.4 INJECTION, SOLUTION INTRAMUSCULAR; INTRAVENOUS; SUBCUTANEOUS EVERY 5 MIN PRN
Status: DISCONTINUED | OUTPATIENT
Start: 2020-09-12 | End: 2020-09-12 | Stop reason: HOSPADM

## 2020-09-12 RX ORDER — HYDROMORPHONE HYDROCHLORIDE 1 MG/ML
0.5 INJECTION, SOLUTION INTRAMUSCULAR; INTRAVENOUS; SUBCUTANEOUS EVERY 2 HOUR PRN
Status: DISCONTINUED | OUTPATIENT
Start: 2020-09-12 | End: 2020-09-16

## 2020-09-12 RX ORDER — SENNA AND DOCUSATE SODIUM 50; 8.6 MG/1; MG/1
2 TABLET, FILM COATED ORAL 2 TIMES DAILY
Status: DISCONTINUED | OUTPATIENT
Start: 2020-09-12 | End: 2020-09-16

## 2020-09-12 RX ORDER — DIPHENHYDRAMINE HYDROCHLORIDE 50 MG/ML
INJECTION INTRAMUSCULAR; INTRAVENOUS AS NEEDED
Status: DISCONTINUED | OUTPATIENT
Start: 2020-09-12 | End: 2020-09-12 | Stop reason: SURG

## 2020-09-12 RX ORDER — MIDAZOLAM HYDROCHLORIDE 1 MG/ML
1 INJECTION INTRAMUSCULAR; INTRAVENOUS EVERY 5 MIN PRN
Status: DISCONTINUED | OUTPATIENT
Start: 2020-09-12 | End: 2020-09-12 | Stop reason: HOSPADM

## 2020-09-12 RX ORDER — LIDOCAINE HYDROCHLORIDE 10 MG/ML
INJECTION, SOLUTION EPIDURAL; INFILTRATION; INTRACAUDAL; PERINEURAL AS NEEDED
Status: DISCONTINUED | OUTPATIENT
Start: 2020-09-12 | End: 2020-09-12 | Stop reason: SURG

## 2020-09-12 RX ORDER — ROCURONIUM BROMIDE 10 MG/ML
INJECTION, SOLUTION INTRAVENOUS AS NEEDED
Status: DISCONTINUED | OUTPATIENT
Start: 2020-09-12 | End: 2020-09-12 | Stop reason: SURG

## 2020-09-12 RX ORDER — ACETAMINOPHEN 325 MG/1
650 TABLET ORAL EVERY 4 HOURS PRN
Status: DISCONTINUED | OUTPATIENT
Start: 2020-09-12 | End: 2020-09-16

## 2020-09-12 RX ORDER — ACETAMINOPHEN 500 MG
500 TABLET ORAL EVERY 4 HOURS PRN
Status: DISCONTINUED | OUTPATIENT
Start: 2020-09-12 | End: 2020-09-16

## 2020-09-12 RX ORDER — FENOFIBRATE 67 MG/1
67 CAPSULE ORAL
Status: DISCONTINUED | OUTPATIENT
Start: 2020-09-12 | End: 2020-09-16

## 2020-09-12 RX ORDER — POLYETHYLENE GLYCOL 3350 17 G/17G
17 POWDER, FOR SOLUTION ORAL DAILY PRN
Status: DISCONTINUED | OUTPATIENT
Start: 2020-09-12 | End: 2020-09-16

## 2020-09-12 RX ORDER — CEFAZOLIN SODIUM/WATER 2 G/20 ML
2 SYRINGE (ML) INTRAVENOUS EVERY 8 HOURS
Status: DISCONTINUED | OUTPATIENT
Start: 2020-09-12 | End: 2020-09-12

## 2020-09-12 RX ORDER — VANCOMYCIN HYDROCHLORIDE
15 ONCE
Status: DISCONTINUED | OUTPATIENT
Start: 2020-09-12 | End: 2020-09-12

## 2020-09-12 RX ORDER — DEXTROSE MONOHYDRATE 25 G/50ML
50 INJECTION, SOLUTION INTRAVENOUS
Status: DISCONTINUED | OUTPATIENT
Start: 2020-09-12 | End: 2020-09-12 | Stop reason: HOSPADM

## 2020-09-12 RX ORDER — MAGNESIUM OXIDE 400 MG (241.3 MG MAGNESIUM) TABLET
2 TABLET NIGHTLY PRN
Status: DISCONTINUED | OUTPATIENT
Start: 2020-09-12 | End: 2020-09-16

## 2020-09-12 RX ORDER — HYDROCODONE BITARTRATE AND ACETAMINOPHEN 5; 325 MG/1; MG/1
1 TABLET ORAL EVERY 4 HOURS PRN
Status: DISCONTINUED | OUTPATIENT
Start: 2020-09-12 | End: 2020-09-16

## 2020-09-12 RX ORDER — ONDANSETRON 2 MG/ML
4 INJECTION INTRAMUSCULAR; INTRAVENOUS EVERY 6 HOURS PRN
Status: DISCONTINUED | OUTPATIENT
Start: 2020-09-12 | End: 2020-09-16

## 2020-09-12 RX ORDER — DEXAMETHASONE SODIUM PHOSPHATE 4 MG/ML
VIAL (ML) INJECTION AS NEEDED
Status: DISCONTINUED | OUTPATIENT
Start: 2020-09-12 | End: 2020-09-12 | Stop reason: SURG

## 2020-09-12 RX ORDER — ENOXAPARIN SODIUM 100 MG/ML
40 INJECTION SUBCUTANEOUS DAILY
Status: DISCONTINUED | OUTPATIENT
Start: 2020-09-12 | End: 2020-09-15

## 2020-09-12 RX ORDER — NALOXONE HYDROCHLORIDE 0.4 MG/ML
80 INJECTION, SOLUTION INTRAMUSCULAR; INTRAVENOUS; SUBCUTANEOUS AS NEEDED
Status: DISCONTINUED | OUTPATIENT
Start: 2020-09-12 | End: 2020-09-12 | Stop reason: HOSPADM

## 2020-09-12 RX ORDER — METOCLOPRAMIDE HYDROCHLORIDE 5 MG/ML
10 INJECTION INTRAMUSCULAR; INTRAVENOUS AS NEEDED
Status: DISCONTINUED | OUTPATIENT
Start: 2020-09-12 | End: 2020-09-12 | Stop reason: HOSPADM

## 2020-09-12 RX ORDER — DIPHENHYDRAMINE HYDROCHLORIDE 50 MG/ML
12.5 INJECTION INTRAMUSCULAR; INTRAVENOUS AS NEEDED
Status: DISCONTINUED | OUTPATIENT
Start: 2020-09-12 | End: 2020-09-12 | Stop reason: HOSPADM

## 2020-09-12 RX ORDER — SODIUM PHOSPHATE, DIBASIC AND SODIUM PHOSPHATE, MONOBASIC 7; 19 G/133ML; G/133ML
1 ENEMA RECTAL ONCE AS NEEDED
Status: DISCONTINUED | OUTPATIENT
Start: 2020-09-12 | End: 2020-09-16

## 2020-09-12 RX ORDER — ASCORBIC ACID 500 MG
1000 TABLET ORAL DAILY
Status: DISCONTINUED | OUTPATIENT
Start: 2020-09-12 | End: 2020-09-16

## 2020-09-12 RX ORDER — FUROSEMIDE 20 MG/1
20 TABLET ORAL DAILY
Status: DISCONTINUED | OUTPATIENT
Start: 2020-09-12 | End: 2020-09-16

## 2020-09-12 RX ORDER — DEXAMETHASONE SODIUM PHOSPHATE 4 MG/ML
4 VIAL (ML) INJECTION AS NEEDED
Status: DISCONTINUED | OUTPATIENT
Start: 2020-09-12 | End: 2020-09-12 | Stop reason: HOSPADM

## 2020-09-12 RX ORDER — HYDROCODONE BITARTRATE AND ACETAMINOPHEN 5; 325 MG/1; MG/1
2 TABLET ORAL EVERY 4 HOURS PRN
Status: DISCONTINUED | OUTPATIENT
Start: 2020-09-12 | End: 2020-09-16

## 2020-09-12 RX ORDER — DOCUSATE SODIUM 100 MG/1
100 CAPSULE, LIQUID FILLED ORAL 2 TIMES DAILY
Status: DISCONTINUED | OUTPATIENT
Start: 2020-09-12 | End: 2020-09-12

## 2020-09-12 RX ORDER — MULTIVIT WITH MINERALS/LUTEIN
1000 TABLET ORAL DAILY
Status: DISCONTINUED | OUTPATIENT
Start: 2020-09-12 | End: 2020-09-16

## 2020-09-12 RX ORDER — DOXEPIN HYDROCHLORIDE 50 MG/1
1 CAPSULE ORAL DAILY
Status: DISCONTINUED | OUTPATIENT
Start: 2020-09-12 | End: 2020-09-16

## 2020-09-12 RX ORDER — GLYBURIDE 5 MG/1
5 TABLET ORAL 2 TIMES DAILY WITH MEALS
Status: DISCONTINUED | OUTPATIENT
Start: 2020-09-12 | End: 2020-09-16

## 2020-09-12 RX ORDER — ONDANSETRON 2 MG/ML
4 INJECTION INTRAMUSCULAR; INTRAVENOUS AS NEEDED
Status: DISCONTINUED | OUTPATIENT
Start: 2020-09-12 | End: 2020-09-12 | Stop reason: HOSPADM

## 2020-09-12 RX ORDER — SODIUM CHLORIDE, SODIUM LACTATE, POTASSIUM CHLORIDE, CALCIUM CHLORIDE 600; 310; 30; 20 MG/100ML; MG/100ML; MG/100ML; MG/100ML
INJECTION, SOLUTION INTRAVENOUS CONTINUOUS
Status: DISCONTINUED | OUTPATIENT
Start: 2020-09-12 | End: 2020-09-12 | Stop reason: HOSPADM

## 2020-09-12 RX ORDER — HYDROMORPHONE HYDROCHLORIDE 1 MG/ML
1 INJECTION, SOLUTION INTRAMUSCULAR; INTRAVENOUS; SUBCUTANEOUS EVERY 2 HOUR PRN
Status: DISCONTINUED | OUTPATIENT
Start: 2020-09-12 | End: 2020-09-16

## 2020-09-12 RX ADMIN — LIDOCAINE HYDROCHLORIDE 50 MG: 10 INJECTION, SOLUTION EPIDURAL; INFILTRATION; INTRACAUDAL; PERINEURAL at 08:08:00

## 2020-09-12 RX ADMIN — DIPHENHYDRAMINE HYDROCHLORIDE 12.5 MG: 50 INJECTION INTRAMUSCULAR; INTRAVENOUS at 08:08:00

## 2020-09-12 RX ADMIN — ONDANSETRON 4 MG: 2 INJECTION INTRAMUSCULAR; INTRAVENOUS at 08:44:00

## 2020-09-12 RX ADMIN — ROCURONIUM BROMIDE 50 MG: 10 INJECTION, SOLUTION INTRAVENOUS at 08:16:00

## 2020-09-12 RX ADMIN — DEXAMETHASONE SODIUM PHOSPHATE 4 MG: 4 MG/ML VIAL (ML) INJECTION at 08:08:00

## 2020-09-12 NOTE — CONSULTS
Dictated. Infected diabetic foot ulcer right lower extremity; s/p Syme's amputation with osteomyelitis of the residual talus. Dr. Jamie Qiu told the patient there is nothing more he can do.   Will schedule for BKA in am.

## 2020-09-12 NOTE — BRIEF OP NOTE
Pre-Operative Diagnosis: Osteomyelitis ([facility] [address] [M86.9]     Post-Operative Diagnosis: Osteomyelitis ([facility] [address] [M86.9]      Procedure Performed:   Procedure(s):  BELOW KNEE AMPUTATION - RIGHT    Surgeon(s) and Role:     Dennis Whittaker MD - Primary    Assistant(

## 2020-09-12 NOTE — PROGRESS NOTES
[facility] Hospitalist Progress Note     PCP: Becka Gage MD    CC:  Follow up    SUBJECTIVE:  Just returned from  BKA. Asking for IV pain meds.  Wife at bedside  OBJECTIVE:  Temp:  [97.4 °F (36.3 °C)-98.6 °F (37 °C)] 97.7 °F (36.5 °C)  Pulse:  [99-5 Daily   • Senna-Docusate Sodium  2 tablet Oral BID   • metFORMIN HCl  1,000 mg Oral BID with meals   • glyBURIDE  5 mg Oral BID with meals   • Evolocumab  140 mg Subcutaneous Q14 Days   • fenofibrate micronized  67 mg Oral Daily with breakfast   • furosemi appreciate IV vancomycin  -monitor BC for clearance    **Expected pain  -IV dilaudid prn, norco prn  -antiemetics, bowel regimen     **CAD, HTN/HL  -Stable.  Continue home meds  -some accelerated HTN, suspect secondary to discomfort from neuropathy-       *

## 2020-09-12 NOTE — ANESTHESIA PROCEDURE NOTES
Airway  Date/Time: 9/12/2020 8:13 AM  Urgency: elective      General Information and Staff    Patient location during procedure: OR  Anesthesiologist: Kirsty Peterson MD  Performed: anesthesiologist     Indications and Patient Condition  Indications for

## 2020-09-12 NOTE — CONSULTS
[facility]    PATIENT'S NAME: GÉNESIS MYERS   ATTENDING PHYSICIAN: Danyell Cummins. Inderjit nAand MD   CONSULTING PHYSICIAN: James Ramirez M.D.    PATIENT ACCOUNT#:   [de-identified]    LOCATION:  [address] [place] [address]  MEDICAL RECORD #:   [id number]       DATE OF BIRTH:  1 disease, diabetes, abscess with bone biopsy and arthrotomy of his right ankle in March 2020.   He has history of hypertension, gout, coronary artery disease, stent placement, history of CVA, hyperlipidemia, incision and drainage of a complex abscess with re surgery. Dictated By Marcia Quach M.D.  d: 09/11/2020 21:33:28  t: 09/11/2020 21:50:43  [facility] [id number]  [facility]    cc: SHIN Willis MD Lavanda Kras, M.D. Dr. Butler Plain

## 2020-09-12 NOTE — PROGRESS NOTES
[facility]                INFECTIOUS DISEASE PROGRESS NOTE    Chema Camarillo Patient Status:  Inpatient    1955 MRN [id number]   [facility] 5NW-A Attending Lele Harmon, *   Hosp Day # 1 PCP Ashli Sue MD --    CO2 28.0 28.0  --   --    ALKPHO 52  --   --   --    AST 28  --   --   --    ALT 43  --   --   --    BILT 0.4  --   --   --    TP 9.4*  --   --   --        Vancomycin Trough (ug/mL)   Date Value   09/11/2020 16.0     Microbiology    [facility]     Numbness and tingling of hand     Hyponatremia     Cellulitis of right lower extremity          ASSESSMENT/PLAN:  1.  Right foot stump osteomyelitis/MRSA bacteremia  - Previous Chronic osteomyelitis/abscess right foot s/p partial amp 1/201

## 2020-09-12 NOTE — ANESTHESIA POSTPROCEDURE EVALUATION
[facility] Patient Status:  Inpatient   Age/Gender 76year old male MRN [id number]   [facility] SURGERY Attending Adrian Milner, [facility] Day # 1 PCP Laine Morales MD       Anesthesia Post-op Note    Procedu

## 2020-09-12 NOTE — PLAN OF CARE
Problem: Patient/Family Goals  Goal: Patient/Family Long Term Goal  Description  Patient's Long Term Goal:   To go home. Interventions:  - Cluster care.   -Abx  - See additional Care Plan goals for specific interventions   Outcome: Progressing  Goal: P managing their own health  - Refer to Case Management Department for coordinating discharge planning if the patient needs post-hospital services based on physician/LIP order or complex needs related to functional status, cognitive ability or social support

## 2020-09-12 NOTE — ANESTHESIA PREPROCEDURE EVALUATION
PRE-OP EVALUATION    Patient Name: Jean Marie Morin    Pre-op Diagnosis: Osteomyelitis ([address] [M86.9]    Procedure(s):  BELOW KNEE AMPUTATION - RIGHT    Surgeon(s) and Role:     Sav Craft MD - Primary    Pre-op vitals reviewed.   Temp: 98.1 °F (36.7 °C) acetaminophen (TYLENOL) tab 650 mg, 650 mg, Oral, Q4H PRN    Or  [MAR Hold] HYDROcodone-acetaminophen (NORCO) 5-325 MG per tab 1 tablet, 1 tablet, Oral, Q4H PRN    Or  [MAR Hold] HYDROcodone-acetaminophen (NORCO) 5-325 MG per tab 2 tablet, 2 tablet, Oral, Oral Tab, Take 1 mg by mouth daily. , Disp: , Rfl:   lidocaine 5 % External Patch, Place 1 patch onto the skin daily as needed. , Disp: , Rfl:   Pramipexole Dihydrochloride 1 MG Oral Tab, Take 1 mg by mouth nightly., Disp: , Rfl:   traMADol HCl 50 MG Oral Ta 0  vitamin E 1000 UNITS Oral Cap, Take 1,000 Units by mouth daily. , Disp: , Rfl:   aspirin 325 MG Oral Tab, Take 81 mg by mouth daily. , Disp: 90 tablet, Rfl: 3  Ergocalciferol (VITAMIN D OR), Take 5,000 Units by mouth daily.   , Disp: , Rfl:   Multiple Vit Endo/Other      (+) diabetes                            Pulmonary    Negative pulmonary ROS.                        Neuro/Psych          (+) CVA                          Past Surgical History:   Procedure Laterality Date   • APPENDECTOMY     • APPENDECTOMY 09/10/2020     (L) 08/20/2020    K 4.0 09/10/2020    K 5.00 08/20/2020     09/10/2020    CL 94 (L) 08/20/2020    CO2 28.0 09/10/2020    CO2 27.0 08/20/2020    BUN 27 (H) 09/10/2020    BUN 42.0 (H) 08/20/2020    CREATSERUM 1.20 09/12/2020    CRE

## 2020-09-13 LAB
ANION GAP SERPL CALC-SCNC: 4 MMOL/L (ref 0–18)
BUN BLD-MCNC: 35 MG/DL (ref 7–18)
BUN/CREAT SERPL: 22.3 (ref 10–20)
CALCIUM BLD-MCNC: 9.2 MG/DL (ref 8.5–10.1)
CHLORIDE SERPL-SCNC: 107 MMOL/L (ref 98–112)
CO2 SERPL-SCNC: 28 MMOL/L (ref 21–32)
CREAT BLD-MCNC: 1.57 MG/DL (ref 0.7–1.3)
CREAT BLD-MCNC: 1.57 MG/DL (ref 0.7–1.3)
DEPRECATED RDW RBC AUTO: 50.9 FL (ref 35.1–46.3)
ERYTHROCYTE [DISTWIDTH] IN BLOOD BY AUTOMATED COUNT: 14.6 % (ref 11–15)
GLUCOSE BLD-MCNC: 131 MG/DL (ref 70–99)
GLUCOSE BLD-MCNC: 133 MG/DL (ref 70–99)
GLUCOSE BLD-MCNC: 170 MG/DL (ref 70–99)
GLUCOSE BLD-MCNC: 179 MG/DL (ref 70–99)
GLUCOSE BLD-MCNC: 179 MG/DL (ref 70–99)
HAV IGM SER QL: 2.3 MG/DL (ref 1.6–2.6)
HCT VFR BLD AUTO: 26.7 % (ref 39–53)
HGB BLD-MCNC: 8.2 G/DL (ref 13–17.5)
MCH RBC QN AUTO: 28.8 PG (ref 26–34)
MCHC RBC AUTO-ENTMCNC: 30.7 G/DL (ref 31–37)
MCV RBC AUTO: 93.7 FL (ref 80–100)
OSMOLALITY SERPL CALC.SUM OF ELEC: 298 MOSM/KG (ref 275–295)
PLATELET # BLD AUTO: 228 10(3)UL (ref 150–450)
POTASSIUM SERPL-SCNC: 4.1 MMOL/L (ref 3.5–5.1)
RBC # BLD AUTO: 2.85 X10(6)UL (ref 3.8–5.8)
SODIUM SERPL-SCNC: 139 MMOL/L (ref 136–145)
STAPHYLOCOCCUS AUREUS, MRSA BY PCR: DETECTED
WBC # BLD AUTO: 6.9 X10(3) UL (ref 4–11)

## 2020-09-13 PROCEDURE — 97530 THERAPEUTIC ACTIVITIES: CPT

## 2020-09-13 PROCEDURE — 82565 ASSAY OF CREATININE: CPT | Performed by: ORTHOPAEDIC SURGERY

## 2020-09-13 PROCEDURE — 83735 ASSAY OF MAGNESIUM: CPT | Performed by: HOSPITALIST

## 2020-09-13 PROCEDURE — 82962 GLUCOSE BLOOD TEST: CPT

## 2020-09-13 PROCEDURE — 97162 PT EVAL MOD COMPLEX 30 MIN: CPT

## 2020-09-13 PROCEDURE — 85027 COMPLETE CBC AUTOMATED: CPT | Performed by: HOSPITALIST

## 2020-09-13 PROCEDURE — 80048 BASIC METABOLIC PNL TOTAL CA: CPT | Performed by: HOSPITALIST

## 2020-09-13 PROCEDURE — 97110 THERAPEUTIC EXERCISES: CPT

## 2020-09-13 NOTE — OPERATIVE REPORT
[facility]    PATIENT'S NAME: GÉNESIS MYERS   ATTENDING PHYSICIAN: Rg Plata. Luke Schwartz MD   OPERATING PHYSICIAN: Ashley Whaley M.D.    PATIENT ACCOUNT#:   [de-identified]    LOCATION:  [address] [place] [address]  MEDICAL RECORD #:   [id number]       DATE OF BIRTH:  1 nerve was pulled distally into the wound and transected proximally with a sharp scalpel and allowed to retract proximally. The tibial artery and peroneal artery were separately identified and tied off with silk ties.   There was no significant atherosclero

## 2020-09-13 NOTE — PROGRESS NOTES
[facility]  Progress Note    Anamaria Marion Patient Status:  Inpatient    1955 MRN [id number]   [facility] 5NW-A Attending Marie Ruiz, *   Hosp Day # 2 PCP Felipe Del Cid MD     SUBJECTIVE:  INTERVAL HISTORY: POD 1

## 2020-09-13 NOTE — PROGRESS NOTES
[facility] Hospitalist Progress Note     PCP: Gregory Fan MD    CC:  Follow up    SUBJECTIVE:  Pt sitting up in chair, wife at bedside.   Pain ok currently but says kept him up at night  No n/v. +U    Requiring IV dilaudid  OBJECTIVE:  Temp:  [97.8 °F PGLU 182* 217* 206* 159* 133*          Meds:     • multivitamin  1 tablet Oral Daily   • vitamin C  1,000 mg Oral Daily   • cholecalciferol  2,000 Units Oral Daily   • vitamin E  1,000 Units Oral Daily   • Senna-Docusate Sodium  2 tablet Oral BID   • met podiatry, ortho on, appreciate IV vancomycin  -monitor BC for clearance    **Expected pain  -IV dilaudid prn, norco prn  -antiemetics, bowel regimen     **CAD, HTN/HL  -Stable.  Continue home meds  -some accelerated HTN, suspect secondary to discomfort from

## 2020-09-13 NOTE — PLAN OF CARE
Problem: Patient/Family Goals  Goal: Patient/Family Long Term Goal  Description  Patient's Long Term Goal:   To go home. Interventions:  - Cluster care.   -Abx  - See additional Care Plan goals for specific interventions   Outcome: Progressing  Goal: P be responsible for managing their own health  - Refer to Case Management Department for coordinating discharge planning if the patient needs post-hospital services based on physician/LIP order or complex needs related to functional status, cognitive abilit

## 2020-09-13 NOTE — PHYSICAL THERAPY NOTE
PHYSICAL THERAPY EVALUATION - INPATIENT     Room Number: 523/523-A  Evaluation Date: 9/13/2020  Type of Evaluation: Re-evaluation  Physician Order: PT Eval and Treat    Presenting Problem: R BKA  Reason for Therapy: Mobility Dysfunction and Discharge APPENDECTOMY     • COLONOSCOPY  2014   • CREATE EARDRUM OPENING,GEN ANESTH  08/10/2017   • EXTREMITY LOWER IRRIGATION & DEBRIDEMENT Right 7/28/2019    Performed by Kirill Myles DPM at [facility] MAIN OR   • Liban Right 7/13/2 extremity ROM is within functional limits     Lower extremity strength is within functional limits     BALANCE  Static Sitting: Good  Dynamic Sitting: Good  Static Standing: Fair  Dynamic Standing: Poor    ADDITIONAL TESTS         NEUROLOGICAL FINDINGS chair;Needs met;Call light within reach;RN aware of session/findings; All patient questions and concerns addressed    ASSESSMENT   Patient is a 76year old male admitted on 9/9/2020 with R BKA on 9/12/20 after an OM on the R LE.  Pertinent comorbidities and Goal #3 Standing balance and tolerance with RW as support for 3' SNA   Goal #4 Pt will be ind in HEP for B LE while seated and supine    Goal #5 Aware of positioning of the R LE for preprosthetic training   Goal #6    Goal Comments: Goals established on

## 2020-09-14 PROBLEM — Z47.89 ORTHOPEDIC AFTERCARE: Status: ACTIVE | Noted: 2020-09-14

## 2020-09-14 LAB
ANION GAP SERPL CALC-SCNC: 5 MMOL/L (ref 0–18)
BUN BLD-MCNC: 37 MG/DL (ref 7–18)
BUN/CREAT SERPL: 25.3 (ref 10–20)
CALCIUM BLD-MCNC: 9.6 MG/DL (ref 8.5–10.1)
CHLORIDE SERPL-SCNC: 107 MMOL/L (ref 98–112)
CO2 SERPL-SCNC: 26 MMOL/L (ref 21–32)
CREAT BLD-MCNC: 1.46 MG/DL (ref 0.7–1.3)
CREAT BLD-MCNC: 1.46 MG/DL (ref 0.7–1.3)
DEPRECATED RDW RBC AUTO: 48.6 FL (ref 35.1–46.3)
ERYTHROCYTE [DISTWIDTH] IN BLOOD BY AUTOMATED COUNT: 14.8 % (ref 11–15)
GLUCOSE BLD-MCNC: 127 MG/DL (ref 70–99)
GLUCOSE BLD-MCNC: 128 MG/DL (ref 70–99)
GLUCOSE BLD-MCNC: 154 MG/DL (ref 70–99)
GLUCOSE BLD-MCNC: 183 MG/DL (ref 70–99)
GLUCOSE BLD-MCNC: 93 MG/DL (ref 70–99)
HAV IGM SER QL: 2.3 MG/DL (ref 1.6–2.6)
HCT VFR BLD AUTO: 26.3 % (ref 39–53)
HGB BLD-MCNC: 8.4 G/DL (ref 13–17.5)
MCH RBC QN AUTO: 28.8 PG (ref 26–34)
MCHC RBC AUTO-ENTMCNC: 31.9 G/DL (ref 31–37)
MCV RBC AUTO: 90.1 FL (ref 80–100)
OSMOLALITY SERPL CALC.SUM OF ELEC: 294 MOSM/KG (ref 275–295)
PLATELET # BLD AUTO: 240 10(3)UL (ref 150–450)
POTASSIUM SERPL-SCNC: 3.6 MMOL/L (ref 3.5–5.1)
POTASSIUM SERPL-SCNC: 4.6 MMOL/L (ref 3.5–5.1)
RBC # BLD AUTO: 2.92 X10(6)UL (ref 3.8–5.8)
SODIUM SERPL-SCNC: 138 MMOL/L (ref 136–145)
VANCOMYCIN TROUGH SERPL-MCNC: 30.7 UG/ML (ref 10–20)
WBC # BLD AUTO: 6.6 X10(3) UL (ref 4–11)

## 2020-09-14 PROCEDURE — 97530 THERAPEUTIC ACTIVITIES: CPT

## 2020-09-14 PROCEDURE — 82962 GLUCOSE BLOOD TEST: CPT

## 2020-09-14 PROCEDURE — 82565 ASSAY OF CREATININE: CPT | Performed by: ORTHOPAEDIC SURGERY

## 2020-09-14 PROCEDURE — 80048 BASIC METABOLIC PNL TOTAL CA: CPT | Performed by: HOSPITALIST

## 2020-09-14 PROCEDURE — 85027 COMPLETE CBC AUTOMATED: CPT | Performed by: HOSPITALIST

## 2020-09-14 PROCEDURE — 97168 OT RE-EVAL EST PLAN CARE: CPT

## 2020-09-14 PROCEDURE — 84132 ASSAY OF SERUM POTASSIUM: CPT | Performed by: INTERNAL MEDICINE

## 2020-09-14 PROCEDURE — 83735 ASSAY OF MAGNESIUM: CPT | Performed by: HOSPITALIST

## 2020-09-14 PROCEDURE — 80202 ASSAY OF VANCOMYCIN: CPT | Performed by: INTERNAL MEDICINE

## 2020-09-14 PROCEDURE — 97535 SELF CARE MNGMENT TRAINING: CPT

## 2020-09-14 RX ORDER — POTASSIUM CHLORIDE 20 MEQ/1
40 TABLET, EXTENDED RELEASE ORAL EVERY 4 HOURS
Status: COMPLETED | OUTPATIENT
Start: 2020-09-14 | End: 2020-09-14

## 2020-09-14 RX ORDER — VANCOMYCIN HYDROCHLORIDE
1.5 EVERY 24 HOURS
Status: DISCONTINUED | OUTPATIENT
Start: 2020-09-15 | End: 2020-09-15

## 2020-09-14 NOTE — PAYOR COMM NOTE
--------------  CONTINUED STAY REVIEW    Payor: [address] [facility]  Subscriber #:  [id number]  Authorization Number: [id number]     Admit date: 9/11/20  Admit time: 80      FAXING CLINICAL UPDATE FOR 9/12/20-9/13/20 9/12/20  SUBJECTIVE:  Just ret prn  -antiemetics, bowel regimen     **CAD, HTN/HL  -Stable. 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 Continue insulin.  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     * Miriam Wiley RN    9/13/2020 2135 Given 100 mg Oral Vashti Lowe RN      Enoxaparin Sodium (LOVENOX) 40 MG/0.4ML injection 40 mg     Date Action Dose Route User    9/13/2020 2136 Given 40 mg Subcutaneous (Left Upper Abdomen) LIVIA Briseno 9/14/2020 1003 Given 50 mg Oral Valerie Harris RN    9/13/2020 2135 Given 50 mg Oral Patricia Duarte RN      metFORMIN HCl (GLUCOPHAGE) tab 1,000 mg     Date Action Dose Route User    9/14/2020 1002 Given 1000 mg Oral Valerie Harris RN    9/13/2020 1

## 2020-09-14 NOTE — PROGRESS NOTES
[facility] Hospitalist Progress Note     PCP: Fernie August MD    CC:  Follow up    SUBJECTIVE:  Pt sitting up in chair, no f/c. No drainage. Pain post surgery. Has neuropathy. Using IS. OBJECTIVE:  Temp:  [98.2 °F (36.8 °C)-98.5 °F (36.9 °C)] 98. 2 09/14/20  1141 09/14/20  1710   PGLU 179* 170* 128* 183* 154*          Meds:     • multivitamin  1 tablet Oral Daily   • vitamin C  1,000 mg Oral Daily   • cholecalciferol  2,000 Units Oral Daily   • vitamin E  1,000 Units Oral Daily   • Senna-Docusate Sod on, appreciate IV vancomycin, management per them, trough 30.7 today   -monitor BC for clearance  -PICC    **Expected pain  -IV dilaudid prn, norco prn  -antiemetics, bowel regimen     **CAD, HTN/HL  -Stable.  Continue home meds  -some accelerated HTN, susp

## 2020-09-14 NOTE — PLAN OF CARE
Problem: Patient/Family Goals  Goal: Patient/Family Long Term Goal  Description  Patient's Long Term Goal:   To go home. Interventions:  - Cluster care.   -Abx  - See additional Care Plan goals for specific interventions   Outcome: Progressing  Goal: P patient's ability to be responsible for managing their own health  - Refer to Case Management Department for coordinating discharge planning if the patient needs post-hospital services based on physician/LIP order or complex needs related to functional sta

## 2020-09-14 NOTE — PROGRESS NOTES
Patient A/Ox4, complains of severe pain to R BKA. Medicated with PRN. Patient vitals are stable and on RA. Telemetry; NSR. Lovenox. Potassium replaced. Patient is to receive PICC for long term ABX. Plan to discharge tomorrow.

## 2020-09-14 NOTE — CM/SW NOTE
Care Progression Note:  Active Acute Medical Issue: Cellulitis of right lower extremity   POD 2 R BKA  Other Contributing Medical Factors/Dx. :   Length of stay: 3  Avoidable Delays: none  Discharge Barriers: k replacement, vanco trough, picc placement.    E

## 2020-09-14 NOTE — PROGRESS NOTES
[facility]                INFECTIOUS DISEASE PROGRESS NOTE    Berlin Sosa Patient Status:  Inpatient    1955 MRN [id number]   [facility] 5NW-A Attending Adrian Milner, *   Hosp Day # 3 PCP Laine Morales MD 3.7  --   --   --   --   --    * 137  --   --  139 138   K 4.3 4.0  --   --  4.1 3.6    105  --   --  107 107   CO2 28.0 28.0  --   --  28.0 26.0   ALKPHO 52  --   --   --   --   --    AST 28  --   --   --   --   --    ALT 43  --   --   --   -- Hypertensive emergency     Right leg pain     Numbness and tingling of left hand     Removal of external fixator R ankle.  Sx 7/3/20; NADEGE 10/1/20     Cervical radiculopathy     Numbness and tingling of hand     Hyponatremia     Cellulitis of right lower ext

## 2020-09-14 NOTE — OCCUPATIONAL THERAPY NOTE
OCCUPATIONAL THERAPY QUICK EVALUATION - INPATIENT    Room Number: 523/523-A  Evaluation Date: 9/14/2020     Type of Evaluation: Initial  Presenting Problem: RLE cellulitis    Physician Order: IP Consult to Occupational Therapy  Reason for Therapy:  ADL/IAD flap, Achilles Tendon Lenghtening R -Sx 1/11/18 NADEGE 4/11/18 1/16/2018   • Peripheral vascular disease ([facility] [address]    • Pirgott amputation R ankle. Application ex fix R ankle. Sx 5/14/20; NADEGE 8/12/20 5/20/2020   • Removal of intramedullary sandro R ankle.  Removal of Reading glasses    Prior Level of Function: Pt typically independent with ADLs and mobility. Pt does not use AD. Pt stating that he knew this was coming and has his house completely outfit to use a W/C and has all AD listed above.     SUBJECTIVE   Pt stated complete dressing at chair with supervision and toileting simulated at EOB with supervision with jordana-care, clothing management, and t/fs. Patient End of Session: Up in chair;Needs met;Call light within reach;RN aware of session/findings; All patient que

## 2020-09-15 LAB
ALBUMIN SERPL-MCNC: 2.9 G/DL (ref 3.4–5)
ANION GAP SERPL CALC-SCNC: 9 MMOL/L (ref 0–18)
BUN BLD-MCNC: 49 MG/DL (ref 7–18)
BUN/CREAT SERPL: 28.8 (ref 10–20)
CALCIUM BLD-MCNC: 9.3 MG/DL (ref 8.5–10.1)
CHLORIDE SERPL-SCNC: 107 MMOL/L (ref 98–112)
CO2 SERPL-SCNC: 22 MMOL/L (ref 21–32)
CREAT BLD-MCNC: 1.69 MG/DL (ref 0.7–1.3)
CREAT BLD-MCNC: 1.7 MG/DL (ref 0.7–1.3)
DEPRECATED RDW RBC AUTO: 47.8 FL (ref 35.1–46.3)
ERYTHROCYTE [DISTWIDTH] IN BLOOD BY AUTOMATED COUNT: 14.8 % (ref 11–15)
GLUCOSE BLD-MCNC: 101 MG/DL (ref 70–99)
GLUCOSE BLD-MCNC: 156 MG/DL (ref 70–99)
GLUCOSE BLD-MCNC: 162 MG/DL (ref 70–99)
GLUCOSE BLD-MCNC: 86 MG/DL (ref 70–99)
GLUCOSE BLD-MCNC: 87 MG/DL (ref 70–99)
HCT VFR BLD AUTO: 26.4 % (ref 39–53)
HGB BLD-MCNC: 8.5 G/DL (ref 13–17.5)
MCH RBC QN AUTO: 28.8 PG (ref 26–34)
MCHC RBC AUTO-ENTMCNC: 32.2 G/DL (ref 31–37)
MCV RBC AUTO: 89.5 FL (ref 80–100)
OSMOLALITY SERPL CALC.SUM OF ELEC: 298 MOSM/KG (ref 275–295)
PHOSPHATE SERPL-MCNC: 4.6 MG/DL (ref 2.5–4.9)
PLATELET # BLD AUTO: 330 10(3)UL (ref 150–450)
POTASSIUM SERPL-SCNC: 4.3 MMOL/L (ref 3.5–5.1)
RBC # BLD AUTO: 2.95 X10(6)UL (ref 3.8–5.8)
SODIUM SERPL-SCNC: 138 MMOL/L (ref 136–145)
VANCOMYCIN SERPL-MCNC: 18.8 UG/ML
WBC # BLD AUTO: 7.5 X10(3) UL (ref 4–11)

## 2020-09-15 PROCEDURE — 02HV33Z INSERTION OF INFUSION DEVICE INTO SUPERIOR VENA CAVA, PERCUTANEOUS APPROACH: ICD-10-PCS | Performed by: INTERNAL MEDICINE

## 2020-09-15 PROCEDURE — 82565 ASSAY OF CREATININE: CPT | Performed by: ORTHOPAEDIC SURGERY

## 2020-09-15 PROCEDURE — 82962 GLUCOSE BLOOD TEST: CPT

## 2020-09-15 PROCEDURE — 36573 INSJ PICC RS&I 5 YR+: CPT

## 2020-09-15 PROCEDURE — 85027 COMPLETE CBC AUTOMATED: CPT | Performed by: INTERNAL MEDICINE

## 2020-09-15 PROCEDURE — 80069 RENAL FUNCTION PANEL: CPT | Performed by: INTERNAL MEDICINE

## 2020-09-15 PROCEDURE — 80202 ASSAY OF VANCOMYCIN: CPT | Performed by: INTERNAL MEDICINE

## 2020-09-15 PROCEDURE — B548ZZA ULTRASONOGRAPHY OF SUPERIOR VENA CAVA, GUIDANCE: ICD-10-PCS | Performed by: INTERNAL MEDICINE

## 2020-09-15 RX ORDER — SODIUM CHLORIDE 9 MG/ML
INJECTION, SOLUTION INTRAVENOUS CONTINUOUS
Status: DISCONTINUED | OUTPATIENT
Start: 2020-09-15 | End: 2020-09-16

## 2020-09-15 RX ORDER — HEPARIN SODIUM 5000 [USP'U]/ML
5000 INJECTION, SOLUTION INTRAVENOUS; SUBCUTANEOUS EVERY 8 HOURS SCHEDULED
Status: DISCONTINUED | OUTPATIENT
Start: 2020-09-15 | End: 2020-09-16

## 2020-09-15 NOTE — PAYOR COMM NOTE
--------------  CONTINUED STAY REVIEW    Payor: [address] [place]  Subscriber #:  [id number]  Authorization Number: [id number]     Admit date: 9/11/20  Admit time: 0729 FAFLASHG CLINICAL UPDATE FOR 9/14/20 9/14/20   SUBJECTIVE:  Pt sitting up in podiatry, ortho on, appreciate IV vancomycin, management per them, trough 30.7 today   -monitor BC for clearance  -PICC     **Expected pain  -IV dilaudid prn, norco prn  -antiemetics, bowel regimen     **CAD, HTN/HL  -Stable.  Continue home meds  -some acce RN    9/14/2020 2007 Given 100 mg Oral Corean Albertina, RN    9/14/2020 1004 Given 100 mg Oral Meg Cadena, RN      Enoxaparin Sodium (LOVENOX) 40 MG/0.4ML injection 40 mg     Date Action Dose Route User    9/14/2020 2007 Given 40 mg Subcutaneous ( Units Subcutaneous (Left Upper Arm) Carl Parra, LIVIA      insulin detemir (LEVEMIR) 100 UNIT/ML flextouch 10 Units     Date Action Dose Route User    9/14/2020 2129 Given 10 Units Subcutaneous (Left Lower Abdomen) Baldomero Favre, RN      lidocaine- Date Action Dose Route User    9/15/2020 0904 New Bag 1.5 g Intravenous Leopoldo Burr RN      Vitamin C tab 1,000 mg     Date Action Dose Route User    9/15/2020 0855 Given 1000 mg Oral Leopoldo Burr RN    9/14/2020 1003 Given 1000 mg Oral Kaitlin Herrera

## 2020-09-15 NOTE — PLAN OF CARE
Problem: RLE cellulitis   Data: Ax04. On telemetry NSR. , room air. 02 sats WNL. Afebrile. Verbalized RLE pain, prn norco. BKA of the right leg. Dressings intact and ace wrapped. Diabetic , carb controlled diet. Accu checks. Continent of b/b.  Uses urina appropriate resources  Description  INTERVENTIONS:  - Identify barriers to discharge w/pt and caregiver  - Include patient/family/discharge partner in discharge planning  - Arrange for needed discharge resources and transportation as appropriate  - Identif

## 2020-09-15 NOTE — DIETARY NOTE
[facility]     Admitting diagnosis: Cellulitis of right lower extremity [Q62.614]    Ht: 180 cm (5' 10.87\")  Wt: 95.5 kg (210 lb 9.6 oz). Body mass index is 29.48 kg/m².   IBW: 75.5 kg    Labs: reviewed  Meds: Vi

## 2020-09-15 NOTE — PROGRESS NOTES
Brandon 121 Patient Status:  Inpatient    1955 MRN [id number]   [facility] 5NW-A Attending Amira Sawyer MD   [facility] Day # 4 PCP Eliel Rae MD       Subjective:  POD #3 right below knee amputation

## 2020-09-15 NOTE — PROGRESS NOTES
[facility] Hospitalist Progress Note     PCP: Edmond Luque MD    CC:  Follow up    SUBJECTIVE:  Pt sitting up in bed, feels skin off. Trying to Take in more fluids.      OBJECTIVE:  Temp:  [97.6 °F (36.4 °C)-98.7 °F (37.1 °C)] 97.6 °F (36.4 °C)  Pulse: Recent Labs   Lab 09/09/20  1541 09/15/20  0622   ALT 43  --    AST 28  --    ALB 3.7 2.9*     --        Recent Labs   Lab 09/14/20  1141 09/14/20  1710 09/14/20  2126 09/15/20  0729 09/15/20  1143   PGLU 183* 154* 127* 87 156*          Meds: external fixator 7/8/20, is admitted for n/v/c.     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 Kenyetta Bay   -[facility] for clear

## 2020-09-15 NOTE — PLAN OF CARE
Pt alert and oriented x4. VSS - Tele , afebrile NSR. maintaining O2 sats at 98% on RA. Heparin SQ. Pt voiding per urinal. PICC placement on 9/15. Transfers to and from chair independently, non weight bearing on the right leg due to BKA. . IV abx held due with appropriate resources  Description  INTERVENTIONS:  - Identify barriers to discharge w/pt and caregiver  - Include patient/family/discharge partner in discharge planning  - Arrange for needed discharge resources and transportation as appropriate  - Id

## 2020-09-15 NOTE — PROGRESS NOTES
[facility]                INFECTIOUS DISEASE PROGRESS NOTE    Suzette Shetty Patient Status:  Inpatient    1955 MRN [id number]   [facility] 5NW-A Attending Jose Roberto Vázquez, *   Hosp Day # 4 PCP Josie Guillen MD --  9.2 9.6  --   --    ALB 3.7  --   --   --   --   --   --    * 137  --  139 138  --   --    K 4.3 4.0  --  4.1 3.6 4.6  --     105  --  107 107  --   --    CO2 28.0 28.0  --  28.0 26.0  --   --    ALKPHO 52  --   --   --   --   --   --    AS extremity     Hyperglycemia     Acute respiratory failure with hypoxia (HCC)     Hypertensive emergency     Right leg pain     Numbness and tingling of left hand     Removal of external fixator R ankle.  Sx 7/3/20; NADEGE 10/1/20     Cervical radiculopathy

## 2020-09-16 VITALS
BODY MASS INDEX: 29.49 KG/M2 | OXYGEN SATURATION: 99 % | RESPIRATION RATE: 20 BRPM | HEIGHT: 70.87 IN | TEMPERATURE: 98 F | HEART RATE: 68 BPM | SYSTOLIC BLOOD PRESSURE: 135 MMHG | DIASTOLIC BLOOD PRESSURE: 60 MMHG | WEIGHT: 210.63 LBS

## 2020-09-16 LAB
ALBUMIN SERPL-MCNC: 3.1 G/DL (ref 3.4–5)
ANION GAP SERPL CALC-SCNC: 7 MMOL/L (ref 0–18)
BUN BLD-MCNC: 37 MG/DL (ref 7–18)
BUN/CREAT SERPL: 26.6 (ref 10–20)
CALCIUM BLD-MCNC: 9.6 MG/DL (ref 8.5–10.1)
CHLORIDE SERPL-SCNC: 108 MMOL/L (ref 98–112)
CO2 SERPL-SCNC: 23 MMOL/L (ref 21–32)
CREAT BLD-MCNC: 1.39 MG/DL (ref 0.7–1.3)
DEPRECATED RDW RBC AUTO: 49.3 FL (ref 35.1–46.3)
ERYTHROCYTE [DISTWIDTH] IN BLOOD BY AUTOMATED COUNT: 15 % (ref 11–15)
GLUCOSE BLD-MCNC: 142 MG/DL (ref 70–99)
GLUCOSE BLD-MCNC: 55 MG/DL (ref 70–99)
GLUCOSE BLD-MCNC: 83 MG/DL (ref 70–99)
HAV IGM SER QL: 2.2 MG/DL (ref 1.6–2.6)
HCT VFR BLD AUTO: 26 % (ref 39–53)
HGB BLD-MCNC: 8.2 G/DL (ref 13–17.5)
MCH RBC QN AUTO: 28.2 PG (ref 26–34)
MCHC RBC AUTO-ENTMCNC: 31.5 G/DL (ref 31–37)
MCV RBC AUTO: 89.3 FL (ref 80–100)
OSMOLALITY SERPL CALC.SUM OF ELEC: 292 MOSM/KG (ref 275–295)
PHOSPHATE SERPL-MCNC: 3.9 MG/DL (ref 2.5–4.9)
PLATELET # BLD AUTO: 395 10(3)UL (ref 150–450)
POTASSIUM SERPL-SCNC: 4 MMOL/L (ref 3.5–5.1)
RBC # BLD AUTO: 2.91 X10(6)UL (ref 3.8–5.8)
SODIUM SERPL-SCNC: 138 MMOL/L (ref 136–145)
VANCOMYCIN SERPL-MCNC: 11.7 UG/ML
WBC # BLD AUTO: 8.5 X10(3) UL (ref 4–11)

## 2020-09-16 PROCEDURE — 85027 COMPLETE CBC AUTOMATED: CPT | Performed by: INTERNAL MEDICINE

## 2020-09-16 PROCEDURE — 80069 RENAL FUNCTION PANEL: CPT | Performed by: INTERNAL MEDICINE

## 2020-09-16 PROCEDURE — 83735 ASSAY OF MAGNESIUM: CPT | Performed by: INTERNAL MEDICINE

## 2020-09-16 PROCEDURE — 80202 ASSAY OF VANCOMYCIN: CPT | Performed by: INTERNAL MEDICINE

## 2020-09-16 PROCEDURE — 82962 GLUCOSE BLOOD TEST: CPT

## 2020-09-16 RX ORDER — VANCOMYCIN HYDROCHLORIDE
1.5 EVERY 24 HOURS
Qty: 8750 ML | Refills: 0 | Status: SHIPPED | OUTPATIENT
Start: 2020-09-16 | End: 2020-10-21

## 2020-09-16 RX ORDER — VANCOMYCIN HYDROCHLORIDE
1.5 EVERY 24 HOURS
Qty: 8750 ML | Refills: 0 | Status: SHIPPED | OUTPATIENT
Start: 2020-09-16 | End: 2020-09-16

## 2020-09-16 RX ORDER — VANCOMYCIN HYDROCHLORIDE
1.5 EVERY 24 HOURS
Status: DISCONTINUED | OUTPATIENT
Start: 2020-09-16 | End: 2020-09-16

## 2020-09-16 NOTE — CM/SW NOTE
09/16/20 1300   Discharge disposition   Expected discharge disposition Home-Health   Name of Dave Martinez 336  (empower )   Additional Home Care/Hospice Provider Other  (midc)   Discharge transportation Private car     Call to

## 2020-09-16 NOTE — DISCHARGE SUMMARY
[facility] Internal Medicine Discharge Summary   Patient ID:  Dariela Sidhu  [id number]  31 year old  2/21/1955    Admit date: 9/9/2020    Discharge date and time: 9/16/2020     Attending Physician: Elbert Kaplan MD     Primary Care Physician: Carly Rob 1.39  [facility] Dr[facility], repeat BMP 1 week  -hold metformin     **acute on chronic anemia secondary to CKD III and suspected reactive/dilutional process. Monitor. HDS     **leukocytosis-reactive, monitor     **PVD-Stable.  Continue home me mouth 3 (three) times daily. Take 5 tablets daily     glyBURIDE 5 MG Tabs  Commonly known as:  DIABETA  Take 1 tablet (5 mg total) by mouth 2 (two) times daily with meals.      insulin glargine 100 UNIT/ML Sopn  Commonly known as:  BASAGLAR  10 units subcut CONSULT TO SOCIAL WORK  IP CONSULT TO ORTHOPEDIC SURGERY  IP CONSULT TO VASCULAR ACCESS TEAM  IP CONSULT TO VASCULAR ACCESS TEAM  IP CONSULT TO NEPHROLOGY    Radiology: Xr Ankle (min 3 Views), Right (cpt=73610)    Result Date: 9/10/2020  PROCEDURE:  XR ANK 9/10/2020 at 12:17 PM       Mri Ankle, Right (jzm=[id number])    Result Date: 9/10/2020  PROCEDURE:  MRI ANKLE, RIGHT (CPT=73721)  COMPARISON:  [facility] , XR, XR ANKLE (MIN 3 VIEWS), RIGHT (CPT=73610), 7/28/2019, 1:02 PM.  [facility] , XR, XR ANKLE (MIN 3 VIEWS), RIG chills, vomiting  PATIENT STATED HISTORY: (As transcribed by Technologist)  Patient has chills and emesis. He denies any chest complaints. FINDINGS:  Bibasilar increased bronchovascular opacities most likely due to atelectasis.   Slight elevation of the

## 2020-09-16 NOTE — PLAN OF CARE
Problem: Patient/Family Goals  Goal: Patient/Family Long Term Goal  Description  Patient's Long Term Goal:   To go home. Interventions:  - Cluster care.   -Abx  - See additional Care Plan goals for specific interventions   Outcome: Progressing  Goal: P preferences of patient/family/discharge partner  - Complete POLST form as appropriate  - Assess patient's ability to be responsible for managing their own health  - Refer to Case Management Department for coordinating discharge planning if the patient need

## 2020-09-16 NOTE — CONSULTS
[facility]    Report of Consultation    Josy Flanagan Patient Status:  Inpatient    1955 MRN [id number]   [facility] 5NW-A Attending Saurav Wang MD   [facility] Day # 4 PCP Oneil Christine MD     Date of consult: 9/15/2020 • Pirgott amputation R ankle. Application ex fix R ankle. Sx 5/14/20; NADEGE 8/12/20 5/20/2020   • Removal of intramedullary sandro R ankle. Removal of screws R tibia, R talus and calc.  Sx 9/6/19; NADEGE 12/5/19 9/17/2019     Past Surgical History:   Procedure L 1,000 mg, Oral, Daily  •  cholecalciferol (VITAMIN D3) cap/tab 2,000 Units, 2,000 Units, Oral, Daily  •  vitamin E cap 1,000 Units, 1,000 Units, Oral, Daily  •  Senna-Docusate Sodium (SENOKOT S) 8.6-50 MG tab 2 tablet, 2 tablet, Oral, BID  •  acetaminophen Potassium (COZAAR) tab 50 mg, 50 mg, Oral, BID  •  Pramipexole Dihydrochloride (MIRAPEX) tab 1 mg, 1 mg, Oral, Nightly  •  traMADol HCl (ULTRAM) tab 50 mg, 50 mg, Oral, Q12H PRN  •  Fleet Enema (FLEET) 7-19 GM/118ML enema 133 mL, 1 enema, Rectal, Once PRN L  Neurologic/Psych: mentating well, no asterixis  Skin: No rashes    LABORATORY DATA:       Lab Results   Component Value Date    GLU 86 09/15/2020    BUN 49 (H) 09/15/2020    BUNCREA 28.8 (H) 09/15/2020    CREATSERUM 1.69 (H) 09/15/2020    CREATSERUM 1.7 NITRITE Negative 09/09/2020    LEUUR Negative 09/09/2020    NMIC Microscopic not indicated 02/24/2018    WBCUR 1-4 09/09/2020    RBCUR 3-5 (A) 09/09/2020    EPIUR None Seen 09/09/2020    BACUR None Seen 09/09/2020    HYLUR Present (A) 05/12/2019         IM

## 2020-09-16 NOTE — PROGRESS NOTES
[facility]                INFECTIOUS DISEASE PROGRESS NOTE    [facility] Patient Status:  Inpatient    1955 MRN [id number]   [facility] 5NW-A Attending Emy Valdez, *   Hosp Day # 5 PCP Lokesh Andersen MD --  2.9* 3.1*   *   < > 138  --  138 138   K 4.3   < > 3.6 4.6 4.3 4.0      < > 107  --  107 108   CO2 28.0   < > 26.0  --  22.0 23.0   ALKPHO 52  --   --   --   --   --    AST 28  --   --   --   --   --    ALT 43  --   --   --   --   --    GUSTAVO pain     Numbness and tingling of left hand     Removal of external fixator R ankle.  Sx 7/3/20; NADEGE 10/1/20     Cervical radiculopathy     Numbness and tingling of hand     Hyponatremia     Cellulitis of right lower extremity     Right, below the knee ampu

## 2020-09-16 NOTE — PROGRESS NOTES
NURSING DISCHARGE NOTE    Discharged Home via Wheelchair. Accompanied by Spouse and Support staff  Belongings Taken by patient/family. Patient discharged home via wheelchair to [facility] with spouse.  Patient and spouse reviewed discharge instru

## 2020-09-16 NOTE — PLAN OF CARE
Problem: Patient/Family Goals  Goal: Patient/Family Long Term Goal  Description  Patient's Long Term Goal:   To go home. Interventions:  - Cluster care.   -Abx  - See additional Care Plan goals for specific interventions   9/16/2020 1432 by Rosio Choi resources  Description  INTERVENTIONS:  - Identify barriers to discharge w/pt and caregiver  - Include patient/family/discharge partner in discharge planning  - Arrange for needed discharge resources and transportation as appropriate  - Identify discharge

## 2020-09-16 NOTE — PLAN OF CARE
Alert and oriented. Room air. NSR on tele. Accuchecks. Voids per urinal. Up independently. R BKA. C/o pain to L leg, PRN Norco given with relief. Denies any c/o respiratory distress. Updated on POC. Verbalized understanding. Call light within reach.  Will c resources  Description  INTERVENTIONS:  - Identify barriers to discharge w/pt and caregiver  - Include patient/family/discharge partner in discharge planning  - Arrange for needed discharge resources and transportation as appropriate  - Identify discharge

## 2020-09-16 NOTE — PROGRESS NOTES
[facility]    Nephrology Progress Note    [facility] Attending:   Toshia Kulkarni MD     Cc: Antonio Eddy    SUBJECTIVE     Feeling better  No complaints    PHYSICAL EXAM   Vital signs: /60 (BP Location: Right arm)   Pulse 68   Temp 98.1 °F (36.7 g, 17 g, Oral, Daily PRN  magnesium hydroxide (MILK OF MAGNESIA) 400 MG/5ML suspension 30 mL, 30 mL, Oral, Daily PRN  bisacodyl (DULCOLAX) rectal suppository 10 mg, 10 mg, Rectal, Daily PRN  Fleet Enema (FLEET) 7-19 GM/118ML enema 133 mL, 1 enema, Rectal, flexpen 1-5 Units, 1-5 Units, Subcutaneous, TID CC and HS  gabapentin (NEURONTIN) tab 1,200 mg, 1,200 mg, Oral, Daily with breakfast    And  gabapentin (NEURONTIN) tab 600 mg, 600 mg, Oral, Q24H    And  gabapentin (NEURONTIN) tab 1,200 mg, 1,200 mg, Oral, distal tibia and fibula is noted. Subcutaneous edema is noted. A loculated fluid collection is not identified.                                              =====    CONCLUSION:  Bony destruction involving the talus is noted.   This is     sugge postsurgical change or related to infection. Further evaluation with MRI could be performed if there is concern for     acute osteomyelitis.            2. There is diffuse thickening of the cortex of the distal talus likely     related to chronic ugarte losartan for 1 day, Cr improved  -- UA w RBCs and 100 protein, recheck UA for RBCs.  Send UPCR -- can do as outpt as has not gotten urine able to collect here   -- renal US as outpt  -- renally dose meds including vanco -- appreciated     Anemia  -- monitor

## 2020-09-21 ENCOUNTER — HOSPITAL (OUTPATIENT)
Dept: OTHER | Age: 65
End: 2020-09-21

## 2020-09-21 LAB
ABS LYMPH: 1.9 K/CUMM (ref 1–3.5)
ABS LYMPH: 1.9 K/CUMM (ref 1–3.5)
ABS MONO: 0.9 K/CUMM (ref 0.1–0.8)
ABS MONO: 0.9 K/CUMM (ref 0.1–0.8)
ABS NEUTRO: 5.1 K/CUMM (ref 2–8)
ABS NEUTRO: 5.1 K/CUMM (ref 2–8)
ANION GAP SERPL CALC-SCNC: 13 MEQ/L (ref 10–20)
ANION GAP SERPL CALC-SCNC: 13 MEQ/L (ref 10–20)
BASOPHIL: 1 % (ref 0–1)
BASOPHIL: 1 % (ref 0–1)
BUN SERPL-MCNC: 32 MG/DL (ref 6–20)
BUN SERPL-MCNC: 32 MG/DL (ref 6–20)
CALCIUM: 9.3 MG/DL (ref 8.4–10.2)
CALCIUM: 9.3 MG/DL (ref 8.4–10.2)
CHLORIDE: 103 MEQ/L (ref 97–107)
CHLORIDE: 103 MEQ/L (ref 97–107)
CREATININE: 1.48 MG/DL (ref 0.6–1.3)
CREATININE: 1.48 MG/DL (ref 0.6–1.3)
DIFF_TYPE?: ABNORMAL
EOSINOPHIL: 4 % (ref 0–6)
EOSINOPHIL: 4 % (ref 0–6)
GLUCOSE LVL: 165 MG/DL (ref 70–99)
GLUCOSE LVL: 165 MG/DL (ref 70–99)
HCT VFR BLD CALC: 28 % (ref 36–51)
HCT VFR BLD CALC: 28 % (ref 36–51)
HEMOLYSIS 2+: NEGATIVE
HGB BLD-MCNC: 8.6 G/DL (ref 12–17)
HGB BLD-MCNC: 8.6 G/DL (ref 12–17)
IMMATURE GRAN: 1.1 % (ref 0–0.3)
IMMATURE GRAN: 1.1 % (ref 0–0.3)
INSTR WBC: 8.5 K/CUMM (ref 4–11)
LYMPHOCYTE: 23 %
LYMPHOCYTE: 23 %
MCH RBC QN AUTO: 30 PG (ref 25–35)
MCH RBC QN AUTO: 30 PG (ref 25–35)
MCHC RBC AUTO-ENTMCNC: 31 G/DL (ref 32–37)
MCHC RBC AUTO-ENTMCNC: 31 G/DL (ref 32–37)
MCV RBC AUTO: 94 FL (ref 78–97)
MCV RBC AUTO: 94 FL (ref 78–97)
MONOCYTE: 11 %
MONOCYTE: 11 %
NEUTROPHIL: 60 %
NEUTROPHIL: 60 %
NRBC BLD MANUAL-RTO: 0 % (ref 0–0.2)
NRBC BLD MANUAL-RTO: 0 % (ref 0–0.2)
PLATELET: 453 K/CUMM (ref 150–450)
PLATELET: 453 K/CUMM (ref 150–450)
POTASSIUM: 5.6 MEQ/L (ref 3.5–5.1)
POTASSIUM: 5.6 MEQ/L (ref 3.5–5.1)
RBC # BLD: 2.91 M/CUMM (ref 4.2–6)
RBC # BLD: 2.91 M/CUMM (ref 4.2–6)
RDW: 15 % (ref 11.5–14.5)
RDW: 15 % (ref 11.5–14.5)
SODIUM: 137 MEQ/L (ref 136–145)
SODIUM: 137 MEQ/L (ref 136–145)
TCO2: 27 MEQ/L (ref 19–29)
TCO2: 27 MEQ/L (ref 19–29)
VANCO TR: 13.3 UG/ML (ref 5–15)
VANCO TR: 13.3 UG/ML (ref 5–15)
WBC # BLD: 8.5 K/CUMM (ref 4–11)
WBC # BLD: 8.5 K/CUMM (ref 4–11)

## 2020-09-28 ENCOUNTER — HOSPITAL (OUTPATIENT)
Dept: OTHER | Age: 65
End: 2020-09-28

## 2020-09-28 LAB
ABS LYMPH: 1.8 K/CUMM (ref 1–3.5)
ABS LYMPH: 1.8 K/CUMM (ref 1–3.5)
ABS MONO: 0.5 K/CUMM (ref 0.1–0.8)
ABS MONO: 0.5 K/CUMM (ref 0.1–0.8)
ABS NEUTRO: 4 K/CUMM (ref 2–8)
ABS NEUTRO: 4 K/CUMM (ref 2–8)
ANION GAP SERPL CALC-SCNC: 18 MEQ/L (ref 10–20)
ANION GAP SERPL CALC-SCNC: 18 MEQ/L (ref 10–20)
BASOPHIL: 1 % (ref 0–1)
BASOPHIL: 1 % (ref 0–1)
BUN SERPL-MCNC: 35 MG/DL (ref 6–20)
BUN SERPL-MCNC: 35 MG/DL (ref 6–20)
CALCIUM: 9.9 MG/DL (ref 8.4–10.2)
CALCIUM: 9.9 MG/DL (ref 8.4–10.2)
CHLORIDE: 100 MEQ/L (ref 97–107)
CHLORIDE: 100 MEQ/L (ref 97–107)
CREATININE: 1.62 MG/DL (ref 0.6–1.3)
CREATININE: 1.62 MG/DL (ref 0.6–1.3)
DIFF_TYPE?: ABNORMAL
EOSINOPHIL: 3 % (ref 0–6)
EOSINOPHIL: 3 % (ref 0–6)
GLUCOSE LVL: 195 MG/DL (ref 70–99)
GLUCOSE LVL: 195 MG/DL (ref 70–99)
HCT VFR BLD CALC: 32 % (ref 36–51)
HCT VFR BLD CALC: 32 % (ref 36–51)
HEMOLYSIS 2+: NEGATIVE
HGB BLD-MCNC: 10.1 G/DL (ref 12–17)
HGB BLD-MCNC: 10.1 G/DL (ref 12–17)
IMMATURE GRAN: 0.4 % (ref 0–0.3)
IMMATURE GRAN: 0.4 % (ref 0–0.3)
INSTR WBC: 6.7 K/CUMM (ref 4–11)
LYMPHOCYTE: 27 %
LYMPHOCYTE: 27 %
MCH RBC QN AUTO: 29 PG (ref 25–35)
MCH RBC QN AUTO: 29 PG (ref 25–35)
MCHC RBC AUTO-ENTMCNC: 31 G/DL (ref 32–37)
MCHC RBC AUTO-ENTMCNC: 31 G/DL (ref 32–37)
MCV RBC AUTO: 94 FL (ref 78–97)
MCV RBC AUTO: 94 FL (ref 78–97)
MONOCYTE: 8 %
MONOCYTE: 8 %
NEUTROPHIL: 60 %
NEUTROPHIL: 60 %
NRBC BLD MANUAL-RTO: 0 % (ref 0–0.2)
NRBC BLD MANUAL-RTO: 0 % (ref 0–0.2)
PLATELET: 268 K/CUMM (ref 150–450)
PLATELET: 268 K/CUMM (ref 150–450)
POTASSIUM: 4.9 MEQ/L (ref 3.5–5.1)
POTASSIUM: 4.9 MEQ/L (ref 3.5–5.1)
RBC # BLD: 3.45 M/CUMM (ref 4.2–6)
RBC # BLD: 3.45 M/CUMM (ref 4.2–6)
RDW: 14.5 % (ref 11.5–14.5)
RDW: 14.5 % (ref 11.5–14.5)
SODIUM: 137 MEQ/L (ref 136–145)
SODIUM: 137 MEQ/L (ref 136–145)
TCO2: 24 MEQ/L (ref 19–29)
TCO2: 24 MEQ/L (ref 19–29)
VANCO TR: 13.8 UG/ML (ref 5–15)
VANCO TR: 13.8 UG/ML (ref 5–15)
WBC # BLD: 6.7 K/CUMM (ref 4–11)
WBC # BLD: 6.7 K/CUMM (ref 4–11)

## 2020-10-13 ENCOUNTER — HOSPITAL (OUTPATIENT)
Dept: OTHER | Age: 65
End: 2020-10-13

## 2020-10-13 LAB
ABS LYMPH: 1.9 K/CUMM (ref 1–3.5)
ABS LYMPH: 1.9 K/CUMM (ref 1–3.5)
ABS MONO: 0.8 K/CUMM (ref 0.1–0.8)
ABS MONO: 0.8 K/CUMM (ref 0.1–0.8)
ABS NEUTRO: 5.1 K/CUMM (ref 2–8)
ABS NEUTRO: 5.1 K/CUMM (ref 2–8)
ANION GAP SERPL CALC-SCNC: 14 MEQ/L (ref 10–20)
ANION GAP SERPL CALC-SCNC: 14 MEQ/L (ref 10–20)
BASOPHIL: 1 % (ref 0–1)
BASOPHIL: 1 % (ref 0–1)
BUN SERPL-MCNC: 34 MG/DL (ref 6–20)
BUN SERPL-MCNC: 34 MG/DL (ref 6–20)
CALCIUM: 9.7 MG/DL (ref 8.4–10.2)
CALCIUM: 9.7 MG/DL (ref 8.4–10.2)
CHLORIDE: 103 MEQ/L (ref 97–107)
CHLORIDE: 103 MEQ/L (ref 97–107)
CREATININE: 1.54 MG/DL (ref 0.6–1.3)
CREATININE: 1.54 MG/DL (ref 0.6–1.3)
DIFF_TYPE?: ABNORMAL
EOSINOPHIL: 5 % (ref 0–6)
EOSINOPHIL: 5 % (ref 0–6)
GLUCOSE LVL: 98 MG/DL (ref 70–99)
GLUCOSE LVL: 98 MG/DL (ref 70–99)
HCT VFR BLD CALC: 33 % (ref 36–51)
HCT VFR BLD CALC: 33 % (ref 36–51)
HEMOLYSIS 2+: NEGATIVE
HGB BLD-MCNC: 10.4 G/DL (ref 12–17)
HGB BLD-MCNC: 10.4 G/DL (ref 12–17)
IMMATURE GRAN: 0.4 % (ref 0–0.3)
IMMATURE GRAN: 0.4 % (ref 0–0.3)
INSTR WBC: 8.2 K/CUMM (ref 4–11)
LYMPHOCYTE: 22 %
LYMPHOCYTE: 22 %
MCH RBC QN AUTO: 30 PG (ref 25–35)
MCH RBC QN AUTO: 30 PG (ref 25–35)
MCHC RBC AUTO-ENTMCNC: 32 G/DL (ref 32–37)
MCHC RBC AUTO-ENTMCNC: 32 G/DL (ref 32–37)
MCV RBC AUTO: 94 FL (ref 78–97)
MCV RBC AUTO: 94 FL (ref 78–97)
MONOCYTE: 9 %
MONOCYTE: 9 %
NEUTROPHIL: 62 %
NEUTROPHIL: 62 %
NRBC BLD MANUAL-RTO: 0 % (ref 0–0.2)
NRBC BLD MANUAL-RTO: 0 % (ref 0–0.2)
PLATELET: 288 K/CUMM (ref 150–450)
PLATELET: 288 K/CUMM (ref 150–450)
POTASSIUM: 5.2 MEQ/L (ref 3.5–5.1)
POTASSIUM: 5.2 MEQ/L (ref 3.5–5.1)
RBC # BLD: 3.49 M/CUMM (ref 4.2–6)
RBC # BLD: 3.49 M/CUMM (ref 4.2–6)
RDW: 15.1 % (ref 11.5–14.5)
RDW: 15.1 % (ref 11.5–14.5)
SODIUM: 136 MEQ/L (ref 136–145)
SODIUM: 136 MEQ/L (ref 136–145)
TCO2: 24 MEQ/L (ref 19–29)
TCO2: 24 MEQ/L (ref 19–29)
VANCO TR: 14.8 UG/ML (ref 5–15)
VANCO TR: 14.8 UG/ML (ref 5–15)
WBC # BLD: 8.2 K/CUMM (ref 4–11)
WBC # BLD: 8.2 K/CUMM (ref 4–11)

## 2020-10-19 ENCOUNTER — HOSPITAL (OUTPATIENT)
Dept: OTHER | Age: 65
End: 2020-10-19

## 2020-10-19 LAB
ANION GAP SERPL CALC-SCNC: 16 MEQ/L (ref 10–20)
ANION GAP SERPL CALC-SCNC: 16 MEQ/L (ref 10–20)
BUN SERPL-MCNC: 37 MG/DL (ref 6–20)
BUN SERPL-MCNC: 37 MG/DL (ref 6–20)
CALCIUM: 9.7 MG/DL (ref 8.4–10.2)
CALCIUM: 9.7 MG/DL (ref 8.4–10.2)
CHLORIDE: 104 MEQ/L (ref 97–107)
CHLORIDE: 104 MEQ/L (ref 97–107)
CREATININE: 1.71 MG/DL (ref 0.6–1.3)
CREATININE: 1.71 MG/DL (ref 0.6–1.3)
GLUCOSE LVL: 98 MG/DL (ref 70–99)
GLUCOSE LVL: 98 MG/DL (ref 70–99)
HEMOLYSIS 2+: NEGATIVE
POTASSIUM: 4.7 MEQ/L (ref 3.5–5.1)
POTASSIUM: 4.7 MEQ/L (ref 3.5–5.1)
SODIUM: 139 MEQ/L (ref 136–145)
SODIUM: 139 MEQ/L (ref 136–145)
TCO2: 24 MEQ/L (ref 19–29)
TCO2: 24 MEQ/L (ref 19–29)
VANCO TR: 13.3 UG/ML (ref 5–15)
VANCO TR: 13.3 UG/ML (ref 5–15)

## 2020-11-02 RX ORDER — ACETAMINOPHEN 500 MG
1000 TABLET ORAL ONCE
Status: CANCELLED | OUTPATIENT
Start: 2020-11-02 | End: 2020-11-02

## 2020-11-09 ENCOUNTER — APPOINTMENT (OUTPATIENT)
Dept: LAB | Age: 65
End: 2020-11-09
Attending: OPHTHALMOLOGY
Payer: COMMERCIAL

## 2020-11-09 DIAGNOSIS — H26.9 CATARACT: ICD-10-CM

## 2020-11-11 ENCOUNTER — ANESTHESIA EVENT (OUTPATIENT)
Dept: SURGERY | Facility: HOSPITAL | Age: 65
End: 2020-11-11
Payer: COMMERCIAL

## 2020-11-11 NOTE — PAT NURSING NOTE
Telephoned Dr. Christopher Seek office and spoke with Therese Denney informing her that the EKG strips that were faxed yesterday that are supposed to be his EKG are just tracings from patients hospitalization in September 2020.   She states she will get a message to the

## 2020-11-12 ENCOUNTER — ANESTHESIA (OUTPATIENT)
Dept: SURGERY | Facility: HOSPITAL | Age: 65
End: 2020-11-12
Payer: COMMERCIAL

## 2020-11-12 ENCOUNTER — HOSPITAL ENCOUNTER (OUTPATIENT)
Facility: HOSPITAL | Age: 65
Setting detail: HOSPITAL OUTPATIENT SURGERY
Discharge: HOME | End: 2020-11-12
Attending: OPHTHALMOLOGY | Admitting: OPHTHALMOLOGY
Payer: COMMERCIAL

## 2020-11-12 VITALS
HEIGHT: 71 IN | TEMPERATURE: 97 F | RESPIRATION RATE: 18 BRPM | SYSTOLIC BLOOD PRESSURE: 123 MMHG | DIASTOLIC BLOOD PRESSURE: 60 MMHG | BODY MASS INDEX: 30.8 KG/M2 | WEIGHT: 220 LBS | HEART RATE: 62 BPM | OXYGEN SATURATION: 99 %

## 2020-11-12 DIAGNOSIS — H26.9 CATARACT: Primary | ICD-10-CM

## 2020-11-12 DIAGNOSIS — H25.812 COMBINED FORMS OF AGE-RELATED CATARACT OF LEFT EYE: ICD-10-CM

## 2020-11-12 PROCEDURE — 08RK3JZ REPLACEMENT OF LEFT LENS WITH SYNTHETIC SUBSTITUTE, PERCUTANEOUS APPROACH: ICD-10-PCS | Performed by: OPHTHALMOLOGY

## 2020-11-12 PROCEDURE — 82962 GLUCOSE BLOOD TEST: CPT

## 2020-11-12 DEVICE — IMPLANTABLE DEVICE: Type: IMPLANTABLE DEVICE | Site: EYE | Status: FUNCTIONAL

## 2020-11-12 RX ORDER — CYCLOPENTOLATE HYDROCHLORIDE 10 MG/ML
1 SOLUTION/ DROPS OPHTHALMIC SEE ADMIN INSTRUCTIONS
Status: COMPLETED | OUTPATIENT
Start: 2020-11-12 | End: 2020-11-12

## 2020-11-12 RX ORDER — TETRACAINE HYDROCHLORIDE 5 MG/ML
1 SOLUTION OPHTHALMIC SEE ADMIN INSTRUCTIONS
Status: COMPLETED | OUTPATIENT
Start: 2020-11-12 | End: 2020-11-12

## 2020-11-12 RX ORDER — TETRACAINE HYDROCHLORIDE 5 MG/ML
SOLUTION OPHTHALMIC AS NEEDED
Status: DISCONTINUED | OUTPATIENT
Start: 2020-11-12 | End: 2020-11-12

## 2020-11-12 RX ORDER — DEXTROSE MONOHYDRATE 25 G/50ML
50 INJECTION, SOLUTION INTRAVENOUS
Status: DISCONTINUED | OUTPATIENT
Start: 2020-11-12 | End: 2020-11-12

## 2020-11-12 RX ORDER — MOXIFLOXACIN 5 MG/ML
SOLUTION/ DROPS OPHTHALMIC AS NEEDED
Status: DISCONTINUED | OUTPATIENT
Start: 2020-11-12 | End: 2020-11-12

## 2020-11-12 RX ORDER — SODIUM CHLORIDE, SODIUM LACTATE, POTASSIUM CHLORIDE, CALCIUM CHLORIDE 600; 310; 30; 20 MG/100ML; MG/100ML; MG/100ML; MG/100ML
INJECTION, SOLUTION INTRAVENOUS CONTINUOUS
Status: DISCONTINUED | OUTPATIENT
Start: 2020-11-12 | End: 2020-11-12

## 2020-11-12 RX ORDER — NALOXONE HYDROCHLORIDE 0.4 MG/ML
80 INJECTION, SOLUTION INTRAMUSCULAR; INTRAVENOUS; SUBCUTANEOUS AS NEEDED
Status: DISCONTINUED | OUTPATIENT
Start: 2020-11-12 | End: 2020-11-12

## 2020-11-12 RX ORDER — TETRACAINE HYDROCHLORIDE 5 MG/ML
SOLUTION OPHTHALMIC
Status: COMPLETED
Start: 2020-11-12 | End: 2020-11-12

## 2020-11-12 RX ORDER — CYCLOPENTOLATE HYDROCHLORIDE 10 MG/ML
SOLUTION/ DROPS OPHTHALMIC
Status: COMPLETED
Start: 2020-11-12 | End: 2020-11-12

## 2020-11-12 RX ORDER — TROPICAMIDE 10 MG/ML
1 SOLUTION/ DROPS OPHTHALMIC SEE ADMIN INSTRUCTIONS
Status: COMPLETED | OUTPATIENT
Start: 2020-11-12 | End: 2020-11-12

## 2020-11-12 RX ORDER — HYDROMORPHONE HYDROCHLORIDE 1 MG/ML
0.4 INJECTION, SOLUTION INTRAMUSCULAR; INTRAVENOUS; SUBCUTANEOUS EVERY 5 MIN PRN
Status: DISCONTINUED | OUTPATIENT
Start: 2020-11-12 | End: 2020-11-12

## 2020-11-12 RX ORDER — PREDNISOLONE ACETATE 10 MG/ML
SUSPENSION/ DROPS OPHTHALMIC AS NEEDED
Status: DISCONTINUED | OUTPATIENT
Start: 2020-11-12 | End: 2020-11-12

## 2020-11-12 RX ORDER — PHENYLEPHRINE HCL 2.5 %
1 DROPS OPHTHALMIC (EYE) SEE ADMIN INSTRUCTIONS
Status: COMPLETED | OUTPATIENT
Start: 2020-11-12 | End: 2020-11-12

## 2020-11-12 RX ORDER — MIDAZOLAM HYDROCHLORIDE 1 MG/ML
INJECTION INTRAMUSCULAR; INTRAVENOUS AS NEEDED
Status: DISCONTINUED | OUTPATIENT
Start: 2020-11-12 | End: 2020-11-12 | Stop reason: SURG

## 2020-11-12 RX ORDER — BALANCED SALT SOLUTION 6.4; .75; .48; .3; 3.9; 1.7 MG/ML; MG/ML; MG/ML; MG/ML; MG/ML; MG/ML
SOLUTION OPHTHALMIC AS NEEDED
Status: DISCONTINUED | OUTPATIENT
Start: 2020-11-12 | End: 2020-11-12

## 2020-11-12 RX ORDER — PHENYLEPHRINE HCL 2.5 %
DROPS OPHTHALMIC (EYE)
Status: COMPLETED
Start: 2020-11-12 | End: 2020-11-12

## 2020-11-12 RX ORDER — LIDOCAINE HYDROCHLORIDE 10 MG/ML
INJECTION, SOLUTION EPIDURAL; INFILTRATION; INTRACAUDAL; PERINEURAL AS NEEDED
Status: DISCONTINUED | OUTPATIENT
Start: 2020-11-12 | End: 2020-11-12

## 2020-11-12 RX ORDER — TROPICAMIDE 10 MG/ML
SOLUTION/ DROPS OPHTHALMIC
Status: COMPLETED
Start: 2020-11-12 | End: 2020-11-12

## 2020-11-12 RX ADMIN — MIDAZOLAM HYDROCHLORIDE 1 MG: 1 INJECTION INTRAMUSCULAR; INTRAVENOUS at 08:01:00

## 2020-11-12 RX ADMIN — SODIUM CHLORIDE, SODIUM LACTATE, POTASSIUM CHLORIDE, CALCIUM CHLORIDE: 600; 310; 30; 20 INJECTION, SOLUTION INTRAVENOUS at 08:23:00

## 2020-11-12 NOTE — H&P (VIEW-ONLY)
Reviewed following H&P. No new changes.       Jerad Lemons  11/4/2020 10:30 AM   Office Visit  MRN:  [id number]  Description: 76year old male Provider: Jl Cota MD Department: [facility]   Scanning Cover Sheet    Click to print [facility] History of hyperuricemia and gout. No current synovitis or joint pain. Due for uric acid level.      CAD with no current chest pain. 4 mets of activity with no dyspnea or chest pain.  Currently adherent to ARB, Beta blocker, aspirin and repatha.     History Past Surgical History:   Procedure Laterality Date   • APPENDECTOMY       • APPENDECTOMY       • COLONOSCOPY   2014   • CREATE EARDRUM OPENING,GEN ANESTH   08/10/2017   • EXTREMITY LOWER IRRIGATION & DEBRIDEMENT Right 7/28/2019     Performed by Ricardo Higgins • GLYBURIDE 5 MG Oral Tab TAKE 1 TABLET BY MOUTH TWICE DAILY WITH MEALS 180 tablet 0   • amLODIPine Besylate 10 MG Oral Tab Take 1 tablet (10 mg total) by mouth daily.  90 tablet 0   • Fenofibrate 145 MG Oral Tab Take 145 mg by mouth daily.       • александрi • acetaminophen 500 MG Oral Tab Take 500 mg by mouth every 6 (six) hours as needed for Pain.              ROS:   GENERAL HEALTH: feels well otherwise  SKIN: denies any unusual skin lesions or rashes  RESPIRATORY: denies shortness of breath with exertion  C Bilateral monofilament/sensation of right foott is diminished.  Right BKA.        HGBA1C:          Lab Results   Component Value Date     A1C 6.7 (H) 09/09/2020     A1C 6.7 (H) 06/22/2020     A1C 8.5 (H) 03/30/2020      (H) 09/09/2020      BMP:   No 1. Preoperative general physical examination  Cleared for procedure at reasonable risk to proceed.    Continue BP meds  Hold oral diabetic meds morning of procedure  Reduce insulin dose 50% day prior  Reviewed labs and stable  - Await labs     2. Bilateral After Visit Summary (Printed 11/4/2020)  Additional Documentation    Vitals:    /66 (BP Location: Right arm, Patient Position: Sitting, Cuff Size: adult)   Pulse 73   Temp 98.5 °F (36.9 °C) (Temporal)   Ht 5' 11\" (1.803 m)   Wt 220 lb (99.8 kg)   S Level of Service

## 2020-11-12 NOTE — ANESTHESIA POSTPROCEDURE EVALUATION
[address] [facility] Patient Status:  Hospital Outpatient Surgery   Age/Gender 76year old male MRN [id number]   [facility] SURGERY Attending Barb Silverio MD   Hosp Day # 0 PCP Patel Cornejo MD       Anesthesia Post-op Note

## 2020-11-12 NOTE — ANESTHESIA PREPROCEDURE EVALUATION
PRE-OP EVALUATION    Patient Name: Saulo Berry    Pre-op Diagnosis: Combined forms of age-related cataract of left eye [H25.812]    Procedure(s):  LEFT PHACOEMULSIFICATION OF CATARACT WITH PLACEMENT OF INTRAOCULAR LENS IMPLANT    Surgeon(s) and Role: BKA    ECG reviewed.           (+) obesity  (+) hypertension     (+) CAD    (+) CABG/stent (stent 10 years ago)    (+) valvular problems/murmurs (mild) and AS and MR       (+) CHF                Endo/Other      (+) diabetes         (+) anemia 11/04/2020    HCT 32.2 (L) 11/04/2020    MCV 94.7 11/04/2020    MCH 30.0 11/04/2020    MCHC 31.7 11/04/2020    RDW 14.4 11/04/2020     11/04/2020     Lab Results   Component Value Date     11/04/2020    K 4.97 11/04/2020     11/04/2020

## 2020-11-12 NOTE — OPERATIVE REPORT
[facility] OPERATIVE REPORT:     PATIENT NAME:  Benjamin Mansfield    MRN:  [id number]    DATE OF OPERATION:   11/12/2020      PREOPERATIVE DIAGNOSIS:   1.  Nuclear Sclerotic Cataract, OS  Posterior Subcapsular Cataract, OS    POSTOPERATIVE DIAGNOSIS:   1 balanced salt solution on a cannula. The nucleus was then divided into 4 quadrants using a prechopper. The phacoemulsification tip was introduced into the eye, and the nuclear fragments were removed without difficulty.  The residual cortex was removed using

## 2020-11-12 NOTE — H&P
Reviewed following H&P. No new changes.       Jean Marie Morin  11/4/2020 10:30 AM   Office Visit  MRN:  [id number]  Description: 76year old male Provider: Chester Glasgow MD Department: [facility]   Scanning Cover Sheet    Click to print [facility] History of hyperuricemia and gout. No current synovitis or joint pain. Due for uric acid level.      CAD with no current chest pain. 4 mets of activity with no dyspnea or chest pain.  Currently adherent to ARB, Beta blocker, aspirin and repatha.     History Past Surgical History:   Procedure Laterality Date   • APPENDECTOMY       • APPENDECTOMY       • COLONOSCOPY   2014   • CREATE EARDRUM OPENING,GEN ANESTH   08/10/2017   • EXTREMITY LOWER IRRIGATION & DEBRIDEMENT Right 7/28/2019     Performed by Ben Crabtree • GLYBURIDE 5 MG Oral Tab TAKE 1 TABLET BY MOUTH TWICE DAILY WITH MEALS 180 tablet 0   • amLODIPine Besylate 10 MG Oral Tab Take 1 tablet (10 mg total) by mouth daily.  90 tablet 0   • Fenofibrate 145 MG Oral Tab Take 145 mg by mouth daily.       • александрi • acetaminophen 500 MG Oral Tab Take 500 mg by mouth every 6 (six) hours as needed for Pain.              ROS:   GENERAL HEALTH: feels well otherwise  SKIN: denies any unusual skin lesions or rashes  RESPIRATORY: denies shortness of breath with exertion  C Bilateral monofilament/sensation of right foott is diminished.  Right BKA.        HGBA1C:          Lab Results   Component Value Date     A1C 6.7 (H) 09/09/2020     A1C 6.7 (H) 06/22/2020     A1C 8.5 (H) 03/30/2020      (H) 09/09/2020      BMP:   No 1. Preoperative general physical examination  Cleared for procedure at reasonable risk to proceed.    Continue BP meds  Hold oral diabetic meds morning of procedure  Reduce insulin dose 50% day prior  Reviewed labs and stable  - Await labs     2. Bilateral After Visit Summary (Printed 11/4/2020)  Additional Documentation    Vitals:    /66 (BP Location: Right arm, Patient Position: Sitting, Cuff Size: adult)   Pulse 73   Temp 98.5 °F (36.9 °C) (Temporal)   Ht 5' 11\" (1.803 m)   Wt 220 lb (99.8 kg)   S Level of Service

## 2020-11-30 ENCOUNTER — APPOINTMENT (OUTPATIENT)
Dept: LAB | Age: 65
End: 2020-11-30
Attending: OPHTHALMOLOGY
Payer: COMMERCIAL

## 2020-11-30 DIAGNOSIS — H25.811 COMBINED FORMS OF AGE-RELATED CATARACT OF RIGHT EYE: ICD-10-CM

## 2020-12-02 ENCOUNTER — ANESTHESIA EVENT (OUTPATIENT)
Dept: SURGERY | Facility: HOSPITAL | Age: 65
End: 2020-12-02
Payer: COMMERCIAL

## 2020-12-03 ENCOUNTER — HOSPITAL ENCOUNTER (OUTPATIENT)
Facility: HOSPITAL | Age: 65
Setting detail: HOSPITAL OUTPATIENT SURGERY
Discharge: HOME | End: 2020-12-03
Attending: OPHTHALMOLOGY | Admitting: OPHTHALMOLOGY
Payer: COMMERCIAL

## 2020-12-03 ENCOUNTER — ANESTHESIA (OUTPATIENT)
Dept: SURGERY | Facility: HOSPITAL | Age: 65
End: 2020-12-03
Payer: COMMERCIAL

## 2020-12-03 VITALS
TEMPERATURE: 97 F | OXYGEN SATURATION: 99 % | BODY MASS INDEX: 30.8 KG/M2 | WEIGHT: 220 LBS | DIASTOLIC BLOOD PRESSURE: 59 MMHG | RESPIRATION RATE: 18 BRPM | HEIGHT: 71 IN | SYSTOLIC BLOOD PRESSURE: 144 MMHG | HEART RATE: 59 BPM

## 2020-12-03 DIAGNOSIS — H25.811 COMBINED FORMS OF AGE-RELATED CATARACT OF RIGHT EYE: ICD-10-CM

## 2020-12-03 DIAGNOSIS — H26.9 CATARACT: Primary | ICD-10-CM

## 2020-12-03 PROCEDURE — 82962 GLUCOSE BLOOD TEST: CPT

## 2020-12-03 PROCEDURE — 08RJ3JZ REPLACEMENT OF RIGHT LENS WITH SYNTHETIC SUBSTITUTE, PERCUTANEOUS APPROACH: ICD-10-PCS | Performed by: OPHTHALMOLOGY

## 2020-12-03 DEVICE — IMPLANTABLE DEVICE: Type: IMPLANTABLE DEVICE | Site: EYE | Status: FUNCTIONAL

## 2020-12-03 RX ORDER — BALANCED SALT SOLUTION 6.4; .75; .48; .3; 3.9; 1.7 MG/ML; MG/ML; MG/ML; MG/ML; MG/ML; MG/ML
SOLUTION OPHTHALMIC AS NEEDED
Status: DISCONTINUED | OUTPATIENT
Start: 2020-12-03 | End: 2020-12-03 | Stop reason: HOSPADM

## 2020-12-03 RX ORDER — CYCLOPENTOLATE HYDROCHLORIDE 10 MG/ML
1 SOLUTION/ DROPS OPHTHALMIC SEE ADMIN INSTRUCTIONS
Status: COMPLETED | OUTPATIENT
Start: 2020-12-03 | End: 2020-12-03

## 2020-12-03 RX ORDER — LIDOCAINE HYDROCHLORIDE 10 MG/ML
INJECTION, SOLUTION EPIDURAL; INFILTRATION; INTRACAUDAL; PERINEURAL AS NEEDED
Status: DISCONTINUED | OUTPATIENT
Start: 2020-12-03 | End: 2020-12-03 | Stop reason: HOSPADM

## 2020-12-03 RX ORDER — DEXTROSE MONOHYDRATE 25 G/50ML
50 INJECTION, SOLUTION INTRAVENOUS
Status: DISCONTINUED | OUTPATIENT
Start: 2020-12-03 | End: 2020-12-03 | Stop reason: HOSPADM

## 2020-12-03 RX ORDER — PREDNISOLONE ACETATE 10 MG/ML
SUSPENSION/ DROPS OPHTHALMIC AS NEEDED
Status: DISCONTINUED | OUTPATIENT
Start: 2020-12-03 | End: 2020-12-03 | Stop reason: HOSPADM

## 2020-12-03 RX ORDER — TETRACAINE HYDROCHLORIDE 5 MG/ML
1 SOLUTION OPHTHALMIC SEE ADMIN INSTRUCTIONS
Status: COMPLETED | OUTPATIENT
Start: 2020-12-03 | End: 2020-12-03

## 2020-12-03 RX ORDER — PHENYLEPHRINE HCL 2.5 %
1 DROPS OPHTHALMIC (EYE) SEE ADMIN INSTRUCTIONS
Status: COMPLETED | OUTPATIENT
Start: 2020-12-03 | End: 2020-12-03

## 2020-12-03 RX ORDER — HYDROCODONE BITARTRATE AND ACETAMINOPHEN 5; 325 MG/1; MG/1
1 TABLET ORAL AS NEEDED
Status: CANCELLED | OUTPATIENT
Start: 2020-12-03

## 2020-12-03 RX ORDER — MOXIFLOXACIN 5 MG/ML
SOLUTION/ DROPS OPHTHALMIC AS NEEDED
Status: DISCONTINUED | OUTPATIENT
Start: 2020-12-03 | End: 2020-12-03 | Stop reason: HOSPADM

## 2020-12-03 RX ORDER — TETRACAINE HYDROCHLORIDE 5 MG/ML
SOLUTION OPHTHALMIC AS NEEDED
Status: DISCONTINUED | OUTPATIENT
Start: 2020-12-03 | End: 2020-12-03 | Stop reason: HOSPADM

## 2020-12-03 RX ORDER — TROPICAMIDE 10 MG/ML
1 SOLUTION/ DROPS OPHTHALMIC SEE ADMIN INSTRUCTIONS
Status: COMPLETED | OUTPATIENT
Start: 2020-12-03 | End: 2020-12-03

## 2020-12-03 RX ORDER — ACETAMINOPHEN 500 MG
1000 TABLET ORAL ONCE
Status: DISCONTINUED | OUTPATIENT
Start: 2020-12-03 | End: 2020-12-03

## 2020-12-03 RX ORDER — HYDROCODONE BITARTRATE AND ACETAMINOPHEN 5; 325 MG/1; MG/1
2 TABLET ORAL AS NEEDED
Status: CANCELLED | OUTPATIENT
Start: 2020-12-03

## 2020-12-03 RX ORDER — TRAMADOL HYDROCHLORIDE 50 MG/1
50 TABLET ORAL EVERY 6 HOURS PRN
COMMUNITY
End: 2021-01-05

## 2020-12-03 RX ORDER — SODIUM CHLORIDE, SODIUM LACTATE, POTASSIUM CHLORIDE, CALCIUM CHLORIDE 600; 310; 30; 20 MG/100ML; MG/100ML; MG/100ML; MG/100ML
INJECTION, SOLUTION INTRAVENOUS CONTINUOUS
Status: DISCONTINUED | OUTPATIENT
Start: 2020-12-03 | End: 2020-12-03

## 2020-12-03 RX ORDER — ONDANSETRON 2 MG/ML
4 INJECTION INTRAMUSCULAR; INTRAVENOUS AS NEEDED
Status: CANCELLED | OUTPATIENT
Start: 2020-12-03 | End: 2020-12-03

## 2020-12-03 RX ORDER — ACETAMINOPHEN 500 MG
1000 TABLET ORAL ONCE AS NEEDED
Status: CANCELLED | OUTPATIENT
Start: 2020-12-03 | End: 2020-12-03

## 2020-12-03 RX ORDER — SODIUM CHLORIDE, SODIUM LACTATE, POTASSIUM CHLORIDE, CALCIUM CHLORIDE 600; 310; 30; 20 MG/100ML; MG/100ML; MG/100ML; MG/100ML
INJECTION, SOLUTION INTRAVENOUS CONTINUOUS
Status: CANCELLED | OUTPATIENT
Start: 2020-12-03

## 2020-12-03 RX ORDER — MIDAZOLAM HYDROCHLORIDE 1 MG/ML
INJECTION INTRAMUSCULAR; INTRAVENOUS AS NEEDED
Status: DISCONTINUED | OUTPATIENT
Start: 2020-12-03 | End: 2020-12-03 | Stop reason: SURG

## 2020-12-03 RX ADMIN — MIDAZOLAM HYDROCHLORIDE 1 MG: 1 INJECTION INTRAMUSCULAR; INTRAVENOUS at 08:08:00

## 2020-12-03 RX ADMIN — MIDAZOLAM HYDROCHLORIDE 1 MG: 1 INJECTION INTRAMUSCULAR; INTRAVENOUS at 08:15:00

## 2020-12-03 NOTE — ANESTHESIA POSTPROCEDURE EVALUATION
[address] [facility] Patient Status:  Hospital Outpatient Surgery   Age/Gender 76year old male MRN [id number]   [facility] SURGERY Attending Loi Ortiz MD   Hosp Day # 0 PCP Baljit Enrique MD       Anesthesia Post-op Note

## 2020-12-03 NOTE — OPERATIVE REPORT
[facility] OPERATIVE REPORT:     PATIENT NAME:  Agnes Laureano    MRN:  [id number]    DATE OF OPERATION:   12/3/2020      PREOPERATIVE DIAGNOSIS:   1. Nuclear Sclerotic Cataract, OD  Posterior Subcapsular Cataract, OD    POSTOPERATIVE DIAGNOSIS:   1. using balanced salt solution on a cannula. The nucleus was then divided into 4 quadrants using a prechopper. The phacoemulsification tip was introduced into the eye, and the nuclear fragments were removed without difficulty.  The residual cortex was removed

## 2020-12-03 NOTE — INTERVAL H&P NOTE
Pre-op Diagnosis: Combined forms of age-related cataract of right eye [H25.811]    The above referenced H&P was reviewed by Charity Ken MD on 12/3/2020, the patient was examined and no significant changes have occurred in the patient's condition since t

## 2020-12-03 NOTE — ANESTHESIA PREPROCEDURE EVALUATION
PRE-OP EVALUATION    Patient Name: Abimael Canada    Pre-op Diagnosis: Combined forms of age-related cataract of left eye [H25.812]    Procedure(s):  LEFT PHACOEMULSIFICATION OF CATARACT WITH PLACEMENT OF INTRAOCULAR LENS IMPLANT    Surgeon(s) and Role: (+) diabetes         (+) anemia                   Pulmonary    Negative pulmonary ROS.                        Neuro/Psych  Comment: Polyneuropathy  LBP, lumbar stenosis  Diabetic retinopathy    (+) depression  (+) anxiety  (+) CVA (age 35) and no residual d HCT 32.2 (L) 11/04/2020    MCV 94.7 11/04/2020    MCH 30.0 11/04/2020    MCHC 31.7 11/04/2020    RDW 14.4 11/04/2020     11/04/2020     Lab Results   Component Value Date     11/04/2020    K 4.97 11/04/2020     11/04/2020    CO2 25.8 11

## 2020-12-12 NOTE — H&P (VIEW-ONLY)
HISTORY AND PHYSICAL EXAMINATION    DATE: 12/12/2020  [facility]     HISTORY OF PRESENT ILLNESS:  This is a 58-year-old male who presents with a chief complaint of injury to his right shoulder. He suffered a slip and fall in the shower.  He grabbed his sh cerebral microvasculopathy, cognitive complaints, history of snoring, chronic midline low back pain without sciatica, mixed axonal demyelinating polyneuropathy, peripheral vascular disease, nonrheumatic mitral regurgitation, diabetic polyneuropathy, lumbar is 5 feet 11 inches and weighs 220 pounds for a BMI of 30.68. Vital Signs: Stable. Temperature 98.4, pulse 80, respiratory rate 18, and blood pressure 140/66. HEENT: Exam unremarkable. Lungs: Clear. Cardiac: Normal S1, S2, without murmur. Abdomen:  Benign.

## 2020-12-14 RX ORDER — ACETAMINOPHEN 500 MG
1000 TABLET ORAL ONCE
Status: CANCELLED | OUTPATIENT
Start: 2020-12-14 | End: 2020-12-14

## 2020-12-16 ENCOUNTER — LABORATORY ENCOUNTER (OUTPATIENT)
Dept: LAB | Age: 65
End: 2020-12-16
Attending: ORTHOPAEDIC SURGERY
Payer: COMMERCIAL

## 2020-12-16 DIAGNOSIS — D63.1 ANEMIA IN STAGE 3A CHRONIC KIDNEY DISEASE (HCC): ICD-10-CM

## 2020-12-16 DIAGNOSIS — M89.9 CHRONIC KIDNEY DISEASE-MINERAL AND BONE DISORDER: ICD-10-CM

## 2020-12-16 DIAGNOSIS — N18.30 CKD STAGE 3 DUE TO TYPE 2 DIABETES MELLITUS (HCC): ICD-10-CM

## 2020-12-16 DIAGNOSIS — N18.2 STAGE 2 CHRONIC KIDNEY DISEASE: ICD-10-CM

## 2020-12-16 DIAGNOSIS — E11.22 CKD STAGE 3 DUE TO TYPE 2 DIABETES MELLITUS (HCC): ICD-10-CM

## 2020-12-16 DIAGNOSIS — E87.70 HYPERVOLEMIA, UNSPECIFIED HYPERVOLEMIA TYPE: ICD-10-CM

## 2020-12-16 DIAGNOSIS — M75.121 COMPLETE TEAR OF RIGHT ROTATOR CUFF: ICD-10-CM

## 2020-12-16 DIAGNOSIS — I10 ESSENTIAL HYPERTENSION: ICD-10-CM

## 2020-12-16 DIAGNOSIS — N18.30 ANEMIA IN STAGE 3 CHRONIC KIDNEY DISEASE (HCC): ICD-10-CM

## 2020-12-16 DIAGNOSIS — E83.9 CHRONIC KIDNEY DISEASE-MINERAL AND BONE DISORDER: ICD-10-CM

## 2020-12-16 DIAGNOSIS — N18.31 ANEMIA IN STAGE 3A CHRONIC KIDNEY DISEASE (HCC): ICD-10-CM

## 2020-12-16 DIAGNOSIS — D63.1 ANEMIA IN STAGE 3 CHRONIC KIDNEY DISEASE (HCC): ICD-10-CM

## 2020-12-16 DIAGNOSIS — N18.9 CHRONIC KIDNEY DISEASE-MINERAL AND BONE DISORDER: ICD-10-CM

## 2020-12-16 PROCEDURE — 85025 COMPLETE CBC W/AUTO DIFF WBC: CPT

## 2020-12-16 PROCEDURE — 86900 BLOOD TYPING SEROLOGIC ABO: CPT

## 2020-12-16 PROCEDURE — 86901 BLOOD TYPING SEROLOGIC RH(D): CPT

## 2020-12-16 PROCEDURE — 82728 ASSAY OF FERRITIN: CPT

## 2020-12-16 PROCEDURE — 83970 ASSAY OF PARATHORMONE: CPT

## 2020-12-16 PROCEDURE — 86850 RBC ANTIBODY SCREEN: CPT

## 2020-12-16 PROCEDURE — 83540 ASSAY OF IRON: CPT

## 2020-12-16 PROCEDURE — 87081 CULTURE SCREEN ONLY: CPT

## 2020-12-16 PROCEDURE — 36415 COLL VENOUS BLD VENIPUNCTURE: CPT

## 2020-12-16 PROCEDURE — 83550 IRON BINDING TEST: CPT

## 2020-12-16 PROCEDURE — 80069 RENAL FUNCTION PANEL: CPT

## 2020-12-18 NOTE — PROGRESS NOTES
- renal function is stable around baseline  - Your parathyroid hormone levels are normal which is good ( the hormone that controls bone health and can be altered with renal disease)  - your hemoglobin levels are normal ( the measure of the strength of your

## 2020-12-24 ENCOUNTER — ANESTHESIA EVENT (OUTPATIENT)
Dept: SURGERY | Facility: HOSPITAL | Age: 65
End: 2020-12-24
Payer: COMMERCIAL

## 2021-01-03 ENCOUNTER — LAB ENCOUNTER (OUTPATIENT)
Dept: LAB | Age: 66
End: 2021-01-03
Attending: ORTHOPAEDIC SURGERY
Payer: COMMERCIAL

## 2021-01-03 DIAGNOSIS — M75.121 COMPLETE TEAR OF RIGHT ROTATOR CUFF: ICD-10-CM

## 2021-01-04 LAB — SARS-COV-2 RNA RESP QL NAA+PROBE: NOT DETECTED

## 2021-01-05 RX ORDER — HYDROCODONE BITARTRATE AND ACETAMINOPHEN 10; 325 MG/1; MG/1
1-2 TABLET ORAL EVERY 6 HOURS PRN
Qty: 40 TABLET | Refills: 0 | Status: SHIPPED | OUTPATIENT
Start: 2021-01-06 | End: 2021-02-15

## 2021-01-06 ENCOUNTER — APPOINTMENT (OUTPATIENT)
Dept: GENERAL RADIOLOGY | Facility: HOSPITAL | Age: 66
End: 2021-01-06
Attending: ORTHOPAEDIC SURGERY
Payer: COMMERCIAL

## 2021-01-06 ENCOUNTER — HOSPITAL ENCOUNTER (OUTPATIENT)
Facility: HOSPITAL | Age: 66
Discharge: HOME HEALTH | End: 2021-01-08
Attending: ORTHOPAEDIC SURGERY | Admitting: ORTHOPAEDIC SURGERY
Payer: COMMERCIAL

## 2021-01-06 ENCOUNTER — ANESTHESIA (OUTPATIENT)
Dept: SURGERY | Facility: HOSPITAL | Age: 66
End: 2021-01-06
Payer: COMMERCIAL

## 2021-01-06 DIAGNOSIS — M75.121 COMPLETE TEAR OF RIGHT ROTATOR CUFF: Primary | ICD-10-CM

## 2021-01-06 DIAGNOSIS — M75.121 COMPLETE TEAR OF RIGHT ROTATOR CUFF, UNSPECIFIED WHETHER TRAUMATIC: ICD-10-CM

## 2021-01-06 PROBLEM — M25.511 RIGHT SHOULDER PAIN: Status: ACTIVE | Noted: 2021-01-06

## 2021-01-06 LAB
GLUCOSE BLD-MCNC: 133 MG/DL (ref 70–99)
GLUCOSE BLD-MCNC: 156 MG/DL (ref 70–99)
GLUCOSE BLD-MCNC: 159 MG/DL (ref 70–99)
GLUCOSE BLD-MCNC: 170 MG/DL (ref 70–99)

## 2021-01-06 PROCEDURE — 82962 GLUCOSE BLOOD TEST: CPT

## 2021-01-06 PROCEDURE — 73020 X-RAY EXAM OF SHOULDER: CPT | Performed by: ORTHOPAEDIC SURGERY

## 2021-01-06 PROCEDURE — 88305 TISSUE EXAM BY PATHOLOGIST: CPT | Performed by: ORTHOPAEDIC SURGERY

## 2021-01-06 PROCEDURE — 88311 DECALCIFY TISSUE: CPT | Performed by: ORTHOPAEDIC SURGERY

## 2021-01-06 PROCEDURE — 0RRJ00Z REPLACEMENT OF RIGHT SHOULDER JOINT WITH REVERSE BALL AND SOCKET SYNTHETIC SUBSTITUTE, OPEN APPROACH: ICD-10-PCS | Performed by: ORTHOPAEDIC SURGERY

## 2021-01-06 PROCEDURE — 76942 ECHO GUIDE FOR BIOPSY: CPT | Performed by: ANESTHESIOLOGY

## 2021-01-06 PROCEDURE — 3E0T3BZ INTRODUCTION OF ANESTHETIC AGENT INTO PERIPHERAL NERVES AND PLEXI, PERCUTANEOUS APPROACH: ICD-10-PCS | Performed by: ANESTHESIOLOGY

## 2021-01-06 DEVICE — IMPLANTABLE DEVICE: Type: IMPLANTABLE DEVICE | Site: SHOULDER | Status: FUNCTIONAL

## 2021-01-06 RX ORDER — BISACODYL 10 MG
10 SUPPOSITORY, RECTAL RECTAL
Status: DISCONTINUED | OUTPATIENT
Start: 2021-01-06 | End: 2021-01-08

## 2021-01-06 RX ORDER — INSULIN ASPART 100 [IU]/ML
INJECTION, SOLUTION INTRAVENOUS; SUBCUTANEOUS
Status: COMPLETED
Start: 2021-01-06 | End: 2021-01-06

## 2021-01-06 RX ORDER — HYDROCODONE BITARTRATE AND ACETAMINOPHEN 5; 325 MG/1; MG/1
2 TABLET ORAL AS NEEDED
Status: DISCONTINUED | OUTPATIENT
Start: 2021-01-06 | End: 2021-01-06 | Stop reason: HOSPADM

## 2021-01-06 RX ORDER — KETOROLAC TROMETHAMINE 15 MG/ML
15 INJECTION, SOLUTION INTRAMUSCULAR; INTRAVENOUS EVERY 6 HOURS
Status: COMPLETED | OUTPATIENT
Start: 2021-01-06 | End: 2021-01-07

## 2021-01-06 RX ORDER — SODIUM PHOSPHATE, DIBASIC AND SODIUM PHOSPHATE, MONOBASIC 7; 19 G/133ML; G/133ML
1 ENEMA RECTAL ONCE AS NEEDED
Status: DISCONTINUED | OUTPATIENT
Start: 2021-01-06 | End: 2021-01-08

## 2021-01-06 RX ORDER — ACETAMINOPHEN 325 MG/1
650 TABLET ORAL ONCE
Status: COMPLETED | OUTPATIENT
Start: 2021-01-06 | End: 2021-01-06

## 2021-01-06 RX ORDER — CEFAZOLIN SODIUM/WATER 2 G/20 ML
2 SYRINGE (ML) INTRAVENOUS ONCE
Status: DISCONTINUED | OUTPATIENT
Start: 2021-01-06 | End: 2021-01-06 | Stop reason: HOSPADM

## 2021-01-06 RX ORDER — FENOFIBRATE 134 MG/1
134 CAPSULE ORAL
Status: DISCONTINUED | OUTPATIENT
Start: 2021-01-07 | End: 2021-01-08

## 2021-01-06 RX ORDER — HYDROMORPHONE HYDROCHLORIDE 1 MG/ML
0.2 INJECTION, SOLUTION INTRAMUSCULAR; INTRAVENOUS; SUBCUTANEOUS EVERY 2 HOUR PRN
Status: DISCONTINUED | OUTPATIENT
Start: 2021-01-06 | End: 2021-01-08

## 2021-01-06 RX ORDER — LOSARTAN POTASSIUM 50 MG/1
50 TABLET ORAL 2 TIMES DAILY
Status: DISCONTINUED | OUTPATIENT
Start: 2021-01-06 | End: 2021-01-08

## 2021-01-06 RX ORDER — ONDANSETRON 2 MG/ML
4 INJECTION INTRAMUSCULAR; INTRAVENOUS EVERY 4 HOURS PRN
Status: ACTIVE | OUTPATIENT
Start: 2021-01-06 | End: 2021-01-08

## 2021-01-06 RX ORDER — METOCLOPRAMIDE HYDROCHLORIDE 5 MG/ML
INJECTION INTRAMUSCULAR; INTRAVENOUS AS NEEDED
Status: DISCONTINUED | OUTPATIENT
Start: 2021-01-06 | End: 2021-01-06 | Stop reason: SURG

## 2021-01-06 RX ORDER — TRAMADOL HYDROCHLORIDE 50 MG/1
50 TABLET ORAL EVERY 6 HOURS
Status: DISCONTINUED | OUTPATIENT
Start: 2021-01-06 | End: 2021-01-08

## 2021-01-06 RX ORDER — ONDANSETRON 2 MG/ML
INJECTION INTRAMUSCULAR; INTRAVENOUS AS NEEDED
Status: DISCONTINUED | OUTPATIENT
Start: 2021-01-06 | End: 2021-01-06 | Stop reason: SURG

## 2021-01-06 RX ORDER — GABAPENTIN 600 MG/1
600 TABLET ORAL
Status: DISCONTINUED | OUTPATIENT
Start: 2021-01-06 | End: 2021-01-08

## 2021-01-06 RX ORDER — GABAPENTIN 600 MG/1
1200 TABLET ORAL 2 TIMES DAILY
Status: DISCONTINUED | OUTPATIENT
Start: 2021-01-06 | End: 2021-01-08

## 2021-01-06 RX ORDER — SODIUM CHLORIDE, SODIUM LACTATE, POTASSIUM CHLORIDE, CALCIUM CHLORIDE 600; 310; 30; 20 MG/100ML; MG/100ML; MG/100ML; MG/100ML
INJECTION, SOLUTION INTRAVENOUS CONTINUOUS
Status: DISCONTINUED | OUTPATIENT
Start: 2021-01-06 | End: 2021-01-08

## 2021-01-06 RX ORDER — HYDROMORPHONE HYDROCHLORIDE 1 MG/ML
0.4 INJECTION, SOLUTION INTRAMUSCULAR; INTRAVENOUS; SUBCUTANEOUS EVERY 2 HOUR PRN
Status: DISCONTINUED | OUTPATIENT
Start: 2021-01-06 | End: 2021-01-08

## 2021-01-06 RX ORDER — SODIUM CHLORIDE, SODIUM LACTATE, POTASSIUM CHLORIDE, CALCIUM CHLORIDE 600; 310; 30; 20 MG/100ML; MG/100ML; MG/100ML; MG/100ML
INJECTION, SOLUTION INTRAVENOUS CONTINUOUS
Status: DISCONTINUED | OUTPATIENT
Start: 2021-01-06 | End: 2021-01-06 | Stop reason: HOSPADM

## 2021-01-06 RX ORDER — VANCOMYCIN HYDROCHLORIDE
15 ONCE
Status: DISCONTINUED | OUTPATIENT
Start: 2021-01-06 | End: 2021-01-06 | Stop reason: HOSPADM

## 2021-01-06 RX ORDER — HYDROMORPHONE HYDROCHLORIDE 1 MG/ML
0.4 INJECTION, SOLUTION INTRAMUSCULAR; INTRAVENOUS; SUBCUTANEOUS EVERY 5 MIN PRN
Status: DISCONTINUED | OUTPATIENT
Start: 2021-01-06 | End: 2021-01-06 | Stop reason: HOSPADM

## 2021-01-06 RX ORDER — DOCUSATE SODIUM 100 MG/1
100 CAPSULE, LIQUID FILLED ORAL 2 TIMES DAILY
Status: DISCONTINUED | OUTPATIENT
Start: 2021-01-06 | End: 2021-01-08

## 2021-01-06 RX ORDER — DEXTROSE MONOHYDRATE 25 G/50ML
50 INJECTION, SOLUTION INTRAVENOUS
Status: DISCONTINUED | OUTPATIENT
Start: 2021-01-06 | End: 2021-01-06 | Stop reason: HOSPADM

## 2021-01-06 RX ORDER — DEXAMETHASONE SODIUM PHOSPHATE 10 MG/ML
8 INJECTION, SOLUTION INTRAMUSCULAR; INTRAVENOUS ONCE
Status: COMPLETED | OUTPATIENT
Start: 2021-01-07 | End: 2021-01-07

## 2021-01-06 RX ORDER — DIPHENHYDRAMINE HYDROCHLORIDE 50 MG/ML
25 INJECTION INTRAMUSCULAR; INTRAVENOUS ONCE AS NEEDED
Status: ACTIVE | OUTPATIENT
Start: 2021-01-06 | End: 2021-01-06

## 2021-01-06 RX ORDER — PHENYLEPHRINE HCL 10 MG/ML
VIAL (ML) INJECTION AS NEEDED
Status: DISCONTINUED | OUTPATIENT
Start: 2021-01-06 | End: 2021-01-06 | Stop reason: SURG

## 2021-01-06 RX ORDER — NEOSTIGMINE METHYLSULFATE 1 MG/ML
INJECTION INTRAVENOUS AS NEEDED
Status: DISCONTINUED | OUTPATIENT
Start: 2021-01-06 | End: 2021-01-06 | Stop reason: SURG

## 2021-01-06 RX ORDER — OXYCODONE HYDROCHLORIDE 5 MG/1
5 TABLET ORAL EVERY 4 HOURS PRN
Status: DISCONTINUED | OUTPATIENT
Start: 2021-01-06 | End: 2021-01-07

## 2021-01-06 RX ORDER — CARVEDILOL 12.5 MG/1
12.5 TABLET ORAL 2 TIMES DAILY WITH MEALS
Status: DISCONTINUED | OUTPATIENT
Start: 2021-01-06 | End: 2021-01-08

## 2021-01-06 RX ORDER — DEXAMETHASONE SODIUM PHOSPHATE 4 MG/ML
VIAL (ML) INJECTION AS NEEDED
Status: DISCONTINUED | OUTPATIENT
Start: 2021-01-06 | End: 2021-01-06 | Stop reason: SURG

## 2021-01-06 RX ORDER — ASCORBIC ACID 500 MG
1000 TABLET ORAL DAILY
Status: DISCONTINUED | OUTPATIENT
Start: 2021-01-06 | End: 2021-01-08

## 2021-01-06 RX ORDER — CEFAZOLIN SODIUM/WATER 2 G/20 ML
2 SYRINGE (ML) INTRAVENOUS EVERY 8 HOURS
Status: COMPLETED | OUTPATIENT
Start: 2021-01-06 | End: 2021-01-06

## 2021-01-06 RX ORDER — SODIUM CHLORIDE 9 MG/ML
INJECTION, SOLUTION INTRAVENOUS CONTINUOUS
Status: DISCONTINUED | OUTPATIENT
Start: 2021-01-06 | End: 2021-01-08

## 2021-01-06 RX ORDER — ALLOPURINOL 100 MG/1
100 TABLET ORAL DAILY
COMMUNITY
End: 2021-03-25

## 2021-01-06 RX ORDER — ROCURONIUM BROMIDE 10 MG/ML
INJECTION, SOLUTION INTRAVENOUS AS NEEDED
Status: DISCONTINUED | OUTPATIENT
Start: 2021-01-06 | End: 2021-01-06 | Stop reason: SURG

## 2021-01-06 RX ORDER — PROCHLORPERAZINE EDISYLATE 5 MG/ML
10 INJECTION INTRAMUSCULAR; INTRAVENOUS EVERY 6 HOURS PRN
Status: ACTIVE | OUTPATIENT
Start: 2021-01-06 | End: 2021-01-08

## 2021-01-06 RX ORDER — OXYCODONE HYDROCHLORIDE 5 MG/1
2.5 TABLET ORAL EVERY 4 HOURS PRN
Status: DISCONTINUED | OUTPATIENT
Start: 2021-01-06 | End: 2021-01-07

## 2021-01-06 RX ORDER — HYDROCODONE BITARTRATE AND ACETAMINOPHEN 5; 325 MG/1; MG/1
1 TABLET ORAL AS NEEDED
Status: DISCONTINUED | OUTPATIENT
Start: 2021-01-06 | End: 2021-01-06 | Stop reason: HOSPADM

## 2021-01-06 RX ORDER — INSULIN ASPART 100 [IU]/ML
INJECTION, SOLUTION INTRAVENOUS; SUBCUTANEOUS ONCE
Status: COMPLETED | OUTPATIENT
Start: 2021-01-06 | End: 2021-01-06

## 2021-01-06 RX ORDER — ACETAMINOPHEN 500 MG
1000 TABLET ORAL ONCE AS NEEDED
Status: DISCONTINUED | OUTPATIENT
Start: 2021-01-06 | End: 2021-01-06 | Stop reason: HOSPADM

## 2021-01-06 RX ORDER — PSEUDOEPHEDRINE HCL 30 MG
100 TABLET ORAL 2 TIMES DAILY
Status: DISCONTINUED | OUTPATIENT
Start: 2021-01-06 | End: 2021-01-06

## 2021-01-06 RX ORDER — MULTIVIT WITH MINERALS/LUTEIN
1000 TABLET ORAL DAILY
Status: DISCONTINUED | OUTPATIENT
Start: 2021-01-07 | End: 2021-01-08

## 2021-01-06 RX ORDER — GABAPENTIN 600 MG/1
1200 TABLET ORAL 2 TIMES DAILY
COMMUNITY
End: 2021-10-22

## 2021-01-06 RX ORDER — ONDANSETRON 2 MG/ML
4 INJECTION INTRAMUSCULAR; INTRAVENOUS AS NEEDED
Status: DISCONTINUED | OUTPATIENT
Start: 2021-01-06 | End: 2021-01-06 | Stop reason: HOSPADM

## 2021-01-06 RX ORDER — FUROSEMIDE 20 MG/1
20 TABLET ORAL DAILY
Status: DISCONTINUED | OUTPATIENT
Start: 2021-01-06 | End: 2021-01-08

## 2021-01-06 RX ORDER — GABAPENTIN 600 MG/1
1200 TABLET ORAL 2 TIMES DAILY
Status: DISCONTINUED | OUTPATIENT
Start: 2021-01-06 | End: 2021-01-06

## 2021-01-06 RX ORDER — ASPIRIN 325 MG
325 TABLET ORAL 2 TIMES DAILY
Status: DISCONTINUED | OUTPATIENT
Start: 2021-01-06 | End: 2021-01-08

## 2021-01-06 RX ORDER — POLYETHYLENE GLYCOL 3350 17 G/17G
17 POWDER, FOR SOLUTION ORAL DAILY PRN
Status: DISCONTINUED | OUTPATIENT
Start: 2021-01-06 | End: 2021-01-08

## 2021-01-06 RX ORDER — ACETAMINOPHEN 325 MG/1
650 TABLET ORAL 4 TIMES DAILY
Status: DISCONTINUED | OUTPATIENT
Start: 2021-01-06 | End: 2021-01-08

## 2021-01-06 RX ORDER — NALOXONE HYDROCHLORIDE 0.4 MG/ML
80 INJECTION, SOLUTION INTRAMUSCULAR; INTRAVENOUS; SUBCUTANEOUS AS NEEDED
Status: DISCONTINUED | OUTPATIENT
Start: 2021-01-06 | End: 2021-01-06 | Stop reason: HOSPADM

## 2021-01-06 RX ORDER — ALLOPURINOL 100 MG/1
100 TABLET ORAL DAILY
Status: DISCONTINUED | OUTPATIENT
Start: 2021-01-06 | End: 2021-01-08

## 2021-01-06 RX ORDER — GLYCOPYRROLATE 0.2 MG/ML
INJECTION, SOLUTION INTRAMUSCULAR; INTRAVENOUS AS NEEDED
Status: DISCONTINUED | OUTPATIENT
Start: 2021-01-06 | End: 2021-01-06 | Stop reason: SURG

## 2021-01-06 RX ORDER — GABAPENTIN 600 MG/1
600 TABLET ORAL
COMMUNITY
End: 2021-06-23

## 2021-01-06 RX ORDER — ACETAMINOPHEN 325 MG/1
TABLET ORAL
Status: COMPLETED
Start: 2021-01-06 | End: 2021-01-06

## 2021-01-06 RX ORDER — PRAMIPEXOLE DIHYDROCHLORIDE 1 MG/1
1 TABLET ORAL NIGHTLY
Status: DISCONTINUED | OUTPATIENT
Start: 2021-01-06 | End: 2021-01-08

## 2021-01-06 RX ADMIN — NEOSTIGMINE METHYLSULFATE 5 MG: 1 INJECTION INTRAVENOUS at 09:46:00

## 2021-01-06 RX ADMIN — ROCURONIUM BROMIDE 50 MG: 10 INJECTION, SOLUTION INTRAVENOUS at 08:22:00

## 2021-01-06 RX ADMIN — ONDANSETRON 4 MG: 2 INJECTION INTRAMUSCULAR; INTRAVENOUS at 08:34:00

## 2021-01-06 RX ADMIN — GLYCOPYRROLATE 0.6 MG: 0.2 INJECTION, SOLUTION INTRAMUSCULAR; INTRAVENOUS at 09:46:00

## 2021-01-06 RX ADMIN — DEXAMETHASONE SODIUM PHOSPHATE 2 MG: 4 MG/ML VIAL (ML) INJECTION at 08:20:00

## 2021-01-06 RX ADMIN — PHENYLEPHRINE HCL 200 MCG: 10 MG/ML VIAL (ML) INJECTION at 09:07:00

## 2021-01-06 RX ADMIN — METOCLOPRAMIDE HYDROCHLORIDE 10 MG: 5 INJECTION INTRAMUSCULAR; INTRAVENOUS at 08:34:00

## 2021-01-06 RX ADMIN — SODIUM CHLORIDE, SODIUM LACTATE, POTASSIUM CHLORIDE, CALCIUM CHLORIDE: 600; 310; 30; 20 INJECTION, SOLUTION INTRAVENOUS at 09:36:00

## 2021-01-06 RX ADMIN — SODIUM CHLORIDE, SODIUM LACTATE, POTASSIUM CHLORIDE, CALCIUM CHLORIDE: 600; 310; 30; 20 INJECTION, SOLUTION INTRAVENOUS at 08:07:00

## 2021-01-06 NOTE — PLAN OF CARE
Problem: Diabetes/Glucose Control  Goal: Glucose maintained within prescribed range  Description: INTERVENTIONS:  - Monitor Blood Glucose as ordered  - Assess for signs and symptoms of hyperglycemia and hypoglycemia  - Administer ordered medications to m Progressing     Problem: SAFETY ADULT - FALL  Goal: Free from fall injury  Description: INTERVENTIONS:  - Assess pt frequently for physical needs  - Identify cognitive and physical deficits and behaviors that affect risk of falls.   - [place] fall precaut

## 2021-01-06 NOTE — CONSULTS
[address] [facility] Patient Status:  Outpatient in a Bed    1955 MRN [id number]   [facility] 3SW-A Attending Lennox Garland MD   Hosp Day # 0 PCP Felipe Del Cid MD     Chief Complaint: complete t ankle. Removal of screws R tibia, R talus and calc.  Sx 9/6/19; NADEGE 12/5/19 9/17/2019   • Stroke ([address]     17 years ago--no residual effects   • Visual impairment     reading glasses        Past Surgical History:   Past Surgical History:   Procedure Lateral Clindamycin             RASH  Statins                 RASH    Medications:  No current facility-administered medications on file prior to encounter. •  allopurinol 100 MG Oral Tab, Take 100 mg by mouth daily. , Disp: , Rfl:     •  gabapentin 600 MG Or point review of systems was completed. Pertinent positives and negatives noted in the HPI.     Physical Exam:    /75 (BP Location: Left arm)   Pulse 67   Temp 97.9 °F (36.6 °C) (Oral)   Resp 18   Ht 5' 11\" (1.803 m)   Wt 223 lb (101.2 kg)   SpO2 9 following  -pt/ot    Post Operative Pain  -ketorolac iv q 6hrs atc  -tramadol po q 6 hrs atc  -oxy po prn  -hydromorphone iv prn    CAD  -no active sx  -asa and carvedilol   -lisinopril on hold for now due to BP    Type 2 DM  -hold home meds for now  -insu

## 2021-01-06 NOTE — BRIEF OP NOTE
Pre-Operative Diagnosis: Complete tear of right rotator cuff, unspecified whether traumatic [M75.121]     Post-Operative Diagnosis: Complete tear of right rotator cuff, unspecified whether traumatic [M75.121]      Procedure Performed:   Procedure(s):  [address] [place] [address]

## 2021-01-06 NOTE — ANESTHESIA PREPROCEDURE EVALUATION
PRE-OP EVALUATION    Patient Name: Ward Snowden    Pre-op Diagnosis: Complete tear of right rotator cuff, unspecified whether traumatic [M75.121]    Procedure(s):  RIGHT REVERSE TOTAL SHOULDER ARTHROPLASTY    Surgeon(s) and Role:     Belinda Miller MD insulin glargine 100 UNIT/ML Subcutaneous Solution Pen-injector, 10 units subcutaneously in the evening and titrate up 2 units daily until you reach 20 units or blood sugars < 140 in the morning, Disp: 6 pen, Rfl: 0, 1/5/2021 at Unknown time    •  glyBURID 12/30/2020    •  [DISCONTINUED] Acetaminophen (ACETAMINOPHEN EXTRA STRENGTH) 500 MG Oral Cap, Take 1,000 mg by mouth once.  , Disp: , Rfl: , 1/6/2021 at 0400    •  lidocaine 5 % External Patch, Place 1 patch onto the skin daily as needed. , Disp: , Rfl: , ANNIKA Anemia     Acute kidney injury ([facility] [address]     Adjustment disorder with mixed anxiety and depressed mood     Chronic diastolic CHF (congestive heart failure) (HCC)     Chronic osteomyelitis of foot ([facility] [address]     Mixed hyperlipidemia     Essential hypertension metatarsal osteotomy, 2nd, and 3rd metatarsal head resections, right foot.     • OTHER Right     Right ankle fusion/amputation     Social History    Tobacco Use      Smoking status: Never Smoker      Smokeless tobacco: Never Used    Alcohol use: No      Jero post-op analgesia, including sore throat, allergy, nausea, vomiting, dental trauma, pain management modalities, transfusion if needed, etc. Questions answered and options explained. Patient accepts and wishes to proceed.  The consent was signed without furt

## 2021-01-06 NOTE — ANESTHESIA POSTPROCEDURE EVALUATION
[address] [facility] Patient Status:  Outpatient in a Bed   Age/Gender 76year old male MRN [id number]   [facility] SURGERY Attending Bijan Kwok MD   Hosp Day # 0 PCP Josie Guillen MD       Anesthesia Post-op Note    Proc

## 2021-01-06 NOTE — PROGRESS NOTES
NURSING ADMISSION NOTE      Patient admitted via Cart  Oriented to room. Safety precautions initiated. Bed in low position. Call light in reach. Pt alert and oriented x4, RA. , 98%. VSS and afebrile. Pt denies pain.  Pt denies SOB, chest pain,

## 2021-01-07 LAB
ANION GAP SERPL CALC-SCNC: 5 MMOL/L (ref 0–18)
BUN BLD-MCNC: 36 MG/DL (ref 7–18)
BUN/CREAT SERPL: 21.6 (ref 10–20)
CALCIUM BLD-MCNC: 9.1 MG/DL (ref 8.5–10.1)
CHLORIDE SERPL-SCNC: 108 MMOL/L (ref 98–112)
CO2 SERPL-SCNC: 26 MMOL/L (ref 21–32)
CREAT BLD-MCNC: 1.67 MG/DL
DEPRECATED RDW RBC AUTO: 42.7 FL (ref 35.1–46.3)
ERYTHROCYTE [DISTWIDTH] IN BLOOD BY AUTOMATED COUNT: 13.1 % (ref 11–15)
GLUCOSE BLD-MCNC: 157 MG/DL (ref 70–99)
GLUCOSE BLD-MCNC: 190 MG/DL (ref 70–99)
GLUCOSE BLD-MCNC: 203 MG/DL (ref 70–99)
GLUCOSE BLD-MCNC: 211 MG/DL (ref 70–99)
GLUCOSE BLD-MCNC: 254 MG/DL (ref 70–99)
HCT VFR BLD AUTO: 28.1 %
HGB BLD-MCNC: 9.4 G/DL
MCH RBC QN AUTO: 30 PG (ref 26–34)
MCHC RBC AUTO-ENTMCNC: 33.5 G/DL (ref 31–37)
MCV RBC AUTO: 89.8 FL
OSMOLALITY SERPL CALC.SUM OF ELEC: 300 MOSM/KG (ref 275–295)
PLATELET # BLD AUTO: 207 10(3)UL (ref 150–450)
POTASSIUM SERPL-SCNC: 4.2 MMOL/L (ref 3.5–5.1)
RBC # BLD AUTO: 3.13 X10(6)UL
SODIUM SERPL-SCNC: 139 MMOL/L (ref 136–145)
WBC # BLD AUTO: 9.7 X10(3) UL (ref 4–11)

## 2021-01-07 PROCEDURE — 97535 SELF CARE MNGMENT TRAINING: CPT

## 2021-01-07 PROCEDURE — 97530 THERAPEUTIC ACTIVITIES: CPT

## 2021-01-07 PROCEDURE — 97161 PT EVAL LOW COMPLEX 20 MIN: CPT

## 2021-01-07 PROCEDURE — 97166 OT EVAL MOD COMPLEX 45 MIN: CPT

## 2021-01-07 PROCEDURE — 85027 COMPLETE CBC AUTOMATED: CPT | Performed by: PHYSICIAN ASSISTANT

## 2021-01-07 PROCEDURE — 80048 BASIC METABOLIC PNL TOTAL CA: CPT | Performed by: HOSPITALIST

## 2021-01-07 PROCEDURE — 82962 GLUCOSE BLOOD TEST: CPT

## 2021-01-07 RX ORDER — ASPIRIN 325 MG
325 TABLET ORAL 2 TIMES DAILY
Qty: 38 TABLET | Refills: 0 | Status: SHIPPED | OUTPATIENT
Start: 2021-01-07 | End: 2021-01-26

## 2021-01-07 RX ORDER — OXYCODONE HYDROCHLORIDE 5 MG/1
5 TABLET ORAL EVERY 4 HOURS PRN
Status: DISCONTINUED | OUTPATIENT
Start: 2021-01-07 | End: 2021-01-08

## 2021-01-07 RX ORDER — AMLODIPINE BESYLATE 5 MG/1
10 TABLET ORAL DAILY
Status: DISCONTINUED | OUTPATIENT
Start: 2021-01-07 | End: 2021-01-08

## 2021-01-07 RX ORDER — OXYCODONE HYDROCHLORIDE 5 MG/1
2.5 TABLET ORAL EVERY 4 HOURS PRN
Status: DISCONTINUED | OUTPATIENT
Start: 2021-01-07 | End: 2021-01-08

## 2021-01-07 RX ORDER — OXYCODONE HYDROCHLORIDE 10 MG/1
10 TABLET ORAL EVERY 4 HOURS PRN
Status: DISCONTINUED | OUTPATIENT
Start: 2021-01-07 | End: 2021-01-08

## 2021-01-07 NOTE — PHYSICAL THERAPY NOTE
PHYSICAL THERAPY EVALUATION - INPATIENT     Room Number: 378/378-A  Evaluation Date: 1/7/2021  Type of Evaluation: Initial  Physician Order: PT Eval and Treat    Presenting Problem: s/p right reverse TSA 1/6/2021  Reason for Therapy: Mobility Dysfunc Rotational Muscle flap, Achilles Tendon Lenghtening R -Sx 1/11/18 NADEGE 4/11/18 1/16/2018   • Neuropathy    • Peripheral vascular disease ([facility] [address]    • [place] amputation R ankle. Application ex fix R ankle.  Sx 5/14/20; NADEGE 8/12/20 5/20/2020   • Removal of intr Lives With: Spouse  Drives: No  Patient Owned Equipment: (WC, RTS, grab bars, slide board, stair lift to basement)       Prior Level of [place]: Pt reports he lives with supportive spouse who works from home.   Pt states that he is ind at [facility] level, need...   -   Moving to and from a bed to a chair (including a wheelchair)?: A Little   -   Need to walk in hospital room?: Total(pt is non amb at baseline)   -   Climbing 3-5 steps with a railing?: Total       AM-PAC Score:  Raw Score: 15   Approx Degree In this PT evaluation, the patient presents with the following impairments right shldr and left foot pain, dec activity tolerance and safety.  These impairments and comorbidities manifest themselves as functional limitations in independent bed mobility, tra

## 2021-01-07 NOTE — CM/SW NOTE
01/07/21 1200   CM/SW Referral Data   Referral Source Social Work (self-referral)   Reason for Referral Discharge planning   Informant Patient;Edward Staff   Patient Info   Patient's Mental Status Alert;Oriented   Discharge Needs   Anticipated D/C needs

## 2021-01-07 NOTE — OCCUPATIONAL THERAPY NOTE
OCCUPATIONAL THERAPY EVALUATION - INPATIENT     Room Number: 378/378-A  Evaluation Date: 1/7/2021  Type of Evaluation: Initial  Presenting Problem: s/p R reverse TSA 1/6/21    Physician Order: IP Consult to Occupational Therapy  Reason for Therapy: ADL/IAD intramedullary sandro R ankle. Removal of screws R tibia, R talus and calc.  Sx 9/6/19; NADEGE 12/5/19 9/17/2019   • [facility]     17 years ago--no residual effects   • Visual impairment     reading glasses       Past Surgical History  Past Surgical History: Patient reports independent in all BADL including functional transfers between bed, wheelchair and toilet; has been using wheelchair for mobility. SUBJECTIVE   \"Well I used to be right-handed, I guess now I'll be left-handed! \"    Patient self-stated g (AM-PAC Scale): 37.26  CMS Modifier (G-Code): CK    FUNCTIONAL TRANSFER ASSESSMENT  Supine to Sit : Supervision  Sit to Stand: Contact guard assist    Skilled Therapy Provided:  Activities performed this session include: Education on shoulder precautions, N presents with the following performance deficits: increased pain, decreased balance, decreased endurance, decreased knowledge of adaptive techniques.  These deficits impact the patient’s ability to participate in lower body dressing/bathing, toileting, toil 5x/week  Number of Visits to Meet Established Goals: 2    ADL GOALS   Patient will perform Upper Body Dressing with supervision  Patient will perform Lower Body Dressing with supervision  Patient will perform Toileting with supervision    FUNCTIONAL TRANSF

## 2021-01-07 NOTE — PLAN OF CARE
Pt AOx4. R.A. c/o pain to right shoulder and left foot, poorly controlled with PO and IV pain meds. Aquacel dressing, C/D/I. WBAT LLE, stand and pivot as needed, Up w/ moderate assist. VS remain stable, voiding freely, last BM 1/5/2021.  SCDs on LLE, ankle

## 2021-01-07 NOTE — HOME CARE LIAISON
TNL asked to check if we could accept ptnt for [facility] on dc. Due staffing [facility] unable to accept at this time.   Stephanie saha    AT ACCEPTED PTNT FOR SN/PT/OT  [phone]    Thanks  Duc Lawton

## 2021-01-07 NOTE — PROGRESS NOTES
[facility] Hospitalist Progress Note                                                                   [address]  2/21/1955    SUBJECTIVE: No chest pain, palpitations, shortness of breath, co 1 mg Oral Nightly   • vitamin E  1,000 Units Oral Daily   • gabapentin  1,200 mg Oral BID   • losartan Potassium  50 mg Oral BID   • Insulin Aspart Pen  1-5 Units Subcutaneous TID PC   • melatonin  5 mg Oral Nightly     Continuous Infusions:   • lactated r

## 2021-01-07 NOTE — PLAN OF CARE
Plan p to dc home with home shaniqua tomorrow after an additional physical therapy session. Pt rating pain high this morning, pain meds adjusted by pain service. Vss at this time. RUE in immobilizer as ordered.  Pt supposed to get fitted for final fitting of p

## 2021-01-07 NOTE — OPERATIVE REPORT
[address]    PATIENT'S NAME: Kingsley Dela Cruz   ATTENDING PHYSICIAN: Aggie Price M.D. OPERATING PHYSICIAN: Aggie Price M.D.    PATIENT ACCOUNT#:   [de-identified]    LOCATION:  [address] [place] [address]  MEDICAL RECORD #:   [id number]       DATE OF BIRTH:  [birth year] to retract medially. The patient had a thickened capsule, but for the most part, there was no evidence of rotator cuff tissue superior to the humeral head.   The arm was slowly brought into external rotation and full extension after the arm board had been reverse shoulder system mini baseplate. I impacted the 25 mm baseplate into position. I had exposed bone inferiorly. I had excellent fixation. I then used the guide for the drill bit for the central screw. I drilled through the guide.   I used the dept stability with internal and external rotation, flexion and extension, and adduction and abduction. The wound was copiously irrigated. The anterior capsular tissues and subscapularis were reapproximated with #2 Ethibond suture.   The deltopectoral groove w

## 2021-01-07 NOTE — PROGRESS NOTES
[address] Patient Status:  Outpatient in a Bed    1955 MRN [id number]   [facility] 3SW-A Attending Rogers Avery MD   Hosp Day # 0 PCP Marino Martínez MD       Subjective:  POD #1 right reverse total shoul

## 2021-01-07 NOTE — PROGRESS NOTES
Post Op Day 1 Ortho Note    Status Post Nerve Block:  Type of Nerve Block: Right Interscalene  Single Injection Nerve Block    Post op review: No evidence of immediate block related complications. Assessed pt sitting up in chair.  He states his pain is

## 2021-01-08 VITALS
HEART RATE: 70 BPM | DIASTOLIC BLOOD PRESSURE: 47 MMHG | SYSTOLIC BLOOD PRESSURE: 107 MMHG | RESPIRATION RATE: 17 BRPM | HEIGHT: 71 IN | OXYGEN SATURATION: 97 % | WEIGHT: 223 LBS | TEMPERATURE: 98 F | BODY MASS INDEX: 31.22 KG/M2

## 2021-01-08 LAB
ANION GAP SERPL CALC-SCNC: 3 MMOL/L (ref 0–18)
BASOPHILS # BLD AUTO: 0.04 X10(3) UL (ref 0–0.2)
BASOPHILS NFR BLD AUTO: 0.5 %
BUN BLD-MCNC: 30 MG/DL (ref 7–18)
BUN/CREAT SERPL: 23.1 (ref 10–20)
CALCIUM BLD-MCNC: 9.3 MG/DL (ref 8.5–10.1)
CHLORIDE SERPL-SCNC: 107 MMOL/L (ref 98–112)
CO2 SERPL-SCNC: 27 MMOL/L (ref 21–32)
CREAT BLD-MCNC: 1.3 MG/DL
DEPRECATED RDW RBC AUTO: 42.5 FL (ref 35.1–46.3)
EOSINOPHIL # BLD AUTO: 0.27 X10(3) UL (ref 0–0.7)
EOSINOPHIL NFR BLD AUTO: 3.3 %
ERYTHROCYTE [DISTWIDTH] IN BLOOD BY AUTOMATED COUNT: 13.1 % (ref 11–15)
GLUCOSE BLD-MCNC: 148 MG/DL (ref 70–99)
GLUCOSE BLD-MCNC: 187 MG/DL (ref 70–99)
GLUCOSE BLD-MCNC: 213 MG/DL (ref 70–99)
HCT VFR BLD AUTO: 24.9 %
HGB BLD-MCNC: 8.3 G/DL
IMM GRANULOCYTES # BLD AUTO: 0.03 X10(3) UL (ref 0–1)
IMM GRANULOCYTES NFR BLD: 0.4 %
LYMPHOCYTES # BLD AUTO: 1.66 X10(3) UL (ref 1–4)
LYMPHOCYTES NFR BLD AUTO: 20.4 %
MCH RBC QN AUTO: 29.7 PG (ref 26–34)
MCHC RBC AUTO-ENTMCNC: 33.3 G/DL (ref 31–37)
MCV RBC AUTO: 89.2 FL
MONOCYTES # BLD AUTO: 0.95 X10(3) UL (ref 0.1–1)
MONOCYTES NFR BLD AUTO: 11.7 %
NEUTROPHILS # BLD AUTO: 5.19 X10 (3) UL (ref 1.5–7.7)
NEUTROPHILS # BLD AUTO: 5.19 X10(3) UL (ref 1.5–7.7)
NEUTROPHILS NFR BLD AUTO: 63.7 %
OSMOLALITY SERPL CALC.SUM OF ELEC: 293 MOSM/KG (ref 275–295)
PLATELET # BLD AUTO: 180 10(3)UL (ref 150–450)
POTASSIUM SERPL-SCNC: 3.8 MMOL/L (ref 3.5–5.1)
RBC # BLD AUTO: 2.79 X10(6)UL
SODIUM SERPL-SCNC: 137 MMOL/L (ref 136–145)
WBC # BLD AUTO: 8.1 X10(3) UL (ref 4–11)

## 2021-01-08 PROCEDURE — 80048 BASIC METABOLIC PNL TOTAL CA: CPT | Performed by: HOSPITALIST

## 2021-01-08 PROCEDURE — 97530 THERAPEUTIC ACTIVITIES: CPT

## 2021-01-08 PROCEDURE — 97535 SELF CARE MNGMENT TRAINING: CPT

## 2021-01-08 PROCEDURE — 82962 GLUCOSE BLOOD TEST: CPT

## 2021-01-08 PROCEDURE — 85025 COMPLETE CBC W/AUTO DIFF WBC: CPT | Performed by: HOSPITALIST

## 2021-01-08 RX ORDER — HYDROCODONE BITARTRATE AND ACETAMINOPHEN 10; 325 MG/1; MG/1
2 TABLET ORAL EVERY 4 HOURS PRN
Status: DISCONTINUED | OUTPATIENT
Start: 2021-01-08 | End: 2021-01-08

## 2021-01-08 RX ORDER — ACETAMINOPHEN 325 MG/1
650 TABLET ORAL EVERY 4 HOURS PRN
Status: DISCONTINUED | OUTPATIENT
Start: 2021-01-08 | End: 2021-01-08

## 2021-01-08 RX ORDER — HYDROCODONE BITARTRATE AND ACETAMINOPHEN 10; 325 MG/1; MG/1
1 TABLET ORAL EVERY 4 HOURS PRN
Status: DISCONTINUED | OUTPATIENT
Start: 2021-01-08 | End: 2021-01-08

## 2021-01-08 NOTE — PHYSICAL THERAPY NOTE
PHYSICAL THERAPY TREATMENT NOTE - INPATIENT    Room Number: 378/378-A     Session: 1   Number of Visits to Meet Established Goals: 2    Presenting Problem: s/p right reverse TSA 1/6/2021    Problem List  Active Problems:    * No active hospital problems. DEBRIDEMENT Right 7/28/2019    Performed by Alexander Randolph DPM at [facility] MAIN OR   • [place] Right 7/13/2017    Performed by Alexander Randolph DPM at [address] [place] [address] Right 12/3/2020    Perform much difficulty does the patient currently have. ..  -   Turning over in bed (including adjusting bedclothes, sheets and blankets)?: None   -   Sitting down on and standing up from a chair with arms (e.g., wheelchair, bedside commode, etc.): None   -   Movi aware of session/findings; All patient questions and concerns addressed; Alarm set    ASSESSMENT     Patient currently does not meet criteria for skilled inpatient physical therapy services, however patient will continue to benefit from QID ambulation with n

## 2021-01-08 NOTE — OCCUPATIONAL THERAPY NOTE
OCCUPATIONAL THERAPY TREATMENT NOTE - INPATIENT     Room Number: 378/378-A  Session: 1   Number of Visits to Meet Established Goals: 2    Presenting Problem: s/p R reverse TSA 1/6/21    History related to current admission: Patient is 76year old male admi • Stroke [facility])     17 years ago--no residual effects   • Visual impairment     reading glasses       Past Surgical History  Past Surgical History:   Procedure Laterality Date   • APPENDECTOMY     • APPENDECTOMY     • COLONOSCOPY  2014   • CREATE EARDRUM SATURATIONS                ACTIVITIES OF DAILY LIVING ASSESSMENT  AM-PAC ‘6-Clicks’ Inpatient Daily Activity Short Form  How much help from another person does the patient currently need…  -   Putting on and taking off regular lower body clothing?: CHARU Waldrop Good  Frequency (Obs): 5x/week      OT Goals:   ADL GOALS   Patient will perform Upper Body Dressing with supervision  Patient will perform Lower Body Dressing with supervision  Patient will perform Toileting with supervision     FUNCTIONAL TRANSFER GOALS

## 2021-01-08 NOTE — CM/SW NOTE
01/08/21 0800   Discharge disposition   Expected discharge disposition Home-Health   Name of Facillity/Home Care/Hospice [facility]   Home services after discharge Skilled home care       AVS sent to [facility] AT [facility].     Luiza Cervantes LCSW  /Discharge [place]

## 2021-01-08 NOTE — PLAN OF CARE
Pt AOx4. R.A. C/o neuropathy pain to left foot, poorly controlled with PO medications. Aquacel dressing, C/D/I. WBAT, Up w/ min assist. VS remain stable, voiding freely via urinal and up to BR as needed, last BM 1/5/2021, colace taken.  SCDs refused, ankle

## 2021-01-08 NOTE — PROGRESS NOTES
[facility] Hospitalist Progress Note                                                                   [address]  2/21/1955    SUBJECTIVE: No chest pain, palpitations, shortness of breath, co gabapentin  1,200 mg Oral BID   • losartan Potassium  50 mg Oral BID   • Insulin Aspart Pen  1-5 Units Subcutaneous TID PC   • melatonin  5 mg Oral Nightly     Continuous Infusions:   • lactated ringers Stopped (01/06/21 1205)   • sodium chloride Stopped (

## 2021-01-08 NOTE — PROGRESS NOTES
Acute Pain Service    Post Op Day 2 Ortho Note    Assessed patient in chair - patient denies  pain at rest. Plan for dc home later today - change OxyIR to Norco in preparation for dc home. No further recommendations. Will sign-off.       Plan discussed w

## 2021-01-08 NOTE — PHYSICAL THERAPY NOTE
Attempted to see Pt this AM - RN aware of attempt. Pt already up to chair - able to transfer without physical assist per RN. Pt currently eating breakfast. Pt reported his spouse has concerns regarding ADLs. Will continue to follow. Try Mucinex, Flonase, and a Netti Pot, which are all over the counter.   Use cough drops, along with lots of fluid, tylenol, ibuprofen.   Follow up with primary in 3 days.     Discharge Instructions  Upper Respiratory Infection    The upper respiratory tract includes the sinuses, nasal passages, pharynx, and larynx. A URI, or upper respiratory infection, is an infection of any of the parts of the upper airway. Symptoms include runny nose, congestion, sneezing, sore throat, cough, and fever. URIs are almost always caused by a virus. Antibiotics do not help with viral infections, so are generally not prescribed. A URI is very contagious through coughing and nasal secretions; make sure you wash your hands often and clean surfaces after sneezing, coughing or touching them. While you should start to improve in 3 - 5 days, remember that sometimes a cough can linger for several weeks.    Generally, every Emergency Department visit should have a follow-up clinic visit with either a primary or a specialty clinic/provider. Please follow-up as instructed by your emergency provider today.    Return to the Emergency Department if:  Any of your symptoms get much worse.  You seem very sick, like being too weak to get up.  You have chest pain or shortness of breath.   You have a severe headache.  You are vomiting (throwing up) so much you cannot keep fluids or medicines down.  You have confusion or seem unusually drowsy.  You have a seizure.    What can I do to help myself?  Fill any prescriptions the provider gave you and take them right away  If you have a fever, get plenty of rest and drink lots of fluids, especially water.  Using a humidifier or saline nose spray will also help loosen mucous.   Clothes or blankets will not change your fever. Do what is comfortable for you.  Bathing or sponging in lukewarm water may help you feel better.  Acetaminophen (Tylenol ) or ibuprofen (Advil , Motrin ) will help bring fever down and may  help you feel more comfortable. Be sure to read and follow the package directions, and ask your provider if you have questions.  Do not drink alcohol.  Decongestants may help you feel better. You may use decongestant nose sprays Afrin  (oxymetazoline) or Chet-Synephrine  (phenylephrine hydrochloride) for up to 3 days, or may use a decongestant tablet like Sudafed  (pseudoephedrine).  If you were given a prescription for medicine here today, be sure to read all of the information (including the package insert) that comes with your prescription.  This will include important information about the medicine, its side effects, and any warnings that you need to know about.  The pharmacist who fills the prescription can provide more information and answer questions you may have about the medicine.  If you have questions or concerns that the pharmacist cannot address, please call or return to the Emergency Department.   Remember that you can always come back to the Emergency Department if you are not able to see your regular provider in the amount of time listed above, if you get any new symptoms, or if there is anything that worries you.

## 2021-01-08 NOTE — CM/SW NOTE
Informed by Taylor Ni with [facility] that pt has been accepted by [facility]. Updated DC AVS.  / to remain available for support and/or discharge planning.      Viad Parisi LCSW  Discharge Planner  [phone]

## 2021-01-09 NOTE — DISCHARGE SUMMARY
Discharge Summary  Patient ID:  Maribell Morales  [id number]  20 year old  2/21/1955    Admit date: 1/6/2021    Discharge date and time: 1/8/2021    Attending Physician: FERDINAND Lopes MD    Reason for admission: Complete tear of right rotator cuff, unspecified whe

## 2021-01-26 DIAGNOSIS — Z23 NEED FOR VACCINATION: ICD-10-CM

## 2021-01-30 ENCOUNTER — IMMUNIZATION (OUTPATIENT)
Dept: LAB | Age: 66
End: 2021-01-30
Attending: HOSPITALIST
Payer: COMMERCIAL

## 2021-01-30 DIAGNOSIS — Z23 NEED FOR VACCINATION: Primary | ICD-10-CM

## 2021-01-30 PROCEDURE — 0001A SARSCOV2 VAC 30MCG/0.3ML IM: CPT

## 2021-02-20 ENCOUNTER — IMMUNIZATION (OUTPATIENT)
Dept: LAB | Age: 66
End: 2021-02-20
Attending: HOSPITALIST
Payer: COMMERCIAL

## 2021-02-20 DIAGNOSIS — Z23 NEED FOR VACCINATION: Primary | ICD-10-CM

## 2021-02-20 PROCEDURE — 0002A SARSCOV2 VAC 30MCG/0.3ML IM: CPT

## 2021-02-24 NOTE — PROGRESS NOTES
Patient seen at bedside. Resting in no obvious distress. Current dressing to right foot is clean dry and intact. Afebrile    Local erythema cellulitis over the dorsum of the forefoot centered around the second third metatarsal head region.   Local jen answered. Will obtain consent. To OR tonight for I&D. none

## 2021-04-13 PROBLEM — N18.31 STAGE 3A CHRONIC KIDNEY DISEASE (HCC): Status: ACTIVE | Noted: 2017-09-18

## 2021-04-13 PROBLEM — E11.42 DIABETIC POLYNEUROPATHY ASSOCIATED WITH TYPE 2 DIABETES MELLITUS (HCC): Status: ACTIVE | Noted: 2017-03-01

## 2021-04-21 PROBLEM — E87.5 HYPERKALEMIA: Status: ACTIVE | Noted: 2021-04-21

## 2021-05-10 NOTE — PLAN OF CARE
Plan pt to dc home with home shaniqua tomorrow after an additional physical therapy session this am. Vss at this time. RUE in immobilizer as ordered. Up with min ast pivot to chair. Pt verbalized understanding of poc & call dont fall protocol.    pt ok to dc h Problem: Mobility Impaired (Adult and Pediatric)  Goal: *Acute Goals and Plan of Care (Insert Text)  Description: Physical Therapy short term goals initiated 05/09/21, to be achieved in 4-7 days. Pt will:   1. Perform bed mobility with indep in prep for OOB activity. 2. Perform sit <> stand transfers with RW and SBA in prep for functional mobility and ambulation. 3. Ambulate >15 ft with RW and SBA in prep for household and community mobility. 4. Ascend/descend 3 stairs with B HR and SBA for safe home entry. Outcome: Progressing Towards Goal  []  Patient has met MD mobilization ana maria for d/c home   []  Recommend HH with 24 hour adult care   [x]  Benefit from additional acute PT session to address: functional mobility    PHYSICAL THERAPY TREATMENT    Patient: Jacquelin Barry (84 y.o. female)  Date: 5/10/2021  Diagnosis: Closed left hip fracture ([facility] [address] [S72.002A] Closed left hip fracture (HCC)  Procedure(s) (LRB):  OPEN REDUCTION INTERNAL FIXATION LEFT HIP WITH C-ARM (Left) 2 Days Post-Op  Precautions: Fall, TTWB(L LE)  PLOF: amb independently w/o AD    ASSESSMENT:  Pt found supine in bed and expressed desire to go home today. Pt friend Jannette Bhakta present and supportive; also expressing concerns: 1. That pt has not seen doctor today 2. That pt has only seen nurse 1 time today and 3. Want to notify staff that pt has changed her mind and [facility]. Nurse Torres in and friend advised to express these concerns to nurse. Pt reports pain 3-4/10 L hip. Educated pt in wt bearing restrictions as pt stated she did not know what they were. Pt supine to sit EOB with min A for L LE mgmt and additional time. Educated pt in safety/technique and L foot placement for safe STS at [facility]. Required min/CGA/vc with bed slightly elevated. Able to participate with GT/RW/min/CGA 36ft with constant vc for safety and sequencing. Pt reciting proper sequence during activity by end of session.   Returned to sit EOB. BSC transfer completed with RW/min/CGA/vc. Pt non productive for BM and returned to bedside in same manner. Sit >supine with min A LLE. educated pt on proper neutral positioning L LE in bed and towel place between toes to maintain same. Per pt friend Kiera Stallings, she is awaiting insurance information for d/c arrangements, therefore pt cannot discharge home today. Will continue PT in am to continue GT, including step nego instruction. Pt declines use of ice. SCD's in place and all needs in reach. Progression toward goals:   []      Improving appropriately and progressing toward goals  [x]      Improving slowly and progressing toward goals  []      Not making progress toward goals and plan of care will be adjusted     PLAN:  Patient continues to benefit from skilled intervention to address the above impairments. Continue treatment per established plan of care. Discharge Recommendations:  Home Health  Further Equipment Recommendations for Discharge:  rolling walker     SUBJECTIVE:   Patient stated I really want to go home.     OBJECTIVE DATA SUMMARY:   Critical Behavior:  Neurologic State: Alert  Orientation Level: Oriented X4  Cognition: Appropriate decision making, Follows commands  Safety/Judgement: Decreased insight into deficits, Fall prevention, Home safety  Functional Mobility Training:  Bed Mobility:  Supine to Sit: Minimum assistance; Additional time  Sit to Supine: Minimum assistance; Additional time  Scooting: Stand-by assistance  Transfers:  Sit to Stand: Contact guard assistance;Minimum assistance(vc)  Stand to Sit: Contact guard assistance;Minimum assistance(vc)  Balance:  Sitting: Intact  Standing: Impaired; With support  Standing - Static: Good;Fair  Standing - Dynamic : Fair             Ambulation/Gait Training:  Distance (ft): 36 Feet (ft)  Assistive Device: Gait belt;Walker, rolling  Ambulation - Level of Assistance: Minimal assistance;Contact guard assistance; Other (comment)  Gait Abnormalities: Antalgic;Decreased step clearance; Step to gait  Left Side Weight Bearing: Toe touch  Base of Support: Shift to right  Stance: Left decreased  Speed/Rosemary: Slow  Step Length: Right shortened;Left shortened  Swing Pattern: Right asymmetrical;Left asymmetrical  Interventions: Safety awareness training; Tactile cues; Verbal cues; Visual/Demos  Pain:  Pain level pre-treatment: 3-4/10  Pain level post-treatment: 3/10   Pain Intervention(s): Medication (see MAR); Rest, Ice, Repositioning   Response to intervention: Nurse notified, See doc flow  Activity Tolerance:   Fair   Please refer to the flowsheet for vital signs taken during this treatment. After treatment:   [] Patient left in no apparent distress sitting up in chair  [x] Patient left in no apparent distress in bed  [x] Call bell left within reach  [x] Nursing notified  [] Caregiver present  [] Bed alarm activated  [x] SCDs applied      COMMUNICATION/EDUCATION:   [x]         Role of Physical Therapy in the acute care setting. [x]         Fall prevention education was provided and the patient/caregiver indicated understanding. [x]         Patient/family have participated as able in working toward goals and plan of care. [x]         Patient/family agree to work toward stated goals and plan of care. []         Patient understands intent and goals of therapy, but is neutral about his/her participation.   []         Patient is unable to participate in stated goals/plan of care: ongoing with therapy staff.  []         Other:        Rafita Craig, PT   Time Calculation: 42 mins

## 2021-05-25 VITALS
DIASTOLIC BLOOD PRESSURE: 75 MMHG | SYSTOLIC BLOOD PRESSURE: 161 MMHG | RESPIRATION RATE: 16 BRPM | HEIGHT: 71 IN | BODY MASS INDEX: 30.8 KG/M2 | TEMPERATURE: 97.2 F | OXYGEN SATURATION: 98 % | WEIGHT: 220.02 LBS | HEART RATE: 64 BPM

## 2021-12-13 PROBLEM — M79.2 NEUROPATHIC PAIN: Status: ACTIVE | Noted: 2021-12-13

## 2021-12-13 PROBLEM — E11.42 DIABETIC SENSORIMOTOR POLYNEUROPATHY (HCC): Status: ACTIVE | Noted: 2021-12-13

## 2021-12-13 PROBLEM — G89.4 CHRONIC PAIN SYNDROME: Status: ACTIVE | Noted: 2021-12-13

## 2021-12-13 PROBLEM — R26.89 ANTALGIC GAIT: Status: ACTIVE | Noted: 2021-12-13

## 2021-12-14 PROBLEM — M24.812 INTERNAL DERANGEMENT OF LEFT SHOULDER: Status: ACTIVE | Noted: 2021-12-14

## 2021-12-14 PROBLEM — M12.812 ROTATOR CUFF ARTHROPATHY OF LEFT SHOULDER: Status: ACTIVE | Noted: 2021-12-14

## 2022-06-03 RX ORDER — CEFAZOLIN SODIUM/WATER 2 G/20 ML
2 SYRINGE (ML) INTRAVENOUS ONCE
Status: CANCELLED | OUTPATIENT
Start: 2022-06-03 | End: 2022-06-03

## 2022-06-10 NOTE — IP AVS SNAPSHOT
Patient Demographics     Address  [address] [place] [id number] Phone  [phone] (Home) *Preferred*  [phone] [facility]) E-mail Address  [email or web address]      Emergency Contact(s)     Name Relation Home Work Dianne Samuels Sp Call Terence Estrella MD.    Specialties:  SURGERY, ORTHOPEDIC, HAND SURGERY  Why:  Orthopedics (for hand)  Contact information:  [address] [facility] [phone]             Schedule an appointment as soon as possible for a visi TAKE 2 TABLETS BY MOUTH THREE TIMES DAILY   Maricruz Meek MD         carvedilol 6.25 MG Tabs  Commonly known as:  COREG  Next dose due: This evening with dinner      Take 1 tablet (6.25 mg total) by mouth 2 (two) times daily with meals.    Christos Carias MD glyBURIDE 5 MG Tabs  Commonly known as:  DIABETA  Next dose due:   This evening with dinner      TAKE 1 TABLET(5 MG) BY MOUTH TWICE DAILY WITH A MEAL   Sherley Romero MD         HYDROcodone-acetaminophen  MG Tabs  Commonly known as:  Kelly Guevara CC: Monoclonal Antibody Infusion/COVID 19 Positive  43yFemale with thyroid cancer    exam/findings:  T(C): 37 (06-10-22 @ 16:11), Max: 37 (06-10-22 @ 16:11)  HR: 91 (06-10-22 @ 16:11) (91 - 91)  BP: 124/85 (06-10-22 @ 16:11) (124/85 - 124/85)  RR: 18 (06-10-22 @ 16:11) (18 - 18)  SpO2: 98% (06-10-22 @ 16:11) (98% - 98%)      PE:   Appearance: NAD	  HEENT:   Normal oral mucosa,   Lymphatic: No lymphadenopathy  Cardiovascular: Normal S1 S2, No JVD, No murmurs, No edema  Respiratory: Lungs clear to auscultation	  Gastrointestinal:  Soft, Non-tender, + BS	  Skin: warm and dry  Neurologic: Non-focal  Extremities: Normal range of motion                 Take 1 tablet (0.5 mg total) by mouth 2 (two) times daily as needed (RASS +2). Nicko Mora MD         Senna-Docusate Sodium 8.6-50 MG Tabs  Commonly known as:  SENOKOT S      Take 2 tablets by mouth 2 (two) times daily.    Melany Payne [id number] HYDROcodone-acetaminophen (NORCO)  MG per tab 1 tablet 05/24/19 1230 Given      [id number] HYDROcodone-acetaminophen (NORCO)  MG per tab 2 tablet (Or Linked Group #1) 05/24/19 0306 Given      [id number] Losartan Potassium (COZAAR) tab 05/23/19 2141 Given            LEFT UPPER ARM     Order ID Medication Name Action Time Action Reason Comments    [id number] Insulin Aspart Pen (NOVOLOG) 100 UNIT/ML flexpen 4-20 Units 05/23/19 2236 Given      [id number] Insulin Aspart Pen (NOVOLOG) 100 UNIT/ GFR, Non- 94 >=60 — Marco A Calhoun   GFR, -American 108 >=60 — Aivo Performed By     The Reji Name Director Address Valid Date Range    [facility] LAB Jeane Barlow  S.  Washing 05/14/19 1110    Order Status:  Completed Lab Status:  Final result Updated:  05/21/19 0634    Specimen:  Blood,peripheral      Staphylococcus aureus, MRSA by PCR Detected    Anaerobic Culture [[id number]] Collected:  05/14/19 1925    Order Status:  Alwin Cockayne Adenovirus PCR: Negative     Coronavirus 229E PCR: Negative     Coronavirus Hku1 PCR: Negative     Coronavirus Nl63 PCR: Negative     Coronavirus Oc43 PCR: Negative     Metapneumovirus PCR: Negative     Rhinovirus/Entero PCR: Negative     Influenza A PCR [1]   This antibiotic was tested by Amparo Booth disk diffusion and result is not an JODI. AFB CULTURE AND SMEAR [[id number]] Collected:  05/15/19 1246    Order Status:  Sent Lab Status:   In process Updated:  05/16/19 5057    Specimen:  Tiss Cefazolin  Resistant    Clindamycin <=0.25  Sensitive    Erythromycin >=8  Resistant    Gentamicin <=0.5  Sensitive    Levofloxacin 0.25  Sensitive    Minocycline  Sensitive    Oxacillin >=4  Resistant    Tetracycline >=16  Resistant    Trimethoprim/Sulfa • History of coronary artery stent placement 6/10/2016   • History of CVA (cerebrovascular accident) 6/10/2016   • Hyperlipidemia    • Midtarsal Amp, Peroneus Brevis &Tibialis Anetrior Tendon Transfer, Abductor Hallucia & Digiti Rotational Muscle flap, Ach Neck: Supple, symmetrical, trachea midline, no cervical or supraclavicular lymph adenopathy, thyroid: no enlargment/tenderness/nodules appreciated   Lungs:   Clear to auscultation bilaterally. Normal effort   Chest wall:  No tenderness or deformity.    Hear CONCLUSION:  Shallow inspiration with bibasilar hypoaeration/atelectasis.     Dictated by: Chris Morales MD on 5/12/2019 at 19:14     Approved by: Chris Morales MD            Xr Knee (1 Or 2 Views), Right (cpt=73560)    Result Date: 5/12/2019  PROCEDURE:  X rounded 6 mm small lucency identified within proximal aspect of distal phalanx of the thumb which does not have a posttraumatic appear. Differential considerations include enchondroma. A glomus tumor is also possible. These are benign lesions.       [facility] Data Registry) which includes the Dose Index Registry. PATIENT STATED HISTORY: (As transcribed by Technologist)  Patient with generalized weakness, unsteady gait, nausea and vomiting since last week.     FINDINGS:  VENTRICLES/SULCI:  Stable symme enlarged. KIDNEYS:  Normal anatomic positions. No solid or cystic renal masses. No hydronephrosis. ADRENALS:  Not enlarged. AORTA/VASCULAR:  Moderate calcified atherosclerosis. No abdominal aortic aneurysm.  RETROPERITONEUM:  There is scattered small lym - severe hyperglycemia on admission now improved  - hold orals, ssi coverage    # anemia  - chronic secondary to CKD    # PVD  - cont home asa    # neuropathy  - cont gabapentin    # HTN  - cont home meds    Prev:  Hep SQ        Outpatient records or previ needed BiPAP. He is now better after lasix. His wife tells me that she had a similar event in the past while he was getting hyperbaric therapy. Currently on 2 liters NC.   No chest pain     Past Medical History:   Diagnosis Date   • 5th ray partial amput [] ondansetron 4 MG Oral Tablet Dispersible Take 1 tablet (4 mg total) by mouth every 4 (four) hours as needed for Nausea.  Disp: 10 tablet Rfl: 0 Past Week at Unknown time   Evolocumab (REPATHA SURECLICK) 434 MG/ML Subcutaneous Solution Auto-injecto carBAMazepine 200 MG Oral Tab Take 2 tablets by mouth 3 times daily Disp: 180 tablet Rfl: 3 5/12/2019 at Unknown time   Fenofibrate 145 MG Oral Tab Take 1 tablet (145 mg total) by mouth daily.  Disp: 90 tablet Rfl: 3 5/12/2019 at Unknown time   carvedilol 6 Icosapent Ethyl (VASCEPA) 1 g Oral Cap Take 2 capsules by mouth 2 (two) times daily. Disp: 360 capsule Rfl: 1 Unknown at Unknown time   ezetimibe (ZETIA) 10 MG Oral Tab Take 1 tablet (10 mg total) by mouth daily.  Disp: 90 tablet Rfl: 3 Unknown at Unknown t Social History Narrative      : 1975      Children: 2      Exercise: walks      Employment: disabled, paramedic, video production      Caffeine intake: minimal    Family History   Problem Relation Age of Onset   • High Cholesterol Father O2 Device: None (Room air)  O2 Flow Rate (L/min): 3 L/min  Pulse Oximetry Type: Continuous  Pulse Ox Probe Location: Right hand                  Wt Readings from Last 3 Encounters:  05/15/19 : 217 lb 12.8 oz (98.8 kg)  05/10/19 : 220 lb (99.8 kg)  04/08/19 MCH 30.5 26.0 - 34.0 pg    MCHC 32.2 31.0 - 37.0 g/dL    RDW 14.9 11.0 - 15.0 %    RDW-SD 51.8 (H) 35.1 - 46.3 fL    Neutrophil Absolute Prelim 11.29 (H) 1.50 - 7.70 x10 (3) uL   MANUAL DIFFERENTIAL    Collection Time: 05/18/19  5:41 AM   Result Value Ref Methemoglobin 0.4 0.4 - 1.5 % SAT    Ionized Calcium 1.12 1.12 - 1.32 mmol/L    Allens Test Positive     Sample Site Right Radial     L/M 10.0 L/min    ABG Device High flow nasal cannula     Potassium Blood Gas 4.3 3.6 - 5.1 mmol/L    Sodium Blood Gas 131 Electronically signed by Ignacia Davey MD on 5/18/2019  2:53 PM   Attribution Key    [address] - Ignacia Davey MD on 5/18/2019  2:47 PM                        Discharge Summary - D/C Summary      Discharge Summary signed by Lizzie Montemayor MD rolled off  but cannot remember details.       Having increased pain and swelling of his L hand. Febrile in ER.       No cough, no urinary issues. No loose stool since admit.   Denies abd pain.         Hospital Course:      # L hand swelling/abscess; IP CONSULT TO VASCULAR ACCESS TEAM  IP CONSULT TO OCCUPATIONAL THERAPY  IP CONSULT TO SOCIAL WORK[LM.2]    Radiology:  Xr Chest Pa + Lat Chest (cpt=71046)    Result Date: 5/12/2019  PROCEDURE:  XR CHEST PA + LAT CHEST (CPT=71046)  INDICATIONS:  left hand c PATIENT STATED HISTORY: (As transcribed by Technologist)    Left hand absess. FINDINGS:  There is swelling of the hand. No soft tissue gas. No periosteal reaction. No bone destruction. No fracture. No bone lesion.   There is arthritis at the base of Inclusion cyst is also within the differential.    Dictated by: Thai Oliva MD on 5/10/2019 at 15:33     Approved by: Thai Oliva MD            Xr Wrist Complete (min 3 Views), Left (cpt=73110)    Result Date: 5/10/2019  PROCEDURE:  XR WRIST changes of cerebral white matter. No intracranial mass or hemorrhage. No sign of acute territorial infarction. Atherosclerotic calcifications at the skull base. No extra-axial fluid collection or midline shift.  SINUSES:           No sign of acute sinus CONCLUSION:  1. Subcutaneous edema and/or cellulitis predominantly involves the dorsum of the hand.  2. Degenerative change involves the carpal 1st metacarpal joint with an adjacent, more focal fluid collection, an abscess cannot be excluded, correlate clin transmitted to the [facility]) [address]) which includes the Dose Index Registry. PATIENT STATED HISTORY:(As transcribed by Technologist)  Patient has had nausea and vomiting for past 3 days.    CONTRAST MD on 5/06/2019 at 18:00     Approved by: Linda Moreira MD            Xr Chest Ap Portable  (cpt=71045)    Result Date: 5/18/2019  PROCEDURE:  XR CHEST AP PORTABLE  (CPT=71045)  TECHNIQUE:  AP chest radiograph was obtained.   COMPARISON:  EDWARD , XR CHEST PA TAKE 2 TABLETS BY MOUTH AT 9 AM, 1 TABLET EVERY DAY AT NOON AND 2 TABLETS EVERY DAY AT 9 PM    Evolocumab (REPATHA SURECLICK) 900 MG/ML Subcutaneous Solution Auto-injector  Inject 140 mg into the skin every 14 (fourteen) days.     CARBAMAZEPINE 200 MG Oral Take 1 tablet (325 mg total) by mouth daily. Ergocalciferol (VITAMIN D OR)  Take 5,000 Units by mouth daily. Multiple Vitamin (MULTI-VITAMIN) Oral Tab  Take 1 tablet by mouth daily.     Ascorbic Acid (VITAMIN C) 1000 MG Oral Tab  Take 1,000 mg by mo Infectious Disease  [facility]   [address] [phone]                    Total Time Coordinating Care: Greater than 30 minutes    Patient had opportunity to ask questions and state under Author:  Elian Salomon PTA Service:  Rehab Author Type:  Physical Therapy Assistant    Filed:  5/23/2019  2:58 PM Date of Service:  5/23/2019  2:46 PM Status:  Signed    :  Elian Salomon PTA (Physical Therapy Assistant)        PHYSICAL T Past Medical History  Past Medical History:   Diagnosis Date   • 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  Sx 12/28/18; NADEGE 3/28/19 1/2/2019   • Coronary atherosclerosis    • Diabetes [facility])    • Essential hypertension    • Gout, unspecified 6/10/2016   • Histo PAIN ASSESSMENT   Rating: Unable to rate  Location: right knee  Management Techniques: Repositioning; Activity promotion    BALANCE                                                                                                                       Static walker heavily. Pt shows shuffling steps, dec stride length and james flat feet. Pt was educated to keep right ankle on towel roll to gain proper knee ext.      THERAPEUTIC EXERCISES  Lower Extremity Ankle pumps  Glut sets  Hip AB/AD  Heel slides  Quad sets Goal #5     Goal #6     Goal Comments: Goals established on 5/16/2019, updated and Ongoing 05/23/19[BR.1]                          Attribution Almazan    BR. 1 - Joceline Hawkins, PTA on 5/23/2019  2:46 PM               Physical Therapy Note signed by John Washington, 5/15/19:  Right knee surgery, aspiration with partial menisectomy.  FWB with ROM ok per Dr Coleman Jeffers  hgb 5/17/19: 8.6    Problem List[SP.1]  Principal Problem:    Fever and chills  Active Problems:    Confusion    Cellulitis of left upper extremity    Hyperg OBJECTIVE[SP.1]  Precautions: Limb alert - right;Limb alert - left(right PICC 5/21/19, left hand sutures removed)[SP.2]    WEIGHT BEARING RESTRICTION[SP.1]  Weight Bearing Restriction: R lower extremity;L lower extremity        R Lower Extremity: Weight Be Skilled Therapy Provided: Pt recd in supine, educated in goals for session.[SP.1]   Pt more alert and with higher spirits today, joking with personnel.[SP.3]    Pt performed LE therex, assist for right LE.[SP.1] Gentle stretching to right knee, able to ach patient is demonstrating a[SP.1] 50.57[SP.3]% degree of impairment in mobility. Research supports that patients with this level of impairment may benefit from MELO.         DISCHARGE RECOMMENDATIONS[SP.1]  PT Discharge Recommendations: Sub-acute rehabilitati Number of Visits to Meet Established Goals: 5    Presenting Problem: fever[MM. 3]    History related to current admission:  Pt is 71year old male admitted on 5/12/2019 from home after inability to get out of recliner chair.   Per chart, pt fell OOB earlier • Midtarsal Amp, Peroneus Brevis &Tibialis Anetrior Tendon Transfer, Abductor Hallucia & Digiti Rotational Muscle flap, Achilles Tendon Lenghtening R -Sx 1/11/18 NADEGE 4/11/18 1/16/2018   • Peripheral vascular disease (HCC)[MM.3]        Past Surgical History -   Bathing (including washing, rinsing, drying)?: A Lot  -   Toileting, which includes using toilet, bedpan or urinal? : A Lot  -   Putting on and taking off regular upper body clothing?: A Little  -   Taking care of personal grooming such as brushing ariel simplification techniques;ADL training;IADL training;Continued evaluation; Compensatory technique education; Neuromuscluar reeducation;Equipment eval/education;Patient/Family training;Patient/Family education; Endurance training;UE strengthening/ROM; Functiona No bone lesion. There is arthritis at the base of the thumb involving the 1st carpometacarpal joint. There is atherosclerosis in the radial artery. \"     CT brain 5/12/19, \"CONCLUSION:    No acute intracranial pathology. \"        Therapy significant co- Performed by Anjum Mcgrath DPM at [facility] MAIN OR   • FOOT OPEN REDUCTION INTERNAL FIXATION Right 1/11/2018    Performed by Anjum Mcgrath DPM at [facility] MAIN OR   • HAND MASS EXCISION Left 5/14/2019    Performed by Etta Osler, MD at [facility] MAIN OR   • Bala CMS Modifier (G-Code): CK[MM. 3]    FUNCTIONAL TRANSFER ASSESSMENT[MM.1]  Supine to Sit : Not tested  Sit to Stand: Not tested[MM. 3]    Skilled Therapy Provided: Patient instructed in L hand wrist finger AROM therex to tolerance, completed 10 reps flexion/e strengthening/ROM; Functional transfer training  Rehab Potential : Good  Frequency (Obs): 5x/week[MM. 3]      OT Goals: ongoing 5/22  ADL Goals   Patient will perform upper body dressing:  with supervision  Patient will perform lower body dressing:  with sup

## 2022-06-18 ENCOUNTER — LAB ENCOUNTER (OUTPATIENT)
Dept: LAB | Facility: HOSPITAL | Age: 67
End: 2022-06-18
Attending: ORTHOPAEDIC SURGERY
Payer: COMMERCIAL

## 2022-06-18 DIAGNOSIS — M12.812 ROTATOR CUFF ARTHROPATHY OF LEFT SHOULDER: ICD-10-CM

## 2022-06-18 LAB — SARS-COV-2 RNA RESP QL NAA+PROBE: NOT DETECTED

## 2022-06-21 ENCOUNTER — ANESTHESIA EVENT (OUTPATIENT)
Dept: SURGERY | Facility: HOSPITAL | Age: 67
End: 2022-06-21
Payer: COMMERCIAL

## 2022-06-21 ENCOUNTER — HOSPITAL ENCOUNTER (OUTPATIENT)
Facility: HOSPITAL | Age: 67
Discharge: HOME HEALTH | End: 2022-06-22
Attending: ORTHOPAEDIC SURGERY | Admitting: ORTHOPAEDIC SURGERY
Payer: COMMERCIAL

## 2022-06-21 ENCOUNTER — APPOINTMENT (OUTPATIENT)
Dept: GENERAL RADIOLOGY | Facility: HOSPITAL | Age: 67
End: 2022-06-21
Attending: ORTHOPAEDIC SURGERY
Payer: COMMERCIAL

## 2022-06-21 ENCOUNTER — ANESTHESIA (OUTPATIENT)
Dept: SURGERY | Facility: HOSPITAL | Age: 67
End: 2022-06-21
Payer: COMMERCIAL

## 2022-06-21 DIAGNOSIS — M12.812 ROTATOR CUFF ARTHROPATHY OF LEFT SHOULDER: Primary | ICD-10-CM

## 2022-06-21 LAB
ANTIBODY SCREEN: NEGATIVE
EST. AVERAGE GLUCOSE BLD GHB EST-MCNC: 146 MG/DL (ref 68–126)
GLUCOSE BLD-MCNC: 122 MG/DL (ref 70–99)
GLUCOSE BLD-MCNC: 123 MG/DL (ref 70–99)
GLUCOSE BLD-MCNC: 157 MG/DL (ref 70–99)
GLUCOSE BLD-MCNC: 164 MG/DL (ref 70–99)
HBA1C MFR BLD: 6.7 % (ref ?–5.7)
RH BLOOD TYPE: POSITIVE

## 2022-06-21 PROCEDURE — S0028 INJECTION, FAMOTIDINE, 20 MG: HCPCS | Performed by: ANESTHESIOLOGY

## 2022-06-21 PROCEDURE — 82962 GLUCOSE BLOOD TEST: CPT

## 2022-06-21 PROCEDURE — 83036 HEMOGLOBIN GLYCOSYLATED A1C: CPT | Performed by: HOSPITALIST

## 2022-06-21 PROCEDURE — 86900 BLOOD TYPING SEROLOGIC ABO: CPT

## 2022-06-21 PROCEDURE — 0RRK00Z REPLACEMENT OF LEFT SHOULDER JOINT WITH REVERSE BALL AND SOCKET SYNTHETIC SUBSTITUTE, OPEN APPROACH: ICD-10-PCS | Performed by: ORTHOPAEDIC SURGERY

## 2022-06-21 PROCEDURE — 73020 X-RAY EXAM OF SHOULDER: CPT | Performed by: ORTHOPAEDIC SURGERY

## 2022-06-21 PROCEDURE — 76942 ECHO GUIDE FOR BIOPSY: CPT | Performed by: ANESTHESIOLOGY

## 2022-06-21 PROCEDURE — 86901 BLOOD TYPING SEROLOGIC RH(D): CPT

## 2022-06-21 PROCEDURE — 86850 RBC ANTIBODY SCREEN: CPT

## 2022-06-21 DEVICE — UNIVERS REVERS SUTURE CUP, 39 (+2 LEFT)
Type: IMPLANTABLE DEVICE | Site: SHOULDER | Status: FUNCTIONAL
Brand: ARTHREX®

## 2022-06-21 DEVICE — UNIVERS REVERS HUMERAL STEM, SIZE 7
Type: IMPLANTABLE DEVICE | Site: SHOULDER | Status: FUNCTIONAL
Brand: ARTHREX®

## 2022-06-21 DEVICE — 5.5X20MM PERIPHERAL SCREW, LOCKING
Type: IMPLANTABLE DEVICE | Site: SHOULDER | Status: FUNCTIONAL
Brand: ARTHREX®

## 2022-06-21 DEVICE — MODULAR POST, 25MM
Type: IMPLANTABLE DEVICE | Site: SHOULDER | Status: FUNCTIONAL
Brand: ARTHREX®

## 2022-06-21 DEVICE — 5.5X32MM PERIPHERAL SCREW, LOCKING
Type: IMPLANTABLE DEVICE | Site: SHOULDER | Status: FUNCTIONAL
Brand: ARTHREX®

## 2022-06-21 DEVICE — 5.5X28MM PERIPHERAL SCREW, LOCKING
Type: IMPLANTABLE DEVICE | Site: SHOULDER | Status: FUNCTIONAL
Brand: ARTHREX®

## 2022-06-21 DEVICE — 24MM BASEPLATE,  10° FULL AUG, OB
Type: IMPLANTABLE DEVICE | Site: SHOULDER | Status: FUNCTIONAL
Brand: ARTHREX®

## 2022-06-21 DEVICE — HUMERAL INSERT M/39 +3 TO FIT IN 39 CUP
Type: IMPLANTABLE DEVICE | Site: SHOULDER | Status: FUNCTIONAL
Brand: ARTHREX®

## 2022-06-21 DEVICE — 39 +4 LAT/24 GLENOSPHERE
Type: IMPLANTABLE DEVICE | Site: SHOULDER | Status: FUNCTIONAL
Brand: ARTHREX®

## 2022-06-21 DEVICE — 5.5X24MM PERIPHERAL SCREW, LOCKING
Type: IMPLANTABLE DEVICE | Site: SHOULDER | Status: FUNCTIONAL
Brand: ARTHREX®

## 2022-06-21 RX ORDER — DIPHENHYDRAMINE HYDROCHLORIDE 50 MG/ML
25 INJECTION INTRAMUSCULAR; INTRAVENOUS ONCE AS NEEDED
Status: ACTIVE | OUTPATIENT
Start: 2022-06-21 | End: 2022-06-21

## 2022-06-21 RX ORDER — DIPHENHYDRAMINE HYDROCHLORIDE 50 MG/ML
12.5 INJECTION INTRAMUSCULAR; INTRAVENOUS AS NEEDED
Status: DISCONTINUED | OUTPATIENT
Start: 2022-06-21 | End: 2022-06-21 | Stop reason: HOSPADM

## 2022-06-21 RX ORDER — NICOTINE POLACRILEX 4 MG
30 LOZENGE BUCCAL
Status: DISCONTINUED | OUTPATIENT
Start: 2022-06-21 | End: 2022-06-21 | Stop reason: HOSPADM

## 2022-06-21 RX ORDER — BUPRENORPHINE HYDROCHLORIDE 0.32 MG/ML
INJECTION INTRAMUSCULAR; INTRAVENOUS AS NEEDED
Status: DISCONTINUED | OUTPATIENT
Start: 2022-06-21 | End: 2022-06-21 | Stop reason: SURG

## 2022-06-21 RX ORDER — ONDANSETRON 2 MG/ML
INJECTION INTRAMUSCULAR; INTRAVENOUS AS NEEDED
Status: DISCONTINUED | OUTPATIENT
Start: 2022-06-21 | End: 2022-06-21 | Stop reason: SURG

## 2022-06-21 RX ORDER — KETAMINE HYDROCHLORIDE 50 MG/ML
INJECTION, SOLUTION, CONCENTRATE INTRAMUSCULAR; INTRAVENOUS AS NEEDED
Status: DISCONTINUED | OUTPATIENT
Start: 2022-06-21 | End: 2022-06-21 | Stop reason: SURG

## 2022-06-21 RX ORDER — DIPHENHYDRAMINE HYDROCHLORIDE 50 MG/ML
INJECTION INTRAMUSCULAR; INTRAVENOUS AS NEEDED
Status: DISCONTINUED | OUTPATIENT
Start: 2022-06-21 | End: 2022-06-21 | Stop reason: SURG

## 2022-06-21 RX ORDER — AMLODIPINE BESYLATE 5 MG/1
10 TABLET ORAL DAILY
Status: DISCONTINUED | OUTPATIENT
Start: 2022-06-21 | End: 2022-06-22

## 2022-06-21 RX ORDER — DEXAMETHASONE SODIUM PHOSPHATE 10 MG/ML
8 INJECTION, SOLUTION INTRAMUSCULAR; INTRAVENOUS ONCE
Status: COMPLETED | OUTPATIENT
Start: 2022-06-22 | End: 2022-06-22

## 2022-06-21 RX ORDER — MEPERIDINE HYDROCHLORIDE 25 MG/ML
12.5 INJECTION INTRAMUSCULAR; INTRAVENOUS; SUBCUTANEOUS AS NEEDED
Status: DISCONTINUED | OUTPATIENT
Start: 2022-06-21 | End: 2022-06-21 | Stop reason: HOSPADM

## 2022-06-21 RX ORDER — ROCURONIUM BROMIDE 10 MG/ML
INJECTION, SOLUTION INTRAVENOUS AS NEEDED
Status: DISCONTINUED | OUTPATIENT
Start: 2022-06-21 | End: 2022-06-21 | Stop reason: SURG

## 2022-06-21 RX ORDER — DEXTROSE MONOHYDRATE 25 G/50ML
50 INJECTION, SOLUTION INTRAVENOUS
Status: DISCONTINUED | OUTPATIENT
Start: 2022-06-21 | End: 2022-06-21 | Stop reason: HOSPADM

## 2022-06-21 RX ORDER — GLYCOPYRROLATE 0.2 MG/ML
INJECTION, SOLUTION INTRAMUSCULAR; INTRAVENOUS AS NEEDED
Status: DISCONTINUED | OUTPATIENT
Start: 2022-06-21 | End: 2022-06-21 | Stop reason: SURG

## 2022-06-21 RX ORDER — HYDROMORPHONE HYDROCHLORIDE 1 MG/ML
0.2 INJECTION, SOLUTION INTRAMUSCULAR; INTRAVENOUS; SUBCUTANEOUS EVERY 5 MIN PRN
Status: DISCONTINUED | OUTPATIENT
Start: 2022-06-21 | End: 2022-06-21

## 2022-06-21 RX ORDER — OXYCODONE HYDROCHLORIDE 5 MG/1
5 TABLET ORAL EVERY 4 HOURS PRN
Status: DISCONTINUED | OUTPATIENT
Start: 2022-06-21 | End: 2022-06-22

## 2022-06-21 RX ORDER — METOCLOPRAMIDE HYDROCHLORIDE 5 MG/ML
10 INJECTION INTRAMUSCULAR; INTRAVENOUS EVERY 8 HOURS PRN
Status: DISCONTINUED | OUTPATIENT
Start: 2022-06-21 | End: 2022-06-21

## 2022-06-21 RX ORDER — HYDROMORPHONE HYDROCHLORIDE 1 MG/ML
0.4 INJECTION, SOLUTION INTRAMUSCULAR; INTRAVENOUS; SUBCUTANEOUS EVERY 5 MIN PRN
Status: DISCONTINUED | OUTPATIENT
Start: 2022-06-21 | End: 2022-06-21

## 2022-06-21 RX ORDER — VANCOMYCIN HYDROCHLORIDE
15 ONCE
Status: DISCONTINUED | OUTPATIENT
Start: 2022-06-21 | End: 2022-06-21 | Stop reason: HOSPADM

## 2022-06-21 RX ORDER — HYDROMORPHONE HYDROCHLORIDE 1 MG/ML
0.4 INJECTION, SOLUTION INTRAMUSCULAR; INTRAVENOUS; SUBCUTANEOUS EVERY 5 MIN PRN
Status: DISCONTINUED | OUTPATIENT
Start: 2022-06-21 | End: 2022-06-21 | Stop reason: HOSPADM

## 2022-06-21 RX ORDER — ONDANSETRON 2 MG/ML
4 INJECTION INTRAMUSCULAR; INTRAVENOUS EVERY 6 HOURS PRN
Status: DISCONTINUED | OUTPATIENT
Start: 2022-06-21 | End: 2022-06-21

## 2022-06-21 RX ORDER — LOSARTAN POTASSIUM 50 MG/1
50 TABLET ORAL 2 TIMES DAILY
Status: DISCONTINUED | OUTPATIENT
Start: 2022-06-21 | End: 2022-06-22

## 2022-06-21 RX ORDER — TRAMADOL HYDROCHLORIDE 50 MG/1
50 TABLET ORAL EVERY 6 HOURS
Status: DISCONTINUED | OUTPATIENT
Start: 2022-06-21 | End: 2022-06-22

## 2022-06-21 RX ORDER — ACETAMINOPHEN 500 MG
1000 TABLET ORAL ONCE
Status: DISCONTINUED | OUTPATIENT
Start: 2022-06-21 | End: 2022-06-21 | Stop reason: HOSPADM

## 2022-06-21 RX ORDER — METOCLOPRAMIDE HYDROCHLORIDE 5 MG/ML
INJECTION INTRAMUSCULAR; INTRAVENOUS AS NEEDED
Status: DISCONTINUED | OUTPATIENT
Start: 2022-06-21 | End: 2022-06-21 | Stop reason: SURG

## 2022-06-21 RX ORDER — DEXTROSE MONOHYDRATE 25 G/50ML
50 INJECTION, SOLUTION INTRAVENOUS
Status: DISCONTINUED | OUTPATIENT
Start: 2022-06-21 | End: 2022-06-22

## 2022-06-21 RX ORDER — FAMOTIDINE 10 MG/ML
INJECTION, SOLUTION INTRAVENOUS AS NEEDED
Status: DISCONTINUED | OUTPATIENT
Start: 2022-06-21 | End: 2022-06-21 | Stop reason: SURG

## 2022-06-21 RX ORDER — ZOLPIDEM TARTRATE 5 MG/1
5 TABLET ORAL NIGHTLY PRN
Status: DISCONTINUED | OUTPATIENT
Start: 2022-06-21 | End: 2022-06-22

## 2022-06-21 RX ORDER — SODIUM PHOSPHATE, DIBASIC AND SODIUM PHOSPHATE, MONOBASIC 7; 19 G/133ML; G/133ML
1 ENEMA RECTAL ONCE AS NEEDED
Status: DISCONTINUED | OUTPATIENT
Start: 2022-06-21 | End: 2022-06-22

## 2022-06-21 RX ORDER — CARBAMAZEPINE 200 MG/1
200 TABLET ORAL DAILY
Status: DISCONTINUED | OUTPATIENT
Start: 2022-06-21 | End: 2022-06-22

## 2022-06-21 RX ORDER — POLYETHYLENE GLYCOL 3350 17 G/17G
17 POWDER, FOR SOLUTION ORAL DAILY PRN
Status: DISCONTINUED | OUTPATIENT
Start: 2022-06-21 | End: 2022-06-22

## 2022-06-21 RX ORDER — METOPROLOL TARTRATE 5 MG/5ML
2.5 INJECTION INTRAVENOUS ONCE
Status: DISCONTINUED | OUTPATIENT
Start: 2022-06-21 | End: 2022-06-21 | Stop reason: HOSPADM

## 2022-06-21 RX ORDER — PROCHLORPERAZINE EDISYLATE 5 MG/ML
10 INJECTION INTRAMUSCULAR; INTRAVENOUS EVERY 6 HOURS PRN
Status: DISCONTINUED | OUTPATIENT
Start: 2022-06-21 | End: 2022-06-22

## 2022-06-21 RX ORDER — SODIUM CHLORIDE, SODIUM LACTATE, POTASSIUM CHLORIDE, CALCIUM CHLORIDE 600; 310; 30; 20 MG/100ML; MG/100ML; MG/100ML; MG/100ML
INJECTION, SOLUTION INTRAVENOUS CONTINUOUS
Status: DISCONTINUED | OUTPATIENT
Start: 2022-06-21 | End: 2022-06-21 | Stop reason: HOSPADM

## 2022-06-21 RX ORDER — FENOFIBRATE 134 MG/1
134 CAPSULE ORAL
Refills: 3 | Status: DISCONTINUED | OUTPATIENT
Start: 2022-06-22 | End: 2022-06-22

## 2022-06-21 RX ORDER — ACETAMINOPHEN 500 MG
1000 TABLET ORAL ONCE AS NEEDED
Status: DISCONTINUED | OUTPATIENT
Start: 2022-06-21 | End: 2022-06-21 | Stop reason: HOSPADM

## 2022-06-21 RX ORDER — TRANEXAMIC ACID 10 MG/ML
INJECTION, SOLUTION INTRAVENOUS AS NEEDED
Status: DISCONTINUED | OUTPATIENT
Start: 2022-06-21 | End: 2022-06-21 | Stop reason: SURG

## 2022-06-21 RX ORDER — NICOTINE POLACRILEX 4 MG
15 LOZENGE BUCCAL
Status: DISCONTINUED | OUTPATIENT
Start: 2022-06-21 | End: 2022-06-22

## 2022-06-21 RX ORDER — CARVEDILOL 12.5 MG/1
25 TABLET ORAL 2 TIMES DAILY WITH MEALS
Status: DISCONTINUED | OUTPATIENT
Start: 2022-06-21 | End: 2022-06-22

## 2022-06-21 RX ORDER — EZETIMIBE 10 MG/1
10 TABLET ORAL NIGHTLY
Status: DISCONTINUED | OUTPATIENT
Start: 2022-06-21 | End: 2022-06-22

## 2022-06-21 RX ORDER — DEXAMETHASONE SODIUM PHOSPHATE 10 MG/ML
INJECTION, SOLUTION INTRAMUSCULAR; INTRAVENOUS AS NEEDED
Status: DISCONTINUED | OUTPATIENT
Start: 2022-06-21 | End: 2022-06-21 | Stop reason: SURG

## 2022-06-21 RX ORDER — TRANEXAMIC ACID 10 MG/ML
1000 INJECTION, SOLUTION INTRAVENOUS ONCE
Status: COMPLETED | OUTPATIENT
Start: 2022-06-21 | End: 2022-06-21

## 2022-06-21 RX ORDER — BISACODYL 10 MG
10 SUPPOSITORY, RECTAL RECTAL
Status: DISCONTINUED | OUTPATIENT
Start: 2022-06-21 | End: 2022-06-22

## 2022-06-21 RX ORDER — HYDROCODONE BITARTRATE AND ACETAMINOPHEN 5; 325 MG/1; MG/1
2 TABLET ORAL ONCE AS NEEDED
Status: DISCONTINUED | OUTPATIENT
Start: 2022-06-21 | End: 2022-06-21 | Stop reason: HOSPADM

## 2022-06-21 RX ORDER — HYDROMORPHONE HYDROCHLORIDE 1 MG/ML
0.2 INJECTION, SOLUTION INTRAMUSCULAR; INTRAVENOUS; SUBCUTANEOUS EVERY 5 MIN PRN
Status: DISCONTINUED | OUTPATIENT
Start: 2022-06-21 | End: 2022-06-21 | Stop reason: HOSPADM

## 2022-06-21 RX ORDER — ONDANSETRON 2 MG/ML
4 INJECTION INTRAMUSCULAR; INTRAVENOUS EVERY 4 HOURS PRN
Status: DISCONTINUED | OUTPATIENT
Start: 2022-06-21 | End: 2022-06-22

## 2022-06-21 RX ORDER — SENNOSIDES 8.6 MG
17.2 TABLET ORAL NIGHTLY PRN
Status: DISCONTINUED | OUTPATIENT
Start: 2022-06-21 | End: 2022-06-22

## 2022-06-21 RX ORDER — PHENYLEPHRINE HCL 10 MG/ML
VIAL (ML) INJECTION AS NEEDED
Status: DISCONTINUED | OUTPATIENT
Start: 2022-06-21 | End: 2022-06-21 | Stop reason: SURG

## 2022-06-21 RX ORDER — TRANEXAMIC ACID 10 MG/ML
1000 INJECTION, SOLUTION INTRAVENOUS ONCE
Status: DISCONTINUED | OUTPATIENT
Start: 2022-06-21 | End: 2022-06-21 | Stop reason: HOSPADM

## 2022-06-21 RX ORDER — MIDAZOLAM HYDROCHLORIDE 1 MG/ML
INJECTION INTRAMUSCULAR; INTRAVENOUS AS NEEDED
Status: DISCONTINUED | OUTPATIENT
Start: 2022-06-21 | End: 2022-06-21 | Stop reason: SURG

## 2022-06-21 RX ORDER — NEOSTIGMINE METHYLSULFATE 1 MG/ML
INJECTION INTRAVENOUS AS NEEDED
Status: DISCONTINUED | OUTPATIENT
Start: 2022-06-21 | End: 2022-06-21 | Stop reason: SURG

## 2022-06-21 RX ORDER — GABAPENTIN 600 MG/1
1200 TABLET ORAL 3 TIMES DAILY
Status: DISCONTINUED | OUTPATIENT
Start: 2022-06-21 | End: 2022-06-22

## 2022-06-21 RX ORDER — HYDROMORPHONE HYDROCHLORIDE 1 MG/ML
0.6 INJECTION, SOLUTION INTRAMUSCULAR; INTRAVENOUS; SUBCUTANEOUS EVERY 5 MIN PRN
Status: DISCONTINUED | OUTPATIENT
Start: 2022-06-21 | End: 2022-06-21 | Stop reason: HOSPADM

## 2022-06-21 RX ORDER — SODIUM CHLORIDE 9 MG/ML
INJECTION, SOLUTION INTRAVENOUS CONTINUOUS
Status: DISCONTINUED | OUTPATIENT
Start: 2022-06-21 | End: 2022-06-22

## 2022-06-21 RX ORDER — HYDROMORPHONE HYDROCHLORIDE 1 MG/ML
0.6 INJECTION, SOLUTION INTRAMUSCULAR; INTRAVENOUS; SUBCUTANEOUS EVERY 5 MIN PRN
Status: DISCONTINUED | OUTPATIENT
Start: 2022-06-21 | End: 2022-06-21

## 2022-06-21 RX ORDER — METOPROLOL TARTRATE 5 MG/5ML
2.5 INJECTION INTRAVENOUS ONCE
Status: DISCONTINUED | OUTPATIENT
Start: 2022-06-21 | End: 2022-06-21

## 2022-06-21 RX ORDER — ACETAMINOPHEN 325 MG/1
TABLET ORAL
Status: COMPLETED
Start: 2022-06-21 | End: 2022-06-21

## 2022-06-21 RX ORDER — OXYCODONE HYDROCHLORIDE 5 MG/1
2.5 TABLET ORAL EVERY 4 HOURS PRN
Status: DISCONTINUED | OUTPATIENT
Start: 2022-06-21 | End: 2022-06-22

## 2022-06-21 RX ORDER — SODIUM CHLORIDE, SODIUM LACTATE, POTASSIUM CHLORIDE, CALCIUM CHLORIDE 600; 310; 30; 20 MG/100ML; MG/100ML; MG/100ML; MG/100ML
INJECTION, SOLUTION INTRAVENOUS CONTINUOUS
Status: DISCONTINUED | OUTPATIENT
Start: 2022-06-21 | End: 2022-06-21

## 2022-06-21 RX ORDER — NICOTINE POLACRILEX 4 MG
30 LOZENGE BUCCAL
Status: DISCONTINUED | OUTPATIENT
Start: 2022-06-21 | End: 2022-06-22

## 2022-06-21 RX ORDER — TRANEXAMIC ACID 10 MG/ML
INJECTION, SOLUTION INTRAVENOUS
Status: COMPLETED
Start: 2022-06-21 | End: 2022-06-21

## 2022-06-21 RX ORDER — NICOTINE POLACRILEX 4 MG
15 LOZENGE BUCCAL
Status: DISCONTINUED | OUTPATIENT
Start: 2022-06-21 | End: 2022-06-21 | Stop reason: HOSPADM

## 2022-06-21 RX ORDER — HYDROMORPHONE HYDROCHLORIDE 1 MG/ML
0.2 INJECTION, SOLUTION INTRAMUSCULAR; INTRAVENOUS; SUBCUTANEOUS EVERY 2 HOUR PRN
Status: DISCONTINUED | OUTPATIENT
Start: 2022-06-21 | End: 2022-06-22

## 2022-06-21 RX ORDER — HYDROMORPHONE HYDROCHLORIDE 1 MG/ML
0.4 INJECTION, SOLUTION INTRAMUSCULAR; INTRAVENOUS; SUBCUTANEOUS EVERY 2 HOUR PRN
Status: DISCONTINUED | OUTPATIENT
Start: 2022-06-21 | End: 2022-06-22

## 2022-06-21 RX ORDER — VANCOMYCIN HYDROCHLORIDE
15 ONCE
Status: COMPLETED | OUTPATIENT
Start: 2022-06-21 | End: 2022-06-22

## 2022-06-21 RX ORDER — LIDOCAINE HYDROCHLORIDE 10 MG/ML
INJECTION, SOLUTION EPIDURAL; INFILTRATION; INTRACAUDAL; PERINEURAL AS NEEDED
Status: DISCONTINUED | OUTPATIENT
Start: 2022-06-21 | End: 2022-06-21 | Stop reason: SURG

## 2022-06-21 RX ORDER — ASPIRIN 81 MG/1
81 TABLET ORAL 2 TIMES DAILY
Status: DISCONTINUED | OUTPATIENT
Start: 2022-06-21 | End: 2022-06-22

## 2022-06-21 RX ORDER — HYDROCODONE BITARTRATE AND ACETAMINOPHEN 5; 325 MG/1; MG/1
1 TABLET ORAL ONCE AS NEEDED
Status: DISCONTINUED | OUTPATIENT
Start: 2022-06-21 | End: 2022-06-21 | Stop reason: HOSPADM

## 2022-06-21 RX ORDER — MIDAZOLAM HYDROCHLORIDE 1 MG/ML
1 INJECTION INTRAMUSCULAR; INTRAVENOUS EVERY 5 MIN PRN
Status: DISCONTINUED | OUTPATIENT
Start: 2022-06-21 | End: 2022-06-21 | Stop reason: HOSPADM

## 2022-06-21 RX ORDER — ALLOPURINOL 100 MG/1
100 TABLET ORAL DAILY
Status: DISCONTINUED | OUTPATIENT
Start: 2022-06-21 | End: 2022-06-22

## 2022-06-21 RX ORDER — ONDANSETRON 2 MG/ML
4 INJECTION INTRAMUSCULAR; INTRAVENOUS EVERY 6 HOURS PRN
Status: DISCONTINUED | OUTPATIENT
Start: 2022-06-21 | End: 2022-06-21 | Stop reason: HOSPADM

## 2022-06-21 RX ORDER — METOCLOPRAMIDE HYDROCHLORIDE 5 MG/ML
10 INJECTION INTRAMUSCULAR; INTRAVENOUS EVERY 8 HOURS PRN
Status: DISCONTINUED | OUTPATIENT
Start: 2022-06-21 | End: 2022-06-21 | Stop reason: HOSPADM

## 2022-06-21 RX ORDER — NALOXONE HYDROCHLORIDE 0.4 MG/ML
80 INJECTION, SOLUTION INTRAMUSCULAR; INTRAVENOUS; SUBCUTANEOUS AS NEEDED
Status: DISCONTINUED | OUTPATIENT
Start: 2022-06-21 | End: 2022-06-21 | Stop reason: HOSPADM

## 2022-06-21 RX ORDER — ACETAMINOPHEN 325 MG/1
650 TABLET ORAL 4 TIMES DAILY
Status: DISCONTINUED | OUTPATIENT
Start: 2022-06-21 | End: 2022-06-22

## 2022-06-21 RX ORDER — INSULIN ASPART 100 [IU]/ML
INJECTION, SOLUTION INTRAVENOUS; SUBCUTANEOUS ONCE
Status: DISCONTINUED | OUTPATIENT
Start: 2022-06-21 | End: 2022-06-21 | Stop reason: HOSPADM

## 2022-06-21 RX ADMIN — MIDAZOLAM HYDROCHLORIDE 1 MG: 1 INJECTION INTRAMUSCULAR; INTRAVENOUS at 12:47:00

## 2022-06-21 RX ADMIN — SODIUM CHLORIDE, SODIUM LACTATE, POTASSIUM CHLORIDE, CALCIUM CHLORIDE: 600; 310; 30; 20 INJECTION, SOLUTION INTRAVENOUS at 12:34:00

## 2022-06-21 RX ADMIN — METOCLOPRAMIDE HYDROCHLORIDE 10 MG: 5 INJECTION INTRAMUSCULAR; INTRAVENOUS at 13:15:00

## 2022-06-21 RX ADMIN — SODIUM CHLORIDE, SODIUM LACTATE, POTASSIUM CHLORIDE, CALCIUM CHLORIDE: 600; 310; 30; 20 INJECTION, SOLUTION INTRAVENOUS at 14:39:00

## 2022-06-21 RX ADMIN — PHENYLEPHRINE HCL 100 MCG: 10 MG/ML VIAL (ML) INJECTION at 13:27:00

## 2022-06-21 RX ADMIN — FAMOTIDINE 20 MG: 10 INJECTION, SOLUTION INTRAVENOUS at 12:52:00

## 2022-06-21 RX ADMIN — DEXAMETHASONE SODIUM PHOSPHATE 2 MG: 10 INJECTION, SOLUTION INTRAMUSCULAR; INTRAVENOUS at 12:46:00

## 2022-06-21 RX ADMIN — KETAMINE HYDROCHLORIDE 50 MG: 50 INJECTION, SOLUTION, CONCENTRATE INTRAMUSCULAR; INTRAVENOUS at 14:29:00

## 2022-06-21 RX ADMIN — ROCURONIUM BROMIDE 50 MG: 10 INJECTION, SOLUTION INTRAVENOUS at 12:48:00

## 2022-06-21 RX ADMIN — TRANEXAMIC ACID 1000 MG: 10 INJECTION, SOLUTION INTRAVENOUS at 13:10:00

## 2022-06-21 RX ADMIN — PHENYLEPHRINE HCL 100 MCG: 10 MG/ML VIAL (ML) INJECTION at 13:20:00

## 2022-06-21 RX ADMIN — NEOSTIGMINE METHYLSULFATE 4 MG: 1 INJECTION INTRAVENOUS at 14:28:00

## 2022-06-21 RX ADMIN — BUPRENORPHINE HYDROCHLORIDE 150 MCG: 0.32 INJECTION INTRAMUSCULAR; INTRAVENOUS at 12:46:00

## 2022-06-21 RX ADMIN — GLYCOPYRROLATE 0.8 MG: 0.2 INJECTION, SOLUTION INTRAMUSCULAR; INTRAVENOUS at 14:28:00

## 2022-06-21 RX ADMIN — MIDAZOLAM HYDROCHLORIDE 2 MG: 1 INJECTION INTRAMUSCULAR; INTRAVENOUS at 12:35:00

## 2022-06-21 RX ADMIN — DIPHENHYDRAMINE HYDROCHLORIDE 12.5 MG: 50 INJECTION INTRAMUSCULAR; INTRAVENOUS at 13:51:00

## 2022-06-21 RX ADMIN — PHENYLEPHRINE HCL 100 MCG: 10 MG/ML VIAL (ML) INJECTION at 12:52:00

## 2022-06-21 RX ADMIN — ROCURONIUM BROMIDE 20 MG: 10 INJECTION, SOLUTION INTRAVENOUS at 13:18:00

## 2022-06-21 RX ADMIN — LIDOCAINE HYDROCHLORIDE 50 MG: 10 INJECTION, SOLUTION EPIDURAL; INFILTRATION; INTRACAUDAL; PERINEURAL at 12:48:00

## 2022-06-21 RX ADMIN — MIDAZOLAM HYDROCHLORIDE 1 MG: 1 INJECTION INTRAMUSCULAR; INTRAVENOUS at 12:37:00

## 2022-06-21 RX ADMIN — ONDANSETRON 4 MG: 2 INJECTION INTRAMUSCULAR; INTRAVENOUS at 14:29:00

## 2022-06-21 RX ADMIN — SODIUM CHLORIDE, SODIUM LACTATE, POTASSIUM CHLORIDE, CALCIUM CHLORIDE: 600; 310; 30; 20 INJECTION, SOLUTION INTRAVENOUS at 14:56:00

## 2022-06-21 RX ADMIN — PHENYLEPHRINE HCL 100 MCG: 10 MG/ML VIAL (ML) INJECTION at 12:57:00

## 2022-06-21 NOTE — ANESTHESIA PROCEDURE NOTES
Airway  Date/Time: 6/21/2022 12:50 PM  Urgency: elective    Airway not difficult    General Information and Staff    Patient location during procedure: OR  Anesthesiologist: Ariel Joseph MD  Performed: anesthesiologist     Indications and Patient Condition  Indications for airway management: anesthesia  Sedation level: deep  Preoxygenated: yes  Patient position: sniffing  Mask difficulty assessment: 1 - vent by mask    Final Airway Details  Final airway type: endotracheal airway      Successful airway: ETT  Cuffed: yes   Successful intubation technique: direct laryngoscopy  Endotracheal tube insertion site: oral  Blade: Alexandro  Blade size: #4  ETT size (mm): 7.5    Cormack-Lehane Classification: grade IIA - partial view of glottis  Placement verified by: chest auscultation and capnometry   Measured from: lips  ETT to lips (cm): 23  Number of attempts at approach: 1    Additional Comments   Patient has two chipped incisors, with dental care pending. No change to this dental condition with airway management. OETT placed without airway or dental trauma.

## 2022-06-21 NOTE — ANESTHESIA PROCEDURE NOTES
Regional Block  Performed by: Shirley Conner MD  Authorized by: Shirley Conner MD       General Information and Staff    Start Time:  6/21/2022 12:46 PM  End Time:  6/21/2022 12:36 PM  Anesthesiologist:  Shirley Conner MD  Performed by: Anesthesiologist  Patient Location:  OR    Block Placement: Pre Induction  Site Identification: real time ultrasound guided and image stored and retrievable    Block site/laterality marked before start: site marked  Reason for Block: at surgeon's request and post-op pain management    Preanesthetic Checklist: 2 patient identifers, IV checked, site marked, risks and benefits discussed, monitors and equipment checked, pre-op evaluation, timeout performed, anesthesia consent, sterile technique used, no prohibitive neurological deficits and no local skin infection at insertion site      Procedure Details    Patient Position:   Prep: ChloraPrep   Monitoring:  Cardiac monitor, continuous pulse ox and blood pressure cuff  Block Type: Interscalene  Laterality:  Left  Injection Technique:  Single-shot    Needle    Needle Type:  Short-bevel and echogenic  Needle Gauge:  22 G  Needle Length:  50 mm  Needle Localization:  Ultrasound guidance and nerve stimulator  Reason for Ultrasound Use: appropriate spread of the medication was noted in real time and no ultrasound evidence of intravascular and/or intraneural injection    Nerve Stimulator: 0.4 amps  Muscle Twitch Response: deltoid response      Assessment    Injection Assessment:  Good spread noted, negative resistance, negative aspiration for heme, incremental injection, low pressure, local visualized surrounding nerve on ultrasound and no pain on injection  Heart Rate Change: No    - Patient tolerated block procedure well without evidence of immediate block related complications. Medications      Additional Comments    Medication: Local per MAR, bupivacaine, buprenorphine, and P.F. bupivacaine.

## 2022-06-21 NOTE — PLAN OF CARE
Patient alert and oriented x4. VSS, on RA. Discomfort reported to left shoulder, patient states expected pain, no need for pain medication at this time. Patient denies N/V. Tolerating PO intake at this time with water, diet to advance as tolerated per orders. Hx right BKA, patient has prosthesis at bedside. Left shoulder with microfoam tape dressing, immobilizer in place. Patient denies N/T to left hand, moderate  strength. Plan: therapy to see in AM, pain management, advance  diet.

## 2022-06-22 VITALS
TEMPERATURE: 99 F | WEIGHT: 213.88 LBS | BODY MASS INDEX: 29.94 KG/M2 | HEART RATE: 74 BPM | SYSTOLIC BLOOD PRESSURE: 137 MMHG | OXYGEN SATURATION: 93 % | DIASTOLIC BLOOD PRESSURE: 60 MMHG | HEIGHT: 71 IN | RESPIRATION RATE: 17 BRPM

## 2022-06-22 LAB
ANION GAP SERPL CALC-SCNC: 7 MMOL/L (ref 0–18)
BASOPHILS # BLD AUTO: 0.05 X10(3) UL (ref 0–0.2)
BASOPHILS NFR BLD AUTO: 0.6 %
BUN BLD-MCNC: 23 MG/DL (ref 7–18)
CALCIUM BLD-MCNC: 9.1 MG/DL (ref 8.5–10.1)
CHLORIDE SERPL-SCNC: 106 MMOL/L (ref 98–112)
CO2 SERPL-SCNC: 24 MMOL/L (ref 21–32)
CREAT BLD-MCNC: 1.21 MG/DL
EOSINOPHIL # BLD AUTO: 0.26 X10(3) UL (ref 0–0.7)
EOSINOPHIL NFR BLD AUTO: 3 %
ERYTHROCYTE [DISTWIDTH] IN BLOOD BY AUTOMATED COUNT: 12.5 %
GLUCOSE BLD-MCNC: 114 MG/DL (ref 70–99)
GLUCOSE BLD-MCNC: 119 MG/DL (ref 70–99)
GLUCOSE BLD-MCNC: 223 MG/DL (ref 70–99)
HCT VFR BLD AUTO: 32.7 %
HGB BLD-MCNC: 10.7 G/DL
IMM GRANULOCYTES # BLD AUTO: 0.02 X10(3) UL (ref 0–1)
IMM GRANULOCYTES NFR BLD: 0.2 %
LYMPHOCYTES # BLD AUTO: 1.43 X10(3) UL (ref 1–4)
LYMPHOCYTES NFR BLD AUTO: 16.3 %
MCH RBC QN AUTO: 30.6 PG (ref 26–34)
MCHC RBC AUTO-ENTMCNC: 32.7 G/DL (ref 31–37)
MCV RBC AUTO: 93.4 FL
MONOCYTES # BLD AUTO: 1.17 X10(3) UL (ref 0.1–1)
MONOCYTES NFR BLD AUTO: 13.3 %
NEUTROPHILS # BLD AUTO: 5.86 X10 (3) UL (ref 1.5–7.7)
NEUTROPHILS # BLD AUTO: 5.86 X10(3) UL (ref 1.5–7.7)
NEUTROPHILS NFR BLD AUTO: 66.6 %
OSMOLALITY SERPL CALC.SUM OF ELEC: 289 MOSM/KG (ref 275–295)
PLATELET # BLD AUTO: 188 10(3)UL (ref 150–450)
POTASSIUM SERPL-SCNC: 3.9 MMOL/L (ref 3.5–5.1)
RBC # BLD AUTO: 3.5 X10(6)UL
SODIUM SERPL-SCNC: 137 MMOL/L (ref 136–145)
WBC # BLD AUTO: 8.8 X10(3) UL (ref 4–11)

## 2022-06-22 PROCEDURE — 97165 OT EVAL LOW COMPLEX 30 MIN: CPT

## 2022-06-22 PROCEDURE — 97110 THERAPEUTIC EXERCISES: CPT

## 2022-06-22 PROCEDURE — 97535 SELF CARE MNGMENT TRAINING: CPT

## 2022-06-22 PROCEDURE — 97530 THERAPEUTIC ACTIVITIES: CPT

## 2022-06-22 PROCEDURE — 85025 COMPLETE CBC W/AUTO DIFF WBC: CPT | Performed by: HOSPITALIST

## 2022-06-22 PROCEDURE — 97161 PT EVAL LOW COMPLEX 20 MIN: CPT

## 2022-06-22 PROCEDURE — 82962 GLUCOSE BLOOD TEST: CPT

## 2022-06-22 PROCEDURE — 80048 BASIC METABOLIC PNL TOTAL CA: CPT | Performed by: HOSPITALIST

## 2022-06-22 RX ORDER — ASPIRIN 325 MG
325 TABLET ORAL DAILY
Status: DISCONTINUED | OUTPATIENT
Start: 2022-06-22 | End: 2022-06-22

## 2022-06-22 NOTE — PLAN OF CARE
Patient is alert and orientated in room. Post op day 0 of left shoulder surgery. Patient denies pain to the should site, tingling in his thumb. Bilateral radial pulses equal and moderate. No edema noted, left arm warn and dry. Patient complaints of pain from neuropathy. Left BKA with prothesis. States he is stable with prothesis and does not require walker or cane. Voids in urinal, clear yellow urine. Pain control and plain of care discussed with patient. No further questions or concerns at this time.

## 2022-06-22 NOTE — PLAN OF CARE
Patient alert and oriented x4. VSS, on RA. Pain reported to left shoulder, expected pain pt is taking scheduled medications. Neuropathic pain reported, scheduled home medication given. Immobilizer to FACUNDO BRITO. Gel ice wrap in place. Mircofoam tape dressing removed this AM per orders, aquacel dressing in place, CDI. Patient reports decreased sensation to left hand, moderate  strength. Patient denies N/V. Voiding via urinal. Patient to work with therapy this AM    Plan: therapy, discharge home with [facility] when cleared. After working with OT, pt unsteady on his feet with prosethesis.  To be evaluated by PT

## 2022-08-15 ENCOUNTER — APPOINTMENT (OUTPATIENT)
Dept: GENERAL RADIOLOGY | Facility: HOSPITAL | Age: 67
End: 2022-08-15
Attending: EMERGENCY MEDICINE
Payer: COMMERCIAL

## 2022-08-15 ENCOUNTER — APPOINTMENT (OUTPATIENT)
Dept: GENERAL RADIOLOGY | Facility: HOSPITAL | Age: 67
DRG: 617 | End: 2022-08-15
Attending: EMERGENCY MEDICINE
Payer: COMMERCIAL

## 2022-08-15 ENCOUNTER — HOSPITAL ENCOUNTER (INPATIENT)
Facility: HOSPITAL | Age: 67
LOS: 4 days | Discharge: HOME HEALTH | End: 2022-08-19
Attending: EMERGENCY MEDICINE | Admitting: HOSPITALIST
Payer: COMMERCIAL

## 2022-08-15 ENCOUNTER — HOSPITAL ENCOUNTER (INPATIENT)
Facility: HOSPITAL | Age: 67
LOS: 4 days | Discharge: HOME HEALTH | DRG: 617 | End: 2022-08-19
Attending: EMERGENCY MEDICINE | Admitting: HOSPITALIST
Payer: COMMERCIAL

## 2022-08-15 DIAGNOSIS — L03.032 CELLULITIS OF TOE OF LEFT FOOT: ICD-10-CM

## 2022-08-15 DIAGNOSIS — L97.529 DIABETIC ULCER OF TOE OF LEFT FOOT ASSOCIATED WITH DIABETES MELLITUS OF OTHER TYPE, UNSPECIFIED ULCER STAGE (HCC): ICD-10-CM

## 2022-08-15 DIAGNOSIS — M86.9 OSTEOMYELITIS OF LEFT FOOT, UNSPECIFIED TYPE (HCC): Primary | ICD-10-CM

## 2022-08-15 DIAGNOSIS — N28.9 RENAL INSUFFICIENCY: ICD-10-CM

## 2022-08-15 DIAGNOSIS — E13.621 DIABETIC ULCER OF TOE OF LEFT FOOT ASSOCIATED WITH DIABETES MELLITUS OF OTHER TYPE, UNSPECIFIED ULCER STAGE (HCC): ICD-10-CM

## 2022-08-15 LAB
ALBUMIN SERPL-MCNC: 3.3 G/DL (ref 3.4–5)
ALBUMIN/GLOB SERPL: 0.6 {RATIO} (ref 1–2)
ALP LIVER SERPL-CCNC: 49 U/L
ALT SERPL-CCNC: 13 U/L
ANION GAP SERPL CALC-SCNC: 4 MMOL/L (ref 0–18)
AST SERPL-CCNC: 8 U/L (ref 15–37)
BASOPHILS # BLD AUTO: 0.06 X10(3) UL (ref 0–0.2)
BASOPHILS NFR BLD AUTO: 0.5 %
BILIRUB SERPL-MCNC: 0.2 MG/DL (ref 0.1–2)
BUN BLD-MCNC: 34 MG/DL (ref 7–18)
CALCIUM BLD-MCNC: 10 MG/DL (ref 8.5–10.1)
CHLORIDE SERPL-SCNC: 106 MMOL/L (ref 98–112)
CO2 SERPL-SCNC: 26 MMOL/L (ref 21–32)
CREAT BLD-MCNC: 1.42 MG/DL
CRP SERPL-MCNC: 13.7 MG/DL (ref ?–0.3)
EOSINOPHIL # BLD AUTO: 0.16 X10(3) UL (ref 0–0.7)
EOSINOPHIL NFR BLD AUTO: 1.2 %
ERYTHROCYTE [DISTWIDTH] IN BLOOD BY AUTOMATED COUNT: 12.6 %
GFR SERPLBLD BASED ON 1.73 SQ M-ARVRAT: 54 ML/MIN/1.73M2 (ref 60–?)
GLOBULIN PLAS-MCNC: 5.4 G/DL (ref 2.8–4.4)
GLUCOSE BLD-MCNC: 132 MG/DL (ref 70–99)
GLUCOSE BLD-MCNC: 157 MG/DL (ref 70–99)
HCT VFR BLD AUTO: 31.2 %
HGB BLD-MCNC: 10 G/DL
IMM GRANULOCYTES # BLD AUTO: 0.04 X10(3) UL (ref 0–1)
IMM GRANULOCYTES NFR BLD: 0.3 %
LACTATE SERPL-SCNC: 1.2 MMOL/L (ref 0.4–2)
LYMPHOCYTES # BLD AUTO: 2.08 X10(3) UL (ref 1–4)
LYMPHOCYTES NFR BLD AUTO: 15.7 %
MCH RBC QN AUTO: 30.2 PG (ref 26–34)
MCHC RBC AUTO-ENTMCNC: 32.1 G/DL (ref 31–37)
MCV RBC AUTO: 94.3 FL
MONOCYTES # BLD AUTO: 1.09 X10(3) UL (ref 0.1–1)
MONOCYTES NFR BLD AUTO: 8.3 %
NEUTROPHILS # BLD AUTO: 9.78 X10 (3) UL (ref 1.5–7.7)
NEUTROPHILS # BLD AUTO: 9.78 X10(3) UL (ref 1.5–7.7)
NEUTROPHILS NFR BLD AUTO: 74 %
OSMOLALITY SERPL CALC.SUM OF ELEC: 293 MOSM/KG (ref 275–295)
PLATELET # BLD AUTO: 535 10(3)UL (ref 150–450)
POTASSIUM SERPL-SCNC: 4.6 MMOL/L (ref 3.5–5.1)
PROCALCITONIN SERPL-MCNC: <0.05 NG/ML (ref ?–0.16)
PROT SERPL-MCNC: 8.7 G/DL (ref 6.4–8.2)
RBC # BLD AUTO: 3.31 X10(6)UL
SARS-COV-2 RNA RESP QL NAA+PROBE: NOT DETECTED
SODIUM SERPL-SCNC: 136 MMOL/L (ref 136–145)
WBC # BLD AUTO: 13.2 X10(3) UL (ref 4–11)

## 2022-08-15 PROCEDURE — 87040 BLOOD CULTURE FOR BACTERIA: CPT | Performed by: EMERGENCY MEDICINE

## 2022-08-15 PROCEDURE — 99285 EMERGENCY DEPT VISIT HI MDM: CPT

## 2022-08-15 PROCEDURE — 82962 GLUCOSE BLOOD TEST: CPT

## 2022-08-15 PROCEDURE — 96374 THER/PROPH/DIAG INJ IV PUSH: CPT

## 2022-08-15 PROCEDURE — 85025 COMPLETE CBC W/AUTO DIFF WBC: CPT | Performed by: EMERGENCY MEDICINE

## 2022-08-15 PROCEDURE — 36415 COLL VENOUS BLD VENIPUNCTURE: CPT

## 2022-08-15 PROCEDURE — 83605 ASSAY OF LACTIC ACID: CPT | Performed by: EMERGENCY MEDICINE

## 2022-08-15 PROCEDURE — 73630 X-RAY EXAM OF FOOT: CPT | Performed by: EMERGENCY MEDICINE

## 2022-08-15 PROCEDURE — 86140 C-REACTIVE PROTEIN: CPT | Performed by: EMERGENCY MEDICINE

## 2022-08-15 PROCEDURE — 84145 PROCALCITONIN (PCT): CPT | Performed by: EMERGENCY MEDICINE

## 2022-08-15 PROCEDURE — 96375 TX/PRO/DX INJ NEW DRUG ADDON: CPT

## 2022-08-15 PROCEDURE — 80053 COMPREHEN METABOLIC PANEL: CPT | Performed by: EMERGENCY MEDICINE

## 2022-08-15 RX ORDER — DOXEPIN HYDROCHLORIDE 50 MG/1
1 CAPSULE ORAL DAILY
Status: DISCONTINUED | OUTPATIENT
Start: 2022-08-15 | End: 2022-08-19

## 2022-08-15 RX ORDER — DEXTROSE MONOHYDRATE 25 G/50ML
50 INJECTION, SOLUTION INTRAVENOUS
Status: DISCONTINUED | OUTPATIENT
Start: 2022-08-15 | End: 2022-08-19

## 2022-08-15 RX ORDER — HYDROMORPHONE HYDROCHLORIDE 1 MG/ML
1 INJECTION, SOLUTION INTRAMUSCULAR; INTRAVENOUS; SUBCUTANEOUS ONCE
Status: COMPLETED | OUTPATIENT
Start: 2022-08-15 | End: 2022-08-15

## 2022-08-15 RX ORDER — AMLODIPINE BESYLATE 5 MG/1
10 TABLET ORAL DAILY
Status: DISCONTINUED | OUTPATIENT
Start: 2022-08-15 | End: 2022-08-19

## 2022-08-15 RX ORDER — NICOTINE POLACRILEX 4 MG
30 LOZENGE BUCCAL
Status: DISCONTINUED | OUTPATIENT
Start: 2022-08-15 | End: 2022-08-19

## 2022-08-15 RX ORDER — HYDROCODONE BITARTRATE AND ACETAMINOPHEN 5; 325 MG/1; MG/1
1 TABLET ORAL EVERY 6 HOURS PRN
Status: DISCONTINUED | OUTPATIENT
Start: 2022-08-15 | End: 2022-08-18

## 2022-08-15 RX ORDER — ONDANSETRON 2 MG/ML
4 INJECTION INTRAMUSCULAR; INTRAVENOUS EVERY 4 HOURS PRN
Status: ACTIVE | OUTPATIENT
Start: 2022-08-15 | End: 2022-08-16

## 2022-08-15 RX ORDER — GLYBURIDE 5 MG/1
5 TABLET ORAL 2 TIMES DAILY WITH MEALS
COMMUNITY

## 2022-08-15 RX ORDER — CARBAMAZEPINE 200 MG/1
200 TABLET ORAL DAILY
Status: DISCONTINUED | OUTPATIENT
Start: 2022-08-16 | End: 2022-08-19

## 2022-08-15 RX ORDER — DULOXETIN HYDROCHLORIDE 30 MG/1
1 CAPSULE, DELAYED RELEASE ORAL NIGHTLY
COMMUNITY
Start: 2022-08-07

## 2022-08-15 RX ORDER — CARVEDILOL 12.5 MG/1
25 TABLET ORAL 2 TIMES DAILY
Status: DISCONTINUED | OUTPATIENT
Start: 2022-08-15 | End: 2022-08-19

## 2022-08-15 RX ORDER — ASPIRIN 325 MG
325 TABLET ORAL DAILY
Status: DISCONTINUED | OUTPATIENT
Start: 2022-08-15 | End: 2022-08-19

## 2022-08-15 RX ORDER — FOLIC ACID 1 MG/1
1 TABLET ORAL DAILY
Status: DISCONTINUED | OUTPATIENT
Start: 2022-08-15 | End: 2022-08-19

## 2022-08-15 RX ORDER — ALLOPURINOL 100 MG/1
100 TABLET ORAL DAILY
Status: DISCONTINUED | OUTPATIENT
Start: 2022-08-15 | End: 2022-08-19

## 2022-08-15 RX ORDER — EZETIMIBE 10 MG/1
10 TABLET ORAL NIGHTLY
Status: DISCONTINUED | OUTPATIENT
Start: 2022-08-15 | End: 2022-08-19

## 2022-08-15 RX ORDER — AMOXICILLIN AND CLAVULANATE POTASSIUM 875; 125 MG/1; MG/1
1 TABLET, FILM COATED ORAL 2 TIMES DAILY
COMMUNITY
End: 2022-08-19

## 2022-08-15 RX ORDER — FUROSEMIDE 20 MG/1
20 TABLET ORAL DAILY
Status: DISCONTINUED | OUTPATIENT
Start: 2022-08-15 | End: 2022-08-19

## 2022-08-15 RX ORDER — TRAMADOL HYDROCHLORIDE 50 MG/1
TABLET ORAL
COMMUNITY
End: 2022-08-19

## 2022-08-15 RX ORDER — MULTIVIT WITH MINERALS/LUTEIN
1000 TABLET ORAL DAILY
Status: DISCONTINUED | OUTPATIENT
Start: 2022-08-15 | End: 2022-08-19

## 2022-08-15 RX ORDER — GABAPENTIN 600 MG/1
1200 TABLET ORAL ONCE
Status: COMPLETED | OUTPATIENT
Start: 2022-08-15 | End: 2022-08-15

## 2022-08-15 RX ORDER — DOCUSATE SODIUM 100 MG/1
100 CAPSULE, LIQUID FILLED ORAL 2 TIMES DAILY
Status: DISCONTINUED | OUTPATIENT
Start: 2022-08-15 | End: 2022-08-17 | Stop reason: SDUPTHER

## 2022-08-15 RX ORDER — ACETAMINOPHEN 500 MG
500 TABLET ORAL EVERY 4 HOURS PRN
Status: DISCONTINUED | OUTPATIENT
Start: 2022-08-15 | End: 2022-08-19

## 2022-08-15 RX ORDER — MELATONIN
5000 DAILY
Status: DISCONTINUED | OUTPATIENT
Start: 2022-08-15 | End: 2022-08-19

## 2022-08-15 RX ORDER — LOSARTAN POTASSIUM 50 MG/1
50 TABLET ORAL 2 TIMES DAILY
Status: DISCONTINUED | OUTPATIENT
Start: 2022-08-15 | End: 2022-08-19

## 2022-08-15 RX ORDER — ASCORBIC ACID 500 MG
1000 TABLET ORAL DAILY
Status: DISCONTINUED | OUTPATIENT
Start: 2022-08-15 | End: 2022-08-19

## 2022-08-15 RX ORDER — MORPHINE SULFATE 4 MG/ML
4 INJECTION, SOLUTION INTRAMUSCULAR; INTRAVENOUS ONCE
Status: COMPLETED | OUTPATIENT
Start: 2022-08-15 | End: 2022-08-15

## 2022-08-15 RX ORDER — HEPARIN SODIUM 5000 [USP'U]/ML
5000 INJECTION, SOLUTION INTRAVENOUS; SUBCUTANEOUS EVERY 8 HOURS SCHEDULED
Status: DISCONTINUED | OUTPATIENT
Start: 2022-08-15 | End: 2022-08-19

## 2022-08-15 RX ORDER — GABAPENTIN 600 MG/1
1200 TABLET ORAL 3 TIMES DAILY
Status: DISCONTINUED | OUTPATIENT
Start: 2022-08-15 | End: 2022-08-19

## 2022-08-15 RX ORDER — FENOFIBRATE 134 MG/1
134 CAPSULE ORAL
Status: DISCONTINUED | OUTPATIENT
Start: 2022-08-16 | End: 2022-08-19

## 2022-08-15 RX ORDER — ONDANSETRON 2 MG/ML
4 INJECTION INTRAMUSCULAR; INTRAVENOUS ONCE
Status: COMPLETED | OUTPATIENT
Start: 2022-08-15 | End: 2022-08-15

## 2022-08-15 RX ORDER — NICOTINE POLACRILEX 4 MG
15 LOZENGE BUCCAL
Status: DISCONTINUED | OUTPATIENT
Start: 2022-08-15 | End: 2022-08-19

## 2022-08-15 RX ORDER — HYDROMORPHONE HYDROCHLORIDE 1 MG/ML
0.5 INJECTION, SOLUTION INTRAMUSCULAR; INTRAVENOUS; SUBCUTANEOUS EVERY 30 MIN PRN
Status: ACTIVE | OUTPATIENT
Start: 2022-08-15 | End: 2022-08-16

## 2022-08-15 NOTE — ED INITIAL ASSESSMENT (HPI)
Pt arrives ambulatory to triage, saw podiatrist today and sent patient in to have amputation scheduled in L foot. Hx of diabetes.  A&O x4,

## 2022-08-16 ENCOUNTER — ANESTHESIA EVENT (OUTPATIENT)
Dept: SURGERY | Facility: HOSPITAL | Age: 67
End: 2022-08-16
Payer: COMMERCIAL

## 2022-08-16 LAB
ANION GAP SERPL CALC-SCNC: 5 MMOL/L (ref 0–18)
BASOPHILS # BLD AUTO: 0.06 X10(3) UL (ref 0–0.2)
BASOPHILS NFR BLD AUTO: 0.6 %
BILIRUB UR QL STRIP.AUTO: NEGATIVE
BUN BLD-MCNC: 30 MG/DL (ref 7–18)
CALCIUM BLD-MCNC: 9.8 MG/DL (ref 8.5–10.1)
CHLORIDE SERPL-SCNC: 106 MMOL/L (ref 98–112)
CO2 SERPL-SCNC: 26 MMOL/L (ref 21–32)
COLOR UR AUTO: YELLOW
CREAT BLD-MCNC: 1.24 MG/DL
EOSINOPHIL # BLD AUTO: 0.23 X10(3) UL (ref 0–0.7)
EOSINOPHIL NFR BLD AUTO: 2.4 %
ERYTHROCYTE [DISTWIDTH] IN BLOOD BY AUTOMATED COUNT: 12.8 %
GFR SERPLBLD BASED ON 1.73 SQ M-ARVRAT: 63 ML/MIN/1.73M2 (ref 60–?)
GLUCOSE BLD-MCNC: 129 MG/DL (ref 70–99)
GLUCOSE BLD-MCNC: 142 MG/DL (ref 70–99)
GLUCOSE BLD-MCNC: 154 MG/DL (ref 70–99)
GLUCOSE BLD-MCNC: 177 MG/DL (ref 70–99)
GLUCOSE BLD-MCNC: 278 MG/DL (ref 70–99)
GLUCOSE UR STRIP.AUTO-MCNC: NEGATIVE MG/DL
HCT VFR BLD AUTO: 29.5 %
HGB BLD-MCNC: 9.4 G/DL
IMM GRANULOCYTES # BLD AUTO: 0.04 X10(3) UL (ref 0–1)
IMM GRANULOCYTES NFR BLD: 0.4 %
LEUKOCYTE ESTERASE UR QL STRIP.AUTO: NEGATIVE
LYMPHOCYTES # BLD AUTO: 2.36 X10(3) UL (ref 1–4)
LYMPHOCYTES NFR BLD AUTO: 24.6 %
MAGNESIUM SERPL-MCNC: 2.1 MG/DL (ref 1.6–2.6)
MCH RBC QN AUTO: 30.4 PG (ref 26–34)
MCHC RBC AUTO-ENTMCNC: 31.9 G/DL (ref 31–37)
MCV RBC AUTO: 95.5 FL
MONOCYTES # BLD AUTO: 1.21 X10(3) UL (ref 0.1–1)
MONOCYTES NFR BLD AUTO: 12.6 %
NEUTROPHILS # BLD AUTO: 5.71 X10 (3) UL (ref 1.5–7.7)
NEUTROPHILS # BLD AUTO: 5.71 X10(3) UL (ref 1.5–7.7)
NEUTROPHILS NFR BLD AUTO: 59.4 %
NITRITE UR QL STRIP.AUTO: NEGATIVE
OSMOLALITY SERPL CALC.SUM OF ELEC: 292 MOSM/KG (ref 275–295)
PH UR STRIP.AUTO: 6 [PH] (ref 5–8)
PLATELET # BLD AUTO: 383 10(3)UL (ref 150–450)
POTASSIUM SERPL-SCNC: 4.4 MMOL/L (ref 3.5–5.1)
PROT UR STRIP.AUTO-MCNC: 30 MG/DL
RBC # BLD AUTO: 3.09 X10(6)UL
RBC UR QL AUTO: NEGATIVE
SODIUM SERPL-SCNC: 137 MMOL/L (ref 136–145)
SP GR UR STRIP.AUTO: 1.02 (ref 1–1.03)
UROBILINOGEN UR STRIP.AUTO-MCNC: <2 MG/DL
WBC # BLD AUTO: 9.6 X10(3) UL (ref 4–11)

## 2022-08-16 PROCEDURE — 82962 GLUCOSE BLOOD TEST: CPT

## 2022-08-16 PROCEDURE — 85025 COMPLETE CBC W/AUTO DIFF WBC: CPT | Performed by: HOSPITALIST

## 2022-08-16 PROCEDURE — 80048 BASIC METABOLIC PNL TOTAL CA: CPT | Performed by: HOSPITALIST

## 2022-08-16 PROCEDURE — 83735 ASSAY OF MAGNESIUM: CPT | Performed by: HOSPITALIST

## 2022-08-16 PROCEDURE — 81001 URINALYSIS AUTO W/SCOPE: CPT | Performed by: HOSPITALIST

## 2022-08-16 NOTE — PROGRESS NOTES
Went to have pt sign his consent form for surgery, pt states he wanted to speak with someone, states, \"I am not in the right headspace. \"  RN asked pt if he had feelings of harming himself, pt replied no. Pt states he has had depression since his R BKA a few years ago, but has never seen someone or been on medications. RN filled out PHQ questionnaire again, and placed order for psych liason consult. Pt states he wants to talk to someone. RN called and spoke with liason to inform, she is aware. Will inform Dr. Maynard Nicely as well.

## 2022-08-16 NOTE — PLAN OF CARE
NURSING ADMISSION NOTE      Patient admitted via Cart  Oriented to room. Safety precautions initiated. Bed in low position. Call light in reach. ED admit from podiatrist's office for OM of Lt foot. A/O x4, VSS on RA, c/o mild pain at this time. , tele. Voiding freely, last BM 8/14. R BKA, prosthesis, CAM boot, and cane at bedside. CGM to [facility]. NPO at [place] for surgery tomorrow. Safety measures in place.

## 2022-08-16 NOTE — ED QUICK NOTES
Orders for admission, patient is aware of plan and ready to go upstairs.  Any questions, please call ED Dilia at extension [phone]    Patient Covid vaccination status: Fully vaccinated     COVID Test Ordered in ED: Rapid SARS-CoV-2 by PCR    COVID Suspicion at Admission: No risk with negative rapid    Running Infusions:  None    Mental Status/LOC at time of transport: oriented x4    Other pertinent information: family  At bed side  CIWA score: N/A   NIH score:  N/A
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
## 2022-08-16 NOTE — PLAN OF CARE
Pt A & O x4, on RA. /IS. Pt has neuropathy in BLE. R BKA with prosthesis. Heparin subcutaneous on hold for surgery later today. Tele-NSR. NPO per Dr. Josy Pena after 0900 today. CGM to LLQ abdomen. CAM boot to Left foot when up. Kerlix CDI to left foot. Upx 1 with cane. Last BM 8/14. Paged Dr. Iliana Davey at this time to ensure consult was called in ER. Norco PRN pain. Will continue to monitor.

## 2022-08-16 NOTE — PROGRESS NOTES
Surgery cancelled as OR unavailable until 10pm.    Discussed with nurse and patient  Will reschedule surgery for tomorrow at 5pm

## 2022-08-17 ENCOUNTER — APPOINTMENT (OUTPATIENT)
Dept: ULTRASOUND IMAGING | Facility: HOSPITAL | Age: 67
End: 2022-08-17
Attending: HOSPITALIST
Payer: COMMERCIAL

## 2022-08-17 ENCOUNTER — ANESTHESIA (OUTPATIENT)
Dept: SURGERY | Facility: HOSPITAL | Age: 67
End: 2022-08-17
Payer: COMMERCIAL

## 2022-08-17 ENCOUNTER — APPOINTMENT (OUTPATIENT)
Dept: ULTRASOUND IMAGING | Facility: HOSPITAL | Age: 67
DRG: 617 | End: 2022-08-17
Attending: HOSPITALIST
Payer: COMMERCIAL

## 2022-08-17 LAB
GLUCOSE BLD-MCNC: 116 MG/DL (ref 70–99)
GLUCOSE BLD-MCNC: 142 MG/DL (ref 70–99)
GLUCOSE BLD-MCNC: 154 MG/DL (ref 70–99)
GLUCOSE BLD-MCNC: 183 MG/DL (ref 70–99)

## 2022-08-17 PROCEDURE — 87076 CULTURE ANAEROBE IDENT EACH: CPT | Performed by: PODIATRIST

## 2022-08-17 PROCEDURE — 87070 CULTURE OTHR SPECIMN AEROBIC: CPT | Performed by: PODIATRIST

## 2022-08-17 PROCEDURE — 0Y6U0Z0 DETACHMENT AT LEFT 3RD TOE, COMPLETE, OPEN APPROACH: ICD-10-PCS | Performed by: PODIATRIST

## 2022-08-17 PROCEDURE — 87075 CULTR BACTERIA EXCEPT BLOOD: CPT | Performed by: PODIATRIST

## 2022-08-17 PROCEDURE — 87102 FUNGUS ISOLATION CULTURE: CPT | Performed by: PODIATRIST

## 2022-08-17 PROCEDURE — 87206 SMEAR FLUORESCENT/ACID STAI: CPT | Performed by: PODIATRIST

## 2022-08-17 PROCEDURE — 88305 TISSUE EXAM BY PATHOLOGIST: CPT | Performed by: PODIATRIST

## 2022-08-17 PROCEDURE — 87205 SMEAR GRAM STAIN: CPT | Performed by: PODIATRIST

## 2022-08-17 PROCEDURE — 87176 TISSUE HOMOGENIZATION CULTR: CPT | Performed by: PODIATRIST

## 2022-08-17 PROCEDURE — 88311 DECALCIFY TISSUE: CPT | Performed by: PODIATRIST

## 2022-08-17 PROCEDURE — 82962 GLUCOSE BLOOD TEST: CPT

## 2022-08-17 PROCEDURE — 87116 MYCOBACTERIA CULTURE: CPT | Performed by: PODIATRIST

## 2022-08-17 PROCEDURE — 87077 CULTURE AEROBIC IDENTIFY: CPT | Performed by: PODIATRIST

## 2022-08-17 PROCEDURE — 93922 UPR/L XTREMITY ART 2 LEVELS: CPT | Performed by: HOSPITALIST

## 2022-08-17 PROCEDURE — 87186 SC STD MICRODIL/AGAR DIL: CPT | Performed by: PODIATRIST

## 2022-08-17 PROCEDURE — 0Y6S0Z0 DETACHMENT AT LEFT 2ND TOE, COMPLETE, OPEN APPROACH: ICD-10-PCS | Performed by: PODIATRIST

## 2022-08-17 RX ORDER — NALOXONE HYDROCHLORIDE 0.4 MG/ML
80 INJECTION, SOLUTION INTRAMUSCULAR; INTRAVENOUS; SUBCUTANEOUS AS NEEDED
Status: DISCONTINUED | OUTPATIENT
Start: 2022-08-17 | End: 2022-08-17 | Stop reason: HOSPADM

## 2022-08-17 RX ORDER — MAGNESIUM HYDROXIDE 1200 MG/15ML
LIQUID ORAL CONTINUOUS PRN
Status: COMPLETED | OUTPATIENT
Start: 2022-08-17 | End: 2022-08-17

## 2022-08-17 RX ORDER — SODIUM CHLORIDE, SODIUM LACTATE, POTASSIUM CHLORIDE, CALCIUM CHLORIDE 600; 310; 30; 20 MG/100ML; MG/100ML; MG/100ML; MG/100ML
INJECTION, SOLUTION INTRAVENOUS CONTINUOUS PRN
Status: DISCONTINUED | OUTPATIENT
Start: 2022-08-17 | End: 2022-08-17 | Stop reason: SURG

## 2022-08-17 RX ORDER — METOCLOPRAMIDE HYDROCHLORIDE 5 MG/ML
10 INJECTION INTRAMUSCULAR; INTRAVENOUS EVERY 8 HOURS PRN
Status: DISCONTINUED | OUTPATIENT
Start: 2022-08-17 | End: 2022-08-17 | Stop reason: HOSPADM

## 2022-08-17 RX ORDER — POLYETHYLENE GLYCOL 3350 17 G/17G
17 POWDER, FOR SOLUTION ORAL DAILY PRN
Status: DISCONTINUED | OUTPATIENT
Start: 2022-08-17 | End: 2022-08-19

## 2022-08-17 RX ORDER — BISACODYL 10 MG
10 SUPPOSITORY, RECTAL RECTAL
Status: DISCONTINUED | OUTPATIENT
Start: 2022-08-17 | End: 2022-08-19

## 2022-08-17 RX ORDER — HYDROMORPHONE HYDROCHLORIDE 1 MG/ML
0.4 INJECTION, SOLUTION INTRAMUSCULAR; INTRAVENOUS; SUBCUTANEOUS EVERY 5 MIN PRN
Status: DISCONTINUED | OUTPATIENT
Start: 2022-08-17 | End: 2022-08-17 | Stop reason: HOSPADM

## 2022-08-17 RX ORDER — MIDAZOLAM HYDROCHLORIDE 1 MG/ML
1 INJECTION INTRAMUSCULAR; INTRAVENOUS EVERY 5 MIN PRN
Status: DISCONTINUED | OUTPATIENT
Start: 2022-08-17 | End: 2022-08-17 | Stop reason: HOSPADM

## 2022-08-17 RX ORDER — METOCLOPRAMIDE HYDROCHLORIDE 5 MG/ML
INJECTION INTRAMUSCULAR; INTRAVENOUS AS NEEDED
Status: DISCONTINUED | OUTPATIENT
Start: 2022-08-17 | End: 2022-08-17 | Stop reason: SURG

## 2022-08-17 RX ORDER — HYDROCODONE BITARTRATE AND ACETAMINOPHEN 5; 325 MG/1; MG/1
2 TABLET ORAL ONCE AS NEEDED
Status: DISCONTINUED | OUTPATIENT
Start: 2022-08-17 | End: 2022-08-17 | Stop reason: HOSPADM

## 2022-08-17 RX ORDER — MEPERIDINE HYDROCHLORIDE 25 MG/ML
12.5 INJECTION INTRAMUSCULAR; INTRAVENOUS; SUBCUTANEOUS AS NEEDED
Status: DISCONTINUED | OUTPATIENT
Start: 2022-08-17 | End: 2022-08-17 | Stop reason: HOSPADM

## 2022-08-17 RX ORDER — LIDOCAINE HYDROCHLORIDE 10 MG/ML
INJECTION, SOLUTION EPIDURAL; INFILTRATION; INTRACAUDAL; PERINEURAL AS NEEDED
Status: DISCONTINUED | OUTPATIENT
Start: 2022-08-17 | End: 2022-08-17 | Stop reason: SURG

## 2022-08-17 RX ORDER — HYDROCODONE BITARTRATE AND ACETAMINOPHEN 5; 325 MG/1; MG/1
1 TABLET ORAL ONCE AS NEEDED
Status: DISCONTINUED | OUTPATIENT
Start: 2022-08-17 | End: 2022-08-17 | Stop reason: HOSPADM

## 2022-08-17 RX ORDER — SODIUM CHLORIDE, SODIUM LACTATE, POTASSIUM CHLORIDE, CALCIUM CHLORIDE 600; 310; 30; 20 MG/100ML; MG/100ML; MG/100ML; MG/100ML
INJECTION, SOLUTION INTRAVENOUS CONTINUOUS
Status: DISCONTINUED | OUTPATIENT
Start: 2022-08-17 | End: 2022-08-17 | Stop reason: HOSPADM

## 2022-08-17 RX ORDER — METOPROLOL TARTRATE 5 MG/5ML
2.5 INJECTION INTRAVENOUS ONCE
Status: DISCONTINUED | OUTPATIENT
Start: 2022-08-17 | End: 2022-08-17 | Stop reason: HOSPADM

## 2022-08-17 RX ORDER — DOCUSATE SODIUM 100 MG/1
100 CAPSULE, LIQUID FILLED ORAL 2 TIMES DAILY
Status: DISCONTINUED | OUTPATIENT
Start: 2022-08-17 | End: 2022-08-19

## 2022-08-17 RX ORDER — MORPHINE SULFATE 2 MG/ML
2 INJECTION, SOLUTION INTRAMUSCULAR; INTRAVENOUS ONCE
Status: COMPLETED | OUTPATIENT
Start: 2022-08-17 | End: 2022-08-17

## 2022-08-17 RX ORDER — SODIUM PHOSPHATE, DIBASIC AND SODIUM PHOSPHATE, MONOBASIC 7; 19 G/133ML; G/133ML
1 ENEMA RECTAL ONCE AS NEEDED
Status: DISCONTINUED | OUTPATIENT
Start: 2022-08-17 | End: 2022-08-19

## 2022-08-17 RX ORDER — HYDROMORPHONE HYDROCHLORIDE 1 MG/ML
0.6 INJECTION, SOLUTION INTRAMUSCULAR; INTRAVENOUS; SUBCUTANEOUS EVERY 5 MIN PRN
Status: DISCONTINUED | OUTPATIENT
Start: 2022-08-17 | End: 2022-08-17 | Stop reason: HOSPADM

## 2022-08-17 RX ORDER — ONDANSETRON 2 MG/ML
4 INJECTION INTRAMUSCULAR; INTRAVENOUS EVERY 6 HOURS PRN
Status: DISCONTINUED | OUTPATIENT
Start: 2022-08-17 | End: 2022-08-17 | Stop reason: HOSPADM

## 2022-08-17 RX ORDER — HYDROMORPHONE HYDROCHLORIDE 1 MG/ML
0.2 INJECTION, SOLUTION INTRAMUSCULAR; INTRAVENOUS; SUBCUTANEOUS EVERY 5 MIN PRN
Status: DISCONTINUED | OUTPATIENT
Start: 2022-08-17 | End: 2022-08-17 | Stop reason: HOSPADM

## 2022-08-17 RX ORDER — ACETAMINOPHEN 500 MG
1000 TABLET ORAL ONCE AS NEEDED
Status: DISCONTINUED | OUTPATIENT
Start: 2022-08-17 | End: 2022-08-17 | Stop reason: HOSPADM

## 2022-08-17 RX ORDER — SENNOSIDES 8.6 MG
17.2 TABLET ORAL NIGHTLY
Status: DISCONTINUED | OUTPATIENT
Start: 2022-08-17 | End: 2022-08-19

## 2022-08-17 RX ORDER — ONDANSETRON 2 MG/ML
INJECTION INTRAMUSCULAR; INTRAVENOUS AS NEEDED
Status: DISCONTINUED | OUTPATIENT
Start: 2022-08-17 | End: 2022-08-17 | Stop reason: SURG

## 2022-08-17 RX ORDER — CEFAZOLIN SODIUM 1 G/3ML
INJECTION, POWDER, FOR SOLUTION INTRAMUSCULAR; INTRAVENOUS AS NEEDED
Status: DISCONTINUED | OUTPATIENT
Start: 2022-08-17 | End: 2022-08-17 | Stop reason: SURG

## 2022-08-17 RX ADMIN — LIDOCAINE HYDROCHLORIDE 50 MG: 10 INJECTION, SOLUTION EPIDURAL; INFILTRATION; INTRACAUDAL; PERINEURAL at 18:40:00

## 2022-08-17 RX ADMIN — CEFAZOLIN SODIUM 2 G: 1 INJECTION, POWDER, FOR SOLUTION INTRAMUSCULAR; INTRAVENOUS at 18:56:00

## 2022-08-17 RX ADMIN — METOCLOPRAMIDE HYDROCHLORIDE 10 MG: 5 INJECTION INTRAMUSCULAR; INTRAVENOUS at 18:50:00

## 2022-08-17 RX ADMIN — ONDANSETRON 4 MG: 2 INJECTION INTRAMUSCULAR; INTRAVENOUS at 19:01:00

## 2022-08-17 RX ADMIN — SODIUM CHLORIDE, SODIUM LACTATE, POTASSIUM CHLORIDE, CALCIUM CHLORIDE: 600; 310; 30; 20 INJECTION, SOLUTION INTRAVENOUS at 18:36:00

## 2022-08-17 NOTE — PLAN OF CARE
Patient alert and oriented x4. VSS, on RA. Tele in place; NSR. Neuropathy pain reported to LLE, scheduled PO medication given. PO and IV PRN pain medication given this AM. Patient transferred to chair this AM x1 person SBA with prosthesis to RLE. Dressing in place to Left foot, CDI.  Voiding via urinal.     Plan: NPO for surgery this evening

## 2022-08-17 NOTE — ANESTHESIA PROCEDURE NOTES
Airway  Date/Time: 8/17/2022 6:43 PM  Urgency: elective      General Information and Staff    Patient location during procedure: OR  Anesthesiologist: Lorene Garza MD  Performed: anesthesiologist     Indications and Patient Condition  Indications for airway management: anesthesia  Spontaneous ventilation: present  Sedation level: deep  Preoxygenated: yes  Patient position: sniffing  Mask difficulty assessment: 0 - not attempted    Final Airway Details  Final airway type: supraglottic airway      Successful airway: classic  Size 4      Number of attempts at approach: 1  Number of other approaches attempted: 0

## 2022-08-17 NOTE — PLAN OF CARE
Patient is alert and oriented x4. Up with Stand by assist and walker. On room air. Vital signs are stable. Lungs are clear. Patient has neuropathy to lower extremities. Prosthetic to right lower extremity. Plan for surgery tomorrow evening. Patient will be NPO at 0800 8/17/22.

## 2022-08-18 LAB
ERYTHROCYTE [DISTWIDTH] IN BLOOD BY AUTOMATED COUNT: 12.5 %
GLUCOSE BLD-MCNC: 153 MG/DL (ref 70–99)
GLUCOSE BLD-MCNC: 155 MG/DL (ref 70–99)
GLUCOSE BLD-MCNC: 206 MG/DL (ref 70–99)
GLUCOSE BLD-MCNC: 213 MG/DL (ref 70–99)
HCT VFR BLD AUTO: 28.5 %
HGB BLD-MCNC: 9.3 G/DL
MCH RBC QN AUTO: 30.1 PG (ref 26–34)
MCHC RBC AUTO-ENTMCNC: 32.6 G/DL (ref 31–37)
MCV RBC AUTO: 92.2 FL
PLATELET # BLD AUTO: 392 10(3)UL (ref 150–450)
RBC # BLD AUTO: 3.09 X10(6)UL
WBC # BLD AUTO: 10.5 X10(3) UL (ref 4–11)

## 2022-08-18 PROCEDURE — 82962 GLUCOSE BLOOD TEST: CPT

## 2022-08-18 PROCEDURE — 85027 COMPLETE CBC AUTOMATED: CPT | Performed by: PODIATRIST

## 2022-08-18 PROCEDURE — 97161 PT EVAL LOW COMPLEX 20 MIN: CPT

## 2022-08-18 PROCEDURE — 97116 GAIT TRAINING THERAPY: CPT

## 2022-08-18 RX ORDER — VANCOMYCIN 2 GRAM/500 ML IN 0.9 % SODIUM CHLORIDE INTRAVENOUS
25 ONCE
Status: COMPLETED | OUTPATIENT
Start: 2022-08-18 | End: 2022-08-19

## 2022-08-18 RX ORDER — HYDROCODONE BITARTRATE AND ACETAMINOPHEN 5; 325 MG/1; MG/1
2 TABLET ORAL EVERY 4 HOURS PRN
Status: DISCONTINUED | OUTPATIENT
Start: 2022-08-18 | End: 2022-08-19

## 2022-08-18 RX ORDER — MORPHINE SULFATE 2 MG/ML
2 INJECTION, SOLUTION INTRAMUSCULAR; INTRAVENOUS
Status: DISCONTINUED | OUTPATIENT
Start: 2022-08-18 | End: 2022-08-19

## 2022-08-18 RX ORDER — MORPHINE SULFATE 2 MG/ML
1 INJECTION, SOLUTION INTRAMUSCULAR; INTRAVENOUS
Status: DISCONTINUED | OUTPATIENT
Start: 2022-08-18 | End: 2022-08-19

## 2022-08-18 RX ORDER — MELATONIN
3 NIGHTLY PRN
Status: DISCONTINUED | OUTPATIENT
Start: 2022-08-18 | End: 2022-08-19

## 2022-08-18 RX ORDER — METRONIDAZOLE 250 MG/1
250 TABLET ORAL EVERY 8 HOURS SCHEDULED
Status: DISCONTINUED | OUTPATIENT
Start: 2022-08-18 | End: 2022-08-19

## 2022-08-18 RX ORDER — HYDROCODONE BITARTRATE AND ACETAMINOPHEN 5; 325 MG/1; MG/1
1 TABLET ORAL EVERY 4 HOURS PRN
Status: DISCONTINUED | OUTPATIENT
Start: 2022-08-18 | End: 2022-08-19

## 2022-08-18 RX ORDER — VANCOMYCIN HYDROCHLORIDE
15
Status: DISCONTINUED | OUTPATIENT
Start: 2022-08-19 | End: 2022-08-19

## 2022-08-18 NOTE — BRIEF OP NOTE
Pre-Operative Diagnosis:   1. Infected Diabetic ulcers left 2nd and 3rd digits  2. Acute osteomyelitis left 2nd and 3rd digits     Post-Operative Diagnosis: same     Procedure Performed:   LEFT FOOT SECOND AND THIRD TOE AMPUTATION    Surgeon(s) and Role:     Cary Jolley DPM - Primary    Assistant(s):   none     Surgical Findings: significant purulent drainage left 2nd and 3rd digits. Toes amputated at metatarsophalangeal joints. No clinical or intra operative signs of infection at that level.      Specimen:   Left 2nd and 3rd toes - path and micro     Estimated Blood Loss: same      Thanh Herr DPM  8/17/2022  7:17 PM

## 2022-08-18 NOTE — OPERATIVE REPORT
[address]    PATIENT'S NAME: Giovanni Wade   ATTENDING PHYSICIAN: Deisy Avalos M.D. OPERATING PHYSICIAN: Laura Swanson D.P.M. PATIENT ACCOUNT#:   [de-identified]    LOCATION:  [address] [place] [address]  MEDICAL RECORD #:   [id number]       YOB: 1955  ADMISSION DATE:       08/15/2022      OPERATION DATE:  08/17/2022    OPERATIVE REPORT    PREOPERATIVE DIAGNOSIS:    1. Infected diabetic ulcers, left second and third digits. 2.   Acute osteomyelitis, left second and third digits. POSTOPERATIVE DIAGNOSIS:    1. Infected diabetic ulcers, left second and third digits. 2.   Acute osteomyelitis, left second and third digits. PROCEDURE:  Left second and third toe amputations. ASSISTANT:  None. ANESTHESIA:  LMA. BLOOD LOSS:  10 mL. COMPLICATIONS:  None. SPECIMENS:  Left second and third toes sent to Pathology and Microbiology for aerobic and anaerobic cultures. IMPLANTS:  None. PROCEDURE:  After obtaining informed consent, the patient was brought to the operating room, placed on the operating room table in a supine position. No tourniquet was utilized for the duration of the case. After administration of an LMA by the anesthesiologist, the left lower extremity was prepped and draped per standard OR protocol. Attention was then directed to the patient's left foot where necrotic, infected diabetic ulcers were noted over the distal tip of the left second and third digits. Two towel clamps were utilized to grasp the second and third digits and a fishmouth-type incision was placed at the left metatarsophalangeal joint; it was full thickness down to the level of the bone. Second and third digits were then disarticulated at the metatarsophalangeal joints, passed off the field. Half of it was sent to Pathology. The other half was sent to Microbiology for aerobic and anaerobic cultures. At this point, the incision was copiously irrigated.   Hemostasis was obtained with electrocautery. There was no clinical evidence of infection at the level of the metatarsophalangeal joint and the second and third metatarsal heads appeared intact and viable. At this point, the incision was primarily closed with 2-0 Vicryl, 3-0 Vicryl and 3-0 nylon suture. Adaptic, 4 x 4's, and a dry sterile dressing were applied to patient's left foot. Patient tolerated the procedure and anesthesia well without any complications. The patient was transported from the operating room to the postanesthesia care unit with vital signs stable and capillary refill time instantaneous.     Dictated By Lesley Urbina D.P.M.  d: 08/17/2022 19:59:52  t: 08/18/2022 72:29:32  Job [id number]  YL/

## 2022-08-18 NOTE — PLAN OF CARE
Problem: PAIN - ADULT  Goal: Verbalizes/displays adequate comfort level or patient's stated pain goal  Description: INTERVENTIONS:  - Encourage pt to monitor pain and request assistance  - Assess pain using appropriate pain scale  - Administer analgesics based on type and severity of pain and evaluate response  - Implement non-pharmacological measures as appropriate and evaluate response  - Consider cultural and social influences on pain and pain management  - Manage/alleviate anxiety  - Utilize distraction and/or relaxation techniques  - Monitor for opioid side effects  - Notify MD/LIP if interventions unsuccessful or patient reports new pain  - Anticipate increased pain with activity and pre-medicate as appropriate  Outcome: Progressing    Verbalized having neuropathy pain to LLE last night but feels much better this morning. Left foot with ace wrap dressing in place, looks clean, dry and intact. Right BKA, patient sitting up in the chair with prothesis on. ID ordered IV vancomycin and Cefepime, Tissue culture still pending.

## 2022-08-18 NOTE — PLAN OF CARE
Patient is alert and oriented x4. Weight heel baring to left foot with surgical shoe. Surgical shoe ordered and now in patients room. Patient urinating via urinal. Vital signs are stable. On room air. Lungs are clear. Plan for patient to work with PT/OT in the morning.

## 2022-08-18 NOTE — ANESTHESIA POSTPROCEDURE EVALUATION
[address] [facility] Patient Status:  Inpatient   Age/Gender 75year old male MRN [id number]   [facility] SURGERY Attending Cleo Will MD   Hosp Day # 2 PCP Eladio Lima MD       Anesthesia Post-op Note    LEFT FOOT SECOND AND THIRD TOE AMPUTATION, POSSIBLE TRANSMETATARSAL AMPUTATION LEFT FOOT    Procedure Summary     Date: 08/17/22 Room / Location: 140[facility] MAIN OR 05 / 140[facility] MAIN OR    Anesthesia Start: R Tapada Marinha 70 Anesthesia Stop: 1920    Procedure: LEFT FOOT SECOND AND THIRD TOE AMPUTATION, POSSIBLE TRANSMETATARSAL AMPUTATION LEFT FOOT (Left ) Diagnosis:       Cellulitis of toe of left foot      (CELLULITIS)    Surgeons: Steven Toney DPM Anesthesiologist: Alyssia Rivera MD    Anesthesia Type: general ASA Status: 3          Anesthesia Type: general    Vitals Value Taken Time   /66 08/17/22 1922   Temp 98.9 08/17/22 1923   Pulse 67 08/17/22 1922   Resp 11 08/17/22 1922   SpO2 98 % 08/17/22 1922   Vitals shown include unvalidated device data. Patient Location: PACU    Anesthesia Type: general    Airway Patency: patent    Postop Pain Control: adequate    Mental Status: preanesthetic baseline    Nausea/Vomiting: none    Cardiopulmonary/Hydration status: stable euvolemic    Complications: no apparent anesthesia related complications    Postop vital signs: stable    Patient to be discharged from PACU when criteria met.

## 2022-08-19 VITALS
BODY MASS INDEX: 29 KG/M2 | HEART RATE: 61 BPM | SYSTOLIC BLOOD PRESSURE: 115 MMHG | WEIGHT: 208 LBS | DIASTOLIC BLOOD PRESSURE: 50 MMHG | RESPIRATION RATE: 19 BRPM | TEMPERATURE: 98 F | OXYGEN SATURATION: 95 %

## 2022-08-19 LAB
ANION GAP SERPL CALC-SCNC: 6 MMOL/L (ref 0–18)
BUN BLD-MCNC: 29 MG/DL (ref 7–18)
CALCIUM BLD-MCNC: 9.8 MG/DL (ref 8.5–10.1)
CHLORIDE SERPL-SCNC: 109 MMOL/L (ref 98–112)
CO2 SERPL-SCNC: 23 MMOL/L (ref 21–32)
CREAT BLD-MCNC: 1.32 MG/DL
ERYTHROCYTE [DISTWIDTH] IN BLOOD BY AUTOMATED COUNT: 12.4 %
GFR SERPLBLD BASED ON 1.73 SQ M-ARVRAT: 59 ML/MIN/1.73M2 (ref 60–?)
GLUCOSE BLD-MCNC: 159 MG/DL (ref 70–99)
GLUCOSE BLD-MCNC: 187 MG/DL (ref 70–99)
GLUCOSE BLD-MCNC: 193 MG/DL (ref 70–99)
HCT VFR BLD AUTO: 29.5 %
HGB BLD-MCNC: 9.3 G/DL
MCH RBC QN AUTO: 30.2 PG (ref 26–34)
MCHC RBC AUTO-ENTMCNC: 31.5 G/DL (ref 31–37)
MCV RBC AUTO: 95.8 FL
OSMOLALITY SERPL CALC.SUM OF ELEC: 295 MOSM/KG (ref 275–295)
PLATELET # BLD AUTO: 453 10(3)UL (ref 150–450)
POTASSIUM SERPL-SCNC: 4.1 MMOL/L (ref 3.5–5.1)
RBC # BLD AUTO: 3.08 X10(6)UL
SODIUM SERPL-SCNC: 138 MMOL/L (ref 136–145)
WBC # BLD AUTO: 11 X10(3) UL (ref 4–11)

## 2022-08-19 PROCEDURE — 85027 COMPLETE CBC AUTOMATED: CPT | Performed by: PODIATRIST

## 2022-08-19 PROCEDURE — 82962 GLUCOSE BLOOD TEST: CPT

## 2022-08-19 PROCEDURE — 80048 BASIC METABOLIC PNL TOTAL CA: CPT | Performed by: HOSPITALIST

## 2022-08-19 RX ORDER — SULFAMETHOXAZOLE AND TRIMETHOPRIM 800; 160 MG/1; MG/1
1 TABLET ORAL 2 TIMES DAILY
Qty: 28 TABLET | Refills: 0 | Status: SHIPPED | OUTPATIENT
Start: 2022-08-19 | End: 2022-09-02

## 2022-08-19 RX ORDER — HYDROCODONE BITARTRATE AND ACETAMINOPHEN 5; 325 MG/1; MG/1
TABLET ORAL
Qty: 35 TABLET | Refills: 0 | Status: SHIPPED | OUTPATIENT
Start: 2022-08-19

## 2022-08-19 RX ORDER — SODIUM CHLORIDE 9 MG/ML
INJECTION, SOLUTION INTRAVENOUS ONCE
Status: COMPLETED | OUTPATIENT
Start: 2022-08-19 | End: 2022-08-19

## 2022-08-19 RX ORDER — POLYETHYLENE GLYCOL 3350 17 G/17G
17 POWDER, FOR SOLUTION ORAL DAILY PRN
Qty: 10 PACKET | Refills: 0 | Status: SHIPPED | COMMUNITY
Start: 2022-08-19

## 2022-08-19 RX ORDER — CEFADROXIL 500 MG/1
500 CAPSULE ORAL 2 TIMES DAILY
Qty: 28 CAPSULE | Refills: 0 | Status: SHIPPED | OUTPATIENT
Start: 2022-08-19 | End: 2022-09-02

## 2022-08-19 NOTE — PLAN OF CARE
Patient alert and oriented x4. VSS, on RA. Tele in place; NSR. Patient with chronic neuropathy, decreased sensation to LLE. RLE with BKA, prosthesis for transfers. Post-op shoe for left foot with transfers. Pain to BLE, neuropathy pain mostly, controlled with PO PRN Norco and scheduled gabapentin. Voiding freely via urinal. IV ABT infusing per orders. Up x1 person SBA. Tolerating diet, denies N/V. Plan: discharge home when cleared, awaiting cultures for PO/IV ABT recommendations per ID.

## 2022-08-19 NOTE — PROGRESS NOTES
08/19/22 1239 08/19/22 1254 08/19/22 1256   Vital Signs   Pulse 58 59 61   Resp 15 15 19   /56 122/63 115/50   MAP (mmHg) 79 78 65   BP Location Right arm Right arm Right arm   BP Method Automatic Automatic Automatic   Patient Position Lying Sitting Standing       Orthostatic BP check, denies any dizziness or feeling lightheaded

## 2022-08-19 NOTE — PROGRESS NOTES
POD#1 s/p left 2nd/3rd toe amps.   Doing well  Feels better  No pain    AFVSS  Sutures intact  Cellulitis resolved  Left 2nd/3rd toes amputated  Superficial wound left hallux    A/P:  POD#1 s/p left 2nd/3rd toe amputations    - dressings removed  - dry dressing bid with paper tape  - polysporin, gauze left hallux bid  - WBAT with surgical shoe  - abx per ID  - okay to discharge from my standpoint  - f/u 7-10 days

## 2022-08-19 NOTE — PLAN OF CARE
A&O x4. VSS on RA. . Mild pain overnight, well controlled with PO medication PRN. Ambulating with min assist, WB to LLE with post op shoe. Voids freely per urinal. L foot drsg C/D/I, elevated on pillows. IV abx per ID. Reviewed POC, pain management, IS use, and fall precautions with pt. Bed alarm on w/bed in lowest position. Pt reminded to use call light. Verbalized understanding. Cxs pending.

## 2022-10-26 NOTE — BRIEF OP NOTE
Pre-Operative Diagnosis: Rotator cuff arthropathy of left shoulder [M12.812]     Post-Operative Diagnosis: * No post-op diagnosis entered *      Procedure Performed:   LEFT REVERSE SHOULDER ARTHROPLASTY    Surgeon(s) and Role:     * Abena Anand MD - Primary    Assistant(s):  PA:  Rufina Spurling, PA-C; Watson Monzon     Surgical Findings: above     Specimen: bone     Estimated Blood Loss: 25cc    Rubina Butler MD  6/21/2022  2:28 PM Graft Donor Site Bandage (Optional-Leave Blank If You Don't Want In Note): Steri-strips and a pressure bandage were applied to the donor site.

## 2022-12-21 ENCOUNTER — HOSPITAL ENCOUNTER (EMERGENCY)
Facility: HOSPITAL | Age: 67
Discharge: HOME | End: 2022-12-21
Attending: EMERGENCY MEDICINE
Payer: COMMERCIAL

## 2022-12-21 VITALS
SYSTOLIC BLOOD PRESSURE: 144 MMHG | WEIGHT: 212 LBS | OXYGEN SATURATION: 99 % | HEIGHT: 71 IN | RESPIRATION RATE: 10 BRPM | HEART RATE: 63 BPM | BODY MASS INDEX: 29.68 KG/M2 | DIASTOLIC BLOOD PRESSURE: 71 MMHG | TEMPERATURE: 98 F

## 2022-12-21 DIAGNOSIS — N28.9 RENAL INSUFFICIENCY: Primary | ICD-10-CM

## 2022-12-21 LAB
ALBUMIN SERPL-MCNC: 3.8 G/DL (ref 3.4–5)
ALBUMIN/GLOB SERPL: 0.8 {RATIO} (ref 1–2)
ALP LIVER SERPL-CCNC: 45 U/L
ALT SERPL-CCNC: 16 U/L
ANION GAP SERPL CALC-SCNC: 8 MMOL/L (ref 0–18)
AST SERPL-CCNC: 20 U/L (ref 15–37)
BASOPHILS # BLD AUTO: 0.07 X10(3) UL (ref 0–0.2)
BASOPHILS NFR BLD AUTO: 0.8 %
BILIRUB SERPL-MCNC: 0.2 MG/DL (ref 0.1–2)
BUN BLD-MCNC: 42 MG/DL (ref 7–18)
CALCIUM BLD-MCNC: 9.9 MG/DL (ref 8.5–10.1)
CHLORIDE SERPL-SCNC: 104 MMOL/L (ref 98–112)
CO2 SERPL-SCNC: 25 MMOL/L (ref 21–32)
CREAT BLD-MCNC: 1.89 MG/DL
EOSINOPHIL # BLD AUTO: 0.45 X10(3) UL (ref 0–0.7)
EOSINOPHIL NFR BLD AUTO: 5.3 %
ERYTHROCYTE [DISTWIDTH] IN BLOOD BY AUTOMATED COUNT: 13.8 %
GFR SERPLBLD BASED ON 1.73 SQ M-ARVRAT: 38 ML/MIN/1.73M2 (ref 60–?)
GLOBULIN PLAS-MCNC: 5 G/DL (ref 2.8–4.4)
GLUCOSE BLD-MCNC: 116 MG/DL (ref 70–99)
HCT VFR BLD AUTO: 34.3 %
HGB BLD-MCNC: 10.8 G/DL
IMM GRANULOCYTES # BLD AUTO: 0.03 X10(3) UL (ref 0–1)
IMM GRANULOCYTES NFR BLD: 0.4 %
LYMPHOCYTES # BLD AUTO: 2.55 X10(3) UL (ref 1–4)
LYMPHOCYTES NFR BLD AUTO: 30.1 %
MCH RBC QN AUTO: 29.2 PG (ref 26–34)
MCHC RBC AUTO-ENTMCNC: 31.5 G/DL (ref 31–37)
MCV RBC AUTO: 92.7 FL
MONOCYTES # BLD AUTO: 0.74 X10(3) UL (ref 0.1–1)
MONOCYTES NFR BLD AUTO: 8.7 %
NEUTROPHILS # BLD AUTO: 4.64 X10 (3) UL (ref 1.5–7.7)
NEUTROPHILS # BLD AUTO: 4.64 X10(3) UL (ref 1.5–7.7)
NEUTROPHILS NFR BLD AUTO: 54.7 %
OSMOLALITY SERPL CALC.SUM OF ELEC: 295 MOSM/KG (ref 275–295)
PLATELET # BLD AUTO: 313 10(3)UL (ref 150–450)
POTASSIUM SERPL-SCNC: 5.1 MMOL/L (ref 3.5–5.1)
PROT SERPL-MCNC: 8.8 G/DL (ref 6.4–8.2)
RBC # BLD AUTO: 3.7 X10(6)UL
SODIUM SERPL-SCNC: 137 MMOL/L (ref 136–145)
WBC # BLD AUTO: 8.5 X10(3) UL (ref 4–11)

## 2022-12-21 PROCEDURE — 99285 EMERGENCY DEPT VISIT HI MDM: CPT

## 2022-12-21 PROCEDURE — 85025 COMPLETE CBC W/AUTO DIFF WBC: CPT

## 2022-12-21 PROCEDURE — 93005 ELECTROCARDIOGRAM TRACING: CPT

## 2022-12-21 PROCEDURE — 96360 HYDRATION IV INFUSION INIT: CPT

## 2022-12-21 PROCEDURE — 80053 COMPREHEN METABOLIC PANEL: CPT | Performed by: EMERGENCY MEDICINE

## 2022-12-21 PROCEDURE — 80053 COMPREHEN METABOLIC PANEL: CPT

## 2022-12-21 PROCEDURE — 93010 ELECTROCARDIOGRAM REPORT: CPT

## 2022-12-21 PROCEDURE — 99284 EMERGENCY DEPT VISIT MOD MDM: CPT

## 2022-12-21 PROCEDURE — 85025 COMPLETE CBC W/AUTO DIFF WBC: CPT | Performed by: EMERGENCY MEDICINE

## 2022-12-21 NOTE — ED INITIAL ASSESSMENT (HPI)
Patient presents to the ED eith c/o hyperkalemia. He states that he had follow up labs after his toe amputation and the MD called him today stating that his potassium was elevated at 6.3.

## 2022-12-22 LAB
ATRIAL RATE: 66 BPM
P AXIS: 21 DEGREES
P-R INTERVAL: 186 MS
Q-T INTERVAL: 388 MS
QRS DURATION: 90 MS
QTC CALCULATION (BEZET): 406 MS
R AXIS: 47 DEGREES
T AXIS: 46 DEGREES
VENTRICULAR RATE: 66 BPM

## 2022-12-22 NOTE — DISCHARGE INSTRUCTIONS
Hold your Lasix or furosemide for the next 3 days. Call your nephrologist tomorrow, to set up for repeat outpatient labs in 1 week.

## 2024-07-07 NOTE — PROGRESS NOTES
Education Record    Learner:  Patient    Disease / Diagnosis:    Barriers / Limitations:  None   Comments:    Method:  Reinforcement   Comments:    General Topics:  Side effects and symptom management and Plan of care reviewed   Comments:    Outcome:  Show
No

## (undated) DEVICE — CHLORAPREP 26ML APPLICATOR

## (undated) DEVICE — BLADE ADVANCUT CORNEAL 2.4MM

## (undated) DEVICE — GLOVE SURG TRIUMPH SZ 8

## (undated) DEVICE — SYRINGE 10ML LL TIP

## (undated) DEVICE — SOL H2O 1000ML BTL

## (undated) DEVICE — SOL  .9 1000ML BTL

## (undated) DEVICE — KIT TRC TRIMANO BEACH CHR ARM

## (undated) DEVICE — CLEARCUT® SIDEPORT KNIFE DUAL BEVEL 1.0MM ANGLED: Brand: CLEARCUT®

## (undated) DEVICE — CONVERTORS STOCKINETTE: Brand: CONVERTORS

## (undated) DEVICE — KENDALL SCD EXPRESS SLEEVES, KNEE LENGTH, MEDIUM: Brand: KENDALL SCD

## (undated) DEVICE — SINGLE USE MEDICAL DEVICE FOR OPHTHALMIC SURGERY: Brand: SIL. COATED I/A 45 MIL 12/B

## (undated) DEVICE — BSS BAG CENTURION

## (undated) DEVICE — PROXIMATE SKIN STAPLERS (35 WIDE) CONTAINS 35 STAINLESS STEEL STAPLES (FIXED HEAD): Brand: PROXIMATE

## (undated) DEVICE — SLEEVE KENDALL SCD EXPRESS MED

## (undated) DEVICE — STANDARD HYPODERMIC NEEDLE,POLYPROPYLENE HUB: Brand: MONOJECT

## (undated) DEVICE — BLADE SAW KM33-11

## (undated) DEVICE — PREP BETADINE SOL 5% EYE

## (undated) DEVICE — REM POLYHESIVE ADULT PATIENT RETURN ELECTRODE: Brand: VALLEYLAB

## (undated) DEVICE — Device: Brand: STABLECUT®

## (undated) DEVICE — SUTURE VICRYL 2-0 CP-1

## (undated) DEVICE — SUPER SPONGES,MEDIUM: Brand: KERLIX

## (undated) DEVICE — UNFOLDER PLATINUM 1 SERIES CRTRDG 30/BOX: Brand: UNFOLDER PLATINUM 1 SERIES

## (undated) DEVICE — BIT DRL 3.2MM CMPRH CNTR SCR

## (undated) DEVICE — HYDROGEN PEROXIDE SOL 4 OZ

## (undated) DEVICE — STERILE POLYISOPRENE POWDER-FREE SURGICAL GLOVES: Brand: PROTEXIS

## (undated) DEVICE — GOWN,SIRUS,FABRIC-REINFORCED,X-LARGE: Brand: MEDLINE

## (undated) DEVICE — ABDOMINAL PAD: Brand: DERMACEA

## (undated) DEVICE — HOOD: Brand: FLYTE

## (undated) DEVICE — DRESSING IN STRIPPABLE ENVELOPE: Brand: DERMACEA

## (undated) DEVICE — 1200CC GUARDIAN II: Brand: GUARDIAN

## (undated) DEVICE — SOL  .9 3000ML

## (undated) DEVICE — PIN FX 9IN STNM CMPRH RVRS SYS: Type: IMPLANTABLE DEVICE

## (undated) DEVICE — ABSORBANT FLOOR PAD

## (undated) DEVICE — PREMIUM WET SKIN PREP TRAY: Brand: MEDLINE INDUSTRIES, INC.

## (undated) DEVICE — CATARACT PATIENT CARE KIT

## (undated) DEVICE — ADHESIVE MASTISOL 2/3CC VL

## (undated) DEVICE — ZIMMER® STERILE DISPOSABLE TOURNIQUET CUFF WITH PLC, DUAL PORT, SINGLE BLADDER, 18 IN. (46 CM)

## (undated) DEVICE — FAN SPRAY KIT: Brand: PULSAVAC®

## (undated) DEVICE — Device

## (undated) DEVICE — GAUZE PACKING IODOFORM 1/4X5

## (undated) DEVICE — NON-ADHERENT STRIPS,OIL EMULSION: Brand: CURITY

## (undated) DEVICE — SOLUTION  .9 1000ML BTL

## (undated) DEVICE — LOWER EXTREMITY CDS-LF: Brand: MEDLINE INDUSTRIES, INC.

## (undated) DEVICE — 3M™ MICROFOAM™ TAPE 1528-4: Brand: 3M™ MICROFOAM™

## (undated) DEVICE — SUTURE PROLENE 4-0 PS-2

## (undated) DEVICE — ACTIVE FMS W/ INTREPID* ULTRA SLEEVES, 0.9MM 30° ABS* INTREPID* BALANCED TIP: Brand: ALCON

## (undated) DEVICE — BANDAGE ROLL,100% COTTON, 6 PLY, LARGE: Brand: KERLIX

## (undated) DEVICE — ISOPROPYL ALCOHOL 70% 4OZ BTL

## (undated) DEVICE — PRECISION (5.5 X 0.51 X 18.0MM)

## (undated) DEVICE — 4.5 MM INCISOR PLUS ELITE STRAIGHT                                    DISPOSABLE BLADES, SLATE,PACKAGED 6                                    PER BOX, STERILE

## (undated) DEVICE — BIT DRL 2.7MM PERI SCR

## (undated) DEVICE — MINI-BLADE®: Brand: BEAVER®

## (undated) DEVICE — DRESSING COBAN 1\" TAN

## (undated) DEVICE — TOURNIQUET CUFF 24 DUAL STR

## (undated) DEVICE — SUT VICRYL 2-0 FS-1 J443H

## (undated) DEVICE — #10 STERILE BLADE: Brand: POLYMER COATED BLADES

## (undated) DEVICE — APPLICATOR CHLORAPREP 26ML

## (undated) DEVICE — SUTURE VICRYL 2-0 FSL

## (undated) DEVICE — STOCKING CMPR LG LNG THG LGTH

## (undated) DEVICE — #15 STERILE STAINLESS BLADE: Brand: STERILE STAINLESS BLADES

## (undated) DEVICE — EYE PACK: Brand: MEDLINE INDUSTRIES, INC.

## (undated) DEVICE — STERILE POLYISOPRENE POWDER-FREE SURGICAL GLOVES WITH EMOLLIENT COATING: Brand: PROTEXIS

## (undated) DEVICE — LIGHT HANDLE

## (undated) DEVICE — PADDING CAST COTTON  4

## (undated) DEVICE — GOWN,SIRUS,FABRIC-REINFORCED,LARGE: Brand: MEDLINE

## (undated) DEVICE — CHLORAPREP ORANGE TINT 10.5ML

## (undated) DEVICE — 2045 S-DRAPE 2 SURGICAL 10/BX: Brand: STERI-DRAPE™

## (undated) DEVICE — GLOVE SURG TRIUMPH SZ 71/2

## (undated) DEVICE — SUTURE VICRYL 0 CP-1

## (undated) DEVICE — SUT CHROMIC GUT 0 CT-1 812H

## (undated) DEVICE — STERILE HOOK LOCK LATEX FREE ELASTIC BANDAGE 6INX5YD: Brand: HOOK LOCK™

## (undated) DEVICE — 3M™ IOBAN™ 2 ANTIMICROBIAL INCISE DRAPE 6650EZ: Brand: IOBAN™ 2

## (undated) DEVICE — ARTHX PSTNR IMPL 2.8MM PIN NIT

## (undated) DEVICE — ORTHO CDS-LF: Brand: MEDLINE INDUSTRIES, INC.

## (undated) DEVICE — SUTURE VICRYL 5-0 RB-1

## (undated) DEVICE — KNEE ARTHROSCOPY CDS: Brand: MEDLINE INDUSTRIES, INC.

## (undated) DEVICE — GAMMEX® PI HYBRID SIZE 7.5, STERILE POWDER-FREE SURGICAL GLOVE, POLYISOPRENE AND NEOPRENE BLEND: Brand: GAMMEX

## (undated) DEVICE — ZIMMER® STERILE DISPOSABLE TOURNIQUET CUFF WITH PLC, DUAL PORT, SINGLE BLADDER, 34 IN. (86 CM)

## (undated) DEVICE — INTENDED TO AID IN THE PASSING OF SUTURES THROUGH BONE AND SOFT TISSUE DURING ORTHOPEDIC SURGERY: Brand: HOFFEE SUTURE RETRIEVER

## (undated) DEVICE — UNDYED BRAIDED (POLYGLACTIN 910), SYNTHETIC ABSORBABLE SUTURE: Brand: COATED VICRYL

## (undated) DEVICE — GAMMEX® PI HYBRID SIZE 8, STERILE POWDER-FREE SURGICAL GLOVE, POLYISOPRENE AND NEOPRENE BLEND: Brand: GAMMEX

## (undated) DEVICE — PRECISION (12.0 X 0.51 X 34.5MM)

## (undated) DEVICE — SOL  .9 1000ML BAG

## (undated) DEVICE — PIN KIT

## (undated) DEVICE — SUT ETHILON 3-0 PS-1 1663H

## (undated) DEVICE — WRAP COOLING SHLDR W/ICE PILLO

## (undated) DEVICE — HEAD REAMER SMALL ANGLE DISP

## (undated) DEVICE — DRAPE,U/SHT,SPLIT,FILM,60X84,STERILE: Brand: MEDLINE

## (undated) DEVICE — SUTURE ETHIBOND 2 OS-4

## (undated) DEVICE — 450 ML BOTTLE OF 0.05% CHLORHEXIDINE GLUCONATE IN 99.95% STERILE WATER FOR IRRIGATION, USP AND APPLICATOR.: Brand: IRRISEPT ANTIMICROBIAL WOUND LAVAGE

## (undated) DEVICE — DRESSING AQUACEL AG 3.5 X 10

## (undated) DEVICE — SUTURE VICRYL 2-0 CT-2

## (undated) DEVICE — PT SPECIFIC PLAN GLEN MODEL 3D

## (undated) DEVICE — UNIVERS REVERS DRILL SURG 3MM

## (undated) DEVICE — IMMOB SHLDR STRAIGHT XL

## (undated) DEVICE — CAP CODE: REVERSE + AUGMENT: Type: IMPLANTABLE DEVICE

## (undated) DEVICE — SUT VICRYL 2-0 FSL J589H

## (undated) DEVICE — PIN FX 2.5IN 1/8IN STNM SS: Type: IMPLANTABLE DEVICE

## (undated) DEVICE — BETADINE OINTMENT 1 32 OZ PKT

## (undated) DEVICE — HEMOCLIP HORIZON LG 004200

## (undated) DEVICE — DRESS WOUND AQUACEL 3.5INX10IN

## (undated) DEVICE — LAPAROTOMY SPONGE - RF AND X-RAY DETECTABLE PRE-WASHED: Brand: SITUATE

## (undated) DEVICE — SUTURE STEEL 3-0 CP-1

## (undated) DEVICE — 10K/24K ARTHROSCOPY INFLOW TUBE SET: Brand: 10K/24K

## (undated) DEVICE — MEGADYNE E-Z CLEAN BLADE 4IN

## (undated) DEVICE — MEGADYNE ELECTRODE ADULT PT RT

## (undated) DEVICE — 3M™ STERI-DRAPE™ U-DRAPE 1015: Brand: STERI-DRAPE™

## (undated) DEVICE — PRECISION (9.0 X 0.51 X 18.5MM)

## (undated) DEVICE — GAUZE SPONGES,12 PLY: Brand: CURITY

## (undated) DEVICE — 3M™ STERI-STRIP™ REINFORCED ADHESIVE SKIN CLOSURES, R1547, 1/2 IN X 4 IN (12 MM X 100 MM), 6 STRIPS/ENVELOPE: Brand: 3M™ STERI-STRIP™

## (undated) DEVICE — STRETCH BANDAGE: Brand: CURITY

## (undated) DEVICE — UPPER EXTREMITY CDS-LF: Brand: MEDLINE INDUSTRIES, INC.

## (undated) DEVICE — SUT VICRYL 3-0 RB-1 J215H

## (undated) DEVICE — SUTURE SILK 0

## (undated) DEVICE — PADDING CAST COTTON STER 4

## (undated) DEVICE — SUT FIBERWIRE 2 C-13 AR-7202

## (undated) NOTE — LETTER
[facility]  Shira Bloom 60 [address] [facility], [address] [place] [address]    Consent for Operation    Date: __________________    Time: _______________    1.  I authorize the performance upon Alex Irwin the following operation:    Procedure(s):  INCISION AND DRAINAGE procedure has been videotaped, the surgeon will obtain the original videotape. The hospital will not be responsible for storage or maintenance of this tape.     6. For the purpose of advancing medical education, I consent to the admittance of observers to t STATEMENTS REQUIRING INSERTION OR COMPLETION WERE FILLED IN.     Signature of Patient:   ___________________________    When the patient is a minor or mentally incompetent to give consent:  Signature of person authorized to consent for patient: ____________ drugs/illegal medications). Failure to inform my anesthesiologist about these medicines may increase my risk of anesthetic complications. · If I am allergic to anything or have had a reaction to anesthesia before.     3. I understand how the anesthesia med I have discussed the procedure and information above with the patient (or patient’s representative) and answered their questions. The patient or their representative has agreed to have anesthesia services.     _______________________________________________

## (undated) NOTE — LETTER
Katie Washington [address] [facility] [address], [address] [facility]     PICC LINE INSERTION CONSENT     I agree to have a Peripherally Inserted Central Catheter (PICC) placed in my arm.    1. The PICC insertion procedure, care, maintenance, risks, benefits, and c also discussed reasonable alternatives to the PICC, including risks, benefits, and side effects related to the alternatives and risks related to not receiving this procedure      _____________________ ___________ ____________________________________   Date

## (undated) NOTE — LETTER
Katie Washington [address] [address]  [place], [address] [facility]    Consent for Operation  Date: __________________                                Time: _______________    1.  I authorize the performance upon [facility] the following operation: Incision and videotape. The \[facility] [place] [facility]\"" will not be responsible for storage or maintenance of this tape. 6. For the purpose of advancing medical education, I consent to the admittance of observers to the Operating Room.   7. I authorize the use of any specimen, organs, ti When the patient is a minor or mentally incompetent to give consent:  Signature of person authorized to consent for patient: ___________________________   Relationship to patient: ____________________________________________________    Signature of Witness these medicines may increase my risk of anesthetic complications. iv. If I am allergic to anything or have had a reaction to anesthesia before. 3. I understand how the anesthesia medicine will help me (benefits).   4. I understand that with any type of an patient’s representative) and answered their questions. The patient or their representative has agreed to have anesthesia services.     _____________________________________________________________________________  Witness        Date   Time  I have raquel

## (undated) NOTE — MR AVS SNAPSHOT
[facility]  [place] [address] [place] [address]  [phone]               Thank you for choosing us for your health care visit with [facility].   We are glad to serve you and happy to provide you with this summary of yo Take 1 capsule (20 mg total) by mouth daily.    Commonly known as:  CYMBALTA           Fenofibrate 145 MG Tabs   TAKE 1 TABLET(145 MG) BY MOUTH DAILY   Commonly known as:  TRICOR           gabapentin 600 MG Tabs   TAKE 2 TABLETS BY MOUTH EVERY MORNING, 1 EV VITAMIN D OR   Take 5,000 capsules by mouth daily. vitamin E 400 UNITS Caps   Take 1,000 Units by mouth daily.                    MyChart     Visit MyChart  You can access your MyChart to more actively manage your health care and view more detail

## (undated) NOTE — LETTER
Katie Washington [address]  [place], [address] [facility]    Consent for Operation  Date: __________________                                Time: _______________    1.  I authorize the performance upon Dorette Masury the following operation:  Procedure(s procedure has been videotaped, the surgeon will obtain the original videotape. The hospital will not be responsible for storage or maintenance of this tape.   6. For the purpose of advancing medical education, I consent to the admittance of observers to the STATEMENTS REQUIRING INSERTION OR COMPLETION WERE FILLED IN.     Signature of Patient:   ___________________________    When the patient is a minor or mentally incompetent to give consent:  Signature of person authorized to consent for patient: ____________ drugs/illegal medications). Failure to inform my anesthesiologist about these medicines may increase my risk of anesthetic complications. iv. If I am allergic to anything or have had a reaction to anesthesia before.   3. I understand how the anesthesia med I have discussed the procedure and information above with the patient (or patient’s representative) and answered their questions. The patient or their representative has agreed to have anesthesia services.     _______________________________________________

## (undated) NOTE — IP AVS SNAPSHOT
Patient Demographics     Address  [address] Phone  [phone] [facility])  [phone] [facility] E-mail Address  [email or web address]      Emergency Contact(s)     Name Relation Home Work Mobile    [place] Spouse [phone] Take 1 tablet (6.25 mg total) by mouth 2 (two) times daily with meals. Traci Fuller MD         docusate sodium 100 MG Caps  Commonly known as:  COLACE      Take 100 mg by mouth 2 (two) times daily.    Robyn Caballero DO         DULoxetine HCl 20 MG Cpep Take 1 tablet by mouth daily.           Pramipexole Dihydrochloride 1 MG Tabs  Commonly known as:  MIRAPEX      TAKE 1 TABLET(1 MG) BY MOUTH EVERY NIGHT   Starla Figueroa MD         sodium chloride 0.9 % SOLN 500 mL with Vancomycin HCl 500 MG SOLR 1,500 mg [id number] carBAMazepine (TEGRETOL) tab 400 mg 01/15/18 0813 Given      [id number] carBAMazepine (TEGRETOL) tab 400 mg 01/15/18 1453 Given      [id number] carvedilol (COREG) tab 6.25 mg 01/14/18 1807 Given      [id number] carvedilol (COREG) tab 6.25 mg 01/15/ Temp  98.4 °F (36.9 °C) Filed at 01/15/2018 1152   SpO2  94 % Filed at 01/15/2018 0400      Patient's Most Recent Weight    Flowsheet Row Most Recent Value   Patient Weight  103.4 kg (228 lb)         Lab Results Last 24 Hours      CREATININE, SERUM [[id number] Clindamycin <=0.25  Sensitive    Erythromycin >=8  Resistant    Gentamicin <=0.5  Sensitive    Levofloxacin 0.25  Sensitive    Minocycline  Sensitive  [1]     Oxacillin >=4  Resistant    Tetracycline >=16  Resistant    Trimethoprim/Sulfa <=10  Sensitive • History of coronary artery stent placement 6/10/2016   • History of CVA (cerebrovascular accident) 6/10/2016   • Hyperlipidemia    • Peripheral vascular disease ([facility] [address]         [address]  Past Surgical History:  No date: APPENDECTOMY  08/10/2017: CREATE EARDRUM Disp:  Rfl:    Icosapent Ethyl (VASCEPA) 1 g Oral Cap Take 2 g by mouth 2 (two) times daily. Disp:  Rfl:    gabapentin 600 MG Oral Tab Take 1,200 mg by mouth 2 (two) times daily.  Take 1200 mg in the morning and evening Disp:  Rfl:    gabapentin 600 MG Oral CV:  nL S1/S2[SB.1], RRR[SB.2]  Abd: soft, NT/ND, no hepatomegaly, +BS  MSK: moving all extremities, no edema[SB.1], right foot with dressing in place, first and second digit red and swollen[SB. 2]  Neuro: no focal deficits  Skin: no rashes/lesions[SB.1], e AP, oblique, and lateral views were obtained. COMPARISON:  [facility] , XR FOOT, COMPLETE (MIN 3 VIEWS), RIGHT (CPT=73630), 10/02/2017, 20:36.   INDICATIONS:  cough, cellulitis foot  PATIENT STATED HISTORY: (As transcribed by Technologist)  Patient offered no - Holding home oral agents  - SSC and accuchecks    # PVD  - ASA    # Neuropathy  - gabapentin    # HTN  - Amlodipine  - Coreg    # Depression  - Cymbalta[SB. 2]    Prophy:  DVT:[SB.1] heparin subQ[SB. 2]    Dispo: admit[SB. 1]oralia for right foot infection[SB. amputation. He underwent I/D of the foot  10/5 with Dr. Jane Lopez and discharged on iv vancomycin, and then placed on chronic oral bactrim. He stopped bactrim 1 week ago, and presented to ED 1/6 with recurrent foot cellulitis.     History:  Past Medical His •  gabapentin (NEURONTIN) cap 600 mg, 600 mg, Oral, Noon  •  HYDROcodone-acetaminophen (NORCO) 5-325 MG per tab 1 tablet, 1 tablet, Oral, Q4H PRN **OR** HYDROcodone-acetaminophen (NORCO) 5-325 MG per tab 2 tablet, 2 tablet, Oral, Q4H PRN  •  lidocaine (LID Lab  01/08/18   0522   RBC  3.18*   HGB  9.3*   HCT  28.6*   MCV  89.9   MCH  29.2   MCHC  32.5   RDW  13.5   NEPRELIM  3.07   WBC  5.5   PLT  383.0       Recent Labs   Lab  01/06/18 2021 01/07/18   0254  01/08/18   0522   GLU  208*  120*  148*   BUN  3 Mixed axonal-demyelinating polyneuropathy     PVD (peripheral vascular disease) (HCC)     Coronary artery disease involving native coronary artery of native heart without angina pectoris     Non-rheumatic mitral regurgitation     Diabetic sensorimotor p General Medicine Discharge Summary     Patient ID:  Juno Peters  60year old  2/21/1955    Admit date: 1/6/2018    Discharge date and time: 1/15/18    Attending Physician: Rico Calderon MD    Primary Care with swelling and erythema. He did note some fevers/chills today. He denies any foot pain. He has an elevated ESR and CRP on admission. He was started on IV vancomycin. \"      Hospital course:  # Cellulitis / Hx of Chronic Osteomyelitis of right foot  - HYDROcodone-acetaminophen 5-325 MG Oral Tab  Take 2 tablets by mouth every 4 (four) hours as needed.       CONTINUE these medications which have NOT CHANGED    METFORMIN HCL ER, OSM, 1000 MG (OSM) Oral Tablet 24 Hr  TAKE 2 TABLETS(2000 MG) BY MOUTH DAILY Insulin Pen Needle (BD PEN NEEDLE SHARONDA U/F) 32G X 4 MM Does not apply Misc  USE TWICE DAILY AS DIRECTED; DX:  E11.39 on insulin    ACCU-CHEK YOLANDA PLUS In Vitro Strip  USE TO TEST ONCE DAILY    Fluticasone Propionate 50 MCG/ACT Nasal Suspension  2 sprays b Juan R Celis MD on 1/06/2018 at 23:03     Approved by: Tong Blanton MD                2D Echocardiogram Results (HF patients only)    No exam resulted this encounter. Cath Angiogram Results (HF Patients only)    No exam resulted this encounter. R Lower Extremity: Non-Weight Bearing       PAIN ASSESSMENT   Rating: 3  Location: right foot  Management Techniques: Relaxation;Repositioning    BALANCE posing risk for safety. Pt cued for upright posture and to improve foot clearance during ambulation. Pt in understanding. Pt returned to room and requested to sit up in chair.  Pt with poor safety with transfer from standing to sitting in chair as pt too fa Goal #1 Patient is able to demonstrate supine - sit EOB @ level: independent   Goal #2 Patient is able to demonstrate transfers Sit to/from Stand at assistance level: minimum assistance   Goal #3 Patient is able to ambulate 50 feet with assist device: walk Patient’s self-stated goal is to go home    OBJECTIVE  Precautions:  (NWB right)    WEIGHT BEARING RESTRICTION  Weight Bearing Restriction: R lower extremity        R Lower Extremity: Non-Weight Bearing       PAIN ASSESSMENT   Rating: 3  Location: right fo supervision for sit<>stand to RW. Pt with step to gait pattern with  walker. Pt with 2 occasions of the left foot getting slightly caught posing risk for safety. Pt cued for upright posture and to improve foot clearance during ambulation.  Pt in unde education;Gait training;Strengthening;Transfer training;Balance training  Rehab Potential : Good  Frequency (Obs): 5x/week    CURRENT GOALS      Goal #1 Patient is able to demonstrate supine - sit EOB @ level: independent   Goal #2 Patient is able to demon Patient’s self-stated goal is to go home    OBJECTIVE  Precautions:  (NWB right)    WEIGHT BEARING RESTRICTION  Weight Bearing Restriction: R lower extremity        R Lower Extremity: Non-Weight Bearing       PAIN ASSESSMENT   Ratin  Location: right fo regarding benefit of sub acute rehab after discharge, and patient agreed that rehab would be worth while      Patient End of Session: Up in chair;Needs met;Call light within reach;RN aware of session/findings; All patient questions and concerns addressed; Fa Session: 1   Number of Visits to Meet Established Goals: 5    Presenting Problem: s/p R mid-tarsal amputation with AT transfer 1/11/18    History related to current admission: Patient is 60year old male admitted on 1/6/2018 from home with history of chron -   Putting on and taking off regular lower body clothing?: A Little  -   Bathing (including washing, rinsing, drying)?: A Little  -   Toileting, which includes using toilet, bedpan or urinal? : A Little  -   Putting on and taking off regular upper body cl previous session, pt remains considerably below baseline functionally and is a high fall risk. Continue to recommend MELO at discharge to assist patient in returning to Mod I for BADLs and functional transfers.        OT Discharge Recommendations: Sub-acute admitted on 1/6/2018 from home with history of chronic osteomyelitis and cellulitis, currently s/p R mid-tarsal amputation with rotational muscle flaps, AT transfer, PB transfer, achilles lengthening 1/11/18.  Therapy significant co-morbidities include CHF, Management Techniques: Activity promotion; Body mechanics;Breathing techniques;Relaxation;Repositioning    COGNITION[BW.1]  Overall Cognitive Status:  WFL - within functional limits[BW.2]    Communication:[BW.1] WFL[BW.2]    Behavioral/Emotional/Social:[BW. via RW, mod assist and safety cues for hand placement > donning pants to waist via min assist for balance in standing, fair plus return demo following NWB precautions > several hops forward towards door via RW, mod assist and increased time with cues for N is recommended for 15-19 days.  After this period of rehabilitation patient should achieve supervision to Mod I level in all BADLs and functional transfers.[BW.2]    The patient is functioning below his previous functional level and would benefit from skill Video Swallow Study Notes     No notes of this type exist for this encounter. SLP Notes     No notes of this type exist for this encounter.             Immunizations     Name Date      INFLUENZA 09/06/17     INFLUENZA 09/14/16     Pneumococ

## (undated) NOTE — ED AVS SNAPSHOT
Maribell Morales   MRN: [id number]    Department:  [facility] Emergency Department   Date of Visit:  8/28/2017           Disclosure     Insurance plans vary and the physician(s) referred by the ER may not be covered by your plan.  Please contact your If you have been prescribed any medication(s), please fill your prescription right away and begin taking the medication(s) as directed    If the emergency physician has read X-rays, these will be re-interpreted by a radiologist.  If there is a significant

## (undated) NOTE — LETTER
Sherjamey Felix [address] [place] [address]     PICC LINE INSERTION CONSENT     I agree to have a Peripherally Inserted Central Catheter (PICC) placed in my arm.    1. The PICC insertion procedure, care, maintenance, risks, benefits, and c goals; and potential problems that might occur during recuperation.  They also discussed reasonable alternatives to the PICC, including risks, benefits, and side effects related to the alternatives and risks related to not receiving this procedure      ____

## (undated) NOTE — LETTER
Katie Washington 182 [address] [address]  [place], [address] [facility]    Consent for Operation  Date: __________________                                Time: _______________    1.  I authorize the performance upon Dieter Delcid the following operation:  Procedure(s 6. I consent to the photographing or videotaping of the operations or procedures to be performed, including appropriate portions of my body for medical, scientific, or educational purposes, provided my identity is not revealed by the pictures or by descrip 10. If I have a Do Not Resuscitate order in place, the surgeon and I (or the individual authorized to consent on my behalf) will discuss and agree as to whether the Do Not Resuscitate order will remain in effect or will be discontinued during the performan a. Allow the anesthesiologist (anesthesia doctor) to give me medicine and do additional procedures as necessary.  Some examples are: Starting or using an “IV” to give me medicine, fluids or blood during my procedure, and having a breathing tube placed to he 7. Regional Anesthesia (“spinal”, “epidural”, & “nerve blocks”): I understand that rare but potential complications include headache, bleeding, infection, seizure, irregular heart rhythms, and nerve injury.     I can change my mind about having anesthesia

## (undated) NOTE — LETTER
Katie Washington 182  [address] [facility] [address], [address] [facility]  Authorization for Surgical Operation and Procedure     Date:___________                                                                                                         Time:__________ potential risks that can occur: fever and allergic reactions, hemolytic reactions, transmission of diseases such as Hepatitis, AIDS and Cytomegalovirus (CMV) and fluid overload.   In the event that I wish to have an autologous transfusion of my own blood, o attending physician will determine when the applicable recovery period ends for purposes of reinstating the DNAR order.   10. Patients having a sterilization procedure: I understand that if the procedure is successful the results will be permanent and it wi a. Allow the anesthesiologist (anesthesia doctor) to give me medicine and do additional procedures as necessary.  Some examples are: Starting or using an “IV” to give me medicine, fluids or blood during my procedure, and having a breathing tube placed to he 7. Regional Anesthesia (“spinal”, “epidural”, & “nerve blocks”): I understand that rare but potential complications include headache, bleeding, infection, seizure, irregular heart rhythms, and nerve injury.     I can change my mind about having anesthesia

## (undated) NOTE — LETTER
Consent to Procedure/Sedation    Date: __________________    Time: _______________    1. I authorize the performance upon Araceli Oleary the following:    Incision and Drainage of Soft Tissue and Bone to right foot.          2. I authorize Dr. Flower Daniels (and Signature of person authorized to consent for patient: Relationship to patient:  ___________________________    ___________________      Witness: ____________________     Date: ______________    Printed: 10/4/2017   10:26 AM    Patient Name: Judy Murcia

## (undated) NOTE — LETTER
OUTSIDE TESTING RESULT REQUEST     IMPORTANT: FOR YOUR IMMEDIATE ATTENTION  Please FAX all test results listed below to: [phone]       * * * * If testing is NOT complete, arrange with patient A.S.A.P. * * * *      Patient Name: Alie Ruano  Surgery Date: 2022  CSN: [id number]  Medical Record: [id number]   : 1955 - A: 75 y      Sex: male  Surgeon(s):  Janette Rosales MD  Procedure: LEFT REVERSE SHOULDER ARTHROPLASTY  Anesthesia Type: Choice     Surgeon: Janette Rosales MD     The following Testing and Time Line are REQUIRED PER ANESTHESIA     EKG READ AND SIGNED WITHIN   90 days  MSSA/MRSA Nasal screening within 30 days      Thank You,   Sent by: Uma Loja  5/13/15

## (undated) NOTE — LETTER
Katie Washington 182 [address] [facility] [address], [address] [facility]     PICC LINE INSERTION CONSENT     I agree to have a Peripherally Inserted Central Catheter (PICC) placed in my arm.    1. The PICC insertion procedure, care, maintenance, risks, benefits, and c also discussed reasonable alternatives to the PICC, including risks, benefits, and side effects related to the alternatives and risks related to not receiving this procedure      _____________________ ___________ ____________________________________   Date

## (undated) NOTE — LETTER
Katie Washington 182 [address] [facility] [address], [address] [facility]     PICC LINE INSERTION CONSENT     I agree to have a Peripherally Inserted Central Catheter (PICC) placed in my arm.    1. The PICC insertion procedure, care, maintenance, risks, benefits, and c also discussed reasonable alternatives to the PICC, including risks, benefits, and side effects related to the alternatives and risks related to not receiving this procedure      _____________________ ___________ ____________________________________   Date

## (undated) NOTE — IP AVS SNAPSHOT
[address]            (For Outpatient Use Only) Initial Admit Date: 7/12/2017   Inpt/Obs Admit Date: Inpt: 7/12/17 / Obs: N/A   Discharge Date:    Roshan Abel:  [de-identified]   MRN: [de-identified]   CSN: [id number]        ENCOUNTER  Patient Subscriber ID:  Pt Rel to Subscriber:    Hospital Account Financial Class: [facility]    July 19, 2017

## (undated) NOTE — LETTER
[address] [facility]     I agree to have a Peripherally Inserted Central Catheter (PICC) placed in my arm.    1. The PICC insertion procedure, care, maintenance, risks, benefits, and complications have been explained to me by my physic benefits, and side effects related to the alternatives and risks related to not receiving this procedure. 8.  I have expressed any questions about this procedure to my physician or the PICC Proceduralist and he/she has answered them.   I certify that I h

## (undated) NOTE — LETTER
Katie Washington 182 6 [address] [address]  [place], [address] [place] [address]    Consent for Operation  Date: __________________                                Time: _______________    1.  I authorize the performance upon Costella Runner the following operation:  Procedure(s 6. I consent to the photographing or videotaping of the operations or procedures to be performed, including appropriate portions of my body for medical, scientific, or educational purposes, provided my identity is not revealed by the pictures or by descrip 10. If I have a Do Not Resuscitate order in place, the surgeon and I (or the individual authorized to consent on my behalf) will discuss and agree as to whether the Do Not Resuscitate order will remain in effect or will be discontinued during the performan a. Allow the anesthesiologist (anesthesia doctor) to give me medicine and do additional procedures as necessary.  Some examples are: Starting or using an “IV” to give me medicine, fluids or blood during my procedure, and having a breathing tube placed to he 7. Regional Anesthesia (“spinal”, “epidural”, & “nerve blocks”): I understand that rare but potential complications include headache, bleeding, infection, seizure, irregular heart rhythms, and nerve injury.     I can change my mind about having anesthesia

## (undated) NOTE — LETTER
[facility]  [address] [facility], [address] [place] [address]    Consent for Operation    Date: __________________    Time: _______________    1.  I authorize the performance upon [facility] the following operation:    Procedure(s):  LISFRANC AMPUTATION RI medical, scientific, or educational purposes, provided my identity is not revealed by the pictures or by descriptive texts accompanying them. If the procedure has been videotaped, the surgeon will obtain the original videotape.  The hospital will not be res I CERTIFY THAT I HAVE READ AND UNDERSTAND THE ABOVE CONSENT TO OPERATION, THAT MY DOCTOR PROVIDED ME WITH THE ABOVE EXPLANATIONS, THAT ALL BLANKS OR STATEMENTS REQUIRING INSERTION OR COMPLETION WERE FILLED IN.     Signature of Patient:   ___________________ · The last time that I ate or drank. · All of the medicines I take (including prescriptions, herbal supplements, and pills I can buy without a prescription (including street drugs/illegal medications).  Failure to inform my anesthesiologist about these med _____________________________________________________________________________  Anesthesiologist Signature     Date   Time  I have discussed the procedure and information above with the patient (or patient’s representative) and answered their questions.  The

## (undated) NOTE — MR AVS SNAPSHOT
[facility]  [place] [address]  [address] [place] [address]  [phone]               Thank you for choosing us for your health care visit with [facility].   We are glad to serve you and happy to provide you with this summary of yo Take 1 tablet (25 mg total) by mouth 2 (two) times daily with meals. Commonly known as:  COREG           DULoxetine HCl 20 MG Cpep   Take 1 capsule (20 mg total) by mouth daily.    Commonly known as:  CYMBALTA           Fenofibrate 145 MG Tabs   TAKE 1 TA 2 tablets 2 (two) times daily. VIRT-TARUN PLUS 5 MG Tabs   Take 6 mg by mouth. Vitamin B-12 500 MCG Tabs   Take 500 mcg by mouth daily. Commonly known as:  VITAMIN B12           vitamin C 1000 MG Tabs   Take 1,000 mg by mouth daily.

## (undated) NOTE — LETTER
[facility] Testing Department  Phone: [phone]  Right Fax: [phone]  ABNORMAL VALUES FAX    Sent By:  Travis Willett Date: 11/6/20    Patient Name: Jeremiah Brazil  Surgery Date: 11/12/2020    CSN: [id number]  Medical Record: [id number]   DO

## (undated) NOTE — IP AVS SNAPSHOT
Patient Demographics     Address  [place] [address] Phone  [phone] [facility]) E-mail Address  [email or web address]      Emergency Contact(s)     Name Relation Home Work Mobile    [place] Spouse [phone] [phone] 1 TABLET(6.25 MG) BY MOUTH TWICE DAILY WITH A MEAL   To Juarez MD         clotrimazole-betamethasone 1-0.05 % Crea  Commonly known as:  LOTRISONE  Next dose due:  Twice daily       Apply twice daily to affected area   Peter Jamison MD Inject 8 Units into the skin 2 (two) times daily. Ketoconazole 2 % Sham  Commonly known as:  NIZORAL  Next dose due:  Resume       Wash scalp, face, chest, ears and back daily.    Oscar Gooden PA-C         lidocaine 5 % Ptch  Commonly known as Where to Get Your Medications      Please  your prescriptions at the location directed by your doctor or nurse    Bring a paper prescription for each of these medications  furosemide 20 MG Tabs  HYDROcodone-acetaminophen 5-325 MG Tabs  sodium chlori [id number] fenofibrate micronized (LOFIBRA) cap 134 mg 07/19/17 0934 Given      [id number] folic acid (FOLVITE) tab 1 mg 07/19/17 0933 Given      [id number] furosemide (LASIX) tab 20 mg 07/19/17 0934 Given      [id number] gabapentin (NEURONTIN) tab 1,200 mg 0 CPAP Settings (Inpatient)    Flowsheet Row Most Recent Value   Mode  Spontaneous   Interface  Full face mask   Mask size  Medium   Set rate  12 breaths/min   Set IPAP  10   Set EPAP  5   O2%  40 %   I time  0.9   Flow rate  4 lpm         Lab Results Last 2 Blood Culture FREQ X 2 [[id number]] Collected:  07/15/17 1153    Order Status:  Completed Lab Status:  Preliminary result Updated:  07/19/17 1300    Specimen:  Blood from Blood,peripheral      Blood Culture Result No Growth 4 Days    Blood Culture Once [17 Tetracycline >=16  Resistant    Trimethoprim/Sulfa <=10  Sensitive    Vancomycin <=.5  Sensitive           [1]   This antibiotic was tested by Swathi Brown disk diffusion and result is not an JODI.                  Aerobic Bacterial Culture Once [[id number]] Blood Culture Result Staphylococcus aureus, MRSA (A)     Blood Culture Smear Gram positive cocci in clusters      Methicillin Resistant Staph aureus (MRSA) by PCR    Susceptibility      Staphylococcus aureus, MRSA     Not Specified    Clindamycin <=.25 • Essential hypertension    • Gout, unspecified 6/10/2016   • History of coronary artery stent placement 6/10/2016   • History of CVA (cerebrovascular accident) 6/10/2016   • Hyperlipidemia         PSH  Past Surgical History:  No date: APPENDECTOMY  No dominick VASCEPA 1 G Oral Cap 2 tablets 2 (two) times daily. Disp:  Rfl: 4   lidocaine 5 % External Patch Apply one patch daily Disp: 30 patch Rfl: 11   Ergocalciferol (VITAMIN D OR) Take 5,000 capsules by mouth daily.  Disp:  Rfl:    Ascorbic Acid (VITAMIN C) 1000 WBC  12.6   HGB  8.1*   MCV  92.6   PLT  244.0       Recent Labs   Lab  07/12/17   1318   NA  135*   K  4.5   CL  103   CO2  25.0   BUN  38*   CREATSERUM  1.65*   GLU  239*   CA  9.0   MG  2.2         ASSESSMENT / PLAN:[LM.1]     Pt is a 61 yo with CAD, HT H&P signed by Sherry Rivera MD at 7/12/2017  5:55 PM  Version 1 of 2    Author:  Sherry Rivera MD Service:  Hospitalist Author Type:  Physician    Filed:  7/12/2017  5:55 PM Date of Service:  7/12/2017  3:37 PM Status:  Signed     [facility] Encounter):  folic acid 1 MG Oral Tab Take 1 mg by mouth daily. Disp:  Rfl:    gabapentin 600 MG Oral Tab Take 1,200 mg by mouth 2 (two) times daily. Disp:  Rfl:    gabapentin 600 MG Oral Tab Take 600 mg by mouth After lunch.  Disp:  Rfl:    AmLOD History reviewed. No pertinent family history. Review of Systems  Comprehensive ROS reviewed and negative except for what's stated above. Including negative for chest pain, shortness of breath, syncope.        OBJECTIVE:  /57 (BP Location: Left ar about a month ago--> possible cellulitis, abscess etc  -podiatry and ID on consult, appreciate  -currently on IV zosyn, vancomycin.   BC and wound cx pending  -plan for I&D tomorrow     Upper extremity tremors-  New  -suspect secondary to infectious process Does the patient have a prior history of HBO treatments?  no    Does the patient have a history of ear disease including prior tympanic membrane injury of pressure equalization tubes?  no    Does the patient have a history of problems equalizing his ears d (Oral)   Resp 22   Ht 5' 11\" (1.803 m)   Wt 233 lb 11 oz (106 kg)   SpO2 97%   BMI 32.61 kg/m²   GENERAL: well developed, well nourished, in no apparent distress  HEENT: atraumatic, normocephalic, throat  clear  EYES: conjunctiva are clear  NECK: supple, Active Problems:    Infected traumatic leg ulcer [facility])      Past Medical History  Past Medical History:   Diagnosis Date   • Diabetes [facility])    • Essential hypertension    • Gout, unspecified 6/10/2016   • History of coronary artery stent placement 6/10/201 -   Climbing 3-5 steps with a railing?: Total       AM-PAC Score:  Raw Score: 16   PT Approx Degree of Impairment Score: 54.16%   Standardized Score (AM-PAC Scale): 40.78   CMS Modifier (G-Code): CK    FUNCTIONAL ABILITY STATUS  Gait Assessment   Gait Assi and gait. Continue to recommend MELO at d/c to achieve mod I level in all aspects of household mobility as Pt will be alone during day while wife at work.     DISCHARGE RECOMMENDATIONS  PT Discharge Recommendations: Sub-acute rehabilitation (ELOS 12-14 days) • History of CVA (cerebrovascular accident) 6/10/2016   • Hyperlipidemia        Past Surgical History  Past Surgical History:  No date: APPENDECTOMY  No date: OTHER Left      Comment: AC joint x 2  06/2017: OTHER      Comment: 1st metatarsal osteotomy, 2nd FUNCTIONAL ABILITY STATUS  Gait Assessment   Gait Assistance: Dependent assistance; Other (Comment) (per FIM, actual mod A)  Distance (ft): 3  Assistive Device: Rolling walker  Pattern: Comment;L Flexed knee;R Flexed knee (RLE NWB, mild kyphotic posture)  S Goal #1 Patient is able to demonstrate supine - sit EOB @ level: supervision   Goal #2 Patient is able to demonstrate transfers Sit to/from Stand at assistance level: supervision   Goal #3 Patient is able to stand pivot transfer from bed to chair with RW. Comment: AC joint x 2  06/2017: OTHER      Comment: 1st metatarsal osteotomy, 2nd, and 3rd                metatarsal head resections, right foot. [LD.2]     SUBJECTIVE[LD.1]  \"He is doing much better today. \" per pt's wife  Pt less confused, more agree mobility with[LD.1] mod[LD.3] assistance and RW x approx[LD.1] 2[LD.3]ft>[LD. 1]chair[LD.3]. [LD.1] Pt was educated on and performed AROM exercises for B UE's in all major planes with min cues for proper technique and form[LD.3].  Pt was educated on safety pr Patient will perform eating: with set up [facility]   Patient will perform grooming: with setup PROGRESSING   Patient will perform lower body dressing:  with min assist and with adaptive equipment PRN PROGRESSING   Patient will perform toileting: with min

## (undated) NOTE — LETTER
[facility] Testing Department  Phone: [phone]  OUTSIDE TESTING RESULT REQUEST      TO:   Dr. Faheem Beltran Date: 11/2/20    FAX #: [phone]     IMPORTANT: FOR YOUR IMMEDIATE ATTENTION  Please FAX all test results listed below to: [phone]

## (undated) NOTE — LETTER
[facility]  Shira Bloom 60 [address] [facility], [address] [place] [address]    Consent for Operation    Date: __________________    Time: _______________    1.  I authorize the performance upon Althia Fresh the following operation:    Procedure(s):  IRRIGATION AND DEBRIDE procedure has been videotaped, the surgeon will obtain the original videotape. The hospital will not be responsible for storage or maintenance of this tape.     6. For the purpose of advancing medical education, I consent to the admittance of observers to t STATEMENTS REQUIRING INSERTION OR COMPLETION WERE FILLED IN.     Signature of Patient:   ___________________________    When the patient is a minor or mentally incompetent to give consent:  Signature of person authorized to consent for patient: ____________ drugs/illegal medications). Failure to inform my anesthesiologist about these medicines may increase my risk of anesthetic complications. · If I am allergic to anything or have had a reaction to anesthesia before.     3. I understand how the anesthesia med I have discussed the procedure and information above with the patient (or patient’s representative) and answered their questions. The patient or their representative has agreed to have anesthesia services.     _______________________________________________

## (undated) NOTE — LETTER
[facility] Testing Department  Phone: [phone]  Right Fax: [phone]  [facility] 20 By:  Bela Morocho RN Date: 22    Patient Name: Cecille Jennings  Surgery Date: 2022    CSN: [id number]  Medical Record: [id number]   : 1955 - A: 75 y      Sex: male    Surgeon(s):  Ana Paula Overton MD    Procedure Comments: LEFT REVERSE SHOULDER ARTHROPLASTY  Anesthesia Type: Choice    [address] [facility] Surgeon Info:  Ana Paula Overton MD  Phone Number: [phone]    To Surgeon: Ana Paula Overton MD Fax #:  [phone]    [facility] (INCLUDING COVER SHEET)    PLEASE NOTE THE FOLLOWING ABNORMALITIES:   Positive MSSA and Positive MRSAfrom 22  _______________________________________________________________        [address], [address] [facility]  Phone: [phone]  Fax: [phone]  [email or web address]    STATEMENT OF CONFIDENTIALITY: This transmittal is intended only for the use of the individual entity to which is addressed and may contain information that is privileged and confidential. information contained. If the reader of this message IS NOT the intended recipient, you are hereby notified that any disclosure, distribution or copying of this information is strictly prohibited. If you have received this transmission in error, please notify us immediately by telephone and return the original documents to us at the above address via the [facility]. Thank you.

## (undated) NOTE — LETTER
[facility]  Shira Letyirais 60 [address] [facility], [address] [place] [address]    Consent for Operation    Date: __________________    Time: _______________    1.  I authorize the performance upon [facility] the following operation:    Procedure(s):  INCISION AND DRAINAGE procedure has been videotaped, the surgeon will obtain the original videotape. The hospital will not be responsible for storage or maintenance of this tape.     6. For the purpose of advancing medical education, I consent to the admittance of observers to t STATEMENTS REQUIRING INSERTION OR COMPLETION WERE FILLED IN.     Signature of Patient:   ___________________________    When the patient is a minor or mentally incompetent to give consent:  Signature of person authorized to consent for patient: ____________ drugs/illegal medications). Failure to inform my anesthesiologist about these medicines may increase my risk of anesthetic complications. · If I am allergic to anything or have had a reaction to anesthesia before.     3. I understand how the anesthesia med I have discussed the procedure and information above with the patient (or patient’s representative) and answered their questions. The patient or their representative has agreed to have anesthesia services.     _______________________________________________

## (undated) NOTE — LETTER
Katie Washington [address] [address]  [place], [address] [facility]    Consent for Operation  Date: __________________                                Time: _______________    1.  I authorize the performance upon Andrae Henderson the following operation:  RIGHT FOOT procedure has been videotaped, the surgeon will obtain the original videotape. The hospital will not be responsible for storage or maintenance of this tape.   6. For the purpose of advancing medical education, I consent to the admittance of observers to the STATEMENTS REQUIRING INSERTION OR COMPLETION WERE FILLED IN.     Signature of Patient:   ___________________________    When the patient is a minor or mentally incompetent to give consent:  Signature of person authorized to consent for patient: ____________ drugs/illegal medications). Failure to inform my anesthesiologist about these medicines may increase my risk of anesthetic complications. iv. If I am allergic to anything or have had a reaction to anesthesia before.   3. I understand how the anesthesia med I have discussed the procedure and information above with the patient (or patient’s representative) and answered their questions. The patient or their representative has agreed to have anesthesia services.     _______________________________________________

## (undated) NOTE — ED AVS SNAPSHOT
Abeberenetta Henderson   MRN: [id number]    Department:  [facility] Emergency Department   Date of Visit:  5/6/2019           Disclosure     Insurance plans vary and the physician(s) referred by the ER may not be covered by your plan.  Please contact your i tell this physician (or your personal doctor if your instructions are to return to your personal doctor) about any new or lasting problems. The primary care or specialist physician will see patients referred from the [facility] Emergency Department.  Wing Hawkins

## (undated) NOTE — MR AVS SNAPSHOT
[facility]  [place] [address] [place] [address]  [phone]               Thank you for choosing us for your health care visit with [facility].   We are glad to serve you and happy to provide you with this summary of yo AmLODIPine Besylate 10 MG Tabs   Take 1 tablet (10 mg total) by mouth daily. Commonly known as:  NORVASC           aspirin 325 MG Tabs   Take 1 tablet (325 mg total) by mouth daily.            BD PEN NEEDLE SHARONDA U/F 32G X 4 MM Misc   Generic drug:  Insul Take 1 tablet (1 mg total) by mouth nightly.    Commonly known as:  MIRAPEX           selenium sulfide 2.5 % Lotn   Apply to affected area on face, chest, back, leave on for 15 minutes then wash off, use prn   Commonly known as:  SELSUN           VASCEPA 1

## (undated) NOTE — ED AVS SNAPSHOT
Costella Runner   MRN: [id number]    Department:  [facility] Emergency Department   Date of Visit:  5/10/2019           Disclosure     Insurance plans vary and the physician(s) referred by the ER may not be covered by your plan.  Please contact your tell this physician (or your personal doctor if your instructions are to return to your personal doctor) about any new or lasting problems. The primary care or specialist physician will see patients referred from the [facility] Emergency Department.  Marcus Silva

## (undated) NOTE — MR AVS SNAPSHOT
[facility]  [place] [address] [place] [id number] [phone]               Thank you for choosing us for your health care visit with [facility].   We are glad to serve you and happy to provide you with this summary of yo carvedilol 25 MG Tabs   Take 1 tablet (25 mg total) by mouth 2 (two) times daily with meals. Commonly known as:  COREG           DULoxetine HCl 20 MG Cpep   Take 1 capsule (20 mg total) by mouth daily.    Commonly known as:  CYMBALTA           Fenofib Generic drug:  Icosapent Ethyl   2 tablets 2 (two) times daily. VIRT-TARUN PLUS 5 MG Tabs   Take 6 mg by mouth. Vitamin B-12 500 MCG Tabs   Take 500 mcg by mouth daily.    Commonly known as:  VITAMIN B12           vitamin C 1000 MG Tabs

## (undated) NOTE — LETTER
Katie Washington [address] [facility] [address], [address] [facility]     PICC LINE INSERTION CONSENT     I agree to have a Peripherally Inserted Central Catheter (PICC) placed in my arm.    1. The PICC insertion procedure, care, maintenance, risks, benefits, and c goals; and potential problems that might occur during recuperation.  They also discussed reasonable alternatives to the PICC, including risks, benefits, and side effects related to the alternatives and risks related to not receiving this procedure      ____